# Patient Record
Sex: FEMALE | Race: WHITE | NOT HISPANIC OR LATINO | Employment: OTHER | URBAN - METROPOLITAN AREA
[De-identification: names, ages, dates, MRNs, and addresses within clinical notes are randomized per-mention and may not be internally consistent; named-entity substitution may affect disease eponyms.]

---

## 2017-02-02 ENCOUNTER — DOCUMENTATION ONLY (OUTPATIENT)
Dept: FAMILY MEDICINE | Facility: CLINIC | Age: 77
End: 2017-02-02

## 2017-02-02 NOTE — PROGRESS NOTES
Pre-Visit Chart Review  For Appointment Scheduled on 2/3/2017.     Health Maintenance Due   Topic Date Due    Foot Exam  10/16/1950    Eye Exam  [birth year]16/1950    TETANUS VACCINE  10/16/1956    Zoster Vaccine  10/16/1998    Hemoglobin A1c  12/07/2012    Lipid Panel  06/07/2013    Pneumococcal (65+) (2 of 2 - PCV13) 03/10/2015    DEXA SCAN  06/07/2015    Influenza Vaccine  08/01/2016                          '

## 2017-02-03 ENCOUNTER — LAB VISIT (OUTPATIENT)
Dept: LAB | Facility: HOSPITAL | Age: 77
End: 2017-02-03
Attending: FAMILY MEDICINE
Payer: MEDICARE

## 2017-02-03 ENCOUNTER — OFFICE VISIT (OUTPATIENT)
Dept: FAMILY MEDICINE | Facility: CLINIC | Age: 77
End: 2017-02-03
Payer: MEDICARE

## 2017-02-03 VITALS
SYSTOLIC BLOOD PRESSURE: 166 MMHG | BODY MASS INDEX: 23.29 KG/M2 | DIASTOLIC BLOOD PRESSURE: 86 MMHG | HEART RATE: 66 BPM | HEIGHT: 64 IN | TEMPERATURE: 98 F | RESPIRATION RATE: 18 BRPM | WEIGHT: 136.44 LBS

## 2017-02-03 DIAGNOSIS — E03.9 HYPOTHYROIDISM, UNSPECIFIED TYPE: ICD-10-CM

## 2017-02-03 DIAGNOSIS — I10 HTN (HYPERTENSION), BENIGN: ICD-10-CM

## 2017-02-03 DIAGNOSIS — C64.9 RENAL CELL CARCINOMA, UNSPECIFIED LATERALITY: ICD-10-CM

## 2017-02-03 DIAGNOSIS — E11.8 TYPE 2 DIABETES MELLITUS WITH COMPLICATION, WITH LONG-TERM CURRENT USE OF INSULIN: ICD-10-CM

## 2017-02-03 DIAGNOSIS — Z79.4 TYPE 2 DIABETES MELLITUS WITH COMPLICATION, WITH LONG-TERM CURRENT USE OF INSULIN: ICD-10-CM

## 2017-02-03 DIAGNOSIS — K21.9 GASTROESOPHAGEAL REFLUX DISEASE, ESOPHAGITIS PRESENCE NOT SPECIFIED: ICD-10-CM

## 2017-02-03 DIAGNOSIS — F02.80 ALZHEIMER'S DEMENTIA WITHOUT BEHAVIORAL DISTURBANCE, UNSPECIFIED TIMING OF DEMENTIA ONSET: ICD-10-CM

## 2017-02-03 DIAGNOSIS — F02.80 ALZHEIMER'S DEMENTIA WITHOUT BEHAVIORAL DISTURBANCE, UNSPECIFIED TIMING OF DEMENTIA ONSET: Primary | ICD-10-CM

## 2017-02-03 DIAGNOSIS — E78.5 HYPERLIPIDEMIA, UNSPECIFIED HYPERLIPIDEMIA TYPE: ICD-10-CM

## 2017-02-03 DIAGNOSIS — F33.0 MILD EPISODE OF RECURRENT MAJOR DEPRESSIVE DISORDER: ICD-10-CM

## 2017-02-03 DIAGNOSIS — Z23 IMMUNIZATION DUE: ICD-10-CM

## 2017-02-03 DIAGNOSIS — G30.9 ALZHEIMER'S DEMENTIA WITHOUT BEHAVIORAL DISTURBANCE, UNSPECIFIED TIMING OF DEMENTIA ONSET: Primary | ICD-10-CM

## 2017-02-03 DIAGNOSIS — G30.9 ALZHEIMER'S DEMENTIA WITHOUT BEHAVIORAL DISTURBANCE, UNSPECIFIED TIMING OF DEMENTIA ONSET: ICD-10-CM

## 2017-02-03 PROBLEM — F32.9 MAJOR DEPRESSIVE DISORDER: Status: ACTIVE | Noted: 2017-02-03

## 2017-02-03 LAB
25(OH)D3+25(OH)D2 SERPL-MCNC: 11 NG/ML
ALBUMIN SERPL BCP-MCNC: 3.5 G/DL
ALP SERPL-CCNC: 108 U/L
ALT SERPL W/O P-5'-P-CCNC: 12 U/L
ANION GAP SERPL CALC-SCNC: 10 MMOL/L
AST SERPL-CCNC: 15 U/L
BASOPHILS # BLD AUTO: 0.02 K/UL
BASOPHILS NFR BLD: 0.2 %
BILIRUB SERPL-MCNC: 0.5 MG/DL
BUN SERPL-MCNC: 14 MG/DL
CALCIUM SERPL-MCNC: 9.8 MG/DL
CHLORIDE SERPL-SCNC: 102 MMOL/L
CHOLEST/HDLC SERPL: 4 {RATIO}
CO2 SERPL-SCNC: 26 MMOL/L
CREAT SERPL-MCNC: 1 MG/DL
DIFFERENTIAL METHOD: ABNORMAL
EOSINOPHIL # BLD AUTO: 0.2 K/UL
EOSINOPHIL NFR BLD: 2.5 %
ERYTHROCYTE [DISTWIDTH] IN BLOOD BY AUTOMATED COUNT: 12.5 %
EST. GFR  (AFRICAN AMERICAN): >60 ML/MIN/1.73 M^2
EST. GFR  (NON AFRICAN AMERICAN): 54.1 ML/MIN/1.73 M^2
GLUCOSE SERPL-MCNC: 249 MG/DL
HCT VFR BLD AUTO: 41.5 %
HDL/CHOLESTEROL RATIO: 25.2 %
HDLC SERPL-MCNC: 202 MG/DL
HDLC SERPL-MCNC: 51 MG/DL
HGB BLD-MCNC: 14.1 G/DL
LDLC SERPL CALC-MCNC: 120.8 MG/DL
LYMPHOCYTES # BLD AUTO: 2.1 K/UL
LYMPHOCYTES NFR BLD: 24.7 %
MCH RBC QN AUTO: 32.9 PG
MCHC RBC AUTO-ENTMCNC: 34 %
MCV RBC AUTO: 97 FL
MONOCYTES # BLD AUTO: 0.4 K/UL
MONOCYTES NFR BLD: 5.2 %
NEUTROPHILS # BLD AUTO: 5.6 K/UL
NEUTROPHILS NFR BLD: 67.3 %
NONHDLC SERPL-MCNC: 151 MG/DL
PLATELET # BLD AUTO: 217 K/UL
PMV BLD AUTO: 10.5 FL
POTASSIUM SERPL-SCNC: 3.4 MMOL/L
PROT SERPL-MCNC: 7.6 G/DL
RBC # BLD AUTO: 4.29 M/UL
SODIUM SERPL-SCNC: 138 MMOL/L
T4 FREE SERPL-MCNC: 1.43 NG/DL
TRIGL SERPL-MCNC: 151 MG/DL
TSH SERPL DL<=0.005 MIU/L-ACNC: 4.72 UIU/ML
WBC # BLD AUTO: 8.29 K/UL

## 2017-02-03 PROCEDURE — 82306 VITAMIN D 25 HYDROXY: CPT

## 2017-02-03 PROCEDURE — 99999 PR PBB SHADOW E&M-EST. PATIENT-LVL III: CPT | Mod: PBBFAC,,, | Performed by: FAMILY MEDICINE

## 2017-02-03 PROCEDURE — 1160F RVW MEDS BY RX/DR IN RCRD: CPT | Mod: S$GLB,,, | Performed by: FAMILY MEDICINE

## 2017-02-03 PROCEDURE — 3077F SYST BP >= 140 MM HG: CPT | Mod: S$GLB,,, | Performed by: FAMILY MEDICINE

## 2017-02-03 PROCEDURE — 83036 HEMOGLOBIN GLYCOSYLATED A1C: CPT

## 2017-02-03 PROCEDURE — 84439 ASSAY OF FREE THYROXINE: CPT

## 2017-02-03 PROCEDURE — 90670 PCV13 VACCINE IM: CPT | Mod: S$GLB,,, | Performed by: FAMILY MEDICINE

## 2017-02-03 PROCEDURE — 84443 ASSAY THYROID STIM HORMONE: CPT

## 2017-02-03 PROCEDURE — 1159F MED LIST DOCD IN RCRD: CPT | Mod: S$GLB,,, | Performed by: FAMILY MEDICINE

## 2017-02-03 PROCEDURE — 90471 IMMUNIZATION ADMIN: CPT | Mod: S$GLB,,, | Performed by: FAMILY MEDICINE

## 2017-02-03 PROCEDURE — 80053 COMPREHEN METABOLIC PANEL: CPT

## 2017-02-03 PROCEDURE — 90715 TDAP VACCINE 7 YRS/> IM: CPT | Mod: S$GLB,,, | Performed by: FAMILY MEDICINE

## 2017-02-03 PROCEDURE — 36415 COLL VENOUS BLD VENIPUNCTURE: CPT | Mod: PO

## 2017-02-03 PROCEDURE — 3079F DIAST BP 80-89 MM HG: CPT | Mod: S$GLB,,, | Performed by: FAMILY MEDICINE

## 2017-02-03 PROCEDURE — 1157F ADVNC CARE PLAN IN RCRD: CPT | Mod: S$GLB,,, | Performed by: FAMILY MEDICINE

## 2017-02-03 PROCEDURE — 99499 UNLISTED E&M SERVICE: CPT | Mod: S$GLB,,, | Performed by: FAMILY MEDICINE

## 2017-02-03 PROCEDURE — G0008 ADMIN INFLUENZA VIRUS VAC: HCPCS | Mod: S$GLB,,, | Performed by: INTERNAL MEDICINE

## 2017-02-03 PROCEDURE — 1126F AMNT PAIN NOTED NONE PRSNT: CPT | Mod: S$GLB,,, | Performed by: FAMILY MEDICINE

## 2017-02-03 PROCEDURE — 99204 OFFICE O/P NEW MOD 45 MIN: CPT | Mod: 25,S$GLB,, | Performed by: FAMILY MEDICINE

## 2017-02-03 PROCEDURE — 90662 IIV NO PRSV INCREASED AG IM: CPT | Mod: S$GLB,,, | Performed by: INTERNAL MEDICINE

## 2017-02-03 PROCEDURE — 80061 LIPID PANEL: CPT

## 2017-02-03 PROCEDURE — 85025 COMPLETE CBC W/AUTO DIFF WBC: CPT

## 2017-02-03 PROCEDURE — G0009 ADMIN PNEUMOCOCCAL VACCINE: HCPCS | Mod: 59,S$GLB,, | Performed by: FAMILY MEDICINE

## 2017-02-03 PROCEDURE — 86592 SYPHILIS TEST NON-TREP QUAL: CPT

## 2017-02-03 RX ORDER — ENALAPRIL MALEATE 5 MG/1
5 TABLET ORAL DAILY
Qty: 90 TABLET | Refills: 3 | Status: SHIPPED | OUTPATIENT
Start: 2017-02-03 | End: 2018-02-01 | Stop reason: SDUPTHER

## 2017-02-03 RX ORDER — INSULIN GLARGINE 100 [IU]/ML
15 INJECTION, SOLUTION SUBCUTANEOUS NIGHTLY
Qty: 4.5 ML | Refills: 11 | Status: ON HOLD | OUTPATIENT
Start: 2017-02-03 | End: 2017-03-28 | Stop reason: HOSPADM

## 2017-02-03 RX ORDER — DONEPEZIL HYDROCHLORIDE 10 MG/1
10 TABLET, FILM COATED ORAL DAILY
COMMUNITY
End: 2017-02-03 | Stop reason: SDUPTHER

## 2017-02-03 RX ORDER — VENLAFAXINE 37.5 MG/1
37.5 TABLET ORAL 2 TIMES DAILY
COMMUNITY
End: 2017-05-15 | Stop reason: SDUPTHER

## 2017-02-03 RX ORDER — DONEPEZIL HYDROCHLORIDE 10 MG/1
10 TABLET, FILM COATED ORAL DAILY
Qty: 90 TABLET | Refills: 3 | Status: SHIPPED | OUTPATIENT
Start: 2017-02-03 | End: 2018-02-01 | Stop reason: SDUPTHER

## 2017-02-03 NOTE — PROGRESS NOTES
"[facility]  Progress Note    Subjective:       Patient ID: Nina Portillo is a 76 y.o. female.    Chief Complaint: Establish Care    HPI76 y.o.female with current medical history of hypertension, hypothyroidism, Alzheimer's, GERD, hyperlipidemia, type 2 diabetes, history of renal cell carcinoma, and depression is here today to establish care.  Patient has been doing well on current medications.  Patient is complaining of trouble eating.  Patient has trouble due to weak teeth.  Patient is currently being evaluated by dentist for possible dentures.  Patient has lost 10 pounds over the past few months.  Otherwise patient has been doing well.  Patient does not have any further complaints at today's visit.  Review of Systems   Constitutional: Negative for chills and fever.   HENT: Negative for congestion, ear pain, postnasal drip, rhinorrhea, sneezing and sore throat.    Eyes: Negative for pain and visual disturbance.   Respiratory: Negative for cough, shortness of breath and wheezing.    Cardiovascular: Negative for chest pain, palpitations and leg swelling.   Gastrointestinal: Negative for abdominal pain, constipation, diarrhea, nausea and vomiting.   Endocrine: Negative.    Genitourinary: Negative for dysuria, frequency and urgency.   Musculoskeletal: Negative for arthralgias and myalgias.   Skin: Negative for rash.   Allergic/Immunologic: Negative.    Neurological: Negative for syncope and headaches.   Hematological: Negative.    Psychiatric/Behavioral: Negative.        Objective:      Vitals:    02/03/17 0855 02/03/17 0914   BP: (!) 184/88 (!) 166/86   BP Location: Right arm    Patient Position: Sitting    BP Method: Automatic    Pulse: 66    Resp: 18    Temp: 97.8 °F (36.6 °C)    TempSrc: Oral    Weight: 61.9 kg (136 lb 7.4 oz)    Height: 5' 4" (1.626 m)      Body mass index is 23.42 kg/(m^2).  Physical Exam   Constitutional: She is oriented to person, place, and time. She appears well-developed and " well-nourished.   HENT:   Head: Normocephalic and atraumatic.   Eyes: Conjunctivae and EOM are normal. Pupils are equal, round, and reactive to light.   Neck: Normal range of motion. Neck supple. No JVD present.   Cardiovascular: Normal rate, regular rhythm, normal heart sounds and intact distal pulses.  Exam reveals no gallop and no friction rub.    No murmur heard.  Pulmonary/Chest: Effort normal. No respiratory distress. She has no wheezes.   Abdominal: Soft. Bowel sounds are normal. There is no tenderness. There is no rebound and no guarding.   Musculoskeletal: Normal range of motion.   Neurological: She is alert and oriented to person, place, and time. No cranial nerve deficit.   Skin: Skin is warm and dry.   Psychiatric: She has a normal mood and affect. Her behavior is normal. Judgment and thought content normal.   Nursing note and vitals reviewed.      Assessment:       1. Alzheimer's dementia without behavioral disturbance, unspecified timing of dementia onset    2. HTN (hypertension), benign    3. Hypothyroidism, unspecified type    4. Type 2 diabetes mellitus with complication, with long-term current use of insulin    5. Gastroesophageal reflux disease, esophagitis presence not specified    6. Hyperlipidemia, unspecified hyperlipidemia type    7. Mild episode of recurrent major depressive disorder    8. Renal cell carcinoma, unspecified laterality    9. Immunization due        Plan:       Alzheimer's dementia without behavioral disturbance, unspecified timing of dementia onset  -     donepezil (ARICEPT) 10 MG tablet; Take 1 tablet (10 mg total) by mouth once daily.  Dispense: 90 tablet; Refill: 3  -     RPR; Future; Expected date: 2/3/17  -     Cancel: HIV-1 and HIV-2 antibodies; Future; Expected date: 2/3/17    HTN (hypertension), benign  -     enalapril (VASOTEC) 5 MG tablet; Take 1 tablet (5 mg total) by mouth once daily.  Dispense: 90 tablet; Refill: 3  - Continue to monitor   - BP goal < 150/90      Hypothyroidism, unspecified type  -     TSH; Future; Expected date: 2/3/17  -     T4, free; Future; Expected date: 2/3/17    Type 2 diabetes mellitus with complication, with long-term current use of insulin  -     Ambulatory referral to Ophthalmology  -     insulin glargine (LANTUS) 100 unit/mL injection; Inject 15 Units into the skin every evening.  Dispense: 4.5 mL; Refill: 11  -     Ambulatory referral to Ophthalmology  -     Comprehensive metabolic panel; Future; Expected date: 2/3/17  -     Hemoglobin A1c; Future; Expected date: 2/3/17  -     Microalbumin/creatinine urine ratio; Future; Expected date: 2/3/17    Gastroesophageal reflux disease, esophagitis presence not specified  -     CBC auto differential; Future; Expected date: 2/3/17    Hyperlipidemia, unspecified hyperlipidemia type  -     Lipid panel; Future; Expected date: 2/3/17    Mild episode of recurrent major depressive disorder        - Well controlled continue current medications    Renal cell carcinoma, unspecified laterality  -     Vitamin D; Future; Expected date: 2/3/17  - Continue to monitor     Immunization due  -     Pneumococcal Conjugate Vaccine (13 Valent) (IM)  -     Tdap Vaccine (Adult)    Patient readiness: acceptance and barriers:none    During the course of the visit the patient was educated and counseled about the following:     Diabetes:  Discussed general issues about diabetes pathophysiology and management.  Educational material distributed.  Addressed ADA diet.  Suggested low cholesterol diet.  Encouraged aerobic exercise.  Discussed foot care.  Reminded to get yearly retinal exam.  Discussed ways to avoid symptomatic hypoglycemia.  Hypertension:   Dietary sodium restriction.  Regular aerobic exercise.  Check blood pressures daily and record.    Goals: Diabetes: Maintain Hemoglobin A1C below 7 and Hypertension: Reduce Blood Pressure    Did patient meet goals/outcomes: Yes    The following self management tools provided:  blood pressure log  blood glucose log    Patient Instructions (the written plan) was given to the patient/family.     Time spent with patient: 30 minutes    Return in about 6 months (around 8/3/2017).  Mariano Peacock MD  [facility]  2/3/2017 10:43 AM

## 2017-02-03 NOTE — PROGRESS NOTES
Prevnar given as ordered; RD;bandaid applied; patient tolerates well. TDaP given as ordered.LD;  Bandaid applied. Patient tolerates well

## 2017-02-04 LAB
ESTIMATED AVG GLUCOSE: 143 MG/DL
HBA1C MFR BLD HPLC: 6.6 %
RPR SER QL: NORMAL

## 2017-02-15 ENCOUNTER — TELEPHONE (OUTPATIENT)
Dept: FAMILY MEDICINE | Facility: CLINIC | Age: 77
End: 2017-02-15

## 2017-02-15 NOTE — TELEPHONE ENCOUNTER
----- Message from Mariano Peacock MD sent at 2/15/2017 10:57 AM CST -----  Please call and inform patient that her hemoglobin A1c has increased to 6.6.  Patient will need better diet and exercise.  The patient also has elevated TSH, low potassium, vitamin D deficiency, and elevated cholesterol.  Please have patient make appointment as soon as possible to discuss findings and formulate appropriate treatment plan.

## 2017-03-06 RX ORDER — LEVOTHYROXINE SODIUM 75 UG/1
75 TABLET ORAL DAILY
Qty: 90 TABLET | Refills: 0 | Status: SHIPPED | OUTPATIENT
Start: 2017-03-06 | End: 2017-06-01 | Stop reason: SDUPTHER

## 2017-03-15 ENCOUNTER — PATIENT MESSAGE (OUTPATIENT)
Dept: FAMILY MEDICINE | Facility: CLINIC | Age: 77
End: 2017-03-15

## 2017-03-16 ENCOUNTER — TELEPHONE (OUTPATIENT)
Dept: FAMILY MEDICINE | Facility: CLINIC | Age: 77
End: 2017-03-16

## 2017-03-16 NOTE — TELEPHONE ENCOUNTER
Patient's daughter, Carroll ([employer] employee) is inquiring about Hospice for patient. Does she need appointment or can she talk with you privately. Please advise

## 2017-03-23 ENCOUNTER — HOSPITAL ENCOUNTER (INPATIENT)
Facility: HOSPITAL | Age: 77
LOS: 6 days | Discharge: NURSING, REHAB OR LONG-TERM CARE | DRG: 871 | End: 2017-03-29
Attending: EMERGENCY MEDICINE | Admitting: EMERGENCY MEDICINE
Payer: MEDICARE

## 2017-03-23 ENCOUNTER — DOCUMENTATION ONLY (OUTPATIENT)
Dept: FAMILY MEDICINE | Facility: CLINIC | Age: 77
End: 2017-03-23

## 2017-03-23 DIAGNOSIS — G93.41 METABOLIC ENCEPHALOPATHY: ICD-10-CM

## 2017-03-23 LAB
ALBUMIN SERPL BCP-MCNC: 3.1 G/DL
ALP SERPL-CCNC: 103 U/L
ALT SERPL W/O P-5'-P-CCNC: 13 U/L
ANION GAP SERPL CALC-SCNC: 11 MMOL/L
AST SERPL-CCNC: 18 U/L
BACTERIA #/AREA URNS HPF: ABNORMAL /HPF
BASOPHILS # BLD AUTO: 0 K/UL
BASOPHILS NFR BLD: 0.1 %
BILIRUB SERPL-MCNC: 0.9 MG/DL
BILIRUB UR QL STRIP: NEGATIVE
BUN SERPL-MCNC: 9 MG/DL
CALCIUM SERPL-MCNC: 10.5 MG/DL
CHLORIDE SERPL-SCNC: 98 MMOL/L
CLARITY UR: ABNORMAL
CO2 SERPL-SCNC: 21 MMOL/L
COLOR UR: YELLOW
CREAT SERPL-MCNC: 0.9 MG/DL
DIFFERENTIAL METHOD: ABNORMAL
EOSINOPHIL # BLD AUTO: 0 K/UL
EOSINOPHIL NFR BLD: 0.3 %
ERYTHROCYTE [DISTWIDTH] IN BLOOD BY AUTOMATED COUNT: 12.3 %
EST. GFR  (AFRICAN AMERICAN): >60 ML/MIN/1.73 M^2
EST. GFR  (NON AFRICAN AMERICAN): >60 ML/MIN/1.73 M^2
GLUCOSE SERPL-MCNC: 279 MG/DL
GLUCOSE UR QL STRIP: ABNORMAL
HCT VFR BLD AUTO: 40.8 %
HGB BLD-MCNC: 13.8 G/DL
HGB UR QL STRIP: ABNORMAL
HYALINE CASTS #/AREA URNS LPF: 0 /LPF
KETONES UR QL STRIP: NEGATIVE
LEUKOCYTE ESTERASE UR QL STRIP: ABNORMAL
LYMPHOCYTES # BLD AUTO: 0.7 K/UL
LYMPHOCYTES NFR BLD: 5.9 %
MCH RBC QN AUTO: 31.5 PG
MCHC RBC AUTO-ENTMCNC: 33.9 %
MCV RBC AUTO: 93 FL
MICROSCOPIC COMMENT: ABNORMAL
MONOCYTES # BLD AUTO: 1 K/UL
MONOCYTES NFR BLD: 8.6 %
NEUTROPHILS # BLD AUTO: 10.3 K/UL
NEUTROPHILS NFR BLD: 85.1 %
NITRITE UR QL STRIP: POSITIVE
PH UR STRIP: 6 [PH] (ref 5–8)
PLATELET # BLD AUTO: 152 K/UL
PMV BLD AUTO: 7.9 FL
POTASSIUM SERPL-SCNC: 4.2 MMOL/L
PROT SERPL-MCNC: 7.5 G/DL
PROT UR QL STRIP: ABNORMAL
RBC # BLD AUTO: 4.38 M/UL
RBC #/AREA URNS HPF: 8 /HPF (ref 0–4)
SODIUM SERPL-SCNC: 130 MMOL/L
SP GR UR STRIP: >=1.03 (ref 1–1.03)
SQUAMOUS #/AREA URNS HPF: 3 /HPF
TROPONIN I SERPL DL<=0.01 NG/ML-MCNC: 0.01 NG/ML
TSH SERPL DL<=0.005 MIU/L-ACNC: 0.99 UIU/ML
URN SPEC COLLECT METH UR: ABNORMAL
UROBILINOGEN UR STRIP-ACNC: 1 EU/DL
WBC # BLD AUTO: 12.1 K/UL
WBC #/AREA URNS HPF: >100 /HPF (ref 0–5)
YEAST URNS QL MICRO: ABNORMAL

## 2017-03-23 PROCEDURE — 96365 THER/PROPH/DIAG IV INF INIT: CPT

## 2017-03-23 PROCEDURE — 63600175 PHARM REV CODE 636 W HCPCS: Performed by: EMERGENCY MEDICINE

## 2017-03-23 PROCEDURE — 87077 CULTURE AEROBIC IDENTIFY: CPT

## 2017-03-23 PROCEDURE — 81000 URINALYSIS NONAUTO W/SCOPE: CPT

## 2017-03-23 PROCEDURE — 12000002 HC ACUTE/MED SURGE SEMI-PRIVATE ROOM

## 2017-03-23 PROCEDURE — 99285 EMERGENCY DEPT VISIT HI MDM: CPT | Mod: 25

## 2017-03-23 PROCEDURE — 87186 SC STD MICRODIL/AGAR DIL: CPT

## 2017-03-23 PROCEDURE — 85025 COMPLETE CBC W/AUTO DIFF WBC: CPT

## 2017-03-23 PROCEDURE — 36415 COLL VENOUS BLD VENIPUNCTURE: CPT

## 2017-03-23 PROCEDURE — 25000003 PHARM REV CODE 250: Performed by: EMERGENCY MEDICINE

## 2017-03-23 PROCEDURE — 84484 ASSAY OF TROPONIN QUANT: CPT

## 2017-03-23 PROCEDURE — 96361 HYDRATE IV INFUSION ADD-ON: CPT

## 2017-03-23 PROCEDURE — 80053 COMPREHEN METABOLIC PANEL: CPT

## 2017-03-23 PROCEDURE — 51702 INSERT TEMP BLADDER CATH: CPT

## 2017-03-23 PROCEDURE — 84443 ASSAY THYROID STIM HORMONE: CPT

## 2017-03-23 PROCEDURE — 87086 URINE CULTURE/COLONY COUNT: CPT

## 2017-03-23 PROCEDURE — 87088 URINE BACTERIA CULTURE: CPT

## 2017-03-23 RX ADMIN — SODIUM CHLORIDE 1000 ML: 0.9 INJECTION, SOLUTION INTRAVENOUS at 09:03

## 2017-03-23 RX ADMIN — CEFTRIAXONE 1 G: 1 INJECTION, SOLUTION INTRAVENOUS at 10:03

## 2017-03-23 NOTE — PROGRESS NOTES
Pre-Visit Chart Review  For Appointment Scheduled on 3/24/17.    Health Maintenance Due   Topic Date Due    Eye Exam  10[birth year]16/1950    Zoster Vaccine  10/16/1998    DEXA SCAN  06/07/2015

## 2017-03-23 NOTE — IP AVS SNAPSHOT
[address] [place] [address]  Phone: [phone]           Patient Discharge Instructions   Our goal is to set you up for success. This packet includes information on your condition, medications, and your home care.  It will help you care for yourself to prevent having to return to the hospital.     Please ask your nurse if you have any questions.      There are many details to remember when preparing to leave the hospital. Here is what you will need to do:    1. Take your medicine. If you are prescribed medications, review your Medication List on the following pages. You may have new medications to  at the pharmacy and others that you'll need to stop taking. Review the instructions for how and when to take your medications. Talk with your doctor or nurses if you are unsure of what to do.     2. Go to your follow-up appointments. Specific follow-up information is listed in the following pages. Your may be contacted by a nurse or clinical provider about future appointments. Be sure we have all of the phone numbers to reach you. Please contact your provider's office if you are unable to make an appointment.     3. Watch for warning signs. Your doctor or nurse will give you detailed warning signs to watch for and when to call for assistance. These instructions may also include educational information about your condition. If you experience any of warning signs to your health, call your doctor.               [facility]  Unless otherwise directed by your provider, please contact [facility], our nurse care line that is available for 24/7 assistance.     [phone] (toll-free)    Registered nurses in the [facility] provide clinical advisement, health education, appointment booking, and other advisory services.                    ** Verify the list of medication(s) below is accurate and up to date. Carry this with you in case of emergency. If  your medications have changed, please notify your healthcare provider.             Medication List      START taking these medications        Additional Info    Begin Date AM Noon PM Bedtime    ciprofloxacin HCl 500 MG tablet   Commonly known as:  CIPRO   Refills:  0   Dose:  500 mg   Indications:  Urinary Tract Infection    Last time this was given:  500 mg on 3/29/2017  8:46 AM   Instructions:  Take 1 tablet (500 mg total) by mouth every 12 (twelve) hours.                                 CONTINUE taking these medications        Additional Info    Begin Date AM Noon PM Bedtime    amlodipine 5 MG tablet   Commonly known as:  NORVASC   Quantity:  90 tablet   Refills:  3   Dose:  5 mg    Last time this was given:  5 mg on 3/29/2017  8:46 AM   Instructions:  Take 1 tablet (5 mg total) by mouth once daily.                               aspirin 325 MG tablet   Refills:  0   Dose:  325 mg    Last time this was given:  325 mg on 3/29/2017  8:46 AM   Instructions:  Take 325 mg by mouth once daily.                               donepezil 10 MG tablet   Commonly known as:  ARICEPT   Quantity:  90 tablet   Refills:  3   Dose:  10 mg    Last time this was given:  10 mg on 3/29/2017  8:46 AM   Instructions:  Take 1 tablet (10 mg total) by mouth once daily.                               enalapril 5 MG tablet   Commonly known as:  VASOTEC   Quantity:  90 tablet   Refills:  3   Dose:  5 mg    Last time this was given:  5 mg on 3/29/2017  8:46 AM   Instructions:  Take 1 tablet (5 mg total) by mouth once daily.                               levothyroxine 75 MCG tablet   Commonly known as:  SYNTHROID   Quantity:  90 tablet   Refills:  0   Dose:  75 mcg    Last time this was given:  75 mcg on 3/29/2017  5:10 AM   Instructions:  Take 1 tablet (75 mcg total) by mouth once daily.                               MULTI VITAMIN ORAL   Refills:  0   Dose:  1 tablet    Instructions:  Take 1 tablet by mouth once daily.                             "   pantoprazole 40 MG tablet   Commonly known as:  PROTONIX   Quantity:  90 tablet   Refills:  3   Dose:  40 mg    Last time this was given:  40 mg on 3/29/2017  8:46 AM   Instructions:  Take 1 tablet (40 mg total) by mouth once daily.                               venlafaxine 37.5 MG Tab   Commonly known as:  EFFEXOR   Refills:  0   Dose:  37.5 mg    Last time this was given:  37.5 mg on 3/29/2017  8:46 AM   Instructions:  Take 37.5 mg by mouth 2 (two) times daily.                                    STOP taking these medications     BD INSULIN PEN NEEDLE UF SHORT 31 gauge x 5/16" Ndle   Generic drug:  pen needle, diabetic       blood sugar diagnostic Strp       blood-glucose meter kit       insulin glargine 100 unit/mL injection   Commonly known as:  LANTUS            Where to Get Your Medications      Information about where to get these medications is not yet available     ! Ask your nurse or doctor about these medications     ciprofloxacin HCl 500 MG tablet                  Please bring to all follow up appointments:    1. A copy of your discharge instructions.  2. All medicines you are currently taking in their original bottles.  3. Identification and insurance card.    Please arrive 15 minutes ahead of scheduled appointment time.    Please call 24 hours in advance if you must reschedule your appointment and/or time.        Your Scheduled Appointments     Apr 07, 2017 10:00 AM CDT   Established Patient Visit with MD [facility] - [facility] ([place])    [address] [place] [address]   [phone]            Aug 04, 2017 10:40 AM CDT   Established Patient Visit with MD [facility] - [facility] ([place])    [place] [address]   [phone]              Follow-up Information     Follow up with Lydia Singh.    Specialties:  SNF Agency, Allergy, Allergy    Why:  SNF          Discharge Instructions     " "  ********************************************************  Discharge to [facility] for SNF  PT and OT 5 times a week.  Diet:  1800 calorie diabetic  Activity:  Ambulate with assistance  Meds:  See list.  Stop date for ciprofloxacin is 4/1.  *********************************************************      Primary Diagnosis     Your primary diagnosis was:  Metabolic Brain Disorder      Admission Information     Date & Time Provider Department CSN    3/23/2017  8:52 PM Miguel Portillo MD [facility] [id number]      Care Providers     Provider Role Specialty Primary office phone    Miguel Portillo MD Attending Provider Hospitalist [phone]      Important Medicare Message          Most Recent Value    Important Message from Medicare Regarding Discharge Appeal Rights  Explained to patient/caregiver, Signed/date by patient/caregiver yes 03/28/2017 0910      Your Vitals Were     BP Pulse Temp Resp Height Weight    159/74 (BP Location: Left arm, Patient Position: Lying, BP Method: Automatic) 72 98.2 °F (36.8 °C) (Oral) 18 5' 5" (1.651 m) 61.7 kg (136 lb)    SpO2 BMI             96% 22.63 kg/m2         Recent Lab Values        6/7/2012 2/3/2017 3/24/2017                     8:38 AM 10:20 AM  7:00 AM         A1C 6.1 6.6 (H) 6.9 (H)         Comment for A1C at 10:20 AM on 2/3/2017:  According to ADA guidelines, hemoglobin A1C <7.0% represents  optimal control in non-pregnant diabetic patients.  Different  metrics may apply to specific populations.   Standards of Medical Care in Diabetes - 2016.  For the purpose of screening for the presence of diabetes:  <5.7%     Consistent with the absence of diabetes  5.7-6.4%  Consistent with increasing risk for diabetes   (prediabetes)  >or=6.5%  Consistent with diabetes  Currently no consensus exists for use of hemoglobin A1C  for diagnosis of diabetes for children.      Comment for A1C at  7:00 AM on 3/24/2017:  According to ADA guidelines, hemoglobin " A1C <7.0% represents  optimal control in non-pregnant diabetic patients.  Different  metrics may apply to specific populations.   Standards of Medical Care in Diabetes - 2016.  For the purpose of screening for the presence of diabetes:  <5.7%     Consistent with the absence of diabetes  5.7-6.4%  Consistent with increasing risk for diabetes   (prediabetes)  >or=6.5%  Consistent with diabetes  Currently no consensus exists for use of hemoglobin A1C  for diagnosis of diabetes for children.        Allergies as of 3/29/2017        Reactions    Phenergan [Promethazine] Other (See Comments)    hallucinations    Prilosec [Omeprazole Magnesium] Rash      Advance Directives     An advance directive is a document which, in the event you are no longer able to make decisions for yourself, tells your healthcare team what kind of treatment you do or do not want to receive, or who you would like to make those decisions for you.  If you do not currently have an advance directive, [facility] encourages you to create one.  For more information call:  [phone]), [phone] ([phone]),  or log on to [email or web address].        Language Assistance Services     ATTENTION: Language assistance services are available, free of charge. Please call [phone].      ATENCIÓN: Si habla español, tiene a lindsey disposición servicios gratuitos de asistencia lingüística. Llame al [phone].     [facility] Ý: N?u b?n nói Ti?ng Vi?t, có các d?ch v? h? tr? ngôn ng? mi?n phí dành cho b?n. G?i s? [phone].        Diabetes Discharge Instructions                                    [facility] complies with applicable Federal civil rights laws and does not discriminate on the basis of race, color, national origin, age, disability, or sex.

## 2017-03-24 PROBLEM — Z85.528 HISTORY OF RENAL CELL CARCINOMA: Status: ACTIVE | Noted: 2017-02-03

## 2017-03-24 PROBLEM — E87.1 HYPONATREMIA: Status: ACTIVE | Noted: 2017-03-24

## 2017-03-24 PROBLEM — N30.00 ACUTE CYSTITIS WITHOUT HEMATURIA: Status: ACTIVE | Noted: 2017-03-24

## 2017-03-24 LAB
CRP SERPL-MCNC: 111.7 MG/L
ESTIMATED AVG GLUCOSE: 151 MG/DL
HBA1C MFR BLD HPLC: 6.9 %
POCT GLUCOSE: 186 MG/DL (ref 70–110)
POCT GLUCOSE: 208 MG/DL (ref 70–110)
POCT GLUCOSE: 229 MG/DL (ref 70–110)
POCT GLUCOSE: 258 MG/DL (ref 70–110)
SODIUM UR-SCNC: 35 MMOL/L

## 2017-03-24 PROCEDURE — 84300 ASSAY OF URINE SODIUM: CPT

## 2017-03-24 PROCEDURE — 12000002 HC ACUTE/MED SURGE SEMI-PRIVATE ROOM

## 2017-03-24 PROCEDURE — 86140 C-REACTIVE PROTEIN: CPT

## 2017-03-24 PROCEDURE — 36415 COLL VENOUS BLD VENIPUNCTURE: CPT

## 2017-03-24 PROCEDURE — 25000003 PHARM REV CODE 250: Performed by: NURSE PRACTITIONER

## 2017-03-24 PROCEDURE — 83036 HEMOGLOBIN GLYCOSYLATED A1C: CPT

## 2017-03-24 PROCEDURE — 63600175 PHARM REV CODE 636 W HCPCS: Performed by: NURSE PRACTITIONER

## 2017-03-24 RX ORDER — DONEPEZIL HYDROCHLORIDE 5 MG/1
10 TABLET, FILM COATED ORAL DAILY
Status: DISCONTINUED | OUTPATIENT
Start: 2017-03-24 | End: 2017-03-29 | Stop reason: HOSPADM

## 2017-03-24 RX ORDER — ENOXAPARIN SODIUM 100 MG/ML
40 INJECTION SUBCUTANEOUS EVERY 24 HOURS
Status: DISCONTINUED | OUTPATIENT
Start: 2017-03-24 | End: 2017-03-29 | Stop reason: HOSPADM

## 2017-03-24 RX ORDER — ENALAPRIL MALEATE 5 MG/1
5 TABLET ORAL DAILY
Status: DISCONTINUED | OUTPATIENT
Start: 2017-03-24 | End: 2017-03-29 | Stop reason: HOSPADM

## 2017-03-24 RX ORDER — AMLODIPINE BESYLATE 5 MG/1
5 TABLET ORAL DAILY
Status: DISCONTINUED | OUTPATIENT
Start: 2017-03-24 | End: 2017-03-29 | Stop reason: HOSPADM

## 2017-03-24 RX ORDER — PANTOPRAZOLE SODIUM 40 MG/1
40 TABLET, DELAYED RELEASE ORAL DAILY
Status: DISCONTINUED | OUTPATIENT
Start: 2017-03-24 | End: 2017-03-29 | Stop reason: HOSPADM

## 2017-03-24 RX ORDER — IBUPROFEN 200 MG
24 TABLET ORAL
Status: DISCONTINUED | OUTPATIENT
Start: 2017-03-24 | End: 2017-03-29 | Stop reason: HOSPADM

## 2017-03-24 RX ORDER — VENLAFAXINE 37.5 MG/1
37.5 TABLET ORAL 2 TIMES DAILY
Status: DISCONTINUED | OUTPATIENT
Start: 2017-03-24 | End: 2017-03-29 | Stop reason: HOSPADM

## 2017-03-24 RX ORDER — AMOXICILLIN 250 MG
1 CAPSULE ORAL 2 TIMES DAILY
Status: DISCONTINUED | OUTPATIENT
Start: 2017-03-24 | End: 2017-03-29 | Stop reason: HOSPADM

## 2017-03-24 RX ORDER — IBUPROFEN 200 MG
16 TABLET ORAL
Status: DISCONTINUED | OUTPATIENT
Start: 2017-03-24 | End: 2017-03-29 | Stop reason: HOSPADM

## 2017-03-24 RX ORDER — GLUCAGON 1 MG
1 KIT INJECTION
Status: DISCONTINUED | OUTPATIENT
Start: 2017-03-24 | End: 2017-03-29 | Stop reason: HOSPADM

## 2017-03-24 RX ORDER — INSULIN ASPART 100 [IU]/ML
0-5 INJECTION, SOLUTION INTRAVENOUS; SUBCUTANEOUS
Status: DISCONTINUED | OUTPATIENT
Start: 2017-03-24 | End: 2017-03-29 | Stop reason: HOSPADM

## 2017-03-24 RX ORDER — ASPIRIN 325 MG
325 TABLET ORAL DAILY
Status: DISCONTINUED | OUTPATIENT
Start: 2017-03-24 | End: 2017-03-29 | Stop reason: HOSPADM

## 2017-03-24 RX ORDER — ONDANSETRON 2 MG/ML
4 INJECTION INTRAMUSCULAR; INTRAVENOUS EVERY 4 HOURS PRN
Status: DISCONTINUED | OUTPATIENT
Start: 2017-03-24 | End: 2017-03-29 | Stop reason: HOSPADM

## 2017-03-24 RX ORDER — ACETAMINOPHEN 325 MG/1
650 TABLET ORAL EVERY 6 HOURS PRN
Status: DISCONTINUED | OUTPATIENT
Start: 2017-03-24 | End: 2017-03-29 | Stop reason: HOSPADM

## 2017-03-24 RX ORDER — SODIUM CHLORIDE 9 MG/ML
INJECTION, SOLUTION INTRAVENOUS CONTINUOUS
Status: DISCONTINUED | OUTPATIENT
Start: 2017-03-24 | End: 2017-03-29 | Stop reason: HOSPADM

## 2017-03-24 RX ADMIN — STANDARDIZED SENNA CONCENTRATE AND DOCUSATE SODIUM 1 TABLET: 8.6; 5 TABLET, FILM COATED ORAL at 10:03

## 2017-03-24 RX ADMIN — LEVOTHYROXINE SODIUM 75 MCG: 25 TABLET ORAL at 06:03

## 2017-03-24 RX ADMIN — PANTOPRAZOLE SODIUM 40 MG: 40 TABLET, DELAYED RELEASE ORAL at 10:03

## 2017-03-24 RX ADMIN — STANDARDIZED SENNA CONCENTRATE AND DOCUSATE SODIUM 1 TABLET: 8.6; 5 TABLET, FILM COATED ORAL at 09:03

## 2017-03-24 RX ADMIN — CEFTRIAXONE 1 G: 1 INJECTION, SOLUTION INTRAVENOUS at 09:03

## 2017-03-24 RX ADMIN — INSULIN ASPART 2 UNITS: 100 INJECTION, SOLUTION INTRAVENOUS; SUBCUTANEOUS at 02:03

## 2017-03-24 RX ADMIN — INSULIN ASPART 2 UNITS: 100 INJECTION, SOLUTION INTRAVENOUS; SUBCUTANEOUS at 06:03

## 2017-03-24 RX ADMIN — ASPIRIN 325 MG ORAL TABLET 325 MG: 325 PILL ORAL at 10:03

## 2017-03-24 RX ADMIN — VENLAFAXINE 37.5 MG: 37.5 TABLET ORAL at 10:03

## 2017-03-24 RX ADMIN — ENALAPRIL MALEATE 5 MG: 5 TABLET ORAL at 10:03

## 2017-03-24 RX ADMIN — VENLAFAXINE 37.5 MG: 37.5 TABLET ORAL at 09:03

## 2017-03-24 RX ADMIN — DONEPEZIL HYDROCHLORIDE 10 MG: 5 TABLET, FILM COATED ORAL at 10:03

## 2017-03-24 RX ADMIN — INSULIN ASPART 1 UNITS: 100 INJECTION, SOLUTION INTRAVENOUS; SUBCUTANEOUS at 09:03

## 2017-03-24 RX ADMIN — ENOXAPARIN SODIUM 40 MG: 100 INJECTION SUBCUTANEOUS at 05:03

## 2017-03-24 RX ADMIN — SODIUM CHLORIDE: 0.9 INJECTION, SOLUTION INTRAVENOUS at 07:03

## 2017-03-24 RX ADMIN — AMLODIPINE BESYLATE 5 MG: 5 TABLET ORAL at 10:03

## 2017-03-24 NOTE — ED NOTES
"Patient identifiers for Nina Portillo checked and correct.  LOC:  Patient is awake, alert, and aware of environment with an appropriate affect. Pt disoriented to time and situation and speaking appropriately.  APPEARANCE:  Patient resting comfortably and in no acute distress. Patient is clean and well groomed, patient's clothing is properly fastened.  SKIN:  The skin is warm and dry. Patient has normal skin turgor and moist mucus membranes. Skin is intact; no bruising or breakdown noted.  MUSCULOSKELETAL:  Patient is moving all extremities well, no obvious deformities noted. Pulses intact. Pedal edema noted candido lower legs.   RESPIRATORY:  Airway is open and patent. Respirations are spontaneous and non-labored with normal effort and rate.  CARDIAC:  Patient has a normal rate and rhythm. Capillary refill < 3 seconds.  ABDOMEN:  No distention noted.  Soft and non-tender upon palpation.  NEUROLOGICAL:  PERRL. Facial expression is symmetrical. Hand grasps are equal bilaterally. Normal sensation in all extremities when touched with finger.  Allergies reported:   Review of patient's allergies indicates:   Allergen Reactions    Phenergan [promethazine] Other (See Comments)     hallucinations    Prilosec [omeprazole magnesium] Rash     OTHER NOTES:  Family states has been having declined mental and physical status x48 hrs, family having to feed and change pt. Unable to get up and walk to bathroom. Pt states she just "doesn't feel well".  "

## 2017-03-24 NOTE — ASSESSMENT & PLAN NOTE
Chronic  Continue Aricept  Fall precautions  Nursing aware of diagnosis  Daughter sleeping at BS

## 2017-03-24 NOTE — PLAN OF CARE
PCP is Dr Peacock.  Verified insurance as [employer].  Pharmacy is [facility] [address].       03/24/17 1130   Discharge Assessment   Assessment Type Discharge Planning Assessment   Confirmed/corrected address and phone number on facesheet? Yes   Assessment information obtained from? Patient;Other  (daughter)   Type of Healthcare Directive Received Durable power of  for health care  (daughter Soledad Kahn)   Prior to hospitilization cognitive status: Alert/Oriented   Prior to hospitalization functional status: Independent;Assistive Equipment   Current cognitive status: Alert/Oriented   Current Functional Status: Independent;Assistive Equipment   Arrived From home or self-care   Lives With child(dianna), adult  (daughter)   Able to Return to Prior Arrangements yes   Is patient able to care for self after discharge? Yes   How many people do you have in your home that can help with your care after discharge? 1   Patient's perception of discharge disposition home or selfcare   Readmission Within The Last 30 Days no previous admission in last 30 days   Patient currently being followed by outpatient case management? No   Patient currently receives home health services? No   Does the patient currently use HME? Yes   Patient currently receives private duty nursing? No   Patient currently receives any other outside agency services? No   Equipment Currently Used at Home glucometer;cane, straight;rollator   Do you have any problems affording any of your prescribed medications? No   Is the patient taking medications as prescribed? yes   Do you have any financial concerns preventing you from receiving the healthcare you need? No   Does the patient have transportation to healthcare appointments? Yes   Transportation Available family or friend will provide   On Dialysis? No   Does the patient receive services at the Coumadin Clinic? No   Discharge Plan A Home   Patient/Family In Agreement With Plan yes

## 2017-03-24 NOTE — PLAN OF CARE
Problem: Patient Care Overview  Goal: Plan of Care Review  Outcome: Ongoing (interventions implemented as appropriate)  Alertness increasing, conversing with daughter and staff. Oriented x1, Iv medication continued

## 2017-03-24 NOTE — SUBJECTIVE & OBJECTIVE
"Past Medical History:   Diagnosis Date    Alzheimer disease 2012    Cancer     renal cell. right    Dementia     Diabetes mellitus     GERD (gastroesophageal reflux disease)     Hyperlipidemia     Hypothyroid     Memory loss     Movement disorder        Past Surgical History:   Procedure Laterality Date    HYSTERECTOMY      JOINT REPLACEMENT      right    KNEE ARTHROSCOPY W/ DEBRIDEMENT      left    PARTIAL NEPHRECTOMY      right.     TONSILLECTOMY         Review of patient's allergies indicates:   Allergen Reactions    Phenergan [promethazine] Other (See Comments)     hallucinations    Prilosec [omeprazole magnesium] Rash       No current facility-administered medications on file prior to encounter.      Current Outpatient Prescriptions on File Prior to Encounter   Medication Sig    amlodipine (NORVASC) 5 MG tablet Take 1 tablet (5 mg total) by mouth once daily.    BD INSULIN PEN NEEDLE UF SHORT 31 X 5/16 " Ndle USE AS DIRECTED    blood sugar diagnostic Strp True results test strips and lancets  use twice a day to test blood sugar   DX:250.00    blood-glucose meter kit Please dispence true test results meter    donepezil (ARICEPT) 10 MG tablet Take 1 tablet (10 mg total) by mouth once daily.    enalapril (VASOTEC) 5 MG tablet Take 1 tablet (5 mg total) by mouth once daily.    insulin glargine (LANTUS) 100 unit/mL injection Inject 15 Units into the skin every evening.    MULTIVIT-MINERALS/FERROUS FUM (MULTI VITAMIN ORAL) Take 1 tablet by mouth once daily.    pantoprazole (PROTONIX) 40 MG tablet Take 1 tablet (40 mg total) by mouth once daily.    aspirin 325 MG tablet Take 325 mg by mouth once daily.    levothyroxine (SYNTHROID) 75 MCG tablet Take 1 tablet (75 mcg total) by mouth once daily.    venlafaxine (EFFEXOR) 37.5 MG Tab Take 37.5 mg by mouth 2 (two) times daily.     Family History     Problem Relation (Age of Onset)    Cancer Mother (90)        Social History Main Topics    " Smoking status: Never Smoker    Smokeless tobacco: Never Used    Alcohol use No    Drug use: No    Sexual activity: No     Review of Systems   Unable to perform ROS: Dementia     Objective:     Vital Signs (Most Recent):  Temp: 98.3 °F (36.8 °C) (03/24/17 0747)  Pulse: 60 (03/24/17 0747)  Resp: 18 (03/24/17 0747)  BP: (!) 154/84 (03/24/17 0747)  SpO2: 96 % (03/24/17 0747) Vital Signs (24h Range):  Temp:  [97.9 °F (36.6 °C)-98.5 °F (36.9 °C)] 98.3 °F (36.8 °C)  Pulse:  [] 60  Resp:  [16-24] 18  SpO2:  [90 %-99 %] 96 %  BP: (136-154)/(69-91) 154/84     Weight: 61.7 kg (136 lb)  Body mass index is 22.63 kg/(m^2).    Physical Exam   Constitutional: She appears well-developed and well-nourished. No distress.   HENT:   Head: Normocephalic and atraumatic.   Mouth/Throat: Oropharynx is clear and moist.   Eyes: Conjunctivae are normal. Pupils are equal, round, and reactive to light. Right eye exhibits no discharge. Left eye exhibits no discharge. No scleral icterus.   Neck: No JVD present. No thyromegaly present.   Cardiovascular: Normal rate and regular rhythm.  Exam reveals no gallop and no friction rub.    No murmur heard.  Pulmonary/Chest: Effort normal and breath sounds normal. No respiratory distress. She has no wheezes. She has no rales. She exhibits no tenderness.   Abdominal: Soft. Bowel sounds are normal. She exhibits no distension. There is no tenderness. There is no rebound and no guarding.   Musculoskeletal: Normal range of motion. She exhibits no edema or tenderness.   Lymphadenopathy:     She has no cervical adenopathy.   Neurological: She is alert. No cranial nerve deficit.   Pleasantly confused   Skin: Skin is warm and dry. She is not diaphoretic.   Psychiatric: She has a normal mood and affect. Her behavior is normal. Judgment and thought content normal.   Vitals reviewed.       Significant Labs:   CBC:   Recent Labs  Lab 03/23/17  2145   WBC 12.10   HGB 13.8   HCT 40.8        CMP:   Recent  Labs  Lab 03/23/17  2145   *   K 4.2   CL 98   CO2 21*   *   BUN 9   CREATININE 0.9   CALCIUM 10.5   PROT 7.5   ALBUMIN 3.1*   BILITOT 0.9   ALKPHOS 103   AST 18   ALT 13   ANIONGAP 11   EGFRNONAA >60

## 2017-03-24 NOTE — H&P
"[facility] Hospital Medicine  History & Physical    Patient Name: Nina Portillo  MRN: [id number]  Admission Date: 3/23/2017  Attending Physician: Sinan Miranda MD   Primary Care Provider: Mariano Peacock MD         Patient information was obtained from relative(s), past medical records and ER records.     Subjective:     Principal Problem:Metabolic encephalopathy    Chief Complaint:   Chief Complaint   Patient presents with    Fatigue      since yesterday with incontinence and inability to feed self; heavier breathing; pt has hx of Dementia        HPI: Pt is a 77 yo female admitted to the services of hospital medicine via the ER for metabolic encephalopathy secondary to UTI. Unable to obtain history and ROS due to dementia. According to the pt's daughter, Ms. Portillo has not "been herself" for the past week, not wanting to eat, confused and has been incontinent of urine. Other past medical history as below.    Past Medical History:   Diagnosis Date    Alzheimer disease 2012    Cancer     renal cell. right    Dementia     Diabetes mellitus     GERD (gastroesophageal reflux disease)     Hyperlipidemia     Hypothyroid     Memory loss     Movement disorder        Past Surgical History:   Procedure Laterality Date    HYSTERECTOMY      JOINT REPLACEMENT      right    KNEE ARTHROSCOPY W/ DEBRIDEMENT      left    PARTIAL NEPHRECTOMY      right.     TONSILLECTOMY         Review of patient's allergies indicates:   Allergen Reactions    Phenergan [promethazine] Other (See Comments)     hallucinations    Prilosec [omeprazole magnesium] Rash       No current facility-administered medications on file prior to encounter.      Current Outpatient Prescriptions on File Prior to Encounter   Medication Sig    amlodipine (NORVASC) 5 MG tablet Take 1 tablet (5 mg total) by mouth once daily.    BD INSULIN PEN NEEDLE UF SHORT 31 X 5/16 " Ndle USE AS DIRECTED    blood sugar diagnostic Strp True " results test strips and lancets  use twice a day to test blood sugar   DX:250.00    blood-glucose meter kit Please dispence true test results meter    donepezil (ARICEPT) 10 MG tablet Take 1 tablet (10 mg total) by mouth once daily.    enalapril (VASOTEC) 5 MG tablet Take 1 tablet (5 mg total) by mouth once daily.    insulin glargine (LANTUS) 100 unit/mL injection Inject 15 Units into the skin every evening.    MULTIVIT-MINERALS/FERROUS FUM (MULTI VITAMIN ORAL) Take 1 tablet by mouth once daily.    pantoprazole (PROTONIX) 40 MG tablet Take 1 tablet (40 mg total) by mouth once daily.    aspirin 325 MG tablet Take 325 mg by mouth once daily.    levothyroxine (SYNTHROID) 75 MCG tablet Take 1 tablet (75 mcg total) by mouth once daily.    venlafaxine (EFFEXOR) 37.5 MG Tab Take 37.5 mg by mouth 2 (two) times daily.     Family History     Problem Relation (Age of Onset)    Cancer Mother (90)        Social History Main Topics    Smoking status: Never Smoker    Smokeless tobacco: Never Used    Alcohol use No    Drug use: No    Sexual activity: No     Review of Systems   Unable to perform ROS: Dementia     Objective:     Vital Signs (Most Recent):  Temp: 98.3 °F (36.8 °C) (03/24/17 0747)  Pulse: 60 (03/24/17 0747)  Resp: 18 (03/24/17 0747)  BP: (!) 154/84 (03/24/17 0747)  SpO2: 96 % (03/24/17 0747) Vital Signs (24h Range):  Temp:  [97.9 °F (36.6 °C)-98.5 °F (36.9 °C)] 98.3 °F (36.8 °C)  Pulse:  [] 60  Resp:  [16-24] 18  SpO2:  [90 %-99 %] 96 %  BP: (136-154)/(69-91) 154/84     Weight: 61.7 kg (136 lb)  Body mass index is 22.63 kg/(m^2).    Physical Exam   Constitutional: She appears well-developed and well-nourished. No distress.   HENT:   Head: Normocephalic and atraumatic.   Mouth/Throat: Oropharynx is clear and moist.   Eyes: Conjunctivae are normal. Pupils are equal, round, and reactive to light. Right eye exhibits no discharge. Left eye exhibits no discharge. No scleral icterus.   Neck: No JVD  present. No thyromegaly present.   Cardiovascular: Normal rate and regular rhythm.  Exam reveals no gallop and no friction rub.    No murmur heard.  Pulmonary/Chest: Effort normal and breath sounds normal. No respiratory distress. She has no wheezes. She has no rales. She exhibits no tenderness.   Abdominal: Soft. Bowel sounds are normal. She exhibits no distension. There is no tenderness. There is no rebound and no guarding.   Musculoskeletal: Normal range of motion. She exhibits no edema or tenderness.   Lymphadenopathy:     She has no cervical adenopathy.   Neurological: She is alert. No cranial nerve deficit.   Pleasantly confused   Skin: Skin is warm and dry. She is not diaphoretic.   Psychiatric: She has a normal mood and affect. Her behavior is normal. Judgment and thought content normal.   Vitals reviewed.       Significant Labs:   CBC:   Recent Labs  Lab 03/23/17  2145   WBC 12.10   HGB 13.8   HCT 40.8        CMP:   Recent Labs  Lab 03/23/17  2145   *   K 4.2   CL 98   CO2 21*   *   BUN 9   CREATININE 0.9   CALCIUM 10.5   PROT 7.5   ALBUMIN 3.1*   BILITOT 0.9   ALKPHOS 103   AST 18   ALT 13   ANIONGAP 11   EGFRNONAA >60           Assessment/Plan:     Acute cystitis without hematuria  Empiric coverage with a beta lactam  Check CRP  Urine culture pending      * Metabolic encephalopathy  Acute  Secondary to UTI  Fall precautions  See above      HTN (hypertension), benign  Chronic with borderline control  Continue home medications  Monitor and treat accordingly        Hypothyroid  chronic, stable, TSH shows euthyroid, continue Synthroid.      Long-term insulin use in type 2 diabetes  Chronic  Controlled   Last A1C 6%  Hold basal insulin due to recent anorexia  accuchecks with ISS  Advance diet to ADA as tolerated      Alzheimer's disease  Chronic  Continue Aricept  Fall precautions  Nursing aware of diagnosis  Daughter sleeping at BS      Hyponatremia  Acute  Mild at 130, will trend  Gentle  hydration with NS  Check urine sodium       VTE Risk Mitigation         Ordered     enoxaparin injection 40 mg  Daily     Route:  Subcutaneous        03/24/17 0558     Medium Risk of VTE  Once      03/24/17 0558        Lola Kilgore NP  Department of Hospital Medicine   [facility]

## 2017-03-24 NOTE — ED PROVIDER NOTES
Encounter Date: 3/23/2017    SCRIBE #1 NOTE: Verona ARGUETA, carmen scribing for, and in the presence of, Dr. Otero.       History     Chief Complaint   Patient presents with    Fatigue      since yesterday with incontinence and inability to feed self; heavier breathing; pt has hx of Dementia     Review of patient's allergies indicates:   Allergen Reactions    Phenergan [promethazine] Other (See Comments)     hallucinations    Prilosec [omeprazole magnesium] Rash     HPI Comments: 3/23/2017  9:06 PM     Chief Complaint: Fatigue    The patient is a 76 y.o. female with PMHx of DM, HLD, hypothyroid, GERD, dementia and alzheimer disease who presents with gradual onset of progressively worsening fatigue for the last 48 hours. Associated with urine incontinence. Typically able to feed herself but x38 hours she has had Decreased po intake.  No recent falls. Pt is on lantus every morning for DM. Patient's hx is limited secondary to dementia and is provided by her daughter. Shx of joint replacement, knee arthroscopy with debridement, hysterectomy, partial nephrectomy and tonsillectomy.    The history is provided by a relative. History limited by: dementia.     Past Medical History:   Diagnosis Date    Alzheimer disease 2012    Cancer     renal cell. right    Dementia     Diabetes mellitus     GERD (gastroesophageal reflux disease)     Hyperlipidemia     Hypothyroid     Memory loss     Movement disorder      Past Surgical History:   Procedure Laterality Date    HYSTERECTOMY      JOINT REPLACEMENT      right    KNEE ARTHROSCOPY W/ DEBRIDEMENT      left    PARTIAL NEPHRECTOMY      right.     TONSILLECTOMY       Family History   Problem Relation Age of Onset    Cancer Mother 90     breast     Social History   Substance Use Topics    Smoking status: Never Smoker    Smokeless tobacco: Never Used    Alcohol use No     Review of Systems   Unable to perform ROS: Dementia       Physical Exam   Initial Vitals   BP  Pulse Resp Temp SpO2   03/23/17 1929 03/23/17 1929 03/23/17 1929 03/23/17 1929 03/23/17 1929   138/73 103 24 98.5 °F (36.9 °C) 97 %     Physical Exam    Nursing note and vitals reviewed.  Constitutional: She appears well-developed and well-nourished. No distress.   No distress   HENT:   Head: Normocephalic and atraumatic.   Mouth/Throat: Oropharynx is clear and moist. Mucous membranes are dry.   Eyes: EOM are normal. Pupils are equal, round, and reactive to light.   Neck: Normal range of motion.   Cardiovascular: Normal rate, regular rhythm, normal heart sounds and intact distal pulses. Exam reveals no gallop and no friction rub.    No murmur heard.  Pulmonary/Chest: Breath sounds normal. She has no wheezes. She has no rhonchi. She has no rales.   Abdominal: Soft. She exhibits no distension. There is no tenderness.   Musculoskeletal: Normal range of motion. She exhibits no edema.   Neurological: She is alert. She has normal strength.   Oriented to self and person only. Follows commands.   Skin: Skin is dry.   Right total knee replacement scar.   Psychiatric: She has a normal mood and affect.         ED Course   Procedures  Labs Reviewed - No data to display  EKG Readings: (Independently Interpreted)   Initial Reading: No STEMI. Rhythm: Normal Sinus Rhythm. Heart Rate: 86. Ectopy: No Ectopy. Conduction: Normal. ST Segments: Normal ST Segments. T Waves: Normal. Clinical Impression: Normal Sinus Rhythm       X-Rays:   Independently Interpreted Readings:   Chest X-Ray: No acute disease seen, no consolidation, atelectasis or PTX.       Medical Decision Making:   History:   I obtained history from: someone other than patient.  Old Medical Records: I decided to obtain old medical records.  Clinical Tests:   Lab Tests: Ordered and Reviewed  Radiological Study: Ordered and Reviewed  Medical Tests: Ordered and Reviewed            Scribe Attestation:   Scribe #1: I performed the above scribed service and the documentation  accurately describes the services I performed. I attest to the accuracy of the note.    Attending Attestation:           Physician Attestation for Scribe:  Physician Attestation Statement for Scribe #1: I, Dr. Otero, reviewed documentation, as scribed by Verona Mendoza in my presence, and it is both accurate and complete.                 ED Course   Comment By Time   IMPRESSION:  No acute intracranial abnormality.  Thank you for allowing us to participate in the care of your patient.  Dictated and Authenticated by: Nemesio Rachel MD  03/23/2017 10:29 PM Central Time ([place] & [place]) Leonel Otero MD 03/23 3254   Dolese to admit Leonel Otero MD 03/23 3373   76-year-old female with dementia presents for decreased by mouth intake.  Urinalysis demonstrates a UTI.  Urine culture has been obtained and IV antibiotics were given.  Patient will be admitted by hospital medicine.  No sign of sepsis.  No distress at this time. Leonel Otero MD 03/24 0005     Clinical Impression:   The encounter diagnosis was Metabolic encephalopathy.          Leonel Otero MD  03/24/17 0117

## 2017-03-24 NOTE — ASSESSMENT & PLAN NOTE
Chronic  Controlled   Last A1C 6%  Hold basal insulin due to recent anorexia  accuchecks with ISS  Advance diet to ADA as tolerated

## 2017-03-24 NOTE — ED NOTES
Report called to Day SANCHEZ. Attempted to call daughterCarroll to update on plan of care. No answer, will try again.

## 2017-03-25 PROBLEM — E87.1 HYPONATREMIA: Status: RESOLVED | Noted: 2017-03-24 | Resolved: 2017-03-25

## 2017-03-25 LAB
ALBUMIN SERPL BCP-MCNC: 2.5 G/DL
ANION GAP SERPL CALC-SCNC: 6 MMOL/L
BASOPHILS # BLD AUTO: 0 K/UL
BASOPHILS NFR BLD: 0.1 %
BUN SERPL-MCNC: 6 MG/DL
CALCIUM SERPL-MCNC: 8.9 MG/DL
CHLORIDE SERPL-SCNC: 105 MMOL/L
CO2 SERPL-SCNC: 24 MMOL/L
CREAT SERPL-MCNC: 0.7 MG/DL
DIFFERENTIAL METHOD: ABNORMAL
EOSINOPHIL # BLD AUTO: 0.1 K/UL
EOSINOPHIL NFR BLD: 1.8 %
ERYTHROCYTE [DISTWIDTH] IN BLOOD BY AUTOMATED COUNT: 12.5 %
EST. GFR  (AFRICAN AMERICAN): >60 ML/MIN/1.73 M^2
EST. GFR  (NON AFRICAN AMERICAN): >60 ML/MIN/1.73 M^2
GLUCOSE SERPL-MCNC: 142 MG/DL
HCT VFR BLD AUTO: 33.8 %
HGB BLD-MCNC: 11.5 G/DL
LYMPHOCYTES # BLD AUTO: 1.3 K/UL
LYMPHOCYTES NFR BLD: 19.5 %
MCH RBC QN AUTO: 31.4 PG
MCHC RBC AUTO-ENTMCNC: 34 %
MCV RBC AUTO: 92 FL
MONOCYTES # BLD AUTO: 0.9 K/UL
MONOCYTES NFR BLD: 13.7 %
NEUTROPHILS # BLD AUTO: 4.2 K/UL
NEUTROPHILS NFR BLD: 64.9 %
PHOSPHATE SERPL-MCNC: 1.9 MG/DL
PLATELET # BLD AUTO: 132 K/UL
PMV BLD AUTO: 8 FL
POCT GLUCOSE: 142 MG/DL (ref 70–110)
POCT GLUCOSE: 172 MG/DL (ref 70–110)
POCT GLUCOSE: 193 MG/DL (ref 70–110)
POCT GLUCOSE: 222 MG/DL (ref 70–110)
POTASSIUM SERPL-SCNC: 3.4 MMOL/L
RBC # BLD AUTO: 3.66 M/UL
SODIUM SERPL-SCNC: 135 MMOL/L
WBC # BLD AUTO: 6.5 K/UL

## 2017-03-25 PROCEDURE — 25000003 PHARM REV CODE 250: Performed by: NURSE PRACTITIONER

## 2017-03-25 PROCEDURE — 63600175 PHARM REV CODE 636 W HCPCS: Performed by: NURSE PRACTITIONER

## 2017-03-25 PROCEDURE — 80069 RENAL FUNCTION PANEL: CPT

## 2017-03-25 PROCEDURE — 85025 COMPLETE CBC W/AUTO DIFF WBC: CPT

## 2017-03-25 PROCEDURE — 12000002 HC ACUTE/MED SURGE SEMI-PRIVATE ROOM

## 2017-03-25 PROCEDURE — 36415 COLL VENOUS BLD VENIPUNCTURE: CPT

## 2017-03-25 RX ADMIN — CEFTRIAXONE 1 G: 1 INJECTION, SOLUTION INTRAVENOUS at 09:03

## 2017-03-25 RX ADMIN — PANTOPRAZOLE SODIUM 40 MG: 40 TABLET, DELAYED RELEASE ORAL at 08:03

## 2017-03-25 RX ADMIN — ENALAPRIL MALEATE 5 MG: 5 TABLET ORAL at 08:03

## 2017-03-25 RX ADMIN — STANDARDIZED SENNA CONCENTRATE AND DOCUSATE SODIUM 1 TABLET: 8.6; 5 TABLET, FILM COATED ORAL at 09:03

## 2017-03-25 RX ADMIN — LEVOTHYROXINE SODIUM 75 MCG: 25 TABLET ORAL at 05:03

## 2017-03-25 RX ADMIN — VENLAFAXINE 37.5 MG: 37.5 TABLET ORAL at 09:03

## 2017-03-25 RX ADMIN — AMLODIPINE BESYLATE 5 MG: 5 TABLET ORAL at 08:03

## 2017-03-25 RX ADMIN — SODIUM CHLORIDE: 0.9 INJECTION, SOLUTION INTRAVENOUS at 03:03

## 2017-03-25 RX ADMIN — INSULIN ASPART 2 UNITS: 100 INJECTION, SOLUTION INTRAVENOUS; SUBCUTANEOUS at 04:03

## 2017-03-25 RX ADMIN — STANDARDIZED SENNA CONCENTRATE AND DOCUSATE SODIUM 1 TABLET: 8.6; 5 TABLET, FILM COATED ORAL at 08:03

## 2017-03-25 RX ADMIN — ASPIRIN 325 MG ORAL TABLET 325 MG: 325 PILL ORAL at 08:03

## 2017-03-25 RX ADMIN — ENOXAPARIN SODIUM 40 MG: 100 INJECTION SUBCUTANEOUS at 04:03

## 2017-03-25 RX ADMIN — DONEPEZIL HYDROCHLORIDE 10 MG: 5 TABLET, FILM COATED ORAL at 08:03

## 2017-03-25 RX ADMIN — VENLAFAXINE 37.5 MG: 37.5 TABLET ORAL at 08:03

## 2017-03-25 NOTE — PLAN OF CARE
Problem: Patient Care Overview  Goal: Plan of Care Review  Outcome: Ongoing (interventions implemented as appropriate)  Plan of care and fall risk reviewed.  Pt oriented to self.  Blood glucose monitored and treated as ordered.  Cathter and incontinence care provided.  Blood glucose monitored and treated. Weight shift assistance provided.  Bed in lowest position, wheels locked, side rails up x 4, alarm set. Will continue to monitor.

## 2017-03-25 NOTE — NURSING
Problem: Patient Care Overview  Goal: Plan of Care Review  Outcome: Ongoing (interventions implemented as appropriate)  Plan of care reviewed with patient, verbalized understanding  Denies any pain or SOB  Bed wheels locked, call light in reach, bed alarms on, side rails up x2  Blood sugar monitored   Will continue to monitor

## 2017-03-25 NOTE — ASSESSMENT & PLAN NOTE
Chronic-Controlled   Last A1C 6%  Hold basal insulin due to recent anorexia  accuchecks with ISS  Advance diet to ADA as tolerated

## 2017-03-25 NOTE — PROGRESS NOTES
"[facility] Medicine  Progress Note    Patient Name: Nina Portillo  MRN: [id number]  Patient Class: IP- Inpatient   Admission Date: 3/23/2017  Length of Stay: 2 days  Attending Physician: Brinda Arnold MD  Primary Care Provider: Mariano Peacock MD        Subjective:     Principal Problem:Metabolic encephalopathy    HPI:  Pt is a 75 yo female admitted to the services of hospital medicine via the ER for metabolic encephalopathy secondary to UTI. Unable to obtain history and ROS due to dementia. According to the pt's daughter, Ms. Portillo has not "been herself" for the past week, not wanting to eat, confused and has been incontinent of urine. Other past medical history as below.    Hospital Course:  Acute cystitis without hematuria  Empiric coverage with a beta lactam-rocephin  Urine culture pending        * Metabolic encephalopathy  Acute  Secondary to UTI  Fall precautions  See above        HTN (hypertension), benign  Chronic with borderline control  Continue home medications  Monitor and treat accordingly           Hypothyroid  chronic, stable, TSH shows euthyroid, continue Synthroid.        Long-term insulin use in type 2 diabetes  Chronic  Controlled   Last A1C 6%  Hold basal insulin due to recent anorexia  accuchecks with ISS  Advance diet to ADA as tolerated        Alzheimer's disease  Chronic  Continue Aricept  Fall precautions          Hyponatremia  Acute-resolved with Gentle hydration with NS  Dispo-placement      Interval History: feeling much better today   Aware of person/place     Review of Systems   Respiratory: Negative.    Cardiovascular: Negative.    Gastrointestinal: Negative.      Objective:     Vital Signs (Most Recent):  Temp: 97.1 °F (36.2 °C) (03/25/17 1500)  Pulse: 68 (03/25/17 1500)  Resp: 18 (03/25/17 1500)  BP: (!) 151/72 (03/25/17 1500)  SpO2: 95 % (03/25/17 1500) Vital Signs (24h Range):  Temp:  [97.1 °F (36.2 °C)-98.4 °F (36.9 °C)] 97.1 °F (36.2 °C)  Pulse:  " [65-79] 68  Resp:  [17-18] 18  SpO2:  [95 %-96 %] 95 %  BP: (115-153)/(68-80) 151/72     Weight: 61.7 kg (136 lb)  Body mass index is 22.63 kg/(m^2).    Intake/Output Summary (Last 24 hours) at 03/25/17 1841  Last data filed at 03/25/17 1727   Gross per 24 hour   Intake          5096.25 ml   Output             2550 ml   Net          2546.25 ml      Physical Exam   Constitutional: She appears well-developed and well-nourished. No distress.   HENT:   Head: Normocephalic and atraumatic.   Mouth/Throat: Oropharynx is clear and moist.   Eyes: Conjunctivae are normal. Pupils are equal, round, and reactive to light. Right eye exhibits no discharge. Left eye exhibits no discharge. No scleral icterus.   Neck: No JVD present. No thyromegaly present.   Cardiovascular: Normal rate and regular rhythm.  Exam reveals no gallop and no friction rub.    No murmur heard.  Pulmonary/Chest: Effort normal and breath sounds normal. No respiratory distress. She has no wheezes. She has no rales. She exhibits no tenderness.   Abdominal: Soft. Bowel sounds are normal. She exhibits no distension. There is no tenderness. There is no rebound and no guarding.   Musculoskeletal: Normal range of motion. She exhibits no edema or tenderness.   Lymphadenopathy:     She has no cervical adenopathy.   Neurological: She is alert. No cranial nerve deficit.   Pleasantly confused   Skin: Skin is warm and dry. She is not diaphoretic.   Psychiatric: She has a normal mood and affect. Her behavior is normal. Judgment and thought content normal.   Vitals reviewed.      Significant Labs: All pertinent labs within the past 24 hours have been reviewed.    Significant Imaging: I have reviewed and interpreted all pertinent imaging results/findings within the past 24 hours.    Assessment/Plan:      * Metabolic encephalopathy  Acute--Secondary to UTI  Fall precautions  See above      HTN (hypertension), benign  Chronic with borderline control  Continue home  medications  Monitor and treat accordingly        Hypothyroid  chronic, stable, TSH shows euthyroid, continue Synthroid.      Long-term insulin use in type 2 diabetes  Chronic-Controlled   Last A1C 6%  Hold basal insulin due to recent anorexia  accuchecks with ISS  Advance diet to ADA as tolerated      Alzheimer's disease  Chronic  Continue Aricept  Fall precautions  Nursing aware of diagnosis-for snf vs placement      Acute cystitis without hematuria  Empiric coverage with a beta lactam  Urine culture pending-e coli prelim      VTE Risk Mitigation         Ordered     enoxaparin injection 40 mg  Daily     Route:  Subcutaneous        03/24/17 0558     Medium Risk of VTE  Once      03/24/17 0558      dispo snf-consult pt/ot SW /tb panel  Discussed with daughter Soledad thomas progress and plan  Time spent seeing patient( greater than 1/2 spent in direct contact) : 38 min    Brinda Arnold MD  Department of Hospital Medicine   [facility]

## 2017-03-25 NOTE — SUBJECTIVE & OBJECTIVE
Interval History: feeling much better today   Aware of person/place     Review of Systems   Respiratory: Negative.    Cardiovascular: Negative.    Gastrointestinal: Negative.      Objective:     Vital Signs (Most Recent):  Temp: 97.1 °F (36.2 °C) (03/25/17 1500)  Pulse: 68 (03/25/17 1500)  Resp: 18 (03/25/17 1500)  BP: (!) 151/72 (03/25/17 1500)  SpO2: 95 % (03/25/17 1500) Vital Signs (24h Range):  Temp:  [97.1 °F (36.2 °C)-98.4 °F (36.9 °C)] 97.1 °F (36.2 °C)  Pulse:  [65-79] 68  Resp:  [17-18] 18  SpO2:  [95 %-96 %] 95 %  BP: (115-153)/(68-80) 151/72     Weight: 61.7 kg (136 lb)  Body mass index is 22.63 kg/(m^2).    Intake/Output Summary (Last 24 hours) at 03/25/17 1841  Last data filed at 03/25/17 1727   Gross per 24 hour   Intake          5096.25 ml   Output             2550 ml   Net          2546.25 ml      Physical Exam   Constitutional: She appears well-developed and well-nourished. No distress.   HENT:   Head: Normocephalic and atraumatic.   Mouth/Throat: Oropharynx is clear and moist.   Eyes: Conjunctivae are normal. Pupils are equal, round, and reactive to light. Right eye exhibits no discharge. Left eye exhibits no discharge. No scleral icterus.   Neck: No JVD present. No thyromegaly present.   Cardiovascular: Normal rate and regular rhythm.  Exam reveals no gallop and no friction rub.    No murmur heard.  Pulmonary/Chest: Effort normal and breath sounds normal. No respiratory distress. She has no wheezes. She has no rales. She exhibits no tenderness.   Abdominal: Soft. Bowel sounds are normal. She exhibits no distension. There is no tenderness. There is no rebound and no guarding.   Musculoskeletal: Normal range of motion. She exhibits no edema or tenderness.   Lymphadenopathy:     She has no cervical adenopathy.   Neurological: She is alert. No cranial nerve deficit.   Pleasantly confused   Skin: Skin is warm and dry. She is not diaphoretic.   Psychiatric: She has a normal mood and affect. Her behavior  is normal. Judgment and thought content normal.   Vitals reviewed.      Significant Labs: All pertinent labs within the past 24 hours have been reviewed.    Significant Imaging: I have reviewed and interpreted all pertinent imaging results/findings within the past 24 hours.

## 2017-03-26 LAB
BACTERIA UR CULT: NORMAL
POCT GLUCOSE: 177 MG/DL (ref 70–110)
POCT GLUCOSE: 189 MG/DL (ref 70–110)
POCT GLUCOSE: 193 MG/DL (ref 70–110)

## 2017-03-26 PROCEDURE — 63600175 PHARM REV CODE 636 W HCPCS: Performed by: NURSE PRACTITIONER

## 2017-03-26 PROCEDURE — 12000002 HC ACUTE/MED SURGE SEMI-PRIVATE ROOM

## 2017-03-26 PROCEDURE — 86580 TB INTRADERMAL TEST: CPT | Performed by: HOSPITALIST

## 2017-03-26 PROCEDURE — 63600175 PHARM REV CODE 636 W HCPCS: Performed by: HOSPITALIST

## 2017-03-26 PROCEDURE — 25000003 PHARM REV CODE 250: Performed by: NURSE PRACTITIONER

## 2017-03-26 RX ORDER — RAMELTEON 8 MG/1
8 TABLET ORAL NIGHTLY
Status: DISCONTINUED | OUTPATIENT
Start: 2017-03-26 | End: 2017-03-29 | Stop reason: HOSPADM

## 2017-03-26 RX ADMIN — VENLAFAXINE 37.5 MG: 37.5 TABLET ORAL at 08:03

## 2017-03-26 RX ADMIN — ENALAPRIL MALEATE 5 MG: 5 TABLET ORAL at 08:03

## 2017-03-26 RX ADMIN — ENOXAPARIN SODIUM 40 MG: 100 INJECTION SUBCUTANEOUS at 04:03

## 2017-03-26 RX ADMIN — RAMELTEON 8 MG: 8 TABLET, FILM COATED ORAL at 09:03

## 2017-03-26 RX ADMIN — DONEPEZIL HYDROCHLORIDE 10 MG: 5 TABLET, FILM COATED ORAL at 08:03

## 2017-03-26 RX ADMIN — TUBERCULIN PURIFIED PROTEIN DERIVATIVE 5 UNITS: 5 INJECTION, SOLUTION INTRADERMAL at 04:03

## 2017-03-26 RX ADMIN — PANTOPRAZOLE SODIUM 40 MG: 40 TABLET, DELAYED RELEASE ORAL at 08:03

## 2017-03-26 RX ADMIN — VENLAFAXINE 37.5 MG: 37.5 TABLET ORAL at 09:03

## 2017-03-26 RX ADMIN — LEVOTHYROXINE SODIUM 75 MCG: 25 TABLET ORAL at 06:03

## 2017-03-26 RX ADMIN — AMLODIPINE BESYLATE 5 MG: 5 TABLET ORAL at 08:03

## 2017-03-26 RX ADMIN — ASPIRIN 325 MG ORAL TABLET 325 MG: 325 PILL ORAL at 08:03

## 2017-03-26 RX ADMIN — STANDARDIZED SENNA CONCENTRATE AND DOCUSATE SODIUM 1 TABLET: 8.6; 5 TABLET, FILM COATED ORAL at 09:03

## 2017-03-26 RX ADMIN — CEFTRIAXONE 1 G: 1 INJECTION, SOLUTION INTRAVENOUS at 09:03

## 2017-03-26 NOTE — PLAN OF CARE
Problem: Patient Care Overview  Goal: Plan of Care Review  Outcome: Ongoing (interventions implemented as appropriate)  Pt AAO  Incontinent to bowel and bladder  Denies pain and discomfort at present time  Pt daughter active in care  Pt tolerates PO and IV therapy with no adverse reactions  Bed locked in lowest position side rails up x3  Call light and bedside table in reach  Will continue to monitor

## 2017-03-27 PROBLEM — G93.41 METABOLIC ENCEPHALOPATHY: Status: RESOLVED | Noted: 2017-03-23 | Resolved: 2017-03-27

## 2017-03-27 LAB
POCT GLUCOSE: 108 MG/DL (ref 70–110)
POCT GLUCOSE: 141 MG/DL (ref 70–110)
POCT GLUCOSE: 209 MG/DL (ref 70–110)
POCT GLUCOSE: 210 MG/DL (ref 70–110)

## 2017-03-27 PROCEDURE — 63600175 PHARM REV CODE 636 W HCPCS: Performed by: NURSE PRACTITIONER

## 2017-03-27 PROCEDURE — 97165 OT EVAL LOW COMPLEX 30 MIN: CPT

## 2017-03-27 PROCEDURE — 25000003 PHARM REV CODE 250: Performed by: HOSPITALIST

## 2017-03-27 PROCEDURE — 97161 PT EVAL LOW COMPLEX 20 MIN: CPT

## 2017-03-27 PROCEDURE — 12000002 HC ACUTE/MED SURGE SEMI-PRIVATE ROOM

## 2017-03-27 PROCEDURE — 25000003 PHARM REV CODE 250: Performed by: NURSE PRACTITIONER

## 2017-03-27 RX ORDER — CIPROFLOXACIN 500 MG/1
500 TABLET ORAL EVERY 12 HOURS
Status: DISCONTINUED | OUTPATIENT
Start: 2017-03-27 | End: 2017-03-29 | Stop reason: HOSPADM

## 2017-03-27 RX ADMIN — STANDARDIZED SENNA CONCENTRATE AND DOCUSATE SODIUM 1 TABLET: 8.6; 5 TABLET, FILM COATED ORAL at 09:03

## 2017-03-27 RX ADMIN — ENALAPRIL MALEATE 5 MG: 5 TABLET ORAL at 09:03

## 2017-03-27 RX ADMIN — VENLAFAXINE 37.5 MG: 37.5 TABLET ORAL at 09:03

## 2017-03-27 RX ADMIN — INSULIN ASPART 2 UNITS: 100 INJECTION, SOLUTION INTRAVENOUS; SUBCUTANEOUS at 12:03

## 2017-03-27 RX ADMIN — DONEPEZIL HYDROCHLORIDE 10 MG: 5 TABLET, FILM COATED ORAL at 09:03

## 2017-03-27 RX ADMIN — SODIUM CHLORIDE: 0.9 INJECTION, SOLUTION INTRAVENOUS at 09:03

## 2017-03-27 RX ADMIN — ENOXAPARIN SODIUM 40 MG: 100 INJECTION SUBCUTANEOUS at 05:03

## 2017-03-27 RX ADMIN — AMLODIPINE BESYLATE 5 MG: 5 TABLET ORAL at 09:03

## 2017-03-27 RX ADMIN — INSULIN ASPART 2 UNITS: 100 INJECTION, SOLUTION INTRAVENOUS; SUBCUTANEOUS at 05:03

## 2017-03-27 RX ADMIN — ASPIRIN 325 MG ORAL TABLET 325 MG: 325 PILL ORAL at 09:03

## 2017-03-27 RX ADMIN — RAMELTEON 8 MG: 8 TABLET, FILM COATED ORAL at 09:03

## 2017-03-27 RX ADMIN — LEVOTHYROXINE SODIUM 75 MCG: 25 TABLET ORAL at 05:03

## 2017-03-27 RX ADMIN — PANTOPRAZOLE SODIUM 40 MG: 40 TABLET, DELAYED RELEASE ORAL at 09:03

## 2017-03-27 RX ADMIN — CIPROFLOXACIN HYDROCHLORIDE 500 MG: 500 TABLET, FILM COATED ORAL at 09:03

## 2017-03-27 NOTE — SUBJECTIVE & OBJECTIVE
Interval History:  No major changes    Review of Systems   Respiratory: Negative.    Cardiovascular: Negative.    Gastrointestinal: Negative.      Objective:     Vital Signs (Most Recent):  Temp: 97.4 °F (36.3 °C) (03/27/17 1500)  Pulse: 66 (03/27/17 1500)  Resp: 18 (03/27/17 1500)  BP: (!) 149/79 (03/27/17 1500)  SpO2: 96 % (03/27/17 1500) Vital Signs (24h Range):  Temp:  [97.4 °F (36.3 °C)-98.5 °F (36.9 °C)] 97.4 °F (36.3 °C)  Pulse:  [61-82] 66  Resp:  [18] 18  SpO2:  [95 %-97 %] 96 %  BP: (140-187)/(64-79) 149/79     Weight: 61.7 kg (136 lb)  Body mass index is 22.63 kg/(m^2).  No intake or output data in the 24 hours ending 03/27/17 1846   Physical Exam   Constitutional: She appears well-developed and well-nourished. No distress.   HENT:   Head: Normocephalic and atraumatic.   Mouth/Throat: Oropharynx is clear and moist.   Eyes: Conjunctivae are normal. Pupils are equal, round, and reactive to light. Right eye exhibits no discharge. Left eye exhibits no discharge. No scleral icterus.   Neck: No JVD present. No thyromegaly present.   Cardiovascular: Normal rate and regular rhythm.  Exam reveals no gallop and no friction rub.    No murmur heard.  Pulmonary/Chest: Effort normal and breath sounds normal. No respiratory distress. She has no wheezes. She has no rales. She exhibits no tenderness.   Abdominal: Soft. Bowel sounds are normal. She exhibits no distension. There is no tenderness. There is no rebound and no guarding.   Musculoskeletal: Normal range of motion. She exhibits no edema or tenderness.   Lymphadenopathy:     She has no cervical adenopathy.   Neurological: She is alert. No cranial nerve deficit.   Pleasantly confused   Skin: Skin is warm and dry. She is not diaphoretic.   Psychiatric: She has a normal mood and affect. Her behavior is normal. Judgment and thought content normal.   Vitals reviewed.      Significant Labs: None    Significant Imaging: none\

## 2017-03-27 NOTE — PT/OT/SLP EVAL
Physical Therapy  Evaluation    Nina Portillo   MRN: [id number]   Admitting Diagnosis: Metabolic encephalopathy    PT Received On: 17  PT Start Time: 0950     PT Stop Time: 1006    PT Total Time (min): 16 min       Billable Minutes:  Evaluation 16    Diagnosis: Metabolic encephalopathy      Past Medical History:   Diagnosis Date    Alzheimer disease 2012    Cancer     renal cell. right    Dementia     Diabetes mellitus     GERD (gastroesophageal reflux disease)     Hyperlipidemia     Hypothyroid     Memory loss     Movement disorder       Past Surgical History:   Procedure Laterality Date    HYSTERECTOMY      JOINT REPLACEMENT      right    KNEE ARTHROSCOPY W/ DEBRIDEMENT      left    PARTIAL NEPHRECTOMY      right.     TONSILLECTOMY         Referring physician: Clayton  Date referred to PT: 2017    General Precautions: Standard, fall  Orthopedic Precautions: N/A   Braces:              Patient History:  Lives With: child(dianna), adult  Equipment Currently Used at Home: cane, straight  DME owned (not currently used): rollator    Previous Level of Function:  Ambulation Skills: needs device  Transfer Skills: needs device    Subjective:  Communicated with nurseSoraya prior to session.    Chief Complaint: none currently  Patient goals: none stated    Pain Ratin/10               Pain Rating Post-Intervention: 0/10    Objective:   Patient found with: telemetry, peripheral IV     Cognitive Exam:  Oriented to: Person and Place    Follows Commands/attention: Follows one-step commands  Communication: clear/fluent  Safety awareness/insight to disability: intact    Physical Exam:  Postural examination/scapula alignment: Rounded shoulder and Head forward    Skin integrity: Visible skin intact  Edema: None noted       Lower Extremity Range of Motion:  Right Lower Extremity: WFL  Left Lower Extremity: WFL    Lower Extremity Strength:  Right Lower Extremity: ankle and hip 4/5, knee 3+/5  Left Lower  Extremity: ankle and hip 4/5, knee 3+/5       Gross motor coordination: WFL    Functional Mobility:  Bed Mobility:  Supine to Sit: Minimum Assistance  Sit to Supine: Stand by Assistance    Transfers:  Sit <> Stand Assistance: Minimum Assistance  Sit <> Stand Assistive Device: Straight Cane    Gait:   Gait Distance: 16'  Assistance 1: Minimum assistance  Gait Assistive Device: Single point cane  Gait Deviation(s): decreased silvia, increased time in double stance, decreased velocity of limb motion, decreased step length, decreased weight-shifting ability        Balance:   Static Sit: FAIR+: Able to take MINIMAL challenges from all directions  Dynamic Sit: FAIR+: Maintains balance through MINIMAL excursions of active trunk motion  Static Stand: FAIR: Maintains without assist but unable to take challenges  Dynamic stand: FAIR: Needs CONTACT GUARD during gait        Patient left right sidelying with all lines intact and call button in reach.    Assessment:   Nina Portillo is a 76 y.o. female with a medical diagnosis of Metabolic encephalopathy and presents with impairments as listed and should benefit from PT to increase and improve mobility.    Rehab identified problem list/impairments: Rehab identified problem list/impairments: weakness, impaired endurance, impaired self care skills, impaired functional mobilty, gait instability, impaired balance, decreased lower extremity function    Rehab potential is good.    Activity tolerance: Fair    Discharge recommendations: Discharge Facility/Level Of Care Needs: home health PT       Equipment recommendations: Equipment Needed After Discharge: none     GOALS:   Physical Therapy Goals        Problem: Physical Therapy Goal    Goal Priority Disciplines Outcome Goal Variances Interventions   Physical Therapy Goal     PT/OT, PT      Description:  Goals to be met by: 2017     Patient will increase functional independence with mobility by performin. Supine to sit  with [place]  2. Sit to supine with [place]  3. Sit to stand transfer with Supervision  4. Gait  x 50 feet with Supervision using Single-point Cane .                 PLAN:    Patient to be seen 6 x/week to address the above listed problems via gait training, therapeutic activities, therapeutic exercises  Plan of Care expires: 04/03/17  Plan of Care reviewed with: patient          Cintia Joiner, PT  03/27/2017

## 2017-03-27 NOTE — PHYSICIAN QUERY
PT Name: Nina Portillo  MR #: [id number]    Physician Query Form - Cause and Effect Relationship Clarification      CDS/: Maday Blanco RN               Contact information:  [phone]    This form is a permanent document in the medical record.     Query Date: March 27, 2017    By submitting this query, we are merely seeking further clarification of documentation. Please utilize your independent clinical judgment when addressing the question(s) below.    The Medical record contains the following:  Supporting Clinical Findings   Location in record   Demented patient with UTI with 2/4 SIRS with source, resolving sepsis presently.                                                                                                  Acute cystitis without hematuria  Empiric coverage with a beta lactam                                                                                     2/24 MD note    ESCHERICHIA COLI   >100,000 cfu/ml                                                                                                                                                                         Ceftriaxone IVPB                       3/23 Urine culture        3/27 MAR      2 Questions:      Question #1  Provider, please clarify if there is any correlation between  Sepsis and ecoli.           Are the conditions:     [  x] Due to or associated with each other     [  ] Unrelated to each other     [  ] Other (Please Specify): _________________________     [  ] Clinically Undetermined                                                                                                                                                                                      Question #2  Provider, please clarify if there is any correlation between UTI  and ecoli.           Are the conditions:     [ x ] Due to or associated with each other     [  ] Unrelated to each other     [  ] Other (Please Specify):  _________________________     [  ] Clinically Undetermined

## 2017-03-27 NOTE — PLAN OF CARE
Problem: Occupational Therapy Goal  Goal: Occupational Therapy Goal  Goals to be met by: 4-3-17     Patient will increase functional independence with ADLs by performing:    UE Dressing with Contact Guard Assistance.  LE Dressing with Minimal Assistance.  Grooming while standing at sink with Contact Guard Assistance.  Stand pivot transfers with Contact Guard Assistance.  Outcome: Ongoing (interventions implemented as appropriate)  OT Evaluation completed and poc established.  ORTIZ Urban

## 2017-03-27 NOTE — PROGRESS NOTES
Left messages on daughter Soledad's voice mail at [phone] and [phone] requesting she call me to discuss pt's discharge disposition.      10:45 a.m. - received phone call from Soledad who stated she would plan to be at the hospital around noon to meet with me.  Plan for her to call me when she is on her way.

## 2017-03-27 NOTE — PLAN OF CARE
Problem: Patient Care Overview  Goal: Plan of Care Review  Outcome: Ongoing (interventions implemented as appropriate)  POC reviewed with pt, understanding verbalized. Pt sat up in chair for a few hours today. Slept between care. Blood glucose monitored, insulin given per order. Pt denies pain and SOB. Bed in lowest position, wheels locked, call light within reach. Will continue to monitor.

## 2017-03-27 NOTE — PLAN OF CARE
Problem: Patient Care Overview  Goal: Plan of Care Review  Outcome: Ongoing (interventions implemented as appropriate)  Pt AAO, disoriented to time  Incontinent  Denies pain and discomfort at present time  Tolerates PO medication and IV therapy well with no adverse reactions  Cane at bedside  Daughter active in Pt care  Polyuria   IV fluids decreased  After being up all night last night and throughout the day with no sleep, Pt is now resting quietly in bed  Chest rising and falling, respirations even and unlabored no visible signs of distress  Bed locked in lowest position side rails up x3 bed alarm set  Call light and bedside table in reach  Will continue to monitor

## 2017-03-27 NOTE — PROGRESS NOTES
"[facility] Medicine  Progress Note    Patient Name: Nina Portillo  MRN: [id number]  Patient Class: IP- Inpatient   Admission Date: 3/23/2017  Length of Stay: 4 days  Attending Physician: Miguel Portillo MD  Primary Care Provider: Mariano Peacock MD        Subjective:     Principal Problem:Metabolic encephalopathy    HPI:  Pt is a 75 yo female admitted to the services of hospital medicine via the ER for metabolic encephalopathy secondary to UTI. Unable to obtain history and ROS due to dementia. According to the pt's daughter, Ms. Portillo has not "been herself" for the past week, not wanting to eat, confused and has been incontinent of urine. Other past medical history as below.    Hospital Course:  Acute cystitis without hematuria  Empiric coverage with a beta lactam-rocephin  Urine culture pending        * Metabolic encephalopathy  Acute  Secondary to UTI  Fall precautions  See above        HTN (hypertension), benign  Chronic with borderline control  Continue home medications  Monitor and treat accordingly           Hypothyroid  chronic, stable, TSH shows euthyroid, continue Synthroid.        Long-term insulin use in type 2 diabetes  Chronic  Controlled   Last A1C 6%  Hold basal insulin due to recent anorexia  accuchecks with ISS  Advance diet to ADA as tolerated        Alzheimer's disease  Chronic  Continue Aricept  Fall precautions          Hyponatremia  Acute-resolved with Gentle hydration with NS  Dispo-placement      Interval History:  No major changes    Review of Systems   Respiratory: Negative.    Cardiovascular: Negative.    Gastrointestinal: Negative.      Objective:     Vital Signs (Most Recent):  Temp: 97.4 °F (36.3 °C) (03/27/17 1500)  Pulse: 66 (03/27/17 1500)  Resp: 18 (03/27/17 1500)  BP: (!) 149/79 (03/27/17 1500)  SpO2: 96 % (03/27/17 1500) Vital Signs (24h Range):  Temp:  [97.4 °F (36.3 °C)-98.5 °F (36.9 °C)] 97.4 °F (36.3 °C)  Pulse:  [61-82] 66  Resp:  [18] " 18  SpO2:  [95 %-97 %] 96 %  BP: (140-187)/(64-79) 149/79     Weight: 61.7 kg (136 lb)  Body mass index is 22.63 kg/(m^2).  No intake or output data in the 24 hours ending 03/27/17 1846   Physical Exam   Constitutional: She appears well-developed and well-nourished. No distress.   HENT:   Head: Normocephalic and atraumatic.   Mouth/Throat: Oropharynx is clear and moist.   Eyes: Conjunctivae are normal. Pupils are equal, round, and reactive to light. Right eye exhibits no discharge. Left eye exhibits no discharge. No scleral icterus.   Neck: No JVD present. No thyromegaly present.   Cardiovascular: Normal rate and regular rhythm.  Exam reveals no gallop and no friction rub.    No murmur heard.  Pulmonary/Chest: Effort normal and breath sounds normal. No respiratory distress. She has no wheezes. She has no rales. She exhibits no tenderness.   Abdominal: Soft. Bowel sounds are normal. She exhibits no distension. There is no tenderness. There is no rebound and no guarding.   Musculoskeletal: Normal range of motion. She exhibits no edema or tenderness.   Lymphadenopathy:     She has no cervical adenopathy.   Neurological: She is alert. No cranial nerve deficit.   Pleasantly confused   Skin: Skin is warm and dry. She is not diaphoretic.   Psychiatric: She has a normal mood and affect. Her behavior is normal. Judgment and thought content normal.   Vitals reviewed.      Significant Labs: None    Significant Imaging: none\    Assessment/Plan:      HTN (hypertension), benign  Chronic with borderline control  Continue home medications  Monitor and treat accordingly        Hypothyroid  chronic, stable, TSH shows euthyroid, continue Synthroid.      Long-term insulin use in type 2 diabetes  Chronic-Controlled   Last A1C 6%  Hold basal insulin due to recent anorexia  accuchecks with ISS  Advance diet to ADA as tolerated      Alzheimer's disease  Chronic  Continue Aricept  Fall precautions  Nursing aware of diagnosis      Acute  cystitis without hematuria  Urine culture shows pan-susceptible Ecoli.  DC Rocephin and start ciprofloxacin.       * Metabolic encephalopathy, resolved as of 3/27/2017  Acute--Secondary to UTI  Fall precautions  See above      VTE Risk Mitigation         Ordered     enoxaparin injection 40 mg  Daily     Route:  Subcutaneous        03/24/17 0558     Medium Risk of VTE  Once      03/24/17 0558        Dispo:  Faxing her chart to a few SNF's here in [place].  Will see what they think.    Miguel Portillo MD  Department of Hospital Medicine   [facility]

## 2017-03-27 NOTE — PT/OT/SLP EVAL
Occupational Therapy  Evaluation    Nina Portillo   MRN: [id number]   Admitting Diagnosis: Metabolic encephalopathy    OT Date of Treatment: 17   OT Start Time: 1158  OT Stop Time: 1211  OT Total Time (min): 13 min    Billable Minutes:  Evaluation 13    Diagnosis: Metabolic encephalopathy       Past Medical History:   Diagnosis Date    Alzheimer disease 2012    Cancer     renal cell. right    Dementia     Diabetes mellitus     GERD (gastroesophageal reflux disease)     Hyperlipidemia     Hypothyroid     Memory loss     Movement disorder       Past Surgical History:   Procedure Laterality Date    HYSTERECTOMY      JOINT REPLACEMENT      right    KNEE ARTHROSCOPY W/ DEBRIDEMENT      left    PARTIAL NEPHRECTOMY      right.     TONSILLECTOMY         Referring physician: Bandar  Date referred to OT: 3-25-17    General Precautions: Standard, fall  Orthopedic Precautions: N/A  Braces:      Do you have any cultural, spiritual, Congregational conflicts, given your current situation?: none     Patient History:  Living Environment  Lives With: child(dianna), adult  Living Arrangements: house  Transportation Available: family or friend will provide  Equipment Currently Used at Home: cane, straight, shower chair    Prior level of function:   Bed Mobility/Transfers: needs device and assist  Grooming: independent  Bathing: needs device and assist  Upper Body Dressing: needs assist  Lower Body Dressing: needs assist  Toileting: unable to perform  Home Management Skills: unable to perform  Homemaking Responsibilities: No  Driving License: No  Mode of Transportation: Family, Friends     Dominant hand: right    Subjective:  Communicated with nurse prior to session.    Chief Complaint: none  Patient/Family stated goals: skilled    Pain Ratin/10              Pain Rating Post-Intervention: 0/10    Objective:  Patient found with: peripheral IV, telemetry    Cognitive Exam:  Oriented to: Person  Follows Commands/attention:  Easily distracted and Follows one-step commands  Communication: very low volume  Memory:  Impaired STM  Safety awareness/insight to disability: impaired  Coping skills/emotional control: Appropriate to situation    Visual/perceptual:  Intact    Physical Exam:  Postural examination/scapula alignment: No postural abnormalities identified  Skin integrity: Visible skin intact  Edema: None noted     Sensation:   Intact    Upper Extremity Range of Motion:  Right Upper Extremity: WFL  Left Upper Extremity: WFL    Upper Extremity Strength:  Right Upper Extremity: WFL  Left Upper Extremity: WFL   Strength: WFL      Gross motor coordination: WFL    Functional Mobility:  Bed Mobility:  Rolling/Turning to Left: Supervision  Rolling/Turning Right: Supervision  Scooting/Bridging: Supervision  Supine to Sit: Stand by Assistance    Transfers:  Sit <> Stand Assistance: Contact Guard Assistance  Sit <> Stand Assistive Device: Straight Cane  Bed <> Chair Technique: Stand Pivot  Bed <> Chair Transfer Assistance: Contact Guard Assistance  Bed <> Chair Assistive Device: Straight Cane    Functional Ambulation: 10 steps with SC and CG    Activities of Daily Living:  Feeding Level of Assistance: Stand by assistance    UE Dressing Level of Assistance: Activity did not occur    LE Dressing Level of Assistance: Activity did not occur    Grooming Position: bedside chair  Grooming Level of Assistance: Stand by assistance  Balance:   Static Sit: GOOD: Takes MODERATE challenges from all directions  Dynamic Sit: GOOD: Maintains balance through MODERATE excursions of active trunk movement  Static Stand: SBA  Dynamic stand: CG  AM-PAC 6 CLICK ADL  How much help from another person does this patient currently need?  1 = Unable, Total/Dependent Assistance  2 = A lot, Maximum/Moderate Assistance  3 = A little, Minimum/Contact Guard/Supervision  4 = None, Modified [place]/Independent    Putting on and taking off regular lower body clothing? :  "2  Bathing (including washing, rinsing, drying)?: 2  Toileting, which includes using toilet, bedpan, or urinal? : 1  Putting on and taking off regular upper body clothing?: 2  Taking care of personal grooming such as brushing teeth?: 3  Eating meals?: 3  Total Score: 13    AM-PAC Raw Score CMS "G-Code Modifier Level of Impairment Assistance   6 % Total / Unable   7 - 9 CM 80 - 100% Maximal Assist   10 - 14 CL 60 - 80% Moderate Assist   15 - 19 CK 40 - 60% Moderate Assist   20 - 22 CJ 20 - 40% Minimal Assist   23 CI 1-20% SBA / CGA   24 CH 0% Independent/ Mod I       Patient left up in chair with all lines intact, call button in reach and nurse notified    Assessment:  Nina Portillo is a 76 y.o. female with a medical diagnosis of Metabolic encephalopathy.  Rehab identified problem list/impairments: Rehab identified problem list/impairments: decreased safety awareness, impaired cognition, impaired functional mobilty, impaired self care skills, gait instability, impaired balance, decreased coordination    Rehab potential is good.    Activity tolerance: Good    Discharge recommendations: Discharge Facility/Level Of Care Needs: nursing facility, skilled     Barriers to discharge: Barriers to Discharge: Decreased caregiver support    Equipment recommendations: none     GOALS:   Occupational Therapy Goals        Problem: Occupational Therapy Goal    Goal Priority Disciplines Outcome Interventions   Occupational Therapy Goal     OT, PT/OT Ongoing (interventions implemented as appropriate)    Description:  Goals to be met by: 4-3-17     Patient will increase functional independence with ADLs by performing:    UE Dressing with Contact Guard Assistance.  LE Dressing with Minimal Assistance.  Grooming while standing at sink with Contact Guard Assistance.  Stand pivot transfers with Contact Guard Assistance.                PLAN:  Patient to be seen 3 x/week to address the above listed problems via self-care/home " management, therapeutic activities, cognitive retraining, therapeutic exercises  Plan of Care expires: 04/27/17  Plan of Care reviewed with: patient         ORTIZ Urban  03/27/2017

## 2017-03-27 NOTE — PROGRESS NOTES
Met with pt's daughter Soledad, pt's POA, to discuss SNF placement.  Provided her with a list of nursing homes in [place].  She stated that she did not want [facility].  Her first choice is [facility], second choice [facility] and third choice [facility].      12:20 p.m. - Spoke to Emily at [facility], [phone] and Nenita at [facility], [phone] regarding the SNF referral.  Left message for Suma at [facility] requesting she call me and added [facility] as part of the treatment team in [facility].  Faxed through [facility], pt's 3 day rehab packet, radiology, MD progress note.  Will send PT/OT when they are available.

## 2017-03-27 NOTE — PLAN OF CARE
Problem: Physical Therapy Goal  Goal: Physical Therapy Goal  Goals to be met by: 2017     Patient will increase functional independence with mobility by performin. Supine to sit with [place]  2. Sit to supine with [place]  3. Sit to stand transfer with Supervision  4. Gait x 50 feet with Supervision using Single-point Cane .    PT syed completed and POC initiated

## 2017-03-28 LAB
ANION GAP SERPL CALC-SCNC: 9 MMOL/L
BUN SERPL-MCNC: 6 MG/DL
CALCIUM SERPL-MCNC: 8.8 MG/DL
CHLORIDE SERPL-SCNC: 106 MMOL/L
CO2 SERPL-SCNC: 21 MMOL/L
CREAT SERPL-MCNC: 0.8 MG/DL
EST. GFR  (AFRICAN AMERICAN): >60 ML/MIN/1.73 M^2
EST. GFR  (NON AFRICAN AMERICAN): >60 ML/MIN/1.73 M^2
GLUCOSE SERPL-MCNC: 196 MG/DL
POCT GLUCOSE: 152 MG/DL (ref 70–110)
POCT GLUCOSE: 286 MG/DL (ref 70–110)
POTASSIUM SERPL-SCNC: 3.3 MMOL/L
SODIUM SERPL-SCNC: 136 MMOL/L

## 2017-03-28 PROCEDURE — 25000003 PHARM REV CODE 250: Performed by: NURSE PRACTITIONER

## 2017-03-28 PROCEDURE — 80048 BASIC METABOLIC PNL TOTAL CA: CPT

## 2017-03-28 PROCEDURE — 12000002 HC ACUTE/MED SURGE SEMI-PRIVATE ROOM

## 2017-03-28 PROCEDURE — 36415 COLL VENOUS BLD VENIPUNCTURE: CPT

## 2017-03-28 PROCEDURE — 63600175 PHARM REV CODE 636 W HCPCS: Performed by: NURSE PRACTITIONER

## 2017-03-28 PROCEDURE — 25000003 PHARM REV CODE 250: Performed by: HOSPITALIST

## 2017-03-28 RX ORDER — CIPROFLOXACIN 500 MG/1
500 TABLET ORAL EVERY 12 HOURS
Start: 2017-03-28 | End: 2017-04-01

## 2017-03-28 RX ADMIN — CIPROFLOXACIN HYDROCHLORIDE 500 MG: 500 TABLET, FILM COATED ORAL at 08:03

## 2017-03-28 RX ADMIN — AMLODIPINE BESYLATE 5 MG: 5 TABLET ORAL at 08:03

## 2017-03-28 RX ADMIN — RAMELTEON 8 MG: 8 TABLET, FILM COATED ORAL at 08:03

## 2017-03-28 RX ADMIN — PANTOPRAZOLE SODIUM 40 MG: 40 TABLET, DELAYED RELEASE ORAL at 08:03

## 2017-03-28 RX ADMIN — VENLAFAXINE 37.5 MG: 37.5 TABLET ORAL at 08:03

## 2017-03-28 RX ADMIN — STANDARDIZED SENNA CONCENTRATE AND DOCUSATE SODIUM 1 TABLET: 8.6; 5 TABLET, FILM COATED ORAL at 08:03

## 2017-03-28 RX ADMIN — DONEPEZIL HYDROCHLORIDE 10 MG: 5 TABLET, FILM COATED ORAL at 08:03

## 2017-03-28 RX ADMIN — ENALAPRIL MALEATE 5 MG: 5 TABLET ORAL at 08:03

## 2017-03-28 RX ADMIN — INSULIN ASPART 3 UNITS: 100 INJECTION, SOLUTION INTRAVENOUS; SUBCUTANEOUS at 11:03

## 2017-03-28 RX ADMIN — ASPIRIN 325 MG ORAL TABLET 325 MG: 325 PILL ORAL at 08:03

## 2017-03-28 RX ADMIN — SODIUM CHLORIDE: 0.9 INJECTION, SOLUTION INTRAVENOUS at 05:03

## 2017-03-28 RX ADMIN — ENOXAPARIN SODIUM 40 MG: 100 INJECTION SUBCUTANEOUS at 05:03

## 2017-03-28 RX ADMIN — INSULIN ASPART 1 UNITS: 100 INJECTION, SOLUTION INTRAVENOUS; SUBCUTANEOUS at 09:03

## 2017-03-28 RX ADMIN — LEVOTHYROXINE SODIUM 75 MCG: 25 TABLET ORAL at 05:03

## 2017-03-28 NOTE — SUBJECTIVE & OBJECTIVE
Interval History:  No changes.  Waiting on acceptance at [facility].    Review of Systems   Respiratory: Negative.    Cardiovascular: Negative.    Gastrointestinal: Negative.      Objective:     Vital Signs (Most Recent):  Temp: 98.1 °F (36.7 °C) (03/28/17 1603)  Pulse: 61 (03/28/17 1603)  Resp: 18 (03/28/17 1603)  BP: (!) 120/56 (03/28/17 1603)  SpO2: 95 % (03/28/17 1603) Vital Signs (24h Range):  Temp:  [97.8 °F (36.6 °C)-98.2 °F (36.8 °C)] 98.1 °F (36.7 °C)  Pulse:  [57-65] 61  Resp:  [16-18] 18  SpO2:  [95 %-98 %] 95 %  BP: (120-160)/(56-74) 120/56     Weight: 61.7 kg (136 lb)  Body mass index is 22.63 kg/(m^2).    Intake/Output Summary (Last 24 hours) at 03/28/17 1800  Last data filed at 03/28/17 1751   Gross per 24 hour   Intake          5223.75 ml   Output                0 ml   Net          5223.75 ml      Physical Exam   Constitutional: She appears well-developed and well-nourished. No distress.   HENT:   Head: Normocephalic and atraumatic.   Mouth/Throat: Oropharynx is clear and moist.   Eyes: Conjunctivae are normal. Pupils are equal, round, and reactive to light. Right eye exhibits no discharge. Left eye exhibits no discharge. No scleral icterus.   Neck: No JVD present. No thyromegaly present.   Cardiovascular: Normal rate and regular rhythm.  Exam reveals no gallop and no friction rub.    No murmur heard.  Pulmonary/Chest: Effort normal and breath sounds normal. No respiratory distress. She has no wheezes. She has no rales. She exhibits no tenderness.   Abdominal: Soft. Bowel sounds are normal. She exhibits no distension. There is no tenderness. There is no rebound and no guarding.   Musculoskeletal: Normal range of motion. She exhibits no edema or tenderness.   Lymphadenopathy:     She has no cervical adenopathy.   Neurological: She is alert. No cranial nerve deficit.   Pleasantly confused   Skin: Skin is warm and dry. She is not diaphoretic.   Psychiatric: She has a normal mood and affect. Her behavior is  normal. Judgment and thought content normal.   Vitals reviewed.      Significant Labs: All pertinent labs within the past 24 hours have been reviewed.    Significant Imaging: none

## 2017-03-28 NOTE — PLAN OF CARE
Problem: Patient Care Overview  Goal: Plan of Care Review  Outcome: Ongoing (interventions implemented as appropriate)  No acute distress noted Remains on cardiac monitoring Safety maintained

## 2017-03-28 NOTE — NURSING
Patient resting comfortably, bed in lowest position, wheels locked, call light within reach, daughter at bedside. Notified daughter of patient's pending [facility] once ok'd [facility]; V/U. Will cont to monitor.

## 2017-03-28 NOTE — PROGRESS NOTES
Informed Gena at [facility] that patient has been accepted by [facility] and that the family has chosen that facility.  Gena will call me when a decision on SNF has been made.

## 2017-03-28 NOTE — ASSESSMENT & PLAN NOTE
Chronic-Controlled   Last A1C 6%  Hold basal insulin due to recent anorexia  accuchecks with ISS  On ADA diet

## 2017-03-28 NOTE — PROGRESS NOTES
1509  Informed Gena at [facility] that today's PT note is in epic.    1605  Updated Dr Portillo that insurance is still pending on SNF; order given to d/c discharge order.

## 2017-03-28 NOTE — NURSING
Notified patient's daughter that patient is not d/c'ing this date d/t insurance still pending; daughter called to verify with [facility] d/t states she was told earlier that insurance was settled. Daughter then confirmed Insurance is still pending. States she has to work tomorrow so D/C might not be able to happen until tomorrow afternoon; states to call her if patient refusing treatment.

## 2017-03-28 NOTE — PROGRESS NOTES
"[facility] Hospital Medicine  Progress Note    Patient Name: Nina Portillo  MRN: [id number]  Patient Class: IP- Inpatient   Admission Date: 3/23/2017  Length of Stay: 5 days  Attending Physician: Miguel Portillo MD  Primary Care Provider: Mariano Peacock MD        Subjective:     Principal Problem:Metabolic encephalopathy    HPI:  Pt is a 77 yo female admitted to the services of hospital medicine via the ER for metabolic encephalopathy secondary to UTI. Unable to obtain history and ROS due to dementia. According to the pt's daughter, Ms. Portillo has not "been herself" for the past week, not wanting to eat, confused and has been incontinent of urine. Other past medical history as below.    Hospital Course:      Interval History:  No changes.  Waiting on acceptance at [facility].    Review of Systems   Respiratory: Negative.    Cardiovascular: Negative.    Gastrointestinal: Negative.      Objective:     Vital Signs (Most Recent):  Temp: 98.1 °F (36.7 °C) (03/28/17 1603)  Pulse: 61 (03/28/17 1603)  Resp: 18 (03/28/17 1603)  BP: (!) 120/56 (03/28/17 1603)  SpO2: 95 % (03/28/17 1603) Vital Signs (24h Range):  Temp:  [97.8 °F (36.6 °C)-98.2 °F (36.8 °C)] 98.1 °F (36.7 °C)  Pulse:  [57-65] 61  Resp:  [16-18] 18  SpO2:  [95 %-98 %] 95 %  BP: (120-160)/(56-74) 120/56     Weight: 61.7 kg (136 lb)  Body mass index is 22.63 kg/(m^2).    Intake/Output Summary (Last 24 hours) at 03/28/17 1800  Last data filed at 03/28/17 1751   Gross per 24 hour   Intake          5223.75 ml   Output                0 ml   Net          5223.75 ml      Physical Exam   Constitutional: She appears well-developed and well-nourished. No distress.   HENT:   Head: Normocephalic and atraumatic.   Mouth/Throat: Oropharynx is clear and moist.   Eyes: Conjunctivae are normal. Pupils are equal, round, and reactive to light. Right eye exhibits no discharge. Left eye exhibits no discharge. No scleral icterus.   Neck: No JVD present. No thyromegaly " present.   Cardiovascular: Normal rate and regular rhythm.  Exam reveals no gallop and no friction rub.    No murmur heard.  Pulmonary/Chest: Effort normal and breath sounds normal. No respiratory distress. She has no wheezes. She has no rales. She exhibits no tenderness.   Abdominal: Soft. Bowel sounds are normal. She exhibits no distension. There is no tenderness. There is no rebound and no guarding.   Musculoskeletal: Normal range of motion. She exhibits no edema or tenderness.   Lymphadenopathy:     She has no cervical adenopathy.   Neurological: She is alert. No cranial nerve deficit.   Pleasantly confused   Skin: Skin is warm and dry. She is not diaphoretic.   Psychiatric: She has a normal mood and affect. Her behavior is normal. Judgment and thought content normal.   Vitals reviewed.      Significant Labs: All pertinent labs within the past 24 hours have been reviewed.    Significant Imaging: none    Assessment/Plan:      HTN (hypertension), benign  Chronic with borderline control  Continue home medications  Monitor and treat accordingly        Hypothyroid  chronic, stable, TSH shows euthyroid, continue Synthroid.      Long-term insulin use in type 2 diabetes  Chronic-Controlled   Last A1C 6%  Hold basal insulin due to recent anorexia  accuchecks with ISS  On ADA diet      Alzheimer's disease  Chronic  Continue Aricept  Fall precautions  Nursing aware of diagnosis      Acute cystitis without hematuria  Urine culture shows pan-susceptible Ecoli.  DC Rocephin and start ciprofloxacin.       VTE Risk Mitigation         Ordered     enoxaparin injection 40 mg  Daily     Route:  Subcutaneous        03/24/17 0558     Medium Risk of VTE  Once      03/24/17 0585          Miguel Portillo MD  Department of Hospital Medicine   [facility]

## 2017-03-28 NOTE — PT/OT/SLP PROGRESS
Physical Therapy      Nina Portillo  MRN: [id number]    Patient not seen today secondary to Patient unwilling to participate, Patient fatigue. Pt would barely open eyes for PTA when asking to participate with therapy. PTA noted pt's lunch was untouched. Pt reported that she did not even feel like eating. Nurse Ulloa notified/aware. Will follow-up 3-.    Porsche Vega, PTA

## 2017-03-28 NOTE — PROGRESS NOTES
Called locet in to [facility].  Faxed PASRR to [facility].    Received 142

## 2017-03-28 NOTE — PROGRESS NOTES
0950  Pt's daughter, Soledad, came to the  office and stated that she has chosen [facility] for [facility].    0953  Updated Suma at [facility].

## 2017-03-29 VITALS
BODY MASS INDEX: 22.66 KG/M2 | SYSTOLIC BLOOD PRESSURE: 159 MMHG | WEIGHT: 136 LBS | DIASTOLIC BLOOD PRESSURE: 74 MMHG | OXYGEN SATURATION: 96 % | RESPIRATION RATE: 18 BRPM | HEIGHT: 65 IN | TEMPERATURE: 98 F | HEART RATE: 72 BPM

## 2017-03-29 LAB
POCT GLUCOSE: 228 MG/DL (ref 70–110)
POCT GLUCOSE: 233 MG/DL (ref 70–110)
POCT GLUCOSE: 249 MG/DL (ref 70–110)
TB INDURATION 48 - 72 HR READ: 0 MM

## 2017-03-29 PROCEDURE — 25000003 PHARM REV CODE 250: Performed by: NURSE PRACTITIONER

## 2017-03-29 PROCEDURE — 25000003 PHARM REV CODE 250: Performed by: HOSPITALIST

## 2017-03-29 PROCEDURE — 97116 GAIT TRAINING THERAPY: CPT

## 2017-03-29 RX ADMIN — ENALAPRIL MALEATE 5 MG: 5 TABLET ORAL at 08:03

## 2017-03-29 RX ADMIN — INSULIN ASPART 2 UNITS: 100 INJECTION, SOLUTION INTRAVENOUS; SUBCUTANEOUS at 11:03

## 2017-03-29 RX ADMIN — VENLAFAXINE 37.5 MG: 37.5 TABLET ORAL at 08:03

## 2017-03-29 RX ADMIN — CIPROFLOXACIN HYDROCHLORIDE 500 MG: 500 TABLET, FILM COATED ORAL at 08:03

## 2017-03-29 RX ADMIN — LEVOTHYROXINE SODIUM 75 MCG: 25 TABLET ORAL at 05:03

## 2017-03-29 RX ADMIN — STANDARDIZED SENNA CONCENTRATE AND DOCUSATE SODIUM 1 TABLET: 8.6; 5 TABLET, FILM COATED ORAL at 08:03

## 2017-03-29 RX ADMIN — PANTOPRAZOLE SODIUM 40 MG: 40 TABLET, DELAYED RELEASE ORAL at 08:03

## 2017-03-29 RX ADMIN — ASPIRIN 325 MG ORAL TABLET 325 MG: 325 PILL ORAL at 08:03

## 2017-03-29 RX ADMIN — INSULIN ASPART 2 UNITS: 100 INJECTION, SOLUTION INTRAVENOUS; SUBCUTANEOUS at 05:03

## 2017-03-29 RX ADMIN — AMLODIPINE BESYLATE 5 MG: 5 TABLET ORAL at 08:03

## 2017-03-29 RX ADMIN — SODIUM CHLORIDE: 0.9 INJECTION, SOLUTION INTRAVENOUS at 02:03

## 2017-03-29 RX ADMIN — DONEPEZIL HYDROCHLORIDE 10 MG: 5 TABLET, FILM COATED ORAL at 08:03

## 2017-03-29 NOTE — DISCHARGE SUMMARY
"[facility] Medicine  Discharge Summary      Patient Name: Nina Portillo  MRN: [id number]  Admission Date: 3/23/2017  Hospital Length of Stay: 6 days  Discharge Date and Time:  03/29/2017 5:33 PM  Attending Physician: Miguel Rangel MD   Discharging Provider: Miguel Rangel MD  Primary Care Provider: Mariano Peacock MD        HPI: Pt is a 75 yo female admitted to the services of hospital medicine via the ER for metabolic encephalopathy secondary to UTI. Unable to obtain history and ROS due to dementia. According to the pt's daughter, Ms. Portillo has not "been herself" for the past week, not wanting to eat, confused and has been incontinent of urine.    * No surgery found *      Indwelling Lines/Drains at time of discharge:   Lines/Drains/Airways          No matching active lines, drains, or airways        Hospital Course:   Pt diagnosed with acute cystitis and metabolic encephalopathy.  Was placed on ceftriaxone.  Her condition improved and mental status returned to baseline.  Urine culture was Ecoli, no resistance to any antibiotic tested.  Changed to ciprofloxacin.  Underwent daily PT and OT.  She was discharged to SNF at [facility].    PE:  Constitutional: She appears well-developed and well-nourished. No distress.    Neck: No JVD present. No thyromegaly present.   Cardiovascular: Normal rate and regular rhythm. Exam reveals no gallop and no friction rub.   No murmur heard.  Pulmonary/Chest: Effort normal and breath sounds normal. No respiratory distress. She has no wheezes. She has no rales. She exhibits no tenderness.   Abdominal: Soft. Bowel sounds are normal. She exhibits no distension. There is no tenderness. There is no rebound and no guarding.     Consults:   Consults         Status Ordering Provider     Inpatient consult to Social Work/Case Management  Once     Provider:  (Not yet assigned)    Completed MIGUEL RANGEL          Significant Diagnostic Studies: " Labs:   BMP:   Recent Labs  Lab 03/28/17  0514   *      K 3.3*      CO2 21*   BUN 6*   CREATININE 0.8   CALCIUM 8.8    and CBC:  Lab Results   Component Value Date    WBC 6.50 03/25/2017    HGB 11.5 (L) 03/25/2017    HCT 33.8 (L) 03/25/2017    MCV 92 03/25/2017     (L) 03/25/2017         Pending Diagnostic Studies:     None        Final Active Diagnoses:    Diagnosis Date Noted POA    Acute cystitis without hematuria [N30.00] 03/24/2017 Yes    Alzheimer's disease [G30.9] 03/24/2014 Yes    HTN (hypertension), benign [I10] 05/31/2012 Yes    Hypothyroid [E03.9] 05/31/2012 Yes    Long-term insulin use in type 2 diabetes [E11.9, Z79.4] 05/31/2012 Not Applicable      Problems Resolved During this Admission:    Diagnosis Date Noted Date Resolved POA    PRINCIPAL PROBLEM:  Metabolic encephalopathy [G93.41] 03/23/2017 03/27/2017 Yes    Hyponatremia [E87.1] 03/24/2017 03/25/2017 Yes      Discharged Condition: stable    Disposition: Skilled Nursing Facility    Follow Up:  Follow-up Information     Follow up with Lydia Singh.    Specialties:  SNF Agency, Allergy, Allergy    Why:  SNF        Patient Instructions:   Diet:  1800 calorie diabetic  Activity:  Ambulate with assistance    Medications:  Reconciled Home Medications:   Current Discharge Medication List      START taking these medications    Details   ciprofloxacin HCl (CIPRO) 500 MG tablet Take 1 tablet (500 mg total) by mouth every 12 (twelve) hours. Stop date: 4/1/2017.         CONTINUE these medications which have NOT CHANGED    Details   amlodipine (NORVASC) 5 MG tablet Take 1 tablet (5 mg total) by mouth once daily.  Qty: 90 tablet, Refills: 3    Associated Diagnoses: HTN (hypertension)      donepezil (ARICEPT) 10 MG tablet Take 1 tablet (10 mg total) by mouth once daily.  Qty: 90 tablet, Refills: 3    Associated Diagnoses: Alzheimer's dementia without behavioral disturbance, unspecified timing of dementia onset     "  enalapril (VASOTEC) 5 MG tablet Take 1 tablet (5 mg total) by mouth once daily.  Qty: 90 tablet, Refills: 3    Associated Diagnoses: HTN (hypertension), benign      MULTIVIT-MINERALS/FERROUS FUM (MULTI VITAMIN ORAL) Take 1 tablet by mouth once daily.      pantoprazole (PROTONIX) 40 MG tablet Take 1 tablet (40 mg total) by mouth once daily.  Qty: 90 tablet, Refills: 3    Associated Diagnoses: GERD (gastroesophageal reflux disease)      aspirin 325 MG tablet Take 325 mg by mouth once daily.      levothyroxine (SYNTHROID) 75 MCG tablet Take 1 tablet (75 mcg total) by mouth once daily.  Qty: 90 tablet, Refills: 0      venlafaxine (EFFEXOR) 37.5 MG Tab Take 37.5 mg by mouth 2 (two) times daily.         STOP taking these medications       BD INSULIN PEN NEEDLE UF SHORT 31 X 5/16 " Ndle Comments:   Reason for Stopping:         blood sugar diagnostic Strp Comments:   Reason for Stopping:         blood-glucose meter kit Comments:   Reason for Stopping:         insulin glargine (LANTUS) 100 unit/mL injection Comments:   Reason for Stopping:             Time spent on the discharge of patient: 28 minutes    Miguel Portillo MD  Department of Hospital Medicine  [facility]    "

## 2017-03-29 NOTE — PLAN OF CARE
Problem: Physical Therapy Goal  Goal: Physical Therapy Goal  Goals to be met by: 2017     Patient will increase functional independence with mobility by performin. Supine to sit with Adel  2. Sit to supine with Adel  3. Sit to stand transfer with Supervision  4. Gait x 50 feet with Supervision using Single-point Cane .    Outcome: Ongoing (interventions implemented as appropriate)  Ambulated 100' with SPC and Min A. Requires verbal cues for safety and technique.

## 2017-03-29 NOTE — DISCHARGE INSTRUCTIONS
********************************************************  Discharge to [facility] for SNF  PT and OT 5 times a week.  Diet:  1800 calorie diabetic  Activity:  Ambulate with assistance  Meds:  See list.  Stop date for ciprofloxacin is 4/1.  *********************************************************

## 2017-03-29 NOTE — PROGRESS NOTES
1205  Received SNF auth from [facility] [id number]    1208  Faxed 142, PASRR, PPD and AVS to [facility].    1226  Updated patient and daughter.

## 2017-03-29 NOTE — PROGRESS NOTES
2300  Updated pt's daughter Soledad that [facility] has not yet approved SNF for patient.  Informed daughter that patient refused PT yesterday due to fatigue therefore we are waiting to see how patient performs with PT today; verbalized understanding and stated that she wants to be called if patient refused PT.    6424  Asked Cayla PT to try and see patient early this morning and document early if possible; verbalized understanding.  Informed Cayla that pt's daughter wants to be called if patient refused therapy again; verbalized understanding.

## 2017-03-29 NOTE — NURSING
Patient bring discharged to [facility] today. Patient's daughter at bedside awaiting transportation. Report called to Radha.

## 2017-03-29 NOTE — PT/OT/SLP PROGRESS
Physical Therapy  Treatment    Nina Portillo   MRN: [id number]   Admitting Diagnosis: Metabolic encephalopathy    PT Received On: 17  PT Start Time: 09     PT Stop Time: 910    PT Total Time (min): 10 min       Billable Minutes:  Gait [id number]    Treatment Type: Treatment  PT/PTA: PTA     PTA Visit Number: 1       General Precautions: Standard, fall  Orthopedic Precautions: N/A   Braces:           Subjective:  Communicated with nurse Yanet prior to session.  Agreed to mobilize.    Pain Ratin/10                   Objective:   Patient found with: telemetry, peripheral IV    Functional Mobility:  Bed Mobility:   Rolling/Turning to Left: Minimum assistance  Rolling/Turning Right: Minimum assistance  Supine to Sit: Minimum Assistance  Sit to Supine: Contact Guard Assistance    Transfers:  Sit <> Stand Assistance: Minimum Assistance  Sit <> Stand Assistive Device: Straight Cane    Gait:   Gait Distance: 100'  Assistance 1: Minimum assistance  Gait Assistive Device: Single point cane  Gait Pattern: 3-point gait  Gait Deviation(s): decreased silvia, decreased velocity of limb motion, decreased step length, decreased weight-shifting ability    Stairs:      Balance:   Static Sit: FAIR+: Able to take MINIMAL challenges from all directions  Dynamic Sit: FAIR: Cannot move trunk without losing balance  Static Stand: FAIR: Maintains without assist but unable to take challenges  Dynamic stand: FAIR: Needs CONTACT GUARD during gait     Therapeutic Activities and Exercises:  Transferred to EOB with Min A.  Ambulated 100' with SPC and Min A with verbal cues for safety and technique as patient grabbing for furniture in room to achieve balance initially during gait.     AM-PAC 6 CLICK MOBILITY  How much help from another person does this patient currently need?   1 = Unable, Total/Dependent Assistance  2 = A lot, Maximum/Moderate Assistance  3 = A little, Minimum/Contact Guard/Supervision  4 = None, Modified  Hardy/Independent         AM-PAC Raw Score CMS G-Code Modifier Level of Impairment Assistance   6 % Total / Unable   7 - 9 CM 80 - 100% Maximal Assist   10 - 14 CL 60 - 80% Moderate Assist   15 - 19 CK 40 - 60% Moderate Assist   20 - 22 CJ 20 - 40% Minimal Assist   23 CI 1-20% SBA / CGA   24 CH 0% Independent/ Mod I     Patient left supine with all lines intact, call button in reach and nurse Yanet notified.    Assessment:  Nina Portillo is a 76 y.o. female with a medical diagnosis of Metabolic encephalopathy and presents with weakness, decreased balance.    Rehab identified problem list/impairments: Rehab identified problem list/impairments: weakness, impaired endurance, gait instability, impaired balance, impaired functional mobilty, decreased coordination, decreased safety awareness    Rehab potential is good.    Activity tolerance: Fair    Discharge recommendations: Discharge Facility/Level Of Care Needs: nursing facility, skilled     Barriers to discharge: Barriers to Discharge: Decreased caregiver support    Equipment recommendations: Equipment Needed After Discharge: none     GOALS:   Physical Therapy Goals        Problem: Physical Therapy Goal    Goal Priority Disciplines Outcome Goal Variances Interventions   Physical Therapy Goal     PT/OT, PT Ongoing (interventions implemented as appropriate)     Description:  Goals to be met by: 2017     Patient will increase functional independence with mobility by performin. Supine to sit with Hardy  2. Sit to supine with Hardy  3. Sit to stand transfer with Supervision  4. Gait  x 50 feet with Supervision using Single-point Cane .                 PLAN:    Patient to be seen 6 x/week  to address the above listed problems via gait training, therapeutic activities, therapeutic exercises  Plan of Care expires: 17  Plan of Care reviewed with: patient         Edith Davi, VALERIY  2017

## 2017-03-29 NOTE — PLAN OF CARE
Problem: Patient Care Overview  Goal: Plan of Care Review  Outcome: Ongoing (interventions implemented as appropriate)  Safety maintained Remains on cardiac monitoring No acute distress noted

## 2017-03-29 NOTE — PROGRESS NOTES
1448  Per Suma at [facility] report can be called to Radha on Station 2 ([phone]) and they will  patient.    1453  Updated pt's nurse Shantell.

## 2017-03-30 ENCOUNTER — PATIENT OUTREACH (OUTPATIENT)
Dept: ADMINISTRATIVE | Facility: CLINIC | Age: 77
End: 2017-03-30
Payer: MEDICARE

## 2017-03-30 ENCOUNTER — DOCUMENTATION ONLY (OUTPATIENT)
Dept: FAMILY MEDICINE | Facility: CLINIC | Age: 77
End: 2017-03-30

## 2017-03-30 NOTE — PT/OT/SLP DISCHARGE
Physical Therapy Discharge Summary    Nina Portillo  MRN: [id number]   Metabolic encephalopathy   Patient Discharged from acute Physical Therapy on 2017.  Please refer to prior PT noted date on 2017 for functional status.     Assessment:   Patient appropriate for care in another setting.  GOALS:   Physical Therapy Goals        Problem: Physical Therapy Goal    Goal Priority Disciplines Outcome Goal Variances Interventions   Physical Therapy Goal     PT/OT, PT Ongoing (interventions implemented as appropriate)     Description:  Goals to be met by: 2017     Patient will increase functional independence with mobility by performin. Supine to sit with Bulloch  2. Sit to supine with Bulloch  3. Sit to stand transfer with Supervision  4. Gait  x 50 feet with Supervision using Single-point Cane .               Reasons for Discontinuation of Therapy Services  Transfer to alternate level of care.      Plan:  Patient Discharged to: Skilled Nursing Facility.

## 2017-03-30 NOTE — PROGRESS NOTES
Pre-Visit Chart Review  For Appointment Scheduled on 4/7/17.    Health Maintenance Due   Topic Date Due    Eye Exam  10/16/1950    Zoster Vaccine  10/16/1998    DEXA SCAN  06/07/2015

## 2017-03-30 NOTE — PLAN OF CARE
03/30/17 1533   Final Note   Assessment Type Final Discharge Note   Discharge Disposition SNF  ([facility])   Discharge planning education complete? Yes

## 2017-04-11 ENCOUNTER — TELEPHONE (OUTPATIENT)
Dept: FAMILY MEDICINE | Facility: CLINIC | Age: 77
End: 2017-04-11

## 2017-04-11 NOTE — TELEPHONE ENCOUNTER
Message from patients daughter.  On behalf of my mother she is being discharged from [facility] on Friday 4/14/17.  [employer] instructed me to contact you to have orders place for [facility] home health.

## 2017-04-12 DIAGNOSIS — F03.90 DEMENTIA WITHOUT BEHAVIORAL DISTURBANCE, UNSPECIFIED DEMENTIA TYPE: Primary | ICD-10-CM

## 2017-04-17 ENCOUNTER — PATIENT OUTREACH (OUTPATIENT)
Dept: ADMINISTRATIVE | Facility: CLINIC | Age: 77
End: 2017-04-17
Payer: MEDICARE

## 2017-04-17 RX ORDER — INSULIN GLARGINE 100 [IU]/ML
15 INJECTION, SOLUTION SUBCUTANEOUS DAILY
COMMUNITY
End: 2018-02-01 | Stop reason: SDUPTHER

## 2017-04-17 NOTE — PROGRESS NOTES
C3 nurse attempted to reach Nina TORRES Lindsey's caregiver at home number listed.  Soledad Kahn's daughter states caregiver is at work and will not be home until after 5pm.  Voicemail with [facility] number left on Soledad's cell.

## 2017-04-17 NOTE — PROGRESS NOTES
C3 nurse attempted to contact patient. No answer. The following message was left for the patient to return the call:  Good afternoon, I am a nurse calling on behalf of [facility] from the Care Coordination Center.  This is a Transitional Care Call for Nina Portillo. When you have a moment please contact us at [phone] or [phone] Monday through Friday, between the hours of 8 am to 4 pm. We look forward to speaking with you. On behalf of [facility] have a nice day.    The patient does not have a scheduled HOSFU appointment within 7 days post acute discharge date 4/14/17. Message sent to Physician staff to assist with HOSFU appointment scheduling.

## 2017-05-15 ENCOUNTER — PATIENT MESSAGE (OUTPATIENT)
Dept: FAMILY MEDICINE | Facility: CLINIC | Age: 77
End: 2017-05-15

## 2017-05-15 RX ORDER — VENLAFAXINE 37.5 MG/1
37.5 TABLET ORAL 2 TIMES DAILY
Qty: 60 TABLET | Refills: 1 | Status: SHIPPED | OUTPATIENT
Start: 2017-05-15 | End: 2017-08-27 | Stop reason: SDUPTHER

## 2017-06-01 RX ORDER — LEVOTHYROXINE SODIUM 75 UG/1
75 TABLET ORAL DAILY
Qty: 90 TABLET | Refills: 3 | Status: SHIPPED | OUTPATIENT
Start: 2017-06-01 | End: 2018-02-01 | Stop reason: SDUPTHER

## 2017-06-06 ENCOUNTER — OFFICE VISIT (OUTPATIENT)
Dept: FAMILY MEDICINE | Facility: CLINIC | Age: 77
End: 2017-06-06
Payer: MEDICARE

## 2017-06-06 ENCOUNTER — DOCUMENTATION ONLY (OUTPATIENT)
Dept: FAMILY MEDICINE | Facility: CLINIC | Age: 77
End: 2017-06-06

## 2017-06-06 ENCOUNTER — LAB VISIT (OUTPATIENT)
Dept: LAB | Facility: HOSPITAL | Age: 77
End: 2017-06-06
Attending: FAMILY MEDICINE
Payer: MEDICARE

## 2017-06-06 VITALS
DIASTOLIC BLOOD PRESSURE: 76 MMHG | SYSTOLIC BLOOD PRESSURE: 150 MMHG | WEIGHT: 132.5 LBS | HEIGHT: 65 IN | TEMPERATURE: 98 F | BODY MASS INDEX: 22.08 KG/M2

## 2017-06-06 DIAGNOSIS — R11.2 NAUSEA AND VOMITING, INTRACTABILITY OF VOMITING NOT SPECIFIED, UNSPECIFIED VOMITING TYPE: Primary | ICD-10-CM

## 2017-06-06 DIAGNOSIS — E11.8 TYPE 2 DIABETES MELLITUS WITH COMPLICATION, WITH LONG-TERM CURRENT USE OF INSULIN: ICD-10-CM

## 2017-06-06 DIAGNOSIS — F02.80 ALZHEIMER'S DEMENTIA WITHOUT BEHAVIORAL DISTURBANCE, UNSPECIFIED TIMING OF DEMENTIA ONSET: ICD-10-CM

## 2017-06-06 DIAGNOSIS — N39.0 URINARY TRACT INFECTION WITHOUT HEMATURIA, SITE UNSPECIFIED: ICD-10-CM

## 2017-06-06 DIAGNOSIS — G30.9 ALZHEIMER'S DEMENTIA WITHOUT BEHAVIORAL DISTURBANCE, UNSPECIFIED TIMING OF DEMENTIA ONSET: ICD-10-CM

## 2017-06-06 DIAGNOSIS — I10 HTN (HYPERTENSION), BENIGN: ICD-10-CM

## 2017-06-06 DIAGNOSIS — Z79.4 TYPE 2 DIABETES MELLITUS WITH COMPLICATION, WITH LONG-TERM CURRENT USE OF INSULIN: ICD-10-CM

## 2017-06-06 LAB
ANION GAP SERPL CALC-SCNC: 11 MMOL/L
BASOPHILS # BLD AUTO: 0.02 K/UL
BASOPHILS NFR BLD: 0.2 %
BILIRUB SERPL-MCNC: NEGATIVE MG/DL
BLOOD URINE, POC: ABNORMAL
BUN SERPL-MCNC: 12 MG/DL
CALCIUM SERPL-MCNC: 10.2 MG/DL
CHLORIDE SERPL-SCNC: 99 MMOL/L
CO2 SERPL-SCNC: 24 MMOL/L
COLOR, POC UA: ABNORMAL
CREAT SERPL-MCNC: 0.9 MG/DL
DIFFERENTIAL METHOD: ABNORMAL
EOSINOPHIL # BLD AUTO: 0.1 K/UL
EOSINOPHIL NFR BLD: 1.2 %
ERYTHROCYTE [DISTWIDTH] IN BLOOD BY AUTOMATED COUNT: 12.8 %
EST. GFR  (AFRICAN AMERICAN): >60 ML/MIN/1.73 M^2
EST. GFR  (NON AFRICAN AMERICAN): >60 ML/MIN/1.73 M^2
GLUCOSE SERPL-MCNC: 183 MG/DL
GLUCOSE SERPL-MCNC: 225 MG/DL (ref 70–110)
GLUCOSE UR QL STRIP: 50
HCT VFR BLD AUTO: 42.2 %
HGB BLD-MCNC: 13.9 G/DL
KETONES UR QL STRIP: NEGATIVE
LEUKOCYTE ESTERASE URINE, POC: POSITIVE
LYMPHOCYTES # BLD AUTO: 1.4 K/UL
LYMPHOCYTES NFR BLD: 12.7 %
MCH RBC QN AUTO: 32.1 PG
MCHC RBC AUTO-ENTMCNC: 32.9 %
MCV RBC AUTO: 98 FL
MONOCYTES # BLD AUTO: 0.9 K/UL
MONOCYTES NFR BLD: 8 %
NEUTROPHILS # BLD AUTO: 8.3 K/UL
NEUTROPHILS NFR BLD: 77.6 %
NITRITE, POC UA: NEGATIVE
PH, POC UA: 5
PLATELET # BLD AUTO: 276 K/UL
PMV BLD AUTO: 10.5 FL
POTASSIUM SERPL-SCNC: 4 MMOL/L
PROTEIN, POC: ABNORMAL
RBC # BLD AUTO: 4.33 M/UL
SODIUM SERPL-SCNC: 134 MMOL/L
SPECIFIC GRAVITY, POC UA: 1.02
UROBILINOGEN, POC UA: NORMAL
WBC # BLD AUTO: 10.64 K/UL

## 2017-06-06 PROCEDURE — 1159F MED LIST DOCD IN RCRD: CPT | Mod: S$GLB,,, | Performed by: PHYSICIAN ASSISTANT

## 2017-06-06 PROCEDURE — 82948 REAGENT STRIP/BLOOD GLUCOSE: CPT | Mod: S$GLB,,, | Performed by: PHYSICIAN ASSISTANT

## 2017-06-06 PROCEDURE — 81001 URINALYSIS AUTO W/SCOPE: CPT | Mod: S$GLB,,, | Performed by: PHYSICIAN ASSISTANT

## 2017-06-06 PROCEDURE — 36415 COLL VENOUS BLD VENIPUNCTURE: CPT | Mod: PO

## 2017-06-06 PROCEDURE — 1126F AMNT PAIN NOTED NONE PRSNT: CPT | Mod: S$GLB,,, | Performed by: PHYSICIAN ASSISTANT

## 2017-06-06 PROCEDURE — 99214 OFFICE O/P EST MOD 30 MIN: CPT | Mod: 25,S$GLB,, | Performed by: PHYSICIAN ASSISTANT

## 2017-06-06 PROCEDURE — 80048 BASIC METABOLIC PNL TOTAL CA: CPT

## 2017-06-06 PROCEDURE — 99999 PR PBB SHADOW E&M-EST. PATIENT-LVL IV: CPT | Mod: PBBFAC,,, | Performed by: PHYSICIAN ASSISTANT

## 2017-06-06 PROCEDURE — 85025 COMPLETE CBC W/AUTO DIFF WBC: CPT

## 2017-06-06 PROCEDURE — 99499 UNLISTED E&M SERVICE: CPT | Mod: S$GLB,,, | Performed by: PHYSICIAN ASSISTANT

## 2017-06-06 RX ORDER — ONDANSETRON 8 MG/1
8 TABLET, ORALLY DISINTEGRATING ORAL EVERY 6 HOURS PRN
Qty: 20 TABLET | Refills: 0 | Status: SHIPPED | OUTPATIENT
Start: 2017-06-06 | End: 2017-12-04

## 2017-06-06 RX ORDER — DOXYCYCLINE 100 MG/1
100 CAPSULE ORAL EVERY 12 HOURS
Qty: 20 CAPSULE | Refills: 0 | Status: SHIPPED | OUTPATIENT
Start: 2017-06-06 | End: 2017-12-04

## 2017-06-06 RX ORDER — ONDANSETRON 8 MG/1
8 TABLET, ORALLY DISINTEGRATING ORAL ONCE
Qty: 1 TABLET | Refills: 0 | Status: SHIPPED | OUTPATIENT
Start: 2017-06-06 | End: 2017-06-06 | Stop reason: SDUPTHER

## 2017-06-06 NOTE — PROGRESS NOTES
Pre-Visit Chart Review  For Appointment Scheduled on 06/06/2017    Health Maintenance Due   Topic Date Due    Eye Exam  10/16/1950    Zoster Vaccine  10/16/1998    DEXA SCAN  06/07/2015

## 2017-06-06 NOTE — PATIENT INSTRUCTIONS
Controlling High Blood Pressure  High blood pressure (hypertension) is often called the silent killer. This is because many people who have it dont know it. High blood pressure is defined as 140/90 mm Hg or higher. Know your blood pressure and remember to check it regularly. Doing so can save your life. Here are some things you can do to help control your blood pressure.    Choose heart-healthy foods  · Select low-salt, low-fat foods. Limit sodium intake to 2,400 mg per day or the amount suggested by your healthcare provider.  · Limit canned, dried, cured, packaged, and fast foods. These can contain a lot of salt.  · Eat 8 to 10 servings of fruits and vegetables every day.  · Choose lean meats, fish, or chicken.  · Eat whole-grain pasta, brown rice, and beans.  · Eat 2 to 3 servings of low-fat or fat-free dairy products.  · Ask your doctor about the DASH eating plan. This plan helps reduce blood pressure.  · When you go to a restaurant, ask that your meal be prepared with no added salt.  Maintain a healthy weight  · Ask your healthcare provider how many calories to eat a day. Then stick to that number.  · Ask your healthcare provider what weight range is healthiest for you. If you are overweight, a weight loss of only 3% to 5% of your body weight can help lower blood pressure. Generally, a good weight loss goal is to lose 10% of your body weight in a year.  · Limit snacks and sweets.  · Get regular exercise.  Get up and get active  · Choose activities you enjoy. Find ones you can do with friends or family. This includes bicycling, dancing, walking, and jogging.  · Park farther away from building entrances.  · Use stairs instead of the elevator.  · When you can, walk or bike instead of driving.  · [place] leaves, garden, or do household repairs.  · Be active at a moderate to vigorous level of physical activity for at least 40 minutes for a minimum of 3 to 4 days a week.   Manage stress  · Make time to relax and enjoy  life. Find time to laugh.  · Communicate your concerns with your loved ones and your healthcare provider.  · Visit with family and friends, and keep up with hobbies.  Limit alcohol and quit smoking  · Men should have no more than 2 drinks per day.  · Women should have no more than 1 drink per day.  · Talk with your healthcare provider about quitting smoking. Smoking significantly increases your risk for heart disease and stroke. Ask your healthcare provider about community smoking cessation programs and other options.  Medicines  If lifestyle changes arent enough, your healthcare provider may prescribe high blood pressure medicine. Take all medicines as prescribed. If you have any questions about your medicines, ask your healthcare provider before stopping or changing them.   Date Last Reviewed: 4/27/2016  © 8440-1733 [employer]. [address] [place] [address], [place] [address]. All rights reserved. This information is not intended as a substitute for professional medical care. Always follow your healthcare professional's instructions.

## 2017-06-06 NOTE — PROGRESS NOTES
Subjective:       Patient ID: Nina Portillo is a 76 y.o. female.    Chief Complaint: Nausea and Emesis    Patient with dementia, recurrent UTI, and diabetes mellitus type 2 presents with nausea since yesterday evening.  She is accompanied by her daughter who is an MA here in the clinic.  Patient had one episode of vomiting after she tried to eat dinner.  She continues to have nausea, poor appetite,  and malaise today.  She denies diarrhea and had normal BM yesterday.  She has not had a fever.  She denies dysuria, frequency, abdominal pain.  She is incontinent of urine and wears depends.  She has tried cranberry juice and tablets with out relief of symptoms.        Review of Systems   Constitutional: Positive for activity change, appetite change and fatigue. Negative for chills, diaphoresis, fever and unexpected weight change.   Respiratory: Negative for chest tightness, shortness of breath and wheezing.    Cardiovascular: Negative for chest pain, palpitations and leg swelling.   Gastrointestinal: Positive for nausea and vomiting. Negative for abdominal pain, constipation and diarrhea.   Genitourinary: Positive for enuresis. Negative for decreased urine volume, difficulty urinating, dysuria, flank pain, frequency, hematuria, pelvic pain and urgency.   Musculoskeletal: Negative for back pain.   Skin: Negative for rash.   Neurological: Positive for weakness. Negative for dizziness, light-headedness, numbness and headaches.   Psychiatric/Behavioral: Positive for confusion (baseline ). Negative for agitation, behavioral problems, hallucinations and sleep disturbance.       Objective:      Physical Exam   Constitutional: She appears well-developed and well-nourished. She is cooperative. She does not have a sickly appearance. She does not appear ill. No distress.   HENT:   Head: Normocephalic and atraumatic.   Mouth/Throat: Oropharynx is clear and moist and mucous membranes are normal. Mucous membranes are not pale and  not dry.   Eyes: Conjunctivae and EOM are normal. Pupils are equal, round, and reactive to light. No scleral icterus.   Neck: Trachea normal. Carotid bruit is not present.   Cardiovascular: Normal rate, regular rhythm and normal heart sounds.    Pulmonary/Chest: Effort normal and breath sounds normal.   Abdominal: Soft. Normal appearance and bowel sounds are normal. She exhibits no distension. There is no tenderness.   Musculoskeletal:        Right lower leg: She exhibits no edema.        Left lower leg: She exhibits no edema.   Neurological: She is alert.   Skin: Skin is warm and dry. Capillary refill takes less than 2 seconds.   Psychiatric: She has a normal mood and affect. Her speech is normal. Judgment normal. Cognition and memory are normal.   Vitals reviewed.      Assessment:       1. Nausea and vomiting, intractability of vomiting not specified, unspecified vomiting type    2. Urinary tract infection without hematuria, site unspecified    3. Type 2 diabetes mellitus with complication, with long-term current use of insulin    4. Alzheimer's dementia without behavioral disturbance, unspecified timing of dementia onset    5. HTN (hypertension), benign        Plan:       Nina was seen today for nausea and emesis.    Diagnoses and all orders for this visit:    Nausea and vomiting, intractability of vomiting not specified, unspecified vomiting type    -     POCT glucose   ondansetron (ZOFRAN-ODT) 8 MG TbDL; Take 1 tablet (8 mg total) by mouth every 6 (six) hours as needed.  Urinary tract infection without hematuria, site unspecified  -     Urine culture  -     POCT urinalysis, dipstick or tablet reag  -     CBC auto differential; Future  -     Basic metabolic panel; Future        doxycycline (VIBRAMYCIN) 100 MG Cap; Take 1 capsule (100 mg total) by mouth every 12 (twelve) hours.  Increase fluid intake   Type 2 diabetes mellitus with complication, with long-term current use of insulin  -     POCT  glucose  Continue as is   Hold long acting insulin if not eating     Alzheimer's dementia without behavioral disturbance, unspecified timing of dementia onset  At baseline   Monitor for acute changes in mental status   HTN (hypertension), benign  Patient readiness: acceptance and barriers:none    During the course of the visit the patient was educated and counseled about the following:     Diabetes:  Reminded to bring in blood sugar diary at next visit.  Hypertension:   Medication: no change.    Goals: Diabetes: Maintain Hemoglobin A1C below 8 and Hypertension: Reduce Blood Pressure    Did patient meet goals/outcomes: Yes    The following self management tools provided: declined    Patient Instructions (the written plan) was given to the patient/family.     Time spent with patient: 30 minutes            -    -

## 2017-06-07 LAB — BACTERIA UR CULT: NO GROWTH

## 2017-06-09 ENCOUNTER — TELEPHONE (OUTPATIENT)
Dept: FAMILY MEDICINE | Facility: CLINIC | Age: 77
End: 2017-06-09

## 2017-06-09 NOTE — TELEPHONE ENCOUNTER
----- Message from TOMMY Jc sent at 6/8/2017 12:38 PM CDT -----  Urine culture is negative  Complete antibiotic as prescribed

## 2017-07-25 ENCOUNTER — DOCUMENTATION ONLY (OUTPATIENT)
Dept: FAMILY MEDICINE | Facility: CLINIC | Age: 77
End: 2017-07-25

## 2017-07-25 NOTE — PROGRESS NOTES
Pre-Visit Chart Review  For Appointment Scheduled on 8/4/17.    Health Maintenance Due   Topic Date Due    Eye Exam  10/16/1950    Zoster Vaccine  10/16/1998    DEXA SCAN  06/07/2015

## 2017-07-31 DIAGNOSIS — K21.9 GASTROESOPHAGEAL REFLUX DISEASE, ESOPHAGITIS PRESENCE NOT SPECIFIED: ICD-10-CM

## 2017-07-31 NOTE — TELEPHONE ENCOUNTER
Patient requesting medication refill   Lov: 6/6/17 next appointment 8/4/17  Labs: 6/6/17  Refill: 3/10/17

## 2017-08-01 RX ORDER — PANTOPRAZOLE SODIUM 40 MG/1
40 TABLET, DELAYED RELEASE ORAL DAILY
Qty: 90 TABLET | Refills: 3 | Status: SHIPPED | OUTPATIENT
Start: 2017-08-01 | End: 2018-08-04 | Stop reason: SDUPTHER

## 2017-08-01 RX ORDER — AMLODIPINE BESYLATE 5 MG/1
5 TABLET ORAL DAILY
Qty: 90 TABLET | Refills: 3 | Status: SHIPPED | OUTPATIENT
Start: 2017-08-01 | End: 2018-08-04 | Stop reason: SDUPTHER

## 2017-08-28 RX ORDER — VENLAFAXINE 37.5 MG/1
37.5 TABLET ORAL 2 TIMES DAILY
Qty: 60 TABLET | Refills: 1 | Status: SHIPPED | OUTPATIENT
Start: 2017-08-28 | End: 2017-10-04 | Stop reason: SDUPTHER

## 2017-10-05 DIAGNOSIS — E11.9 TYPE 2 DIABETES MELLITUS WITHOUT COMPLICATION: ICD-10-CM

## 2017-10-05 RX ORDER — VENLAFAXINE 37.5 MG/1
37.5 TABLET ORAL 2 TIMES DAILY
Qty: 60 TABLET | Refills: 1 | Status: SHIPPED | OUTPATIENT
Start: 2017-10-05 | End: 2017-12-04

## 2017-12-04 ENCOUNTER — PATIENT MESSAGE (OUTPATIENT)
Dept: FAMILY MEDICINE | Facility: CLINIC | Age: 77
End: 2017-12-04

## 2018-01-31 ENCOUNTER — DOCUMENTATION ONLY (OUTPATIENT)
Dept: FAMILY MEDICINE | Facility: CLINIC | Age: 78
End: 2018-01-31

## 2018-01-31 NOTE — PROGRESS NOTES
Health Maintenance Due   Topic Date Due    Eye Exam  10/16/1950    Zoster Vaccine  10/16/1998    DEXA SCAN  06/07/2015    Influenza Vaccine  08/01/2017    Hemoglobin A1c  09/24/2017    Foot Exam  02/03/2018

## 2018-02-01 ENCOUNTER — OFFICE VISIT (OUTPATIENT)
Dept: FAMILY MEDICINE | Facility: CLINIC | Age: 78
End: 2018-02-01
Payer: MEDICARE

## 2018-02-01 ENCOUNTER — CLINICAL SUPPORT (OUTPATIENT)
Dept: FAMILY MEDICINE | Facility: CLINIC | Age: 78
End: 2018-02-01
Payer: MEDICARE

## 2018-02-01 VITALS
HEIGHT: 65 IN | WEIGHT: 122.56 LBS | DIASTOLIC BLOOD PRESSURE: 92 MMHG | BODY MASS INDEX: 20.42 KG/M2 | TEMPERATURE: 99 F | SYSTOLIC BLOOD PRESSURE: 132 MMHG | HEART RATE: 108 BPM | OXYGEN SATURATION: 98 %

## 2018-02-01 DIAGNOSIS — E11.59 HYPERTENSION ASSOCIATED WITH DIABETES: Primary | ICD-10-CM

## 2018-02-01 DIAGNOSIS — E03.9 ACQUIRED HYPOTHYROIDISM: ICD-10-CM

## 2018-02-01 DIAGNOSIS — F33.0 MAJOR DEPRESSIVE DISORDER, RECURRENT EPISODE, MILD: ICD-10-CM

## 2018-02-01 DIAGNOSIS — I10 HTN (HYPERTENSION), BENIGN: ICD-10-CM

## 2018-02-01 DIAGNOSIS — F02.80 ALZHEIMER'S DEMENTIA WITHOUT BEHAVIORAL DISTURBANCE, UNSPECIFIED TIMING OF DEMENTIA ONSET: ICD-10-CM

## 2018-02-01 DIAGNOSIS — Z23 NEED FOR VACCINATION: Primary | ICD-10-CM

## 2018-02-01 DIAGNOSIS — Z23 FLU VACCINE NEED: ICD-10-CM

## 2018-02-01 DIAGNOSIS — I15.2 HYPERTENSION ASSOCIATED WITH DIABETES: Primary | ICD-10-CM

## 2018-02-01 DIAGNOSIS — E11.8 CONTROLLED TYPE 2 DIABETES MELLITUS WITH COMPLICATION, WITH LONG-TERM CURRENT USE OF INSULIN: ICD-10-CM

## 2018-02-01 DIAGNOSIS — Z79.4 CONTROLLED TYPE 2 DIABETES MELLITUS WITH COMPLICATION, WITH LONG-TERM CURRENT USE OF INSULIN: ICD-10-CM

## 2018-02-01 DIAGNOSIS — G30.9 ALZHEIMER'S DEMENTIA WITHOUT BEHAVIORAL DISTURBANCE, UNSPECIFIED TIMING OF DEMENTIA ONSET: ICD-10-CM

## 2018-02-01 DIAGNOSIS — F51.01 PRIMARY INSOMNIA: ICD-10-CM

## 2018-02-01 LAB
ALBUMIN SERPL BCP-MCNC: 3.3 G/DL
ALP SERPL-CCNC: 132 U/L
ALT SERPL W/O P-5'-P-CCNC: 10 U/L
ANION GAP SERPL CALC-SCNC: 11 MMOL/L
AST SERPL-CCNC: 18 U/L
BASOPHILS # BLD AUTO: 0.04 K/UL
BASOPHILS NFR BLD: 0.5 %
BILIRUB SERPL-MCNC: 0.9 MG/DL
BUN SERPL-MCNC: 13 MG/DL
CALCIUM SERPL-MCNC: 10.1 MG/DL
CHLORIDE SERPL-SCNC: 100 MMOL/L
CO2 SERPL-SCNC: 27 MMOL/L
CREAT SERPL-MCNC: 1 MG/DL
CREAT UR-MCNC: 225 MG/DL
DIFFERENTIAL METHOD: ABNORMAL
EOSINOPHIL # BLD AUTO: 0.2 K/UL
EOSINOPHIL NFR BLD: 1.9 %
ERYTHROCYTE [DISTWIDTH] IN BLOOD BY AUTOMATED COUNT: 12.7 %
EST. GFR  (AFRICAN AMERICAN): >60 ML/MIN/1.73 M^2
EST. GFR  (NON AFRICAN AMERICAN): 53.7 ML/MIN/1.73 M^2
ESTIMATED AVG GLUCOSE: 120 MG/DL
GLUCOSE SERPL-MCNC: 222 MG/DL
HBA1C MFR BLD HPLC: 5.8 %
HCT VFR BLD AUTO: 43.4 %
HGB BLD-MCNC: 14.4 G/DL
IMM GRANULOCYTES # BLD AUTO: 0.02 K/UL
IMM GRANULOCYTES NFR BLD AUTO: 0.2 %
LYMPHOCYTES # BLD AUTO: 2.1 K/UL
LYMPHOCYTES NFR BLD: 25.5 %
MCH RBC QN AUTO: 32.4 PG
MCHC RBC AUTO-ENTMCNC: 33.2 G/DL
MCV RBC AUTO: 98 FL
MICROALBUMIN UR DL<=1MG/L-MCNC: 27 UG/ML
MICROALBUMIN/CREATININE RATIO: 12 UG/MG
MONOCYTES # BLD AUTO: 0.6 K/UL
MONOCYTES NFR BLD: 7.2 %
NEUTROPHILS # BLD AUTO: 5.4 K/UL
NEUTROPHILS NFR BLD: 64.7 %
PLATELET # BLD AUTO: 295 K/UL
PMV BLD AUTO: 9.8 FL
POTASSIUM SERPL-SCNC: 3.5 MMOL/L
PROT SERPL-MCNC: 7.5 G/DL
RBC # BLD AUTO: 4.45 M/UL
SODIUM SERPL-SCNC: 138 MMOL/L
T4 FREE SERPL-MCNC: 1.36 NG/DL
TSH SERPL DL<=0.005 MIU/L-ACNC: 11.24 UIU/ML
WBC # BLD AUTO: 8.34 K/UL

## 2018-02-01 PROCEDURE — 1159F MED LIST DOCD IN RCRD: CPT | Mod: S$GLB,,, | Performed by: NURSE PRACTITIONER

## 2018-02-01 PROCEDURE — 3008F BODY MASS INDEX DOCD: CPT | Mod: S$GLB,,, | Performed by: NURSE PRACTITIONER

## 2018-02-01 PROCEDURE — 83036 HEMOGLOBIN GLYCOSYLATED A1C: CPT

## 2018-02-01 PROCEDURE — G0008 ADMIN INFLUENZA VIRUS VAC: HCPCS | Mod: S$GLB,,, | Performed by: INTERNAL MEDICINE

## 2018-02-01 PROCEDURE — 99214 OFFICE O/P EST MOD 30 MIN: CPT | Mod: 25,S$GLB,, | Performed by: NURSE PRACTITIONER

## 2018-02-01 PROCEDURE — 90662 IIV NO PRSV INCREASED AG IM: CPT | Mod: S$GLB,,, | Performed by: INTERNAL MEDICINE

## 2018-02-01 PROCEDURE — 80053 COMPREHEN METABOLIC PANEL: CPT

## 2018-02-01 PROCEDURE — 84439 ASSAY OF FREE THYROXINE: CPT

## 2018-02-01 PROCEDURE — 1126F AMNT PAIN NOTED NONE PRSNT: CPT | Mod: S$GLB,,, | Performed by: NURSE PRACTITIONER

## 2018-02-01 PROCEDURE — 82043 UR ALBUMIN QUANTITATIVE: CPT

## 2018-02-01 PROCEDURE — 84443 ASSAY THYROID STIM HORMONE: CPT

## 2018-02-01 PROCEDURE — 85025 COMPLETE CBC W/AUTO DIFF WBC: CPT

## 2018-02-01 RX ORDER — ENALAPRIL MALEATE 5 MG/1
5 TABLET ORAL DAILY
Qty: 90 TABLET | Refills: 3 | Status: SHIPPED | OUTPATIENT
Start: 2018-02-01 | End: 2018-06-01 | Stop reason: SDUPTHER

## 2018-02-01 RX ORDER — INSULIN GLARGINE 100 [IU]/ML
15 INJECTION, SOLUTION SUBCUTANEOUS DAILY
Qty: 30 ML | Refills: 3 | Status: SHIPPED | OUTPATIENT
Start: 2018-02-01 | End: 2018-03-28

## 2018-02-01 RX ORDER — DONEPEZIL HYDROCHLORIDE 10 MG/1
10 TABLET, FILM COATED ORAL DAILY
Qty: 90 TABLET | Refills: 3 | Status: SHIPPED | OUTPATIENT
Start: 2018-02-01 | End: 2019-02-27

## 2018-02-01 RX ORDER — LEVOTHYROXINE SODIUM 75 UG/1
75 TABLET ORAL DAILY
Qty: 90 TABLET | Refills: 3 | Status: SHIPPED | OUTPATIENT
Start: 2018-02-01 | End: 2018-03-28

## 2018-02-01 RX ORDER — MIRTAZAPINE 7.5 MG/1
7.5 TABLET, FILM COATED ORAL NIGHTLY
Qty: 30 TABLET | Refills: 1 | Status: SHIPPED | OUTPATIENT
Start: 2018-02-01 | End: 2018-03-28

## 2018-02-01 NOTE — PROGRESS NOTES
Allergies and two pt identifiers verified.  High dose Flu vaccine administered IM per order and MAR.  Tolerated injection well.  Instructed patient to wait 15 minutes in lobby to monitor for adverse reactions and copy of VIS given.  Patient voiced understanding.

## 2018-02-01 NOTE — PROGRESS NOTES
Allergies and two pt identifiers verified.  High dose Flu vaccine administered IM per MD order and MAR.  Tolerated injection well.  Instructed patient to wait 15 minutes in lobby to monitor for adverse reactions and copy of VIS given.  Patient voiced understanding.

## 2018-02-01 NOTE — PROGRESS NOTES
"Subjective:       Patient ID: Nina Portillo is a 77 y.o. female.    Chief Complaint: Establish Care  Weight loss, 10 pounds in past year without trying to lose weight. States she is eating and drinking well. Denies any nausea, vomiting or diarrhea.     Tachycardia is new, denies any palpitations, denies chest pain and dyspnea. Has chronic hypertension which is worse today.     Has chronic hypothyroidism, taking levothyroxine in the morning with everything else.     Taking protonix daily, denies heartburn or reflux.     Appears depressed, facial expression is sad, denies having fun doing things she has enjoyed previously. Not on any antidepressant at this time.  HPI  Review of Systems   Constitutional: Positive for unexpected weight change. Negative for fatigue and fever.   Respiratory: Negative for cough and shortness of breath.    Cardiovascular: Negative for chest pain and palpitations.   Gastrointestinal: Negative for diarrhea, nausea and vomiting.   Endocrine: Positive for cold intolerance. Negative for polydipsia and polyphagia.   Neurological: Negative for dizziness and weakness.       Objective:      Physical Exam   Constitutional: She is oriented to person, place, and time. She appears well-developed and well-nourished. No distress.   HENT:   Head: Normocephalic and atraumatic.   Eyes: Conjunctivae are normal. Right eye exhibits no discharge. Left eye exhibits no discharge. No scleral icterus.   Cardiovascular: Normal rate, regular rhythm and normal heart sounds.  Exam reveals no gallop and no friction rub.    No murmur heard.  Pulmonary/Chest: Effort normal and breath sounds normal. No respiratory distress. She has no wheezes. She has no rales.   Musculoskeletal: She exhibits no edema.   Neurological: She is alert and oriented to person, place, and time.   When asked what the day is, she replies "2013"   Skin: Skin is warm and dry. She is not diaphoretic.   Psychiatric: She has a normal mood and affect. " Her behavior is normal.   Nursing note and vitals reviewed.      Assessment:       1. Hypertension associated with diabetes    2. Acquired hypothyroidism    3. Controlled type 2 diabetes mellitus with complication, with long-term current use of insulin    4. Alzheimer's dementia without behavioral disturbance, unspecified timing of dementia onset    5. HTN (hypertension), benign    6. Primary insomnia    7. Flu vaccine need    8. Major depressive disorder, recurrent episode, mild        Plan:       Hypertension associated with diabetes  -     Comprehensive metabolic panel; Future; Expected date: 02/01/2018  -     CBC auto differential; Future; Expected date: 02/01/2018    Acquired hypothyroidism  -     TSH; Future; Expected date: 02/01/2018  -     levothyroxine (SYNTHROID) 75 MCG tablet; Take 1 tablet (75 mcg total) by mouth once daily.  Dispense: 90 tablet; Refill: 3    Controlled type 2 diabetes mellitus with complication, with long-term current use of insulin  -     Hemoglobin A1c; Future; Expected date: 02/01/2018  -     Microalbumin/creatinine urine ratio; Future; Expected date: 02/01/2018  -     insulin glargine (LANTUS) 100 unit/mL injection; Inject 15 Units into the skin once daily.  Dispense: 30 mL; Refill: 3    Alzheimer's dementia without behavioral disturbance, unspecified timing of dementia onset  -     donepezil (ARICEPT) 10 MG tablet; Take 1 tablet (10 mg total) by mouth once daily.  Dispense: 90 tablet; Refill: 3    HTN (hypertension), benign  -     enalapril (VASOTEC) 5 MG tablet; Take 1 tablet (5 mg total) by mouth once daily.  Dispense: 90 tablet; Refill: 3    Primary insomnia  -     mirtazapine (REMERON) 7.5 MG Tab; Take 1 tablet (7.5 mg total) by mouth every evening.  Dispense: 30 tablet; Refill: 1    Flu vaccine need  -     Influenza - High Dose (65+) (PF) (IM)    Major depressive disorder, recurrent episode, mild      4 weeks, if b/p remains elevated, will increase medication at that time.  Discussed changing insulin if patient's kidney function is good to metformin, daughter in agreement. Per daughter, appetite is poor and pt, does not sleep well at night. Will try remeron which should help with depression, appetite and insomnia, if weight loss continues, will do weight loss work up.

## 2018-02-02 ENCOUNTER — TELEPHONE (OUTPATIENT)
Dept: FAMILY MEDICINE | Facility: CLINIC | Age: 78
End: 2018-02-02

## 2018-03-28 ENCOUNTER — OFFICE VISIT (OUTPATIENT)
Dept: FAMILY MEDICINE | Facility: CLINIC | Age: 78
End: 2018-03-28
Payer: MEDICARE

## 2018-03-28 ENCOUNTER — DOCUMENTATION ONLY (OUTPATIENT)
Dept: FAMILY MEDICINE | Facility: CLINIC | Age: 78
End: 2018-03-28

## 2018-03-28 VITALS
WEIGHT: 121.94 LBS | SYSTOLIC BLOOD PRESSURE: 139 MMHG | DIASTOLIC BLOOD PRESSURE: 72 MMHG | HEART RATE: 78 BPM | HEIGHT: 65 IN | TEMPERATURE: 98 F | BODY MASS INDEX: 20.32 KG/M2 | OXYGEN SATURATION: 98 %

## 2018-03-28 DIAGNOSIS — E03.9 ACQUIRED HYPOTHYROIDISM: ICD-10-CM

## 2018-03-28 DIAGNOSIS — E11.9 DIABETIC EYE EXAM: ICD-10-CM

## 2018-03-28 DIAGNOSIS — Z01.00 DIABETIC EYE EXAM: ICD-10-CM

## 2018-03-28 DIAGNOSIS — Z85.528 HISTORY OF RENAL CELL CARCINOMA: ICD-10-CM

## 2018-03-28 DIAGNOSIS — E11.9 CONTROLLED TYPE 2 DIABETES MELLITUS WITHOUT COMPLICATION, WITH LONG-TERM CURRENT USE OF INSULIN: Primary | ICD-10-CM

## 2018-03-28 DIAGNOSIS — Z79.4 CONTROLLED TYPE 2 DIABETES MELLITUS WITHOUT COMPLICATION, WITH LONG-TERM CURRENT USE OF INSULIN: Primary | ICD-10-CM

## 2018-03-28 DIAGNOSIS — R63.4 WEIGHT LOSS: ICD-10-CM

## 2018-03-28 DIAGNOSIS — F51.01 PRIMARY INSOMNIA: ICD-10-CM

## 2018-03-28 PROCEDURE — 99214 OFFICE O/P EST MOD 30 MIN: CPT | Mod: S$GLB,,, | Performed by: NURSE PRACTITIONER

## 2018-03-28 RX ORDER — MIRTAZAPINE 15 MG/1
15 TABLET, FILM COATED ORAL NIGHTLY
Qty: 30 TABLET | Refills: 11 | Status: SHIPPED | OUTPATIENT
Start: 2018-03-28 | End: 2018-08-17 | Stop reason: SDUPTHER

## 2018-03-28 RX ORDER — METFORMIN HYDROCHLORIDE 500 MG/1
500 TABLET, EXTENDED RELEASE ORAL 2 TIMES DAILY WITH MEALS
Qty: 60 TABLET | Refills: 3 | Status: SHIPPED | OUTPATIENT
Start: 2018-03-28 | End: 2018-09-11 | Stop reason: SDUPTHER

## 2018-03-28 RX ORDER — LEVOTHYROXINE SODIUM 100 UG/1
100 TABLET ORAL DAILY
Qty: 30 TABLET | Refills: 5 | Status: SHIPPED | OUTPATIENT
Start: 2018-03-28 | End: 2018-06-28 | Stop reason: SDUPTHER

## 2018-03-28 NOTE — PROGRESS NOTES
Health Maintenance Due   Topic Date Due    Eye Exam  10/16/1950    Zoster Vaccine  10/16/1998    DEXA SCAN  06/07/2015    Foot Exam  02/03/2018    Lipid Panel  02/03/2018

## 2018-03-28 NOTE — PATIENT INSTRUCTIONS
Stop insulin, check fasting blood sugar for 1 week. Start with 1 metformin tab after supper. IF no problems, increase to 2 metformin tabs after a week. Increase remeron to 15 mg. Tab at bedtime. Increase levothyroxine to 100 mg. A day.

## 2018-03-28 NOTE — PROGRESS NOTES
Subjective:       Patient ID: Nina Portillo is a 77 y.o. female.    Chief Complaint: Follow-up  Has type 2 diabetes, was never tried on any orals, put on insulin and other than dose change, has never been changed to anything else. Does not eat regularly and is at risk of severe hypoglycemia which her daughter is concerned about.     Continues to lose weight, much less than she was, but is eating better since starting remeron. Denies any problems with remeron, still not sleeping through the night every night.      Hypothyroidism is chronic. She is taking her levothyroxine with her other medications in the evening as her meds are prepared for her and it is not practical for her to take it any other way. She c/o dry skin, hair loss. Denies fatigue and constipation.       HPI  Review of Systems   Constitutional: Positive for unexpected weight change. Negative for fatigue and fever.   Respiratory: Positive for shortness of breath. Negative for cough.    Cardiovascular: Positive for leg swelling. Negative for chest pain.   Gastrointestinal: Negative for diarrhea, nausea and vomiting.   Genitourinary: Negative for flank pain.   Musculoskeletal: Positive for arthralgias. Negative for back pain.   Neurological: Positive for dizziness and headaches.   Psychiatric/Behavioral: Positive for sleep disturbance.       Objective:       CMP  Sodium   Date Value Ref Range Status   02/01/2018 138 136 - 145 mmol/L Final     Potassium   Date Value Ref Range Status   02/01/2018 3.5 3.5 - 5.1 mmol/L Final     Chloride   Date Value Ref Range Status   02/01/2018 100 95 - 110 mmol/L Final     CO2   Date Value Ref Range Status   02/01/2018 27 23 - 29 mmol/L Final     Glucose   Date Value Ref Range Status   02/01/2018 222 (H) 70 - 110 mg/dL Final     BUN, Bld   Date Value Ref Range Status   02/01/2018 13 8 - 23 mg/dL Final     Creatinine   Date Value Ref Range Status   02/01/2018 1.0 0.5 - 1.4 mg/dL Final     Calcium   Date Value Ref Range  Status   02/01/2018 10.1 8.7 - 10.5 mg/dL Final     Total Protein   Date Value Ref Range Status   02/01/2018 7.5 6.0 - 8.4 g/dL Final     Albumin   Date Value Ref Range Status   02/01/2018 3.3 (L) 3.5 - 5.2 g/dL Final     Total Bilirubin   Date Value Ref Range Status   02/01/2018 0.9 0.1 - 1.0 mg/dL Final     Comment:     For infants and newborns, interpretation of results should be based  on gestational age, weight and in agreement with clinical  observations.  Premature Infant recommended reference ranges:  Up to 24 hours.............<8.0 mg/dL  Up to 48 hours............<12.0 mg/dL  3-5 days..................<15.0 mg/dL  6-29 days.................<15.0 mg/dL       Alkaline Phosphatase   Date Value Ref Range Status   02/01/2018 132 55 - 135 U/L Final     AST   Date Value Ref Range Status   02/01/2018 18 10 - 40 U/L Final     ALT   Date Value Ref Range Status   02/01/2018 10 10 - 44 U/L Final     Anion Gap   Date Value Ref Range Status   02/01/2018 11 8 - 16 mmol/L Final     eGFR if    Date Value Ref Range Status   02/01/2018 >60.0 >60 mL/min/1.73 m^2 Final     eGFR if non    Date Value Ref Range Status   02/01/2018 53.7 (A) >60 mL/min/1.73 m^2 Final     Comment:     Calculation used to obtain the estimated glomerular filtration  rate (eGFR) is the CKD-EPI equation.        Lab Results   Component Value Date    WBC 8.34 02/01/2018    HGB 14.4 02/01/2018    HCT 43.4 02/01/2018    MCV 98 02/01/2018     02/01/2018     Lab Results   Component Value Date    HGBA1C 5.8 (H) 02/01/2018       Physical Exam   Constitutional: She is oriented to person, place, and time. She appears well-developed and well-nourished. No distress.   HENT:   Head: Normocephalic and atraumatic.   Eyes: Conjunctivae are normal. Right eye exhibits no discharge. Left eye exhibits no discharge. No scleral icterus.   Cardiovascular: Normal rate, regular rhythm and normal heart sounds.  Exam reveals no gallop and no  friction rub.    No murmur heard.  Pulses:       Dorsalis pedis pulses are 1+ on the right side, and 1+ on the left side.        Posterior tibial pulses are 1+ on the right side, and 1+ on the left side.   Pulmonary/Chest: Effort normal and breath sounds normal. No respiratory distress. She has no wheezes. She has no rales.   Musculoskeletal:        Right foot: There is normal range of motion and no deformity.        Left foot: There is normal range of motion and no deformity.   Feet:   Right Foot:   Protective Sensation: 10 sites tested. 9 sites sensed.   Skin Integrity: Positive for callus and dry skin. Negative for ulcer, blister, skin breakdown, erythema or warmth.   Left Foot:   Protective Sensation: 10 sites tested. 7 sites sensed.   Skin Integrity: Positive for callus. Negative for ulcer, blister, skin breakdown, erythema, warmth or dry skin.   Neurological: She is alert and oriented to person, place, and time.   Skin: Skin is warm and dry. She is not diaphoretic.   Psychiatric: She has a normal mood and affect. Her behavior is normal.   Nursing note and vitals reviewed.      Assessment:       1. Controlled type 2 diabetes mellitus without complication, with long-term current use of insulin    2. Primary insomnia    3. Acquired hypothyroidism    4. History of renal cell carcinoma    5. Weight loss    6. Diabetic eye exam        Plan:       Controlled type 2 diabetes mellitus without complication, with long-term current use of insulin  -     metFORMIN (GLUCOPHAGE-XR) 500 MG 24 hr tablet; Take 1 tablet (500 mg total) by mouth 2 (two) times daily with meals.  Dispense: 60 tablet; Refill: 3  -     Comprehensive metabolic panel; Future; Expected date: 06/28/2018  -     Hemoglobin A1c; Future; Expected date: 06/28/2018  -     Lipid panel; Future; Expected date: 06/28/2018    Primary insomnia  -     mirtazapine (REMERON) 15 MG tablet; Take 1 tablet (15 mg total) by mouth every evening.  Dispense: 30 tablet; Refill:  11    Acquired hypothyroidism  -     levothyroxine (SYNTHROID) 100 MCG tablet; Take 1 tablet (100 mcg total) by mouth once daily.  Dispense: 30 tablet; Refill: 5  -     TSH; Future; Expected date: 06/28/2018  -     Lipid panel; Future; Expected date: 06/28/2018    History of renal cell carcinoma  -     US Abdomen Complete; Future; Expected date: 03/28/2018    Weight loss  -     US Abdomen Complete; Future; Expected date: 03/28/2018    Diabetic eye exam  -     Ambulatory Referral to Ophthalmology         Stop insulin, check fasting blood sugar for 1 week. Start with 1 metformin tab after supper. IF no problems, increase to 2 metformin tabs after a week. Increase remeron to 15 mg. Tab at bedtime. Increase levothyroxine to 100 mg. A day.

## 2018-06-01 DIAGNOSIS — I10 HTN (HYPERTENSION), BENIGN: ICD-10-CM

## 2018-06-01 RX ORDER — ENALAPRIL MALEATE 5 MG/1
TABLET ORAL
Qty: 90 TABLET | Refills: 0 | Status: SHIPPED | OUTPATIENT
Start: 2018-06-01 | End: 2018-09-11 | Stop reason: SDUPTHER

## 2018-06-22 ENCOUNTER — CLINICAL SUPPORT (OUTPATIENT)
Dept: FAMILY MEDICINE | Facility: CLINIC | Age: 78
End: 2018-06-22
Payer: MEDICARE

## 2018-06-22 ENCOUNTER — APPOINTMENT (OUTPATIENT)
Dept: LAB | Facility: HOSPITAL | Age: 78
End: 2018-06-22
Attending: NURSE PRACTITIONER
Payer: MEDICARE

## 2018-06-22 DIAGNOSIS — E11.9 CONTROLLED TYPE 2 DIABETES MELLITUS WITHOUT COMPLICATION, WITH LONG-TERM CURRENT USE OF INSULIN: ICD-10-CM

## 2018-06-22 DIAGNOSIS — E03.9 ACQUIRED HYPOTHYROIDISM: ICD-10-CM

## 2018-06-22 DIAGNOSIS — Z79.4 CONTROLLED TYPE 2 DIABETES MELLITUS WITHOUT COMPLICATION, WITH LONG-TERM CURRENT USE OF INSULIN: ICD-10-CM

## 2018-06-22 LAB
ALBUMIN SERPL BCP-MCNC: 3.4 G/DL
ALP SERPL-CCNC: 90 U/L
ALT SERPL W/O P-5'-P-CCNC: 11 U/L
ANION GAP SERPL CALC-SCNC: 10 MMOL/L
AST SERPL-CCNC: 15 U/L
BILIRUB SERPL-MCNC: 0.4 MG/DL
BUN SERPL-MCNC: 11 MG/DL
CALCIUM SERPL-MCNC: 10.3 MG/DL
CHLORIDE SERPL-SCNC: 104 MMOL/L
CHOLEST SERPL-MCNC: 180 MG/DL
CHOLEST/HDLC SERPL: 3.6 {RATIO}
CO2 SERPL-SCNC: 24 MMOL/L
CREAT SERPL-MCNC: 0.8 MG/DL
EST. GFR  (AFRICAN AMERICAN): >60 ML/MIN/1.73 M^2
EST. GFR  (NON AFRICAN AMERICAN): >60 ML/MIN/1.73 M^2
ESTIMATED AVG GLUCOSE: 111 MG/DL
GLUCOSE SERPL-MCNC: 115 MG/DL
HBA1C MFR BLD HPLC: 5.5 %
HDLC SERPL-MCNC: 50 MG/DL
HDLC SERPL: 27.8 %
LDLC SERPL CALC-MCNC: 111.2 MG/DL
NONHDLC SERPL-MCNC: 130 MG/DL
POTASSIUM SERPL-SCNC: 3.9 MMOL/L
PROT SERPL-MCNC: 6.9 G/DL
SODIUM SERPL-SCNC: 138 MMOL/L
T4 FREE SERPL-MCNC: 1.4 NG/DL
TRIGL SERPL-MCNC: 94 MG/DL
TSH SERPL DL<=0.005 MIU/L-ACNC: 0.27 UIU/ML

## 2018-06-22 PROCEDURE — 83036 HEMOGLOBIN GLYCOSYLATED A1C: CPT

## 2018-06-22 PROCEDURE — 80053 COMPREHEN METABOLIC PANEL: CPT

## 2018-06-22 PROCEDURE — 80061 LIPID PANEL: CPT

## 2018-06-22 PROCEDURE — 84443 ASSAY THYROID STIM HORMONE: CPT

## 2018-06-22 PROCEDURE — 84439 ASSAY OF FREE THYROXINE: CPT

## 2018-06-26 DIAGNOSIS — E11.8 TYPE 2 DIABETES MELLITUS WITH COMPLICATION, WITHOUT LONG-TERM CURRENT USE OF INSULIN: ICD-10-CM

## 2018-06-26 DIAGNOSIS — Z78.0 ASYMPTOMATIC MENOPAUSE: Primary | ICD-10-CM

## 2018-06-28 ENCOUNTER — DOCUMENTATION ONLY (OUTPATIENT)
Dept: FAMILY MEDICINE | Facility: CLINIC | Age: 78
End: 2018-06-28

## 2018-06-28 ENCOUNTER — OFFICE VISIT (OUTPATIENT)
Dept: FAMILY MEDICINE | Facility: CLINIC | Age: 78
End: 2018-06-28
Payer: MEDICARE

## 2018-06-28 VITALS
OXYGEN SATURATION: 98 % | DIASTOLIC BLOOD PRESSURE: 82 MMHG | HEART RATE: 82 BPM | SYSTOLIC BLOOD PRESSURE: 128 MMHG | HEIGHT: 65 IN | WEIGHT: 119.06 LBS | BODY MASS INDEX: 19.83 KG/M2 | TEMPERATURE: 98 F

## 2018-06-28 DIAGNOSIS — E03.9 ACQUIRED HYPOTHYROIDISM: ICD-10-CM

## 2018-06-28 DIAGNOSIS — I73.9 PAD (PERIPHERAL ARTERY DISEASE): ICD-10-CM

## 2018-06-28 DIAGNOSIS — E11.8 CONTROLLED TYPE 2 DIABETES MELLITUS WITH COMPLICATION, WITHOUT LONG-TERM CURRENT USE OF INSULIN: Primary | ICD-10-CM

## 2018-06-28 PROCEDURE — 99213 OFFICE O/P EST LOW 20 MIN: CPT | Mod: S$GLB,,, | Performed by: NURSE PRACTITIONER

## 2018-06-28 RX ORDER — LEVOTHYROXINE SODIUM 88 UG/1
88 TABLET ORAL
Qty: 90 TABLET | Refills: 3 | Status: SHIPPED | OUTPATIENT
Start: 2018-06-28 | End: 2018-12-07 | Stop reason: SDUPTHER

## 2018-06-28 NOTE — PROGRESS NOTES
Subjective:       Patient ID: Nina Portillo is a 77 y.o. female.    Chief Complaint: Diabetes    Diabetes   She presents for her follow-up diabetic visit. She has type 2 diabetes mellitus. Her disease course has been improving. Pertinent negatives for hypoglycemia include no confusion or dizziness. Associated symptoms include foot paresthesias and weight loss. Pertinent negatives for diabetes include no blurred vision, no chest pain, no polyphagia, no polyuria and no weakness. Pertinent negatives for hypoglycemia complications include no hospitalization and no nocturnal hypoglycemia. Symptoms are improving. Diabetic complications include peripheral neuropathy. Pertinent negatives for diabetic complications include no CVA or heart disease. Risk factors for coronary artery disease include diabetes mellitus, sedentary lifestyle, post-menopausal, hypertension, dyslipidemia and family history. Current diabetic treatment includes oral agent (monotherapy). She is compliant with treatment all of the time. Her weight is decreasing steadily. When asked about meal planning, she reported none. She has not had a previous visit with a dietitian. She never participates in exercise. An ACE inhibitor/angiotensin II receptor blocker is being taken. She does not see a podiatrist.Eye exam is not current.     Has hypothyroidism, over treated currently. She is eating well, but still losing weight. Denies any chest pain or palpitations.     Has h/o renal cell cancer, has order for abdominal ultrasound, has not gotten it done yet.     Has chronic periferal vascular disease. Cannot walk long distances, needs a cane/walker for all ambulation. Has edema if legs are down for any length of time.   Review of Systems   Unable to perform ROS: Dementia (ROS provided by family)   Constitutional: Positive for appetite change, unexpected weight change and weight loss. Negative for activity change.   Eyes: Negative for blurred vision.    Respiratory: Negative for cough and shortness of breath.    Cardiovascular: Negative for chest pain and leg swelling.   Gastrointestinal: Negative for abdominal pain and nausea.   Endocrine: Negative for polyphagia and polyuria.   Genitourinary: Negative for difficulty urinating and dysuria.   Musculoskeletal: Negative for arthralgias and myalgias.   Neurological: Negative for dizziness and weakness.   Psychiatric/Behavioral: Negative for confusion.       Objective:      Physical Exam   Constitutional: She is oriented to person, place, and time. She appears well-developed and well-nourished. She is cooperative. She has a sickly appearance. She appears ill. No distress.   HENT:   Head: Normocephalic and atraumatic.   Eyes: Conjunctivae are normal. Right eye exhibits no discharge. Left eye exhibits no discharge. No scleral icterus.   Cardiovascular: Normal rate, regular rhythm and normal heart sounds.  Exam reveals no gallop and no friction rub.    No murmur heard.  Pulmonary/Chest: Effort normal and breath sounds normal. No respiratory distress. She has no wheezes. She has no rales.   Musculoskeletal: She exhibits edema.   +1/+4 edema BLE     Neurological: She is alert and oriented to person, place, and time.   Skin: Skin is warm and dry. She is not diaphoretic.   Psychiatric: She has a normal mood and affect. She is withdrawn.   Nursing note and vitals reviewed.      Assessment:       1. Controlled type 2 diabetes mellitus with complication, without long-term current use of insulin    2. Acquired hypothyroidism    3. PAD (peripheral artery disease)        Plan:       Controlled type 2 diabetes mellitus with complication, without long-term current use of insulin    Acquired hypothyroidism  -     levothyroxine (SYNTHROID) 88 MCG tablet; Take 1 tablet (88 mcg total) by mouth before breakfast.  Dispense: 90 tablet; Refill: 3  -     TSH; Future; Expected date: 08/28/2018    PAD (peripheral artery disease)       Patient  to continue eating to gain weight and to start taking synthroid at 88mcg. Return to clinic in 2 months with labs prior. Also get abdominal ultrasound. Continue metformin once a day. Keep legs elevated as much as possible.  Blood pressure is slightly above goal, but fine for this elderly, frail lady. No change in b/p meds.

## 2018-06-28 NOTE — PATIENT INSTRUCTIONS
Patient to continue eating to gain weight and to start taking synthroid at 88mcg. Return to clinic in 2 months with labs prior.

## 2018-06-28 NOTE — PROGRESS NOTES
Health Maintenance Due   Topic Date Due    Eye Exam  10/16/1950    Zoster Vaccine  10/16/1998    DEXA SCAN  06/07/2015

## 2018-08-04 DIAGNOSIS — K21.9 GASTROESOPHAGEAL REFLUX DISEASE, ESOPHAGITIS PRESENCE NOT SPECIFIED: ICD-10-CM

## 2018-08-04 RX ORDER — PANTOPRAZOLE SODIUM 40 MG/1
40 TABLET, DELAYED RELEASE ORAL DAILY
Qty: 90 TABLET | Refills: 2 | Status: SHIPPED | OUTPATIENT
Start: 2018-08-04 | End: 2018-12-07 | Stop reason: SDUPTHER

## 2018-08-04 RX ORDER — AMLODIPINE BESYLATE 5 MG/1
5 TABLET ORAL DAILY
Qty: 90 TABLET | Refills: 2 | Status: SHIPPED | OUTPATIENT
Start: 2018-08-04 | End: 2018-12-07 | Stop reason: SDUPTHER

## 2018-08-17 DIAGNOSIS — F51.01 PRIMARY INSOMNIA: ICD-10-CM

## 2018-08-17 RX ORDER — MIRTAZAPINE 15 MG/1
15 TABLET, FILM COATED ORAL NIGHTLY
Qty: 30 TABLET | Refills: 11 | Status: SHIPPED | OUTPATIENT
Start: 2018-08-17 | End: 2018-12-07 | Stop reason: SDUPTHER

## 2018-09-11 DIAGNOSIS — Z79.4 CONTROLLED TYPE 2 DIABETES MELLITUS WITHOUT COMPLICATION, WITH LONG-TERM CURRENT USE OF INSULIN: ICD-10-CM

## 2018-09-11 DIAGNOSIS — I10 HTN (HYPERTENSION), BENIGN: ICD-10-CM

## 2018-09-11 DIAGNOSIS — E11.9 CONTROLLED TYPE 2 DIABETES MELLITUS WITHOUT COMPLICATION, WITH LONG-TERM CURRENT USE OF INSULIN: ICD-10-CM

## 2018-09-11 RX ORDER — METFORMIN HYDROCHLORIDE 500 MG/1
500 TABLET, EXTENDED RELEASE ORAL 2 TIMES DAILY WITH MEALS
Qty: 60 TABLET | Refills: 3 | Status: SHIPPED | OUTPATIENT
Start: 2018-09-11 | End: 2018-12-07 | Stop reason: SDUPTHER

## 2018-09-11 RX ORDER — ENALAPRIL MALEATE 5 MG/1
5 TABLET ORAL DAILY
Qty: 90 TABLET | Refills: 0 | Status: SHIPPED | OUTPATIENT
Start: 2018-09-11 | End: 2018-12-07 | Stop reason: SDUPTHER

## 2018-12-07 DIAGNOSIS — K21.9 GASTROESOPHAGEAL REFLUX DISEASE, ESOPHAGITIS PRESENCE NOT SPECIFIED: ICD-10-CM

## 2018-12-07 DIAGNOSIS — E03.9 ACQUIRED HYPOTHYROIDISM: ICD-10-CM

## 2018-12-07 DIAGNOSIS — E11.9 CONTROLLED TYPE 2 DIABETES MELLITUS WITHOUT COMPLICATION, WITH LONG-TERM CURRENT USE OF INSULIN: ICD-10-CM

## 2018-12-07 DIAGNOSIS — F51.01 PRIMARY INSOMNIA: ICD-10-CM

## 2018-12-07 DIAGNOSIS — Z79.4 CONTROLLED TYPE 2 DIABETES MELLITUS WITHOUT COMPLICATION, WITH LONG-TERM CURRENT USE OF INSULIN: ICD-10-CM

## 2018-12-07 DIAGNOSIS — I10 HTN (HYPERTENSION), BENIGN: ICD-10-CM

## 2018-12-07 RX ORDER — METFORMIN HYDROCHLORIDE 500 MG/1
500 TABLET, EXTENDED RELEASE ORAL 2 TIMES DAILY WITH MEALS
Qty: 60 TABLET | Refills: 3 | Status: ON HOLD | OUTPATIENT
Start: 2018-12-07 | End: 2019-02-19 | Stop reason: HOSPADM

## 2018-12-07 RX ORDER — MIRTAZAPINE 15 MG/1
15 TABLET, FILM COATED ORAL NIGHTLY
Qty: 30 TABLET | Refills: 11 | Status: ON HOLD | OUTPATIENT
Start: 2018-12-07 | End: 2019-02-18 | Stop reason: SDUPTHER

## 2018-12-07 RX ORDER — ENALAPRIL MALEATE 5 MG/1
5 TABLET ORAL DAILY
Qty: 90 TABLET | Refills: 0 | Status: SHIPPED | OUTPATIENT
Start: 2018-12-07 | End: 2019-02-27

## 2018-12-07 RX ORDER — AMLODIPINE BESYLATE 5 MG/1
5 TABLET ORAL DAILY
Qty: 90 TABLET | Refills: 2 | Status: ON HOLD | OUTPATIENT
Start: 2018-12-07 | End: 2019-02-18 | Stop reason: SDUPTHER

## 2018-12-07 RX ORDER — LEVOTHYROXINE SODIUM 88 UG/1
88 TABLET ORAL
Qty: 90 TABLET | Refills: 0 | Status: SHIPPED | OUTPATIENT
Start: 2018-12-07 | End: 2019-01-10 | Stop reason: SDUPTHER

## 2018-12-07 RX ORDER — PANTOPRAZOLE SODIUM 40 MG/1
40 TABLET, DELAYED RELEASE ORAL DAILY
Qty: 90 TABLET | Refills: 2 | Status: SHIPPED | OUTPATIENT
Start: 2018-12-07 | End: 2019-02-27 | Stop reason: SDUPTHER

## 2019-01-05 ENCOUNTER — HOSPITAL ENCOUNTER (EMERGENCY)
Facility: HOSPITAL | Age: 79
Discharge: HOME | End: 2019-01-05
Attending: EMERGENCY MEDICINE
Payer: MEDICARE

## 2019-01-05 VITALS
RESPIRATION RATE: 18 BRPM | BODY MASS INDEX: 19.8 KG/M2 | DIASTOLIC BLOOD PRESSURE: 107 MMHG | WEIGHT: 119 LBS | OXYGEN SATURATION: 97 % | SYSTOLIC BLOOD PRESSURE: 165 MMHG | HEART RATE: 102 BPM | TEMPERATURE: 98 F

## 2019-01-05 DIAGNOSIS — S60.222A CONTUSION OF LEFT HAND, INITIAL ENCOUNTER: ICD-10-CM
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
## 2019-01-05 PROCEDURE — 25000003 PHARM REV CODE 250: Performed by: NURSE PRACTITIONER

## 2019-01-05 PROCEDURE — 99284 EMERGENCY DEPT VISIT MOD MDM: CPT | Mod: 25

## 2019-01-05 RX ORDER — ACETAMINOPHEN 325 MG/1
650 TABLET ORAL
Status: COMPLETED | OUTPATIENT
Start: 2019-01-05 | End: 2019-01-05

## 2019-01-05 RX ADMIN — ACETAMINOPHEN 650 MG: 325 TABLET, FILM COATED ORAL at 03:01

## 2019-01-05 NOTE — ED PROVIDER NOTES
Encounter Date: 1/5/2019    SCRIBE #1 NOTE: I, Stone Witt, am scribing for, and in the presence of, NAS Bravo.       History     Chief Complaint   Patient presents with    Fall     S/p trip and fall. C/o pain to left hand. Family states she appears to have hip pain as well.        Time seen by provider: 3:00 PM on 01/05/2019    Nina Portillo is a 78 y.o. female with a hx of DM, Alzheimer's, HTN, hypothyroid, and GERD who presents to the ED with c/o left hand pain secondary to falling. Per the patients daughter, she found her laying on the floor approximately 1 hour PTA. The pt states that she is not able to move too good since she has fallen. Associated symptoms include left hip pain and tremors. She denies having any new back pain, knee pain, CP, blurred vision, LOC, or neck pain. She also explains that she has some lower back pain, but that is not different from her baseline. The patient denies any other symptoms at this time.Past surgical hx includes a partial nephrectomy, knee replacement, and a hysterectomy. Pt has drug allergies to Phenergan and Prilosec.       The history is provided by the patient and a relative.     Review of patient's allergies indicates:   Allergen Reactions    Phenergan [promethazine] Other (See Comments)     hallucinations    Prilosec [omeprazole magnesium] Hives and Rash     Past Medical History:   Diagnosis Date    Alzheimer disease 2012    Cancer     renal cell. right    Dementia     Diabetes mellitus     GERD (gastroesophageal reflux disease)     Hyperlipidemia     Hypothyroid     Memory loss     Movement disorder      Past Surgical History:   Procedure Laterality Date    HYSTERECTOMY      JOINT REPLACEMENT      right    KNEE ARTHROSCOPY W/ DEBRIDEMENT      left    PARTIAL NEPHRECTOMY      right.     TONSILLECTOMY       Family History   Problem Relation Age of Onset    Cancer Mother 90        breast     Social History     Tobacco Use    Smoking  status: Never Smoker    Smokeless tobacco: Never Used   Substance Use Topics    Alcohol use: No    Drug use: No     Review of Systems   Constitutional: Positive for chills. Negative for fever.   HENT: Negative for congestion and ear pain.    Eyes: Negative for redness.   Respiratory: Negative for shortness of breath and stridor.    Cardiovascular: Negative for chest pain and leg swelling.   Gastrointestinal: Negative for diarrhea, nausea and vomiting.   Genitourinary: Negative for difficulty urinating, dysuria, flank pain, frequency, hematuria and urgency.   Musculoskeletal: Positive for back pain. Negative for arthralgias, gait problem, joint swelling, myalgias and neck pain.        Positive for left hand pain.   Skin: Negative for rash and wound.   Neurological: Positive for tremors. Negative for dizziness, syncope, facial asymmetry, weakness, light-headedness, numbness and headaches.   Hematological: Negative for adenopathy.   Psychiatric/Behavioral: Negative.        Physical Exam     Initial Vitals   BP Pulse Resp Temp SpO2   01/05/19 1444 01/05/19 1444 01/05/19 1444 01/05/19 1443 01/05/19 1444   (!) 165/107 102 18 97.7 °F (36.5 °C) 97 %      MAP       --                Physical Exam    Nursing note and vitals reviewed.  Constitutional: She appears well-developed and well-nourished. She is active.   HENT:   Head: Normocephalic and atraumatic.   Right Ear: External ear normal.   Left Ear: External ear normal.   Nose: Nose normal.   Mouth/Throat: Oropharynx is clear and moist. No oropharyngeal exudate.   Eyes: Conjunctivae, EOM and lids are normal. Pupils are equal, round, and reactive to light. Right eye exhibits no chemosis and no discharge. Left eye exhibits no chemosis and no discharge. Right conjunctiva is not injected. Left conjunctiva is not injected.   Neck: Trachea normal and normal range of motion. Neck supple. No stridor present. No tracheal deviation present. No neck rigidity.   Cardiovascular:  Normal rate, regular rhythm, normal heart sounds, intact distal pulses and normal pulses. Exam reveals no distant heart sounds and no friction rub.    No murmur heard.  Pulses:       Radial pulses are 2+ on the right side, and 2+ on the left side.        Dorsalis pedis pulses are 2+ on the right side, and 2+ on the left side.   Pulmonary/Chest: Breath sounds normal. No stridor. She has no wheezes. She has no rhonchi. She has no rales.   Abdominal: Soft. Normal appearance and bowel sounds are normal. She exhibits no distension. There is no tenderness. There is no rigidity, no rebound, no guarding and no CVA tenderness.   Musculoskeletal: Normal range of motion.        Left knee: Tenderness found.   Left knee tenderness with no bruising. Midline sacral tenderness.    Lymphadenopathy:        Head (right side): No submental, no submandibular, no preauricular and no posterior auricular adenopathy present.        Head (left side): No submental, no submandibular, no preauricular and no posterior auricular adenopathy present.     She has no cervical adenopathy.   Neurological: She is alert and oriented to person, place, and time. She has normal strength. No cranial nerve deficit or sensory deficit. Gait normal. GCS eye subscore is 4. GCS verbal subscore is 5. GCS motor subscore is 6.   Skin: Skin is warm, dry and intact. Capillary refill takes less than 2 seconds. No rash noted.   Psychiatric: She has a normal mood and affect. Her speech is normal and behavior is normal. Thought content normal.         ED Course   Procedures  Labs Reviewed - No data to display       Imaging Results          X-Ray Hip 2 or 3 views Left (Final result)  Result time 01/05/19 15:50:39    Final result by Hugo Lieberman MD (01/05/19 15:50:39)                 Narrative:    EXAMINATION:  XR HIP 2 VIEW LEFT    CLINICAL HISTORY:  fall;    TECHNIQUE:  AP view of the pelvis and frog leg lateral view of the left hip were  performed.    COMPARISON:  None    FINDINGS:  No acute fracture.  No malalignment.  Partially imaged degenerative change lower lumbar spine.  Minimal degenerative change of both hip joints.  Moderate degree of stool in the colon and rectum as can be seen with constipation.  Osteopenia.      Electronically signed by: Hugo Lieberman  Date:    01/05/2019  Time:    15:50                             X-Ray Hand 3 view Left (Final result)  Result time 01/05/19 15:52:09    Final result by Hugo Lieberman MD (01/05/19 15:52:09)                 Narrative:    EXAMINATION:  XR HAND COMPLETE 3 VIEW LEFT    CLINICAL HISTORY:  fall;.    TECHNIQUE:  PA, lateral, and oblique views of the left hand were performed.    COMPARISON:  None    FINDINGS:  No displaced fracture.  Osteopenia.  No malalignment.  Moderate degenerative change 1st CMC joint.  Scattered mild IP joint degenerative changes.      Electronically signed by: Hugo Lieberman  Date:    01/05/2019  Time:    15:52                             X-Ray Knee 3 View Left (Final result)  Result time 01/05/19 15:53:05    Final result by Hugo Lieberman MD (01/05/19 15:53:05)                 Narrative:    EXAMINATION:  XR KNEE 3 VIEW LEFT    CLINICAL HISTORY:  Unspecified fall, initial encounter    TECHNIQUE:  AP, lateral, and Merchant views of the left knee were performed.    COMPARISON:  None    FINDINGS:  No displaced fracture.  Osteopenia.  No malalignment.  Tricompartmental degenerative changes moderate in the medial compartment and at least mild in the remaining compartments.  No joint effusion.      Electronically signed by: Hugo Lieberman  Date:    01/05/2019  Time:    15:53                             X-Ray Sacrum And Coccyx (Final result)  Result time 01/05/19 15:54:53    Final result by Hugo Lieberman MD (01/05/19 15:54:53)                 Narrative:    EXAMINATION:  XR SACRUM AND COCCYX    CLINICAL HISTORY:  Unspecified fall, initial  encounter    TECHNIQUE:  Two or three views of the sacrum and coccyx were performed.    COMPARISON:  None    FINDINGS:  Osteopenia.  No displaced fracture.  No malalignment in the sacrum or coccyx.  Partially imaged degenerative change lower lumbar spine.  Mild degenerative change of the hip joints.  Moderate degree of stool in the colon and rectum.      Electronically signed by: Hugo Lieberman  Date:    01/05/2019  Time:    15:54                             X-Ray Lumbar Spine Ap And Lateral (Final result)  Result time 01/05/19 15:56:27    Final result by Hugo Lieberman MD (01/05/19 15:56:27)                 Impression:      No traumatic malalignment or displaced fracture.  Degenerative changes as described.      Electronically signed by: Hugo Lieberman  Date:    01/05/2019  Time:    15:56             Narrative:    EXAMINATION:  XR LUMBAR SPINE AP AND LATERAL    CLINICAL HISTORY:  Low back pain, minor trauma;Unspecified fall, initial encounter    TECHNIQUE:  AP, lateral and spot images were performed of the lumbar spine.    COMPARISON:  None    FINDINGS:  Levocurvature lumbar spine with straightening of normal lordosis.  No spondylolisthesis.  No displaced fracture with preserved vertebral body heights.  Degenerative disc disease moderate at L3-4 and mild at L4-5.  Facet degenerative change lowest 3 lumbar levels.  Surgical clip projects right upper abdominal quadrant.  Moderate degree of stool in the colon and rectum which could indicate constipation.                                 Medical Decision Making:   History:   Old Medical Records: I decided to obtain old medical records.  Differential Diagnosis:   Fracture  Dislocation  Sprain  Contusion  Strain  Spasm    Clinical Tests:   Radiological Study: Reviewed and Ordered       APC / Resident Notes:   79 yo chronically ill female presents after fall at home. Xrays negative for acute findings. Pt given tylenol with relief. Pt able to ambulate prior to  discharge. I do not feel further evaluation or admission is needed at this time.  I discussed fall precautions with daughter and patient and recommend the use of a walker instead of cane.  Pt to f/u with PCP. I discussed with Dr Malloy who agrees with POC. Pt  voices understanding and is agreeable to the plan.  She is given specific return precautions.        Scribe Attestation:   Scribe #1: I performed the above scribed service and the documentation accurately describes the services I performed. I attest to the accuracy of the note.    I, HUGO Bravo, personally performed the services described in this documentation. All medical record entries made by the scribe were at my direction and in my presence.  I have reviewed the chart and agree that the record reflects my personal performance and is accurate and complete. HUGO Bravo.  9:44 PM 01/05/2019           Clinical Impression:   The primary encounter diagnosis was Fall. A diagnosis of Contusion of left hand, initial encounter was also pertinent to this visit.                             Milly Ríos NP  01/05/19 5012

## 2019-01-05 NOTE — ED NOTES
Daughter Given written and verbal DC instructions questions answered per MD aware to follow up with PCP encouraged to return if needed.

## 2019-01-05 NOTE — ED NOTES
Pt daughter states that 1 hr PTA she found mother laying on ground after fall, now c/o left hand and left knee pain with lower back discomfort difficult to assess because pt states she hurts all over. Daughter states mother normally ambulates with a cane but has not been doing so after the fall. Calm even non labored respirations family remains at bedside aware to notify nurse of needs or concerns. ROM sensation and pulses intact

## 2019-01-09 ENCOUNTER — OFFICE VISIT (OUTPATIENT)
Dept: FAMILY MEDICINE | Facility: CLINIC | Age: 79
End: 2019-01-09
Payer: MEDICARE

## 2019-01-09 ENCOUNTER — APPOINTMENT (OUTPATIENT)
Dept: LAB | Facility: HOSPITAL | Age: 79
End: 2019-01-09
Attending: NURSE PRACTITIONER
Payer: MEDICARE

## 2019-01-09 VITALS
WEIGHT: 122.56 LBS | DIASTOLIC BLOOD PRESSURE: 64 MMHG | HEART RATE: 90 BPM | OXYGEN SATURATION: 96 % | TEMPERATURE: 98 F | BODY MASS INDEX: 20.42 KG/M2 | HEIGHT: 65 IN | SYSTOLIC BLOOD PRESSURE: 124 MMHG
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
## 2019-01-09 DIAGNOSIS — E11.49 DIABETIC NEUROPATHY WITH NEUROLOGIC COMPLICATION: ICD-10-CM

## 2019-01-09 DIAGNOSIS — E11.59 HYPERTENSION ASSOCIATED WITH DIABETES: ICD-10-CM

## 2019-01-09 DIAGNOSIS — M79.642 LEFT HAND PAIN: ICD-10-CM

## 2019-01-09 DIAGNOSIS — F33.0 MAJOR DEPRESSIVE DISORDER, RECURRENT EPISODE, MILD: ICD-10-CM

## 2019-01-09 DIAGNOSIS — E11.40 DIABETIC NEUROPATHY WITH NEUROLOGIC COMPLICATION: ICD-10-CM

## 2019-01-09 DIAGNOSIS — I73.9 PAD (PERIPHERAL ARTERY DISEASE): ICD-10-CM

## 2019-01-09 DIAGNOSIS — M25.552 LEFT HIP PAIN: Primary | ICD-10-CM

## 2019-01-09 DIAGNOSIS — E11.8 TYPE 2 DIABETES MELLITUS WITH COMPLICATION, WITHOUT LONG-TERM CURRENT USE OF INSULIN: ICD-10-CM

## 2019-01-09 DIAGNOSIS — I15.2 HYPERTENSION ASSOCIATED WITH DIABETES: ICD-10-CM

## 2019-01-09 DIAGNOSIS — Z23 NEEDS FLU SHOT: ICD-10-CM

## 2019-01-09 LAB
ALBUMIN SERPL BCP-MCNC: 3.7 G/DL
ALP SERPL-CCNC: 107 U/L
ALT SERPL W/O P-5'-P-CCNC: 10 U/L
ANION GAP SERPL CALC-SCNC: 9 MMOL/L
AST SERPL-CCNC: 15 U/L
BASOPHILS # BLD AUTO: 0 K/UL
BASOPHILS NFR BLD: 0.5 %
BILIRUB SERPL-MCNC: 0.6 MG/DL
BUN SERPL-MCNC: 17 MG/DL
CALCIUM SERPL-MCNC: 10.7 MG/DL
CHLORIDE SERPL-SCNC: 103 MMOL/L
CHOLEST SERPL-MCNC: 207 MG/DL
CHOLEST/HDLC SERPL: 3.8 {RATIO}
CO2 SERPL-SCNC: 27 MMOL/L
CREAT SERPL-MCNC: 1 MG/DL
DIFFERENTIAL METHOD: ABNORMAL
EOSINOPHIL # BLD AUTO: 0.3 K/UL
EOSINOPHIL NFR BLD: 4 %
ERYTHROCYTE [DISTWIDTH] IN BLOOD BY AUTOMATED COUNT: 12.7 %
EST. GFR  (AFRICAN AMERICAN): >60 ML/MIN/1.73 M^2
EST. GFR  (NON AFRICAN AMERICAN): 53 ML/MIN/1.73 M^2
GLUCOSE SERPL-MCNC: 195 MG/DL
HCT VFR BLD AUTO: 44 %
HDLC SERPL-MCNC: 55 MG/DL
HDLC SERPL: 26.6 %
HGB BLD-MCNC: 14.7 G/DL
LDLC SERPL CALC-MCNC: 121 MG/DL
LYMPHOCYTES # BLD AUTO: 1.8 K/UL
LYMPHOCYTES NFR BLD: 23.2 %
MCH RBC QN AUTO: 32.1 PG
MCHC RBC AUTO-ENTMCNC: 33.5 G/DL
MCV RBC AUTO: 96 FL
MONOCYTES # BLD AUTO: 0.6 K/UL
MONOCYTES NFR BLD: 7 %
NEUTROPHILS # BLD AUTO: 5.2 K/UL
NEUTROPHILS NFR BLD: 65.3 %
NONHDLC SERPL-MCNC: 152 MG/DL
PLATELET # BLD AUTO: 279 K/UL
PMV BLD AUTO: 8.4 FL
POTASSIUM SERPL-SCNC: 4.1 MMOL/L
PROT SERPL-MCNC: 8 G/DL
RBC # BLD AUTO: 4.59 M/UL
SODIUM SERPL-SCNC: 139 MMOL/L
T4 FREE SERPL-MCNC: 1.3 NG/DL
TRIGL SERPL-MCNC: 155 MG/DL
TSH SERPL DL<=0.005 MIU/L-ACNC: 7.19 UIU/ML
WBC # BLD AUTO: 7.9 K/UL

## 2019-01-09 PROCEDURE — 99214 PR OFFICE/OUTPT VISIT, EST, LEVL IV, 30-39 MIN: ICD-10-PCS | Mod: 25,S$GLB,, | Performed by: NURSE PRACTITIONER

## 2019-01-09 PROCEDURE — 90662 FLU VACCINE - HIGH DOSE (65+) PRESERVATIVE FREE IM: ICD-10-PCS | Mod: S$GLB,,, | Performed by: NURSE PRACTITIONER

## 2019-01-09 PROCEDURE — 83036 HEMOGLOBIN GLYCOSYLATED A1C: CPT

## 2019-01-09 PROCEDURE — 99499 UNLISTED E&M SERVICE: CPT | Mod: S$GLB,,, | Performed by: NURSE PRACTITIONER

## 2019-01-09 PROCEDURE — 99214 OFFICE O/P EST MOD 30 MIN: CPT | Mod: 25,S$GLB,, | Performed by: NURSE PRACTITIONER

## 2019-01-09 PROCEDURE — 85025 COMPLETE CBC W/AUTO DIFF WBC: CPT

## 2019-01-09 PROCEDURE — 84439 ASSAY OF FREE THYROXINE: CPT

## 2019-01-09 PROCEDURE — 80053 COMPREHEN METABOLIC PANEL: CPT

## 2019-01-09 PROCEDURE — G0008 ADMIN INFLUENZA VIRUS VAC: HCPCS | Mod: S$GLB,,, | Performed by: NURSE PRACTITIONER

## 2019-01-09 PROCEDURE — 90662 IIV NO PRSV INCREASED AG IM: CPT | Mod: S$GLB,,, | Performed by: NURSE PRACTITIONER

## 2019-01-09 PROCEDURE — 99499 RISK ADDL DX/OHS AUDIT: ICD-10-PCS | Mod: S$GLB,,, | Performed by: NURSE PRACTITIONER

## 2019-01-09 PROCEDURE — 84443 ASSAY THYROID STIM HORMONE: CPT

## 2019-01-09 PROCEDURE — G0008 FLU VACCINE - HIGH DOSE (65+) PRESERVATIVE FREE IM: ICD-10-PCS | Mod: S$GLB,,, | Performed by: NURSE PRACTITIONER

## 2019-01-09 PROCEDURE — 80061 LIPID PANEL: CPT

## 2019-01-09 NOTE — PROGRESS NOTES
"Subjective:       Patient ID: Nina Portillo is a 78 y.o. female.    Chief Complaint: Left hip pain and L hand pain 2nd, 3rd, and 4th digits.  Fell 4 days ago at home. She is unsure how she fell, she just recalls, " one minute I was walking and the next, my feet were over my head." She was taken to the ER, had multiple x rays, nothing was found to be fractured and she was sent home. Her left hand is painful when she tries to move it and she is unable to close the hand at all. Her left hip is so painful that she cannot walk without using a cane or walker (and help).     Hypertension is chronic and well controlled. She denies chest pain, dyspnea and dizziness. She has no peripheral edema. She denies dry cough and takes amlodipine and enalapril daily.     She has chronic diabetes with neuropathy which has been well controlled. She does not take any medications and does not check blood sugar regularly, eats well.     PAD is chronic, she is able to walk short distances with walker or cane, had chronic knee pain as well but denies severe leg pain with walking, but sits down after short walk.     Depression is chronic, she takes remeron nightly but feels she doesn't sleep well (although her daughter states she sleeps fine most nights). She continues on remeron nightly.     HPI  Review of Systems   Constitutional: Negative for fever and unexpected weight change.   Cardiovascular: Negative for chest pain.   Musculoskeletal: Positive for arthralgias, gait problem and joint swelling.   Skin: Positive for color change. Negative for wound.   Neurological: Negative for dizziness.       Objective:     X-Ray Hip 2 or 3 views Left   Order: [id number]   Status:  Final result   Visible to patient:  Yes (Patient Portal) Next appt:  None   Details     Reading Physician Reading Date Result Priority   Hugo Lieberman MD 1/5/2019       Narrative     EXAMINATION:  XR HIP 2 VIEW LEFT    CLINICAL HISTORY:  fall;    TECHNIQUE:  AP view " of the pelvis and frog leg lateral view of the left hip were performed.    COMPARISON:  None    FINDINGS:  No acute fracture.  No malalignment.  Partially imaged degenerative change lower lumbar spine.  Minimal degenerative change of both hip joints.  Moderate degree of stool in the colon and rectum as can be seen with constipation.  Osteopenia.      Electronically signed by: Hugo Lieberman  Date: 01/05/2019  Time: 15:50             Last Resulted: 01/05/19 15:50 Order Details View Encounter Lab and Collection Details Routing Result            X-Ray Hand 3 view Left   Order: [id number]   Status:  Final result   Visible to patient:  Yes (Patient Portal) Next appt:  None   Details     Reading Physician Reading Date Result Priority   Hugo Lieberman MD 1/5/2019       Narrative     EXAMINATION:  XR HAND COMPLETE 3 VIEW LEFT    CLINICAL HISTORY:  fall;.    TECHNIQUE:  PA, lateral, and oblique views of the left hand were performed.    COMPARISON:  None    FINDINGS:  No displaced fracture.  Osteopenia.  No malalignment.  Moderate degenerative change 1st CMC joint.  Scattered mild IP joint degenerative changes.      Electronically signed by: Hugo Lieberman  Date: 01/05/2019  Time: 15:52                         Physical Exam   Constitutional: She is oriented to person, place, and time. She appears well-developed and well-nourished. No distress.   HENT:   Head: Normocephalic and atraumatic.   Eyes: Conjunctivae are normal. Right eye exhibits no discharge. Left eye exhibits no discharge. No scleral icterus.   Cardiovascular: Normal rate, regular rhythm and normal heart sounds. Exam reveals no gallop and no friction rub.   No murmur heard.  Pulmonary/Chest: Effort normal and breath sounds normal. No stridor. No respiratory distress. She has no wheezes. She has no rales.   Musculoskeletal: She exhibits edema and tenderness. She exhibits no deformity.   Swelling and minimal ROM 3rd and 4th fingers left hand. Left hip tender  to touch over greater trochanter, no swelling or bruising noted.    Neurological: She is alert and oriented to person, place, and time.   Skin: Skin is warm and dry. No rash noted. She is not diaphoretic. No pallor.   Bruising 3rd and 4th fingers left hand.    Psychiatric: She has a normal mood and affect. Her behavior is normal.   Nursing note and vitals reviewed.      Assessment:       1. Left hip pain    2. Left hand pain    3. Fall, subsequent encounter    4. Hypertension associated with diabetes    5. Major depressive disorder, recurrent episode, mild    6. PAD (peripheral artery disease)    7. Diabetic neuropathy with neurologic complication    8. Type 2 diabetes mellitus with complication, without long-term current use of insulin    9. Needs flu shot        Plan:       Left hip pain  -     Ambulatory Referral to Orthopedics    Left hand pain  -     Ambulatory Referral to Orthopedics    Fall, subsequent encounter    Hypertension associated with diabetes  -     Comprehensive metabolic panel; Future; Expected date: 01/09/2019  -     Lipid panel; Future; Expected date: 01/09/2019  -     TSH; Future; Expected date: 01/09/2019  -     CBC auto differential; Future; Expected date: 01/09/2019    Major depressive disorder, recurrent episode, mild    PAD (peripheral artery disease)    Diabetic neuropathy with neurologic complication    Type 2 diabetes mellitus with complication, without long-term current use of insulin  -     Hemoglobin A1c; Future; Expected date: 01/09/2019  -     Microalbumin/creatinine urine ratio; Future; Expected date: 01/09/2019    Needs flu shot  -     Influenza - High Dose (65+) (PF) (IM)         3rd and 4th fingers splinted. Continue to use cane or walker. Referred to orthopedist. High dose flu vaccine given by this APRN in right deltoid using aseptic technique. Patient tolerated flu vaccine well.   1 month with labs prior

## 2019-01-10 DIAGNOSIS — E03.9 ACQUIRED HYPOTHYROIDISM: ICD-10-CM

## 2019-01-10 LAB
ESTIMATED AVG GLUCOSE: 126 MG/DL
HBA1C MFR BLD HPLC: 6 %

## 2019-01-10 RX ORDER — LEVOTHYROXINE SODIUM 100 UG/1
TABLET ORAL
Qty: 45 TABLET | Refills: 0 | Status: SHIPPED | OUTPATIENT
Start: 2019-01-10 | End: 2019-04-02 | Stop reason: SDUPTHER

## 2019-01-10 RX ORDER — LEVOTHYROXINE SODIUM 88 UG/1
TABLET ORAL
Qty: 45 TABLET | Refills: 0 | Status: SHIPPED | OUTPATIENT
Start: 2019-01-10 | End: 2019-03-01 | Stop reason: SDUPTHER

## 2019-01-15 ENCOUNTER — OFFICE VISIT (OUTPATIENT)
Dept: ORTHOPEDICS | Facility: CLINIC | Age: 79
End: 2019-01-15
Payer: MEDICARE

## 2019-01-15 VITALS
HEART RATE: 81 BPM | WEIGHT: 122 LBS | BODY MASS INDEX: 20.33 KG/M2 | SYSTOLIC BLOOD PRESSURE: 179 MMHG | HEIGHT: 65 IN | DIASTOLIC BLOOD PRESSURE: 80 MMHG

## 2019-01-15 DIAGNOSIS — S60.00XA CONTUSION OF FINGER OF LEFT HAND, UNSPECIFIED FINGER, INITIAL ENCOUNTER: ICD-10-CM

## 2019-01-15 DIAGNOSIS — S60.222A CONTUSION OF LEFT HAND, INITIAL ENCOUNTER: ICD-10-CM

## 2019-01-15 DIAGNOSIS — S70.02XA CONTUSION OF LEFT HIP, INITIAL ENCOUNTER: Primary | ICD-10-CM

## 2019-01-15 DIAGNOSIS — M25.552 ACUTE HIP PAIN, LEFT: Primary | ICD-10-CM

## 2019-01-15 PROCEDURE — 1100F PTFALLS ASSESS-DOCD GE2>/YR: CPT | Mod: CPTII,S$GLB,, | Performed by: ORTHOPAEDIC SURGERY

## 2019-01-15 PROCEDURE — 3077F PR MOST RECENT SYSTOLIC BLOOD PRESSURE >= 140 MM HG: ICD-10-PCS | Mod: CPTII,S$GLB,, | Performed by: ORTHOPAEDIC SURGERY

## 2019-01-15 PROCEDURE — 99203 PR OFFICE/OUTPT VISIT, NEW, LEVL III, 30-44 MIN: ICD-10-PCS | Mod: S$GLB,,, | Performed by: ORTHOPAEDIC SURGERY

## 2019-01-15 PROCEDURE — 99999 PR PBB SHADOW E&M-EST. PATIENT-LVL III: CPT | Mod: PBBFAC,,, | Performed by: ORTHOPAEDIC SURGERY

## 2019-01-15 PROCEDURE — 3288F FALL RISK ASSESSMENT DOCD: CPT | Mod: CPTII,S$GLB,, | Performed by: ORTHOPAEDIC SURGERY

## 2019-01-15 PROCEDURE — 3079F DIAST BP 80-89 MM HG: CPT | Mod: CPTII,S$GLB,, | Performed by: ORTHOPAEDIC SURGERY

## 2019-01-15 PROCEDURE — 3077F SYST BP >= 140 MM HG: CPT | Mod: CPTII,S$GLB,, | Performed by: ORTHOPAEDIC SURGERY

## 2019-01-15 PROCEDURE — 3288F PR FALLS RISK ASSESSMENT DOCUMENTED: ICD-10-PCS | Mod: CPTII,S$GLB,, | Performed by: ORTHOPAEDIC SURGERY

## 2019-01-15 PROCEDURE — 99999 PR PBB SHADOW E&M-EST. PATIENT-LVL III: ICD-10-PCS | Mod: PBBFAC,,, | Performed by: ORTHOPAEDIC SURGERY

## 2019-01-15 PROCEDURE — 1100F PR PT FALLS ASSESS DOC 2+ FALLS/FALL W/INJURY/YR: ICD-10-PCS | Mod: CPTII,S$GLB,, | Performed by: ORTHOPAEDIC SURGERY

## 2019-01-15 PROCEDURE — 3079F PR MOST RECENT DIASTOLIC BLOOD PRESSURE 80-89 MM HG: ICD-10-PCS | Mod: CPTII,S$GLB,, | Performed by: ORTHOPAEDIC SURGERY

## 2019-01-15 PROCEDURE — 99203 OFFICE O/P NEW LOW 30 MIN: CPT | Mod: S$GLB,,, | Performed by: ORTHOPAEDIC SURGERY

## 2019-01-15 NOTE — LETTER
January 17, 2019      Elizabeth Sandoval, APRN  [id number] [place] [id number]  Phone: [phone]          Patient: Nina Portillo   MR Number: [id number]   YOB: 1940   Date of Visit: 1/15/2019       Dear Elizabeth Sandoval:    Thank you for referring Nina Portillo to me for evaluation. Attached you will find relevant portions of my assessment and plan of care.    If you have questions, please do not hesitate to call me. I look forward to following Nina Portillo along with you.    Sincerely,    Brannon Cordova MD    Enclosure  CC:  No Recipients    If you would like to receive this communication electronically, please contact [email or web address] or [phone] to request more information on [employer] access.    For providers and/or their staff who would like to refer a patient to [facility], please contact us through our one-stop-shop provider referral line, [facility] , at [phone].    If you feel you have received this communication in error or would no longer like to receive these types of communications, please e-mail [email or web address]

## 2019-01-17 NOTE — PROGRESS NOTES
Past Medical History:   Diagnosis Date    Alzheimer disease 2012    Cancer     renal cell. right    Dementia     Diabetes mellitus     GERD (gastroesophageal reflux disease)     Hyperlipidemia     Hypothyroid     Memory loss     Movement disorder        Past Surgical History:   Procedure Laterality Date    HYSTERECTOMY      JOINT REPLACEMENT      right    KNEE ARTHROSCOPY W/ DEBRIDEMENT      left    PARTIAL NEPHRECTOMY      right.     TONSILLECTOMY         Current Outpatient Medications   Medication Sig    amLODIPine (NORVASC) 5 MG tablet Take 1 tablet (5 mg total) by mouth once daily.    aspirin 325 MG tablet Take 325 mg by mouth once daily.    CRANBERRY FRUIT EXTRACT (CRANBERRY EXTRACT ORAL) Take 1 capsule by mouth once daily at 6am.    donepezil (ARICEPT) 10 MG tablet Take 1 tablet (10 mg total) by mouth once daily.    enalapril (VASOTEC) 5 MG tablet Take 1 tablet (5 mg total) by mouth once daily.    levothyroxine (SYNTHROID) 100 MCG tablet 1 tab po qod odd days with 88 mcg. Po on even days.    levothyroxine (SYNTHROID) 88 MCG tablet 1 tab po qod even days with 100 mcg. po Tab odd days    metFORMIN (GLUCOPHAGE-XR) 500 MG 24 hr tablet Take 1 tablet (500 mg total) by mouth 2 (two) times daily with meals.    mirtazapine (REMERON) 15 MG tablet Take 1 tablet (15 mg total) by mouth every evening.    MULTIVIT-MINERALS/FERROUS FUM (MULTI VITAMIN ORAL) Take 1 tablet by mouth once daily.    pantoprazole (PROTONIX) 40 MG tablet Take 1 tablet (40 mg total) by mouth once daily.     No current facility-administered medications for this visit.        Review of patient's allergies indicates:   Allergen Reactions    Phenergan [promethazine] Other (See Comments)     hallucinations    Prilosec [omeprazole magnesium] Hives and Rash       Family History   Problem Relation Age of Onset    Cancer Mother 90        breast       Social History     Socioeconomic History    Marital status:      Spouse  "name: Not on file    Number of children: Not on file    Years of education: Not on file    Highest education level: Not on file   Social Needs    Financial resource strain: Not on file    Food insecurity - worry: Not on file    Food insecurity - inability: Not on file    Transportation needs - medical: Not on file    Transportation needs - non-medical: Not on file   Occupational History    Not on file   Tobacco Use    Smoking status: Never Smoker    Smokeless tobacco: Never Used   Substance and Sexual Activity    Alcohol use: No    Drug use: No    Sexual activity: No   Other Topics Concern    Not on file   Social History Narrative    Not on file       Chief Complaint:   Chief Complaint   Patient presents with    Left Hip - Pain, Injury    Left Hand - Pain, Injury       Consulting Physician: Elizabeth Sandoval APRN    History of present illness:    This is a 78 y.o. female who complains of left hip and hand pain following a fall 1-5-19. Pain 0/10. Getting better.    Review of Systems:    Constitution: Denies chills, fever, and sweats.  HENT: Denies headaches or blurry vision.  Cardiovascular: Denies chest pain or irregular heart beat.  Respiratory: Denies cough or shortness of breath.  Gastrointestinal: Denies abdominal pain, nausea, or vomiting.  Musculoskeletal:  Denies muscle cramps.  Neurological: Denies dizziness or focal weakness.  Psychiatric/Behavioral: Normal mental status.  Hematologic/Lymphatic: Denies bleeding problem or easy bruising/bleeding.  Skin: Denies rash or suspicious lesions.    Examination:    Vital Signs:    Vitals:    01/15/19 1552   BP: (!) 179/80   Pulse: 81   Weight: 55.3 kg (122 lb)   Height: 5' 5" (1.651 m)   PainSc: 0-No pain   PainLoc: Hand       Body mass index is 20.3 kg/m².    This a well-developed, well nourished patient in no acute distress.    Alert and oriented x 3 and cooperative to examination.       Physical Exam: Left Hip " Exam    Gait:   Normal    Skin  Rash:   None  Scars:   None    Inspection  Erythema:  None  Bruising:  None  Swelling:  None  Masses:  None  Lymphadenopathy: None    Range of Motion  Flexion:  150°  Extension:  0°  External Rotation: 50°  Internal Rotation: 15°  Abduction:  50°    No pain with hip range of motion.    Straight Leg Raise: Negative  Log Roll:  Negative    Tenderness  Groin:   Negative  Greater Trochanter: Negative    Strength:  5/5    Stability:  Normal    Sensation:  Intact    Vascular  Pulses:  Palpable distally      Left Hand Exam    Skin  Scars:   None  Rash:   None    Inspection  Erythema:  None  Bruising:  None  Swelling:  None  Masses:  None  Lymphadenopathy: None    Coordination:  Normal  Instability:  None    Range of Motion  Finger ROM:  Near full    Strength:  Near normal   Tenderness:  None    Pulse:   2+ radial  Capillary Refill: Normal    Sensation:  Intact          Imaging: X-rays ordered and images interpreted today personally by me of left hip appears normal and left hand shows no fracture but CMC DJD.        Assessment: Acute hip pain, left    Contusion of left hand, initial encounter        Plan:  Contusion of hip and hand. Will setup for home PT. Back in 6 weeks if not better.      DISCLAIMER: This note may have been dictated using voice recognition software and may contain grammatical errors.     NOTE: Consult report sent to referring provider via [employer] EMR.

## 2019-02-15 ENCOUNTER — HOSPITAL ENCOUNTER (INPATIENT)
Facility: HOSPITAL | Age: 79
LOS: 4 days | Discharge: HOME HEALTH | DRG: 872 | End: 2019-02-19
Attending: EMERGENCY MEDICINE | Admitting: INTERNAL MEDICINE
Payer: MEDICARE

## 2019-02-15 DIAGNOSIS — A41.9 SEPSIS, DUE TO UNSPECIFIED ORGANISM: Primary | ICD-10-CM

## 2019-02-15 DIAGNOSIS — A41.9 SEVERE SEPSIS: ICD-10-CM

## 2019-02-15 DIAGNOSIS — R65.20 SEVERE SEPSIS: ICD-10-CM

## 2019-02-15 LAB
ALBUMIN SERPL BCP-MCNC: 3.3 G/DL
ALP SERPL-CCNC: 96 U/L
ALT SERPL W/O P-5'-P-CCNC: 10 U/L
ANION GAP SERPL CALC-SCNC: 11 MMOL/L
AST SERPL-CCNC: 12 U/L
BACTERIA #/AREA URNS HPF: ABNORMAL /HPF
BASOPHILS # BLD AUTO: 0 K/UL
BASOPHILS NFR BLD: 0.1 %
BILIRUB SERPL-MCNC: 1.4 MG/DL
BILIRUB UR QL STRIP: NEGATIVE
BUN SERPL-MCNC: 19 MG/DL
CALCIUM SERPL-MCNC: 10.5 MG/DL
CHLORIDE SERPL-SCNC: 99 MMOL/L
CLARITY UR: ABNORMAL
CO2 SERPL-SCNC: 22 MMOL/L
COLOR UR: YELLOW
CREAT SERPL-MCNC: 1.1 MG/DL
DIFFERENTIAL METHOD: ABNORMAL
EOSINOPHIL # BLD AUTO: 0 K/UL
EOSINOPHIL NFR BLD: 0 %
ERYTHROCYTE [DISTWIDTH] IN BLOOD BY AUTOMATED COUNT: 12.6 %
EST. GFR  (AFRICAN AMERICAN): 55 ML/MIN/1.73 M^2
EST. GFR  (NON AFRICAN AMERICAN): 48 ML/MIN/1.73 M^2
GLUCOSE SERPL-MCNC: 180 MG/DL
GLUCOSE UR QL STRIP: NEGATIVE
HCT VFR BLD AUTO: 43.1 %
HGB BLD-MCNC: 14.3 G/DL
HGB UR QL STRIP: ABNORMAL
HYALINE CASTS #/AREA URNS LPF: 0 /LPF
INR PPP: 1
KETONES UR QL STRIP: NEGATIVE
LACTATE SERPL-SCNC: 2.3 MMOL/L
LEUKOCYTE ESTERASE UR QL STRIP: ABNORMAL
LYMPHOCYTES # BLD AUTO: 0.5 K/UL
LYMPHOCYTES NFR BLD: 2.5 %
MCH RBC QN AUTO: 31.5 PG
MCHC RBC AUTO-ENTMCNC: 33.3 G/DL
MCV RBC AUTO: 95 FL
MICROSCOPIC COMMENT: ABNORMAL
MONOCYTES # BLD AUTO: 1.3 K/UL
MONOCYTES NFR BLD: 7.1 %
NEUTROPHILS # BLD AUTO: 16.4 K/UL
NEUTROPHILS NFR BLD: 90.3 %
NITRITE UR QL STRIP: POSITIVE
PH UR STRIP: 6 [PH] (ref 5–8)
PLATELET # BLD AUTO: 202 K/UL
PMV BLD AUTO: 7.7 FL
POTASSIUM SERPL-SCNC: 4.6 MMOL/L
PROT SERPL-MCNC: 7.4 G/DL
PROT UR QL STRIP: ABNORMAL
PROTHROMBIN TIME: 10.7 SEC
RBC # BLD AUTO: 4.55 M/UL
RBC #/AREA URNS HPF: 5 /HPF (ref 0–4)
SODIUM SERPL-SCNC: 132 MMOL/L
SP GR UR STRIP: 1.02 (ref 1–1.03)
TROPONIN I SERPL DL<=0.01 NG/ML-MCNC: 0.01 NG/ML
TSH SERPL DL<=0.005 MIU/L-ACNC: 0.9 UIU/ML
URN SPEC COLLECT METH UR: ABNORMAL
UROBILINOGEN UR STRIP-ACNC: 1 EU/DL
WBC # BLD AUTO: 18.1 K/UL
WBC #/AREA URNS HPF: >100 /HPF (ref 0–5)

## 2019-02-15 PROCEDURE — 84443 ASSAY THYROID STIM HORMONE: CPT

## 2019-02-15 PROCEDURE — 93005 ELECTROCARDIOGRAM TRACING: CPT

## 2019-02-15 PROCEDURE — 87186 SC STD MICRODIL/AGAR DIL: CPT

## 2019-02-15 PROCEDURE — 36415 COLL VENOUS BLD VENIPUNCTURE: CPT

## 2019-02-15 PROCEDURE — 63600175 PHARM REV CODE 636 W HCPCS: Performed by: NURSE PRACTITIONER

## 2019-02-15 PROCEDURE — 84484 ASSAY OF TROPONIN QUANT: CPT

## 2019-02-15 PROCEDURE — 87040 BLOOD CULTURE FOR BACTERIA: CPT | Mod: 59

## 2019-02-15 PROCEDURE — 87077 CULTURE AEROBIC IDENTIFY: CPT | Mod: 59

## 2019-02-15 PROCEDURE — 83605 ASSAY OF LACTIC ACID: CPT

## 2019-02-15 PROCEDURE — 87088 URINE BACTERIA CULTURE: CPT

## 2019-02-15 PROCEDURE — 80053 COMPREHEN METABOLIC PANEL: CPT

## 2019-02-15 PROCEDURE — 63600175 PHARM REV CODE 636 W HCPCS: Performed by: EMERGENCY MEDICINE

## 2019-02-15 PROCEDURE — G0378 HOSPITAL OBSERVATION PER HR: HCPCS

## 2019-02-15 PROCEDURE — 96365 THER/PROPH/DIAG IV INF INIT: CPT

## 2019-02-15 PROCEDURE — 25000003 PHARM REV CODE 250: Performed by: EMERGENCY MEDICINE

## 2019-02-15 PROCEDURE — 99285 EMERGENCY DEPT VISIT HI MDM: CPT | Mod: 25

## 2019-02-15 PROCEDURE — 85025 COMPLETE CBC W/AUTO DIFF WBC: CPT

## 2019-02-15 PROCEDURE — 12000002 HC ACUTE/MED SURGE SEMI-PRIVATE ROOM

## 2019-02-15 PROCEDURE — 25000003 PHARM REV CODE 250: Performed by: NURSE PRACTITIONER

## 2019-02-15 PROCEDURE — 85610 PROTHROMBIN TIME: CPT

## 2019-02-15 PROCEDURE — 87086 URINE CULTURE/COLONY COUNT: CPT

## 2019-02-15 PROCEDURE — 81000 URINALYSIS NONAUTO W/SCOPE: CPT

## 2019-02-15 RX ORDER — ONDANSETRON 2 MG/ML
4 INJECTION INTRAMUSCULAR; INTRAVENOUS EVERY 4 HOURS PRN
Status: DISCONTINUED | OUTPATIENT
Start: 2019-02-15 | End: 2019-02-19 | Stop reason: HOSPADM

## 2019-02-15 RX ORDER — INSULIN ASPART 100 [IU]/ML
0-5 INJECTION, SOLUTION INTRAVENOUS; SUBCUTANEOUS
Status: DISCONTINUED | OUTPATIENT
Start: 2019-02-15 | End: 2019-02-19 | Stop reason: HOSPADM

## 2019-02-15 RX ORDER — IBUPROFEN 200 MG
16 TABLET ORAL
Status: DISCONTINUED | OUTPATIENT
Start: 2019-02-15 | End: 2019-02-19 | Stop reason: HOSPADM

## 2019-02-15 RX ORDER — SODIUM CHLORIDE 9 MG/ML
INJECTION, SOLUTION INTRAVENOUS CONTINUOUS
Status: DISCONTINUED | OUTPATIENT
Start: 2019-02-15 | End: 2019-02-17

## 2019-02-15 RX ORDER — POTASSIUM CHLORIDE 20 MEQ/15ML
60 SOLUTION ORAL
Status: DISCONTINUED | OUTPATIENT
Start: 2019-02-15 | End: 2019-02-19 | Stop reason: HOSPADM

## 2019-02-15 RX ORDER — IBUPROFEN 200 MG
24 TABLET ORAL
Status: DISCONTINUED | OUTPATIENT
Start: 2019-02-15 | End: 2019-02-19 | Stop reason: HOSPADM

## 2019-02-15 RX ORDER — RAMELTEON 8 MG/1
8 TABLET ORAL NIGHTLY PRN
Status: DISCONTINUED | OUTPATIENT
Start: 2019-02-15 | End: 2019-02-19 | Stop reason: HOSPADM

## 2019-02-15 RX ORDER — ENOXAPARIN SODIUM 100 MG/ML
40 INJECTION SUBCUTANEOUS EVERY 24 HOURS
Status: DISCONTINUED | OUTPATIENT
Start: 2019-02-15 | End: 2019-02-17

## 2019-02-15 RX ORDER — POTASSIUM CHLORIDE 20 MEQ/15ML
40 SOLUTION ORAL
Status: DISCONTINUED | OUTPATIENT
Start: 2019-02-15 | End: 2019-02-19 | Stop reason: HOSPADM

## 2019-02-15 RX ORDER — GLUCAGON 1 MG
1 KIT INJECTION
Status: DISCONTINUED | OUTPATIENT
Start: 2019-02-15 | End: 2019-02-19 | Stop reason: HOSPADM

## 2019-02-15 RX ORDER — ACETAMINOPHEN 325 MG/1
650 TABLET ORAL EVERY 4 HOURS PRN
Status: DISCONTINUED | OUTPATIENT
Start: 2019-02-15 | End: 2019-02-19 | Stop reason: HOSPADM

## 2019-02-15 RX ADMIN — ENOXAPARIN SODIUM 40 MG: 100 INJECTION SUBCUTANEOUS at 11:02

## 2019-02-15 RX ADMIN — SODIUM CHLORIDE: 0.9 INJECTION, SOLUTION INTRAVENOUS at 11:02

## 2019-02-15 RX ADMIN — CEFTRIAXONE 2 G: 2 INJECTION, SOLUTION INTRAVENOUS at 08:02

## 2019-02-15 RX ADMIN — SODIUM CHLORIDE 1659 ML: 0.9 INJECTION, SOLUTION INTRAVENOUS at 08:02

## 2019-02-16 PROBLEM — R65.20 SEVERE SEPSIS: Status: ACTIVE | Noted: 2019-02-15

## 2019-02-16 PROBLEM — E11.40 CONTROLLED TYPE 2 DIABETES MELLITUS WITH DIABETIC NEUROPATHY, WITHOUT LONG-TERM CURRENT USE OF INSULIN: Status: ACTIVE | Noted: 2019-02-16

## 2019-02-16 LAB
ALBUMIN SERPL BCP-MCNC: 2.4 G/DL
ALP SERPL-CCNC: 79 U/L
ALT SERPL W/O P-5'-P-CCNC: 9 U/L
ANION GAP SERPL CALC-SCNC: 7 MMOL/L
AST SERPL-CCNC: 13 U/L
BASOPHILS # BLD AUTO: 0 K/UL
BASOPHILS NFR BLD: 0 %
BILIRUB SERPL-MCNC: 0.7 MG/DL
BUN SERPL-MCNC: 18 MG/DL
CALCIUM SERPL-MCNC: 9.2 MG/DL
CHLORIDE SERPL-SCNC: 108 MMOL/L
CO2 SERPL-SCNC: 20 MMOL/L
CREAT SERPL-MCNC: 0.9 MG/DL
DIFFERENTIAL METHOD: ABNORMAL
EOSINOPHIL # BLD AUTO: 0 K/UL
EOSINOPHIL NFR BLD: 0 %
ERYTHROCYTE [DISTWIDTH] IN BLOOD BY AUTOMATED COUNT: 12.8 %
EST. GFR  (AFRICAN AMERICAN): >60 ML/MIN/1.73 M^2
EST. GFR  (NON AFRICAN AMERICAN): >60 ML/MIN/1.73 M^2
ESTIMATED AVG GLUCOSE: 117 MG/DL
GLUCOSE SERPL-MCNC: 172 MG/DL
HBA1C MFR BLD HPLC: 5.7 %
HCT VFR BLD AUTO: 33.2 %
HGB BLD-MCNC: 11.2 G/DL
LACTATE SERPL-SCNC: 2.4 MMOL/L
LYMPHOCYTES # BLD AUTO: 0.6 K/UL
LYMPHOCYTES NFR BLD: 3.5 %
MAGNESIUM SERPL-MCNC: 1.6 MG/DL
MCH RBC QN AUTO: 31.8 PG
MCHC RBC AUTO-ENTMCNC: 33.7 G/DL
MCV RBC AUTO: 94 FL
MONOCYTES # BLD AUTO: 1.4 K/UL
MONOCYTES NFR BLD: 7.9 %
NEUTROPHILS # BLD AUTO: 15.4 K/UL
NEUTROPHILS NFR BLD: 88.6 %
PHOSPHATE SERPL-MCNC: 1.9 MG/DL
PLATELET # BLD AUTO: 158 K/UL
PMV BLD AUTO: 7.9 FL
POCT GLUCOSE: 159 MG/DL (ref 70–110)
POCT GLUCOSE: 161 MG/DL (ref 70–110)
POCT GLUCOSE: 187 MG/DL (ref 70–110)
POCT GLUCOSE: 193 MG/DL (ref 70–110)
POTASSIUM SERPL-SCNC: 3.6 MMOL/L
PROT SERPL-MCNC: 5.6 G/DL
RBC # BLD AUTO: 3.52 M/UL
SODIUM SERPL-SCNC: 135 MMOL/L
WBC # BLD AUTO: 17.4 K/UL

## 2019-02-16 PROCEDURE — 36415 COLL VENOUS BLD VENIPUNCTURE: CPT

## 2019-02-16 PROCEDURE — 12000002 HC ACUTE/MED SURGE SEMI-PRIVATE ROOM

## 2019-02-16 PROCEDURE — 83735 ASSAY OF MAGNESIUM: CPT

## 2019-02-16 PROCEDURE — 85025 COMPLETE CBC W/AUTO DIFF WBC: CPT

## 2019-02-16 PROCEDURE — 80053 COMPREHEN METABOLIC PANEL: CPT

## 2019-02-16 PROCEDURE — 84100 ASSAY OF PHOSPHORUS: CPT

## 2019-02-16 PROCEDURE — 94761 N-INVAS EAR/PLS OXIMETRY MLT: CPT

## 2019-02-16 PROCEDURE — 63600175 PHARM REV CODE 636 W HCPCS: Performed by: NURSE PRACTITIONER

## 2019-02-16 PROCEDURE — 83605 ASSAY OF LACTIC ACID: CPT

## 2019-02-16 PROCEDURE — 83036 HEMOGLOBIN GLYCOSYLATED A1C: CPT

## 2019-02-16 PROCEDURE — 25000003 PHARM REV CODE 250: Performed by: NURSE PRACTITIONER

## 2019-02-16 PROCEDURE — 25000003 PHARM REV CODE 250: Performed by: INTERNAL MEDICINE

## 2019-02-16 PROCEDURE — 97161 PT EVAL LOW COMPLEX 20 MIN: CPT

## 2019-02-16 PROCEDURE — 99900035 HC TECH TIME PER 15 MIN (STAT)

## 2019-02-16 RX ORDER — AMLODIPINE BESYLATE 5 MG/1
5 TABLET ORAL DAILY
Status: DISCONTINUED | OUTPATIENT
Start: 2019-02-17 | End: 2019-02-19

## 2019-02-16 RX ORDER — DONEPEZIL HYDROCHLORIDE 5 MG/1
10 TABLET, FILM COATED ORAL DAILY
Status: DISCONTINUED | OUTPATIENT
Start: 2019-02-17 | End: 2019-02-19 | Stop reason: HOSPADM

## 2019-02-16 RX ORDER — PANTOPRAZOLE SODIUM 40 MG/1
40 TABLET, DELAYED RELEASE ORAL DAILY
Status: DISCONTINUED | OUTPATIENT
Start: 2019-02-17 | End: 2019-02-19 | Stop reason: HOSPADM

## 2019-02-16 RX ORDER — ASPIRIN 325 MG
325 TABLET ORAL DAILY
Status: DISCONTINUED | OUTPATIENT
Start: 2019-02-17 | End: 2019-02-19 | Stop reason: HOSPADM

## 2019-02-16 RX ORDER — LEVOTHYROXINE SODIUM 88 UG/1
88 TABLET ORAL
Status: DISCONTINUED | OUTPATIENT
Start: 2019-02-17 | End: 2019-02-19 | Stop reason: HOSPADM

## 2019-02-16 RX ORDER — LANOLIN ALCOHOL/MO/W.PET/CERES
800 CREAM (GRAM) TOPICAL ONCE
Status: COMPLETED | OUTPATIENT
Start: 2019-02-16 | End: 2019-02-16

## 2019-02-16 RX ORDER — ENALAPRIL MALEATE 5 MG/1
5 TABLET ORAL DAILY
Status: DISCONTINUED | OUTPATIENT
Start: 2019-02-17 | End: 2019-02-19 | Stop reason: HOSPADM

## 2019-02-16 RX ORDER — ALBUTEROL SULFATE 2.5 MG/.5ML
2.5 SOLUTION RESPIRATORY (INHALATION) EVERY 4 HOURS PRN
Status: DISCONTINUED | OUTPATIENT
Start: 2019-02-16 | End: 2019-02-19 | Stop reason: HOSPADM

## 2019-02-16 RX ORDER — MIRTAZAPINE 15 MG/1
15 TABLET, FILM COATED ORAL NIGHTLY
Status: DISCONTINUED | OUTPATIENT
Start: 2019-02-16 | End: 2019-02-19 | Stop reason: HOSPADM

## 2019-02-16 RX ADMIN — MIRTAZAPINE 15 MG: 15 TABLET, FILM COATED ORAL at 08:02

## 2019-02-16 RX ADMIN — ENOXAPARIN SODIUM 40 MG: 100 INJECTION SUBCUTANEOUS at 05:02

## 2019-02-16 RX ADMIN — RAMELTEON 8 MG: 8 TABLET, FILM COATED ORAL at 08:02

## 2019-02-16 RX ADMIN — MAGNESIUM OXIDE TAB 400 MG (241.3 MG ELEMENTAL MG) 800 MG: 400 (241.3 MG) TAB at 08:02

## 2019-02-16 RX ADMIN — CEFTRIAXONE 1 G: 1 INJECTION, SOLUTION INTRAVENOUS at 08:02

## 2019-02-16 NOTE — HPI
Ms. Portillo is an 77yo F with a PMH of DM2, Renal cell carinoma with partial nephrectomy, HTN, Hypothyroid, and Dementia. She presents to the ED with c/o Fatigue. Her daughter reported that it started today. Associated symptoms include SOB, dry cough, and decreased appetite. While in the ED, she was found to have severe sepsis due to a UTI. She was given a full sepsis bolus and Iv Rocephin. Blood and urine cultures were collected. She currently denies pain or discomforts.

## 2019-02-16 NOTE — ED NOTES
Patient identifiers for Nina Portillo checked and correct.  LOC:  Patient is awake, alert, and aware of environment with an appropriate affect. Patient is oriented x 3 and speaking appropriately.  APPEARANCE:  Patient resting comfortably and in no acute distress. Patient is clean and well groomed, patient's clothing is properly fastened.  SKIN:  The skin is warm and dry. Patient has normal skin turgor and moist mucus membranes. Skin is intact; no bruising or breakdown noted.  MUSCULOSKELETAL:  Patient is moving all extremities well, no obvious deformities noted. Pulses intact.   RESPIRATORY:  Airway is open and patent. Respirations are spontaneous and non-labored with normal effort and rate.  CARDIAC:  Patient has a normal rate and rhythm. No peripheral edema noted. Capillary refill < 3 seconds.  ABDOMEN:  No distention noted.  Soft and non-tender upon palpation.  NEUROLOGICAL:  PERRL. Facial expression is symmetrical. Hand grasps are equal bilaterally. Normal sensation in all extremities when touched with finger.  Allergies reported:    Review of patient's allergies indicates:   Allergen Reactions    Phenergan [promethazine] Other (See Comments)     hallucinations    Prilosec [omeprazole magnesium] Hives and Rash     OTHER NOTES:  CO fatigue for the last few months, states that she has not been eating as normal.

## 2019-02-16 NOTE — PLAN OF CARE
02/16/19 1115   Discharge Assessment   Assessment Type Discharge Planning Assessment   Confirmed/corrected address and phone number on facesheet? Yes   Assessment information obtained from? Other  (pt's daughter, Soledad Kahn on the phone )   Expected Length of Stay (days) 3   Communicated expected length of stay with patient/caregiver yes   Prior to hospitilization cognitive status: Alert/Oriented   Prior to hospitalization functional status: Assistive Equipment;Needs Assistance   Lives With child(dianna), adult   Able to Return to Prior Arrangements other (see comments)  (unable to determine at this time)   Is patient able to care for self after discharge? Unable to determine at this time (comments)   Patient's perception of discharge disposition admitted as an inpatient   Readmission Within the Last 30 Days no previous admission in last 30 days   Patient currently being followed by outpatient case management? No   Patient currently receives any other outside agency services? Yes   Name and contact number of agency or person providing outside services [facility]    Is it the patient/care giver preference to resume care with the current outside agency? Yes   Equipment Currently Used at Home cane, straight;walker, rolling;glucometer   Do you have any problems affording any of your prescribed medications? No  (pharm: [facility] on [facility])   Is the patient taking medications as prescribed? yes   Does the patient have transportation home? Yes   Transportation Anticipated family or friend will provide   Does the patient receive services at the [facility]? No   Discharge Plan A Home Health   DME Needed Upon Discharge  none   Patient/Family in Agreement with Plan yes     Patient appeared to be sleeping twice when I went to the room, I called the pt's daughter to complete assessment.   The pt was active with [facility]; completed pt choice/disclosure with verbal consent from pt's  daughter, Soledad.....LAURA Jimenez CM

## 2019-02-16 NOTE — PT/OT/SLP EVAL
"Physical Therapy Evaluation    Patient Name:  Nina Portillo   MRN:  [id number]    Recommendations:     Discharge Recommendations:  other (see comments)(home vs HHPT)   Discharge Equipment Recommendations: none   Barriers to discharge: Unable to determine at this time due to pt cognitive status; no family at bedside    Assessment:     Nina Portillo is a 78 y.o. female admitted with a medical diagnosis of Severe sepsis.  She presents with the following impairments/functional limitations:  weakness, impaired endurance, impaired functional mobilty, impaired balance, gait instability, decreased safety awareness Patient found sleeping, difficult to awaken. Mildly confused and minimally conversive with one word replies to questions. Able to perform supine to sit transfer with mod A and ambulate with min A/CGA to [facility] using RW. Patient may benefit from continued skilled PT to address noted impairments and improve functional mobility.    Rehab Prognosis: Good; patient would benefit from acute skilled PT services to address these deficits and reach maximum level of function.    Recent Surgery: * No surgery found *      Plan:     During this hospitalization, patient to be seen daily to address the identified rehab impairments via gait training, therapeutic activities, therapeutic exercises, therapeutic groups, neuromuscular re-education, wheelchair management/training and progress toward the following goals:    · Plan of Care Expires:  03/02/19    Subjective     Chief Complaint: Reports she is sleepy  Patient/Family Comments/goals: "I'm cold."   Pain/Comfort:  · Pain Rating 1: 0/10    Patients cultural, spiritual, Zoroastrian conflicts given the current situation: no    Living Environment:  Lives with daughter. Limited information due to pt cognitive status and no family at bedside.  Prior to admission, patients level of function was ambulatory with RW. No information available on level of assistance at this time.  " Equipment used at home: walker, rolling, cane, straight.  DME owned (not currently used): none.  Upon discharge, patient will have assistance from daughter.    Objective:     Communicated with primary nurse Soraya prior to session.  Patient found supine with peripheral IV, telemetry  upon PT entry to room.    General Precautions: Standard, fall   Orthopedic Precautions:N/A   Braces: N/A     Exams:  · Cognitive Exam:  Patient is oriented to Person and Place  · RLE ROM: WFL  · RLE Strength: WFL  · LLE ROM: WFL  · LLE Strength: WFL    Functional Mobility:  · Bed Mobility:     · Supine to Sit: moderate assistance  · Transfers:     · Sit to Stand:  minimum assistance and moderate assistance with rolling walker  · Gait: 15 ft at min A/CGA using RW      Therapeutic Activities and Exercises:   Ambulated with RW and IV pole in tow to [facility], primary nurse setting up chair alarm system.     AM-PAC 6 CLICK MOBILITY  Total Score:      Patient left up in chair with all lines intact, call button in reach, chair alarm on and primary nurse notified.    GOALS:   Multidisciplinary Problems     Physical Therapy Goals        Problem: Physical Therapy Goal    Goal Priority Disciplines Outcome Goal Variances Interventions   Physical Therapy Goal     PT, PT/OT Ongoing (interventions implemented as appropriate)     Description:  Goals to be met by: 2019     Patient will increase functional independence with mobility by performin. Supine to sit with Modified Adams  2. Sit to stand transfer with Modified Adams  2. Gait  x 150 feet with Modified Adams using Rolling Walker.                       History:     Past Medical History:   Diagnosis Date    Alzheimer disease     Cancer     renal cell. right    Dementia     Diabetes mellitus     GERD (gastroesophageal reflux disease)     Hyperlipidemia     Hypothyroid     Memory loss     Movement disorder        Past Surgical History:   Procedure Laterality  Date    HYSTERECTOMY      JOINT REPLACEMENT      right    KNEE ARTHROSCOPY W/ DEBRIDEMENT      left    PARTIAL NEPHRECTOMY      right.     TONSILLECTOMY         Time Tracking:     PT Received On: 02/16/19  PT Start Time: 1130     PT Stop Time: 1150  PT Total Time (min): 20 min     Billable Minutes: Evaluation 20      Kasandra Recinos, PT  02/16/2019

## 2019-02-16 NOTE — PLAN OF CARE
Problem: Physical Therapy Goal  Goal: Physical Therapy Goal  Outcome: Ongoing (interventions implemented as appropriate)  PT evaluation completed. Pt able to ambulate 15 ft to BSC at min A/CGA using RW.

## 2019-02-16 NOTE — ASSESSMENT & PLAN NOTE
Hold metformin due to lactic acidosis  Monitor blood sugars QAC&HS  SSI prn  Diabetic diet  Lab Results   Component Value Date    HGBA1C 6.0 (H) 01/09/2019

## 2019-02-16 NOTE — PLAN OF CARE
Problem: Adult Inpatient Plan of Care  Goal: Plan of Care Review  Outcome: Ongoing (interventions implemented as appropriate)  02 saturation 94% on room air.  PRN tx not required at this time.

## 2019-02-16 NOTE — PLAN OF CARE
Problem: Adult Inpatient Plan of Care  Goal: Plan of Care Review  Outcome: Ongoing (interventions implemented as appropriate)  Patient alert and disoriented at times. resting in bed. NAD. Denies pain or SOB. VSS. Normal Sr. Brief for incont. Bed alarm active.  Plan of care reviewed with patient. Verbalizes understanding.Call light in reach. Pt free from fall or injury. Will monitor.

## 2019-02-16 NOTE — ASSESSMENT & PLAN NOTE
Chronic. Continue home levothyroxine dose  Lab Results   Component Value Date    TSH 0.897 02/15/2019

## 2019-02-16 NOTE — PLAN OF CARE
Problem: Adult Inpatient Plan of Care  Goal: Plan of Care Review  Outcome: Ongoing (interventions implemented as appropriate)  Pt confused- reoriented PRN. Up in chair most of shift. Incontinence care. SR on tele. Safety maintained. Will monitor

## 2019-02-16 NOTE — H&P
[facility] Hospital Medicine  History & Physical    Patient Name: Nina Portillo  MRN: [id number]  Admission Date: 2/15/2019  Attending Physician: Brinda Arnold MD   Primary Care Provider: Rush Rosa MD         Patient information was obtained from patient and ER records.     Subjective:     Principal Problem:Severe sepsis    Chief Complaint:   Chief Complaint   Patient presents with    Fatigue     Refuses to eat since yesterday and generalized weakness.  Endorses SOB this afternoon        HPI: Ms. Portillo is an 79yo F with a PMH of DM2, Renal cell carinoma with partial nephrectomy, HTN, Hypothyroid, and Dementia. She presents to the ED with c/o Fatigue. Her daughter reported that it started today. Associated symptoms include SOB, dry cough, and decreased appetite. While in the ED, she was found to have severe sepsis due to a UTI. She was given a full sepsis bolus and Iv Rocephin. Blood and urine cultures were collected. She currently denies pain or discomforts.           Past Medical History:   Diagnosis Date    Alzheimer disease 2012    Cancer     renal cell. right    Dementia     Diabetes mellitus     GERD (gastroesophageal reflux disease)     Hyperlipidemia     Hypothyroid     Memory loss     Movement disorder        Past Surgical History:   Procedure Laterality Date    HYSTERECTOMY      JOINT REPLACEMENT      right    KNEE ARTHROSCOPY W/ DEBRIDEMENT      left    PARTIAL NEPHRECTOMY      right.     TONSILLECTOMY         Review of patient's allergies indicates:   Allergen Reactions    Phenergan [promethazine] Other (See Comments)     hallucinations    Prilosec [omeprazole magnesium] Hives and Rash       No current facility-administered medications on file prior to encounter.      Current Outpatient Medications on File Prior to Encounter   Medication Sig    amLODIPine (NORVASC) 5 MG tablet Take 1 tablet (5 mg total) by mouth once daily.    aspirin 325 MG tablet Take 325  mg by mouth once daily.    CRANBERRY FRUIT EXTRACT (CRANBERRY EXTRACT ORAL) Take 1 capsule by mouth once daily at 6am.    donepezil (ARICEPT) 10 MG tablet Take 1 tablet (10 mg total) by mouth once daily.    enalapril (VASOTEC) 5 MG tablet Take 1 tablet (5 mg total) by mouth once daily.    levothyroxine (SYNTHROID) 100 MCG tablet 1 tab po qod odd days with 88 mcg. Po on even days.    levothyroxine (SYNTHROID) 88 MCG tablet 1 tab po qod even days with 100 mcg. po Tab odd days    metFORMIN (GLUCOPHAGE-XR) 500 MG 24 hr tablet Take 1 tablet (500 mg total) by mouth 2 (two) times daily with meals.    mirtazapine (REMERON) 15 MG tablet Take 1 tablet (15 mg total) by mouth every evening.    MULTIVIT-MINERALS/FERROUS FUM (MULTI VITAMIN ORAL) Take 1 tablet by mouth once daily.    pantoprazole (PROTONIX) 40 MG tablet Take 1 tablet (40 mg total) by mouth once daily.     Family History     Problem Relation (Age of Onset)    Mother:   Father:      Cancer Mother (90)        Tobacco Use    Smoking status: Never Smoker    Smokeless tobacco: Never Used   Substance and Sexual Activity    Alcohol use: No    Drug use: No    Sexual activity: No     Review of Systems   Constitutional: Positive for appetite change and fatigue.   HENT: Negative for congestion.    Eyes: Negative for visual disturbance.   Respiratory: Positive for cough and shortness of breath.    Cardiovascular: Negative for chest pain and palpitations.   Gastrointestinal: Negative for abdominal pain, diarrhea, nausea and vomiting.   Genitourinary: Negative for dysuria, flank pain and hematuria.   Musculoskeletal: Negative for arthralgias.   Skin: Negative for wound.   Neurological: Negative for dizziness and headaches.     Objective:     Vital Signs (Most Recent):  Temp: 99.6 °F (37.6 °C) (19)  Pulse: (!) 115 (19)  Resp: 14 (19)  BP: 138/66 (19)  SpO2: (!) 93 % (19) Vital Signs (24h  Range):  Temp:  [98.3 °F (36.8 °C)-99.6 °F (37.6 °C)] 99.6 °F (37.6 °C)  Pulse:  [] 115  Resp:  [14-18] 14  SpO2:  [92 %-99 %] 93 %  BP: (137-162)/(65-92) 138/66     Weight: 59 kg (130 lb)  Body mass index is 21.63 kg/m².    Physical Exam   Constitutional: She is oriented to person, place, and time. No distress.   HENT:   Head: Normocephalic.   Eyes: Pupils are equal, round, and reactive to light.   Neck: Normal range of motion. Neck supple. No JVD present. No tracheal deviation present.   Cardiovascular: Regular rhythm, normal heart sounds and intact distal pulses. Tachycardia present.   No murmur heard.   on telemetry   Pulmonary/Chest: Effort normal and breath sounds normal. No respiratory distress.   Abdominal: Soft. Bowel sounds are normal. She exhibits no distension. There is no tenderness.   Neurological: She is alert and oriented to person, place, and time.   Intermittent confusion   Skin: Skin is warm and dry. Capillary refill takes less than 2 seconds.         CRANIAL NERVES     CN III, IV, VI   Pupils are equal, round, and reactive to light.       Significant Labs:   CBC:   Recent Labs   Lab 02/15/19  1945   WBC 18.10*   HGB 14.3   HCT 43.1        CMP:   Recent Labs   Lab 02/15/19  1945   *   K 4.6   CL 99   CO2 22*   *   BUN 19   CREATININE 1.1   CALCIUM 10.5   PROT 7.4   ALBUMIN 3.3*   BILITOT 1.4*   ALKPHOS 96   AST 12   ALT 10   ANIONGAP 11   EGFRNONAA 48*     Coagulation:   Recent Labs   Lab 02/15/19  1945   INR 1.0     Lactic Acid:   Recent Labs   Lab 02/15/19  2038 02/16/19  0003   LACTATE 2.3* 2.4*     Troponin:   Recent Labs   Lab 02/15/19  1945   TROPONINI 0.011     TSH:   Recent Labs   Lab 02/15/19  1945   TSH 0.897     Urine Studies:   Recent Labs   Lab 02/15/19  1950   COLORU Yellow   APPEARANCEUA Cloudy*   PHUR 6.0   SPECGRAV 1.025   PROTEINUA 1+*   GLUCUA Negative   KETONESU Negative   BILIRUBINUA Negative   OCCULTUA 2+*   NITRITE Positive*   UROBILINOGEN 1.0    LEUKOCYTESUR 2+*   RBCUA 5*   WBCUA >100*   BACTERIA Many*   HYALINECASTS 0     Microbiology Results (last 7 days)     Procedure Component Value Units Date/Time    Blood culture [[id number]] Collected:  02/15/19 2038    Order Status:  Sent Specimen:  Blood from Peripheral, Left  Hand Updated:  02/16/19 0006    Blood culture [[id number]] Collected:  02/15/19 2038    Order Status:  Sent Specimen:  Blood from Peripheral, Right  Hand Updated:  02/16/19 0006    Urine culture [[id number]] Collected:  02/15/19 1950    Order Status:  No result Specimen:  Urine Updated:  02/15/19 2019            Significant Imaging:     CXR: Reviewed film--looks ok.    CT Abd/pelvis w/o Contrast: Reviewed radiologist's report-- 1) Duplicated urinary tract involves left kidney. There appears to be some distention involving the lower pole moiety along with left-sided perinephric stranding. These are nonspecific findings and there is no evidence of urinary tract stone. 2) Urinary bladder wall thickening as described above may be related to non distention versus cystitis. 3) Right lower lobe pulmonary nodule. 4) Cholelithiasis    Assessment/Plan:     * Severe sepsis    Due to UTI  IV Rocephin  Lactate 2.3--> given full sepsis bolus in ED  Trend Lactate  Urine and blood cultures pending  Monitor on telemetry  Monitor labs and VS       Controlled type 2 diabetes mellitus with diabetic neuropathy, without long-term current use of insulin    Hold metformin due to lactic acidosis  Monitor blood sugars QAC&HS  SSI prn  Diabetic diet  Lab Results   Component Value Date    HGBA1C 6.0 (H) 01/09/2019          Alzheimer's disease    Stable. Continue Aricept  Delirium precautions        Hypothyroid    Chronic. Continue home levothyroxine dose  Lab Results   Component Value Date    TSH 0.897 02/15/2019            Hypertension associated with diabetes    Chronic/Controlled. Continue chronic medications, adjusting as needed.         VTE Risk Mitigation (From  admission, onward)        Ordered     enoxaparin injection 40 mg  Daily      02/15/19 2214     IP VTE HIGH RISK PATIENT  Once      02/15/19 2214             Geneva Melgoza NP  Department of Hospital Medicine   [facility]

## 2019-02-16 NOTE — ASSESSMENT & PLAN NOTE
Due to UTI  IV Rocephin  Lactate 2.3--> given full sepsis bolus in ED  Trend Lactate  Urine and blood cultures pending  Monitor on telemetry  Monitor labs and VS

## 2019-02-16 NOTE — ED PROVIDER NOTES
"Encounter Date: 2/15/2019    SCRIBE #1 NOTE: I, Nya Collins, am scribing for, and in the presence of,  .       History     Chief Complaint   Patient presents with    Fatigue     Refuses to eat since yesterday and generalized weakness.  Endorses SOB this afternoon       Time seen by provider: 7:20 PM on 02/15/2019    Nina Portillo is a 78 y.o. female with HLD, GERD, hx of Renal cell carcinoma s/p partial nephrectomy, hypothyroid, HTN, who presents to the ED with generalized weakness onset 1 day. Patient's daughter explains that she has been generally weak onset today, explaining that she typically walks around but had to be assisted today. Her daughter states the patient has complained of shortness of breath and notes she had a dry cough today. Patient denies any shortness of breath, chest pain, vomiting, diarrhea, urinary symptoms, congestion, headache, changes in vision, changes in speech, numbness, or fever. She endorses abdominal tenderness and a decrease in appetite because "she is not hungry". Patient has had a previous knee replacement and hysterectomy.  Almost all of the history is obtained from the daughter.  Patient reports I feel fine.      The history is provided by the patient and a relative. No  was used.     Review of patient's allergies indicates:   Allergen Reactions    Phenergan [promethazine] Other (See Comments)     hallucinations    Prilosec [omeprazole magnesium] Hives and Rash     Past Medical History:   Diagnosis Date    Alzheimer disease 2012    Cancer     renal cell. right    Dementia     Diabetes mellitus     GERD (gastroesophageal reflux disease)     Hyperlipidemia     Hypothyroid     Memory loss     Movement disorder      Past Surgical History:   Procedure Laterality Date    HYSTERECTOMY      JOINT REPLACEMENT      right    KNEE ARTHROSCOPY W/ DEBRIDEMENT      left    PARTIAL NEPHRECTOMY      right.     TONSILLECTOMY       Family " History   Problem Relation Age of Onset    Cancer Mother 90        breast     Social History     Tobacco Use    Smoking status: Never Smoker    Smokeless tobacco: Never Used   Substance Use Topics    Alcohol use: No    Drug use: No     Review of Systems   Constitutional: Positive for appetite change (decrease). Negative for fever.   HENT: Negative for congestion, drooling, rhinorrhea and sore throat.    Eyes: Negative for photophobia and visual disturbance.   Respiratory: Positive for cough and shortness of breath.    Cardiovascular: Negative for chest pain.   Gastrointestinal: Positive for abdominal pain. Negative for blood in stool, diarrhea, nausea and vomiting.   Genitourinary: Negative for dysuria, flank pain and hematuria.   Musculoskeletal: Negative for back pain and neck pain.   Skin: Negative for rash.   Neurological: Negative for syncope, speech difficulty, weakness, numbness and headaches.   Psychiatric/Behavioral: Negative for confusion.   All other systems reviewed and are negative.      Physical Exam     Initial Vitals [02/15/19 1845]   BP Pulse Resp Temp SpO2   137/70 (!) 113 18 98.9 °F (37.2 °C) 96 %      MAP       --         Physical Exam    Nursing note and vitals reviewed.  Constitutional: She appears well-developed and well-nourished. She is not diaphoretic.  Non-toxic appearance. She does not have a sickly appearance. She does not appear ill. No distress.   HENT:   Head: Normocephalic and atraumatic.   Eyes: EOM are normal.   Neck: Normal range of motion. Neck supple.   Cardiovascular: Normal rate, regular rhythm, normal heart sounds and intact distal pulses. Exam reveals no gallop and no friction rub.    No murmur heard.  Pulmonary/Chest: Breath sounds normal. No respiratory distress. She has no wheezes. She has no rhonchi. She has no rales.   Abdominal: Soft. She exhibits no distension. There is tenderness in the right lower quadrant. There is no rebound and no guarding.    Musculoskeletal: Normal range of motion.   Neurological: She is alert.   Not oriented to year.    Skin: Skin is warm and dry.   Psychiatric: She has a normal mood and affect. Her behavior is normal. Judgment and thought content normal.         ED Course   Critical Care  Date/Time: 2/16/2019 2:27 AM  Performed by: Leonel Otero MD  Authorized by: Brinda Arnold MD   Direct patient critical care time: 9 minutes  Additional history critical care time: 11 minutes  Ordering / reviewing critical care time: 12 minutes  Documentation critical care time: 7 minutes  Consulting other physicians critical care time: 6 minutes  Consult with family critical care time: 3 minutes  Total critical care time (exclusive of procedural time) : 48 minutes  Critical care was necessary to treat or prevent imminent or life-threatening deterioration of the following conditions: sepsis.  Critical care was time spent personally by me on the following activities: review of old charts, re-evaluation of patient's condition, pulse oximetry, ordering and review of radiographic studies, ordering and review of laboratory studies, ordering and performing treatments and interventions, obtaining history from patient or surrogate and examination of patient.        Labs Reviewed   CBC W/ AUTO DIFFERENTIAL - Abnormal; Notable for the following components:       Result Value    WBC 18.10 (*)     MCH 31.5 (*)     MPV 7.7 (*)     Gran # (ANC) 16.4 (*)     Lymph # 0.5 (*)     Mono # 1.3 (*)     Gran% 90.3 (*)     Lymph% 2.5 (*)     All other components within normal limits   COMPREHENSIVE METABOLIC PANEL   URINALYSIS, REFLEX TO URINE CULTURE   PROTIME-INR   TSH   TROPONIN I        ECG Results          EKG 12-lead (Preliminary result)  Result time 02/15/19 19:51:39    ED Interpretation by Leonel Otero MD (02/15/19 19:51:39)    Sinus rhythm incomplete right bundle no ST segment elevation or depression or T-wave inversion                             Imaging Results          CT Abdomen Pelvis  Without Contrast (In process)                X-Ray Chest AP Portable (In process)                  Medical Decision Making:   History:   Old Medical Records: I decided to obtain old medical records.  Clinical Tests:   Lab Tests: Ordered and Reviewed  Radiological Study: Ordered and Reviewed  Medical Tests: Ordered and Reviewed            Scribe Attestation:   Scribe #1: I performed the above scribed service and the documentation accurately describes the services I performed. I attest to the accuracy of the note.    I, Dr. Otero, personally performed the services described in this documentation. All medical record entries made by the scribe were at my direction and in my presence.  I have reviewed the chart and agree that the record reflects my personal performance and is accurate and complete.2:28 AM 02/16/2019            ED Course as of Feb 16 0207   Fri Feb 15, 2019   1918 SpO2: 96 % [EF]   1918 Resp: 18 [EF]   1918 Pulse: (!) 113 [EF]   1918 Temp src: Oral [EF]   1918 Temp: 98.9 °F (37.2 °C) [EF]   1918 BP: 137/70 [EF]   2023 Bacteria, UA: (!) Many [EF]   2023 WBC, UA: (!) >100 [EF]   2023 RBC, UA: (!) 5 [EF]   2023 WBC: (!) 18.10 [EF]   2024 Sepsis orders added  [EF]   2126 Lactate, Daniel: (!) 2.3 [EF]   2133 Franceski to admit  [EF]      ED Course User Index  [EF] Leonel Otero MD     Clinical Impression:   There were no encounter diagnoses.      Disposition:   Disposition: Admitted  Condition: Stable         78-year-old female presents to the ER for decreased appetite and malaise.  Urinalysis consistent with infection.  Patient meets sepsis criteria.  IV fluid bolus antibiotics ordered.  Patient will be admitted to Hospital Medicine.  Not ill appearing in the ER, no distress.               Leonel Otero MD  02/16/19 0228

## 2019-02-16 NOTE — NURSING
Blood Culture, Routine Gram stain reynaldo bottle: Gram negative rods  P     Dr. Varner notified via secure chat. No new orders at this time.

## 2019-02-17 LAB
ANION GAP SERPL CALC-SCNC: 7 MMOL/L
BUN SERPL-MCNC: 14 MG/DL
CALCIUM SERPL-MCNC: 9.2 MG/DL
CHLORIDE SERPL-SCNC: 107 MMOL/L
CO2 SERPL-SCNC: 20 MMOL/L
CREAT SERPL-MCNC: 0.7 MG/DL
ERYTHROCYTE [DISTWIDTH] IN BLOOD BY AUTOMATED COUNT: 12.6 %
EST. GFR  (AFRICAN AMERICAN): >60 ML/MIN/1.73 M^2
EST. GFR  (NON AFRICAN AMERICAN): >60 ML/MIN/1.73 M^2
GLUCOSE SERPL-MCNC: 142 MG/DL
HCT VFR BLD AUTO: 30.5 %
HGB BLD-MCNC: 10.2 G/DL
MAGNESIUM SERPL-MCNC: 2.3 MG/DL
MCH RBC QN AUTO: 31.9 PG
MCHC RBC AUTO-ENTMCNC: 33.6 G/DL
MCV RBC AUTO: 95 FL
PLATELET # BLD AUTO: 119 K/UL
PMV BLD AUTO: 8.1 FL
POCT GLUCOSE: 117 MG/DL (ref 70–110)
POCT GLUCOSE: 132 MG/DL (ref 70–110)
POCT GLUCOSE: 136 MG/DL (ref 70–110)
POCT GLUCOSE: 147 MG/DL (ref 70–110)
POTASSIUM SERPL-SCNC: 3.5 MMOL/L
RBC # BLD AUTO: 3.21 M/UL
SODIUM SERPL-SCNC: 134 MMOL/L
WBC # BLD AUTO: 11 K/UL

## 2019-02-17 PROCEDURE — 80048 BASIC METABOLIC PNL TOTAL CA: CPT

## 2019-02-17 PROCEDURE — 83735 ASSAY OF MAGNESIUM: CPT

## 2019-02-17 PROCEDURE — 36415 COLL VENOUS BLD VENIPUNCTURE: CPT

## 2019-02-17 PROCEDURE — 99900035 HC TECH TIME PER 15 MIN (STAT)

## 2019-02-17 PROCEDURE — 85027 COMPLETE CBC AUTOMATED: CPT

## 2019-02-17 PROCEDURE — 12000002 HC ACUTE/MED SURGE SEMI-PRIVATE ROOM

## 2019-02-17 PROCEDURE — 25000003 PHARM REV CODE 250: Performed by: INTERNAL MEDICINE

## 2019-02-17 PROCEDURE — 25000003 PHARM REV CODE 250: Performed by: NURSE PRACTITIONER

## 2019-02-17 PROCEDURE — 63600175 PHARM REV CODE 636 W HCPCS: Performed by: NURSE PRACTITIONER

## 2019-02-17 PROCEDURE — 94761 N-INVAS EAR/PLS OXIMETRY MLT: CPT

## 2019-02-17 RX ORDER — POTASSIUM CHLORIDE 20 MEQ/1
40 TABLET, EXTENDED RELEASE ORAL ONCE
Status: DISCONTINUED | OUTPATIENT
Start: 2019-02-17 | End: 2019-02-17

## 2019-02-17 RX ORDER — POTASSIUM CHLORIDE 20 MEQ/15ML
40 SOLUTION ORAL ONCE
Status: COMPLETED | OUTPATIENT
Start: 2019-02-17 | End: 2019-02-17

## 2019-02-17 RX ADMIN — ACETAMINOPHEN 650 MG: 325 TABLET, FILM COATED ORAL at 08:02

## 2019-02-17 RX ADMIN — AMLODIPINE BESYLATE 5 MG: 5 TABLET ORAL at 08:02

## 2019-02-17 RX ADMIN — DONEPEZIL HYDROCHLORIDE 10 MG: 5 TABLET, FILM COATED ORAL at 08:02

## 2019-02-17 RX ADMIN — PANTOPRAZOLE SODIUM 40 MG: 40 TABLET, DELAYED RELEASE ORAL at 08:02

## 2019-02-17 RX ADMIN — LEVOTHYROXINE SODIUM 88 MCG: 88 TABLET ORAL at 05:02

## 2019-02-17 RX ADMIN — ACETAMINOPHEN 650 MG: 325 TABLET, FILM COATED ORAL at 03:02

## 2019-02-17 RX ADMIN — MIRTAZAPINE 15 MG: 15 TABLET, FILM COATED ORAL at 08:02

## 2019-02-17 RX ADMIN — POTASSIUM CHLORIDE 40 MEQ: 20 SOLUTION ORAL at 10:02

## 2019-02-17 RX ADMIN — CEFTRIAXONE 1 G: 1 INJECTION, SOLUTION INTRAVENOUS at 08:02

## 2019-02-17 RX ADMIN — ENALAPRIL MALEATE 5 MG: 5 TABLET ORAL at 08:02

## 2019-02-17 RX ADMIN — ASPIRIN 325 MG ORAL TABLET 325 MG: 325 PILL ORAL at 08:02

## 2019-02-17 NOTE — ASSESSMENT & PLAN NOTE
The patient is no longer septic.  She has pyelonephritis secondary to a Gram-negative germán and has responded to IV Rocephin and fluids.  ID and sensitivities are pending

## 2019-02-17 NOTE — ASSESSMENT & PLAN NOTE
Hold metformin due to lactic acidosis  The patient should  not be on diabetes medications on discharge since her hemoglobin A1c is 5.7  Monitor blood sugars QAC&HS  SSI prn  Diabetic diet  Lab Results   Component Value Date    HGBA1C 5.7 (H) 02/16/2019

## 2019-02-17 NOTE — PLAN OF CARE
02/17/19 0745   PRE-TX-O2   O2 Device (Oxygen Therapy) room air   SpO2 95 %   Pulse Oximetry Type Intermittent   $ Pulse Oximetry - Multiple Charge Pulse Oximetry - Multiple

## 2019-02-17 NOTE — NURSING
"Situation Principle Problem:  Severe sepsis      Reason for Calling: Hypertension, elevated temp    Provider Calling:  JASS Cramer NP   Background Vitals:    02/16/19 0745 02/16/19 1157 02/16/19 1947 02/17/19 0312   BP:  (!) 174/87 (!) 168/70 (!) 185/81   BP Location:    Left arm   Patient Position:  Lying     Pulse: 73 79 86 92   Resp: 18 17 18 20   Temp:  98.9 °F (37.2 °C) 98.4 °F (36.9 °C) (!) 100.7 °F (38.2 °C)   TempSrc:  Oral Oral Axillary   SpO2: (!) 94% (!) 92% 98% 96%   Weight:       Height:         Past Medical History:   Diagnosis Date    Alzheimer disease 2012    Cancer     renal cell. right    Dementia     Diabetes mellitus     GERD (gastroesophageal reflux disease)     Hyperlipidemia     Hypothyroid     Memory loss     Movement disorder          POCT Glucose   Date Value Ref Range Status   02/16/2019 193 (H) 70 - 110 mg/dL Final   02/16/2019 159 (H) 70 - 110 mg/dL Final   02/16/2019 161 (H) 70 - 110 mg/dL Final   02/16/2019 187 (H) 70 - 110 mg/dL Final       Intake/Output:    Intake/Output Summary (Last 24 hours) at 2/17/2019 0336  Last data filed at 2/16/2019 1800  Gross per 24 hour   Intake 1385 ml   Output --   Net 1385 ml        Assessment What is happening: BP (!) 185/81 (BP Location: Left arm)   Pulse 92   Temp (!) 100.7 °F (38.2 °C) (Axillary)   Resp 20   Ht 5' 5" (1.651 m)   Wt 59 kg (130 lb)   SpO2 96%   Breastfeeding? No   BMI 21.63 kg/m²     Looks like blood cultures have already been done which are positive for gram neg rods.  She is on rocephin. Her oral BP meds will be starting at 9AM.  Do you want to do anything else for the temp or BP?   Response Provider Response: ***       "

## 2019-02-17 NOTE — PT/OT/SLP PROGRESS
Physical Therapy      Patient Name:  Nina Portillo   MRN:  [id number]    Patient not seen today secondary to Patient unwilling to participate. Will follow-up tomorrow.    Saige Gunn, PTA

## 2019-02-17 NOTE — SUBJECTIVE & OBJECTIVE
Interval History:  The patient is a little lethargic but wakes up with stimulation.  She is not eating much.  She complains of weakness.  No pain    Review of Systems   Unable to perform ROS: Dementia   Constitutional: Positive for appetite change. Negative for fatigue.   HENT: Negative for congestion.    Eyes: Negative for visual disturbance.   Respiratory: Negative for cough and shortness of breath.    Cardiovascular: Negative for chest pain and palpitations.   Gastrointestinal: Negative for abdominal pain, diarrhea, nausea and vomiting.   Genitourinary: Negative for dysuria, flank pain and hematuria.   Musculoskeletal: Negative for arthralgias.   Skin: Negative for wound.   Neurological: Negative for dizziness and headaches.     Objective:     Vital Signs (Most Recent):  Temp: 97.2 °F (36.2 °C) (02/17/19 0755)  Pulse: 64 (02/17/19 0755)  Resp: 18 (02/17/19 0755)  BP: (!) 127/58 (02/17/19 0755)  SpO2: (!) 94 % (02/17/19 0755) Vital Signs (24h Range):  Temp:  [97.2 °F (36.2 °C)-100.7 °F (38.2 °C)] 97.2 °F (36.2 °C)  Pulse:  [64-92] 64  Resp:  [17-20] 18  SpO2:  [92 %-98 %] 94 %  BP: (127-185)/(58-87) 127/58     Weight: 59 kg (130 lb)  Body mass index is 21.63 kg/m².    Intake/Output Summary (Last 24 hours) at 2/17/2019 1029  Last data filed at 2/17/2019 0442  Gross per 24 hour   Intake 1922.5 ml   Output --   Net 1922.5 ml      Physical Exam    Significant Labs:   BMP:   Recent Labs   Lab 02/17/19  0537   *   *   K 3.5      CO2 20*   BUN 14   CREATININE 0.7   CALCIUM 9.2   MG 2.3     CBC:   Recent Labs   Lab 02/15/19  1945 02/16/19  0521 02/17/19  0537   WBC 18.10* 17.40* 11.00   HGB 14.3 11.2* 10.2*   HCT 43.1 33.2* 30.5*    158 119*       Significant Imaging: I have reviewed all pertinent imaging results/findings within the past 24 hours.

## 2019-02-17 NOTE — PLAN OF CARE
Problem: Adult Inpatient Plan of Care  Goal: Plan of Care Review  Outcome: Ongoing (interventions implemented as appropriate)  Pt cognitively improved since yesterday. Pt oriented to person and place. SR on tele. Incontinence care. Safety maintained.

## 2019-02-17 NOTE — PROGRESS NOTES
[facility] Hospital Medicine  Progress Note    Patient Name: Nina Portillo  MRN: [id number]  Patient Class: IP- Inpatient   Admission Date: 2/15/2019  Length of Stay: 2 days  Attending Physician: Richelle Varner MD  Primary Care Provider: Rush Rosa MD        Subjective:     Principal Problem:Severe sepsis    HPI:  Ms. Portillo is an 79yo F with a PMH of DM2, Renal cell carinoma with partial nephrectomy, HTN, Hypothyroid, and Dementia. She presents to the ED with c/o Fatigue. Her daughter reported that it started today. Associated symptoms include SOB, dry cough, and decreased appetite. While in the ED, she was found to have severe sepsis due to a UTI. She was given a full sepsis bolus and Iv Rocephin. Blood and urine cultures were collected. She currently denies pain or discomforts.           Hospital Course:  The patient is an 78-year-old woman admitted with pyelonephritis.  Her urine culture is growing gram-negative rods.  She is responding nicely to IV Rocephin.    Interval History:  The patient is a little lethargic but wakes up with stimulation.  She is not eating much.  She complains of weakness.  No pain    Review of Systems   Unable to perform ROS: Dementia   Constitutional: Positive for appetite change. Negative for fatigue.   HENT: Negative for congestion.    Eyes: Negative for visual disturbance.   Respiratory: Negative for cough and shortness of breath.    Cardiovascular: Negative for chest pain and palpitations.   Gastrointestinal: Negative for abdominal pain, diarrhea, nausea and vomiting.   Genitourinary: Negative for dysuria, flank pain and hematuria.   Musculoskeletal: Negative for arthralgias.   Skin: Negative for wound.   Neurological: Negative for dizziness and headaches.     Objective:     Vital Signs (Most Recent):  Temp: 97.2 °F (36.2 °C) (02/17/19 0755)  Pulse: 64 (02/17/19 0755)  Resp: 18 (02/17/19 0755)  BP: (!) 127/58 (02/17/19 0755)  SpO2: (!) 94 % (02/17/19  0755) Vital Signs (24h Range):  Temp:  [97.2 °F (36.2 °C)-100.7 °F (38.2 °C)] 97.2 °F (36.2 °C)  Pulse:  [64-92] 64  Resp:  [17-20] 18  SpO2:  [92 %-98 %] 94 %  BP: (127-185)/(58-87) 127/58     Weight: 59 kg (130 lb)  Body mass index is 21.63 kg/m².    Intake/Output Summary (Last 24 hours) at 2/17/2019 1029  Last data filed at 2/17/2019 0442  Gross per 24 hour   Intake 1922.5 ml   Output --   Net 1922.5 ml      Physical Exam    Significant Labs:   BMP:   Recent Labs   Lab 02/17/19  0537   *   *   K 3.5      CO2 20*   BUN 14   CREATININE 0.7   CALCIUM 9.2   MG 2.3     CBC:   Recent Labs   Lab 02/15/19  1945 02/16/19  0521 02/17/19  0537   WBC 18.10* 17.40* 11.00   HGB 14.3 11.2* 10.2*   HCT 43.1 33.2* 30.5*    158 119*       Significant Imaging: I have reviewed all pertinent imaging results/findings within the past 24 hours.    Assessment/Plan:      * Severe sepsis    The patient is no longer septic.  She has pyelonephritis secondary to a Gram-negative germán and has responded to IV Rocephin and fluids.  ID and sensitivities are pending       Hypertension associated with diabetes    Chronic/Controlled. Continue chronic medications, adjusting as needed.       Controlled type 2 diabetes mellitus with diabetic neuropathy, without long-term current use of insulin    Hold metformin due to lactic acidosis  The patient should  not be on diabetes medications on discharge since her hemoglobin A1c is 5.7  Monitor blood sugars QAC&HS  SSI prn  Diabetic diet  Lab Results   Component Value Date    HGBA1C 5.7 (H) 02/16/2019          Alzheimer's disease    Stable. Continue Aricept  Delirium precautions        Hypothyroid    Chronic. Continue home levothyroxine dose  Lab Results   Component Value Date    TSH 0.897 02/15/2019              Thrombocytopenia  --her platelet count is trending down.  I will discontinue the Lovenox and order a CBC for tomorrow    Hypokalemia  --replace with oral potassium      VTE  Risk Mitigation (From admission, onward)        Ordered     enoxaparin injection 40 mg  Daily      02/15/19 2214     IP VTE HIGH RISK PATIENT  Once      02/15/19 2214              Richelle Varner MD  Department of Hospital Medicine   [facility]

## 2019-02-18 DIAGNOSIS — F51.01 PRIMARY INSOMNIA: ICD-10-CM

## 2019-02-18 PROBLEM — E44.0 MODERATE MALNUTRITION: Status: ACTIVE | Noted: 2019-02-18

## 2019-02-18 LAB
ANION GAP SERPL CALC-SCNC: 9 MMOL/L
BACTERIA BLD CULT: NORMAL
BACTERIA UR CULT: NORMAL
BUN SERPL-MCNC: 9 MG/DL
CALCIUM SERPL-MCNC: 9.6 MG/DL
CHLORIDE SERPL-SCNC: 104 MMOL/L
CO2 SERPL-SCNC: 22 MMOL/L
CREAT SERPL-MCNC: 0.7 MG/DL
ERYTHROCYTE [DISTWIDTH] IN BLOOD BY AUTOMATED COUNT: 12.7 %
EST. GFR  (AFRICAN AMERICAN): >60 ML/MIN/1.73 M^2
EST. GFR  (NON AFRICAN AMERICAN): >60 ML/MIN/1.73 M^2
GLUCOSE SERPL-MCNC: 142 MG/DL
HCT VFR BLD AUTO: 34.5 %
HGB BLD-MCNC: 11.5 G/DL
MAGNESIUM SERPL-MCNC: 1.7 MG/DL
MCH RBC QN AUTO: 31.8 PG
MCHC RBC AUTO-ENTMCNC: 33.3 G/DL
MCV RBC AUTO: 95 FL
PLATELET # BLD AUTO: 136 K/UL
PMV BLD AUTO: 8.1 FL
POCT GLUCOSE: 137 MG/DL (ref 70–110)
POCT GLUCOSE: 145 MG/DL (ref 70–110)
POCT GLUCOSE: 161 MG/DL (ref 70–110)
POCT GLUCOSE: 323 MG/DL (ref 70–110)
POTASSIUM SERPL-SCNC: 3.7 MMOL/L
RBC # BLD AUTO: 3.62 M/UL
SODIUM SERPL-SCNC: 135 MMOL/L
WBC # BLD AUTO: 9.2 K/UL

## 2019-02-18 PROCEDURE — 83735 ASSAY OF MAGNESIUM: CPT

## 2019-02-18 PROCEDURE — 99900035 HC TECH TIME PER 15 MIN (STAT)

## 2019-02-18 PROCEDURE — 25000003 PHARM REV CODE 250: Performed by: NURSE PRACTITIONER

## 2019-02-18 PROCEDURE — 36415 COLL VENOUS BLD VENIPUNCTURE: CPT

## 2019-02-18 PROCEDURE — 97530 THERAPEUTIC ACTIVITIES: CPT

## 2019-02-18 PROCEDURE — 87040 BLOOD CULTURE FOR BACTERIA: CPT

## 2019-02-18 PROCEDURE — 12000002 HC ACUTE/MED SURGE SEMI-PRIVATE ROOM

## 2019-02-18 PROCEDURE — 63600175 PHARM REV CODE 636 W HCPCS: Performed by: NURSE PRACTITIONER

## 2019-02-18 PROCEDURE — 25000003 PHARM REV CODE 250: Performed by: INTERNAL MEDICINE

## 2019-02-18 PROCEDURE — 97802 MEDICAL NUTRITION INDIV IN: CPT

## 2019-02-18 PROCEDURE — 94761 N-INVAS EAR/PLS OXIMETRY MLT: CPT

## 2019-02-18 PROCEDURE — 80048 BASIC METABOLIC PNL TOTAL CA: CPT

## 2019-02-18 PROCEDURE — 85027 COMPLETE CBC AUTOMATED: CPT

## 2019-02-18 RX ORDER — HYDRALAZINE HYDROCHLORIDE 20 MG/ML
10 INJECTION INTRAMUSCULAR; INTRAVENOUS EVERY 6 HOURS PRN
Status: DISCONTINUED | OUTPATIENT
Start: 2019-02-18 | End: 2019-02-19 | Stop reason: HOSPADM

## 2019-02-18 RX ADMIN — INSULIN ASPART 2 UNITS: 100 INJECTION, SOLUTION INTRAVENOUS; SUBCUTANEOUS at 10:02

## 2019-02-18 RX ADMIN — HYDRALAZINE HYDROCHLORIDE 10 MG: 20 INJECTION INTRAMUSCULAR; INTRAVENOUS at 02:02

## 2019-02-18 RX ADMIN — LEVOTHYROXINE SODIUM 88 MCG: 88 TABLET ORAL at 06:02

## 2019-02-18 RX ADMIN — ACETAMINOPHEN 650 MG: 325 TABLET, FILM COATED ORAL at 03:02

## 2019-02-18 RX ADMIN — MIRTAZAPINE 15 MG: 15 TABLET, FILM COATED ORAL at 08:02

## 2019-02-18 RX ADMIN — ENALAPRIL MALEATE 5 MG: 5 TABLET ORAL at 08:02

## 2019-02-18 RX ADMIN — DONEPEZIL HYDROCHLORIDE 10 MG: 5 TABLET, FILM COATED ORAL at 08:02

## 2019-02-18 RX ADMIN — ASPIRIN 325 MG ORAL TABLET 325 MG: 325 PILL ORAL at 08:02

## 2019-02-18 RX ADMIN — PANTOPRAZOLE SODIUM 40 MG: 40 TABLET, DELAYED RELEASE ORAL at 08:02

## 2019-02-18 RX ADMIN — CEFTRIAXONE 1 G: 1 INJECTION, SOLUTION INTRAVENOUS at 08:02

## 2019-02-18 RX ADMIN — HYDRALAZINE HYDROCHLORIDE 10 MG: 20 INJECTION INTRAMUSCULAR; INTRAVENOUS at 05:02

## 2019-02-18 RX ADMIN — AMLODIPINE BESYLATE 5 MG: 5 TABLET ORAL at 08:02

## 2019-02-18 NOTE — PLAN OF CARE
Problem: Adult Inpatient Plan of Care  Goal: Plan of Care Review  Outcome: Ongoing (interventions implemented as appropriate)  POC reviewed with pt. Pt verbalized understanding. Pt cognitively improving. Afebrile. Tele box 3466. Safety maintained. Pt sat in chair the majority of shift. NAD

## 2019-02-18 NOTE — PLAN OF CARE
Aerosol treatments ordered with albuterol q4prn no treatmnent needed at this time O2 sat 95% room air

## 2019-02-18 NOTE — PROGRESS NOTES
[facility] Hospital Medicine  Progress Note    Patient Name: Nina Portillo  MRN: [id number]  Patient Class: IP- Inpatient   Admission Date: 2/15/2019  Length of Stay: 3 days  Attending Physician: Sadia Quintero MD  Primary Care Provider: Rush Rosa MD        Subjective:     Principal Problem:Severe sepsis    HPI:  Ms. Portillo is an 79yo F with a PMH of DM2, Renal cell carinoma with partial nephrectomy, HTN, Hypothyroid, and Dementia. She presents to the ED with c/o Fatigue. Her daughter reported that it started today. Associated symptoms include SOB, dry cough, and decreased appetite. While in the ED, she was found to have severe sepsis due to a UTI. She was given a full sepsis bolus and Iv Rocephin. Blood and urine cultures were collected. She currently denies pain or discomforts.           Hospital Course:  No notes on file    Interval History:  T-max a 101.7°.  Patient with some elevated blood pressures overnight.  She reports she feels well and  reports urinary frequency but no pain. Plan of care discussed with patient and bedside nurse in detail.    Review of Systems   Constitutional: Positive for appetite change. Negative for fatigue.   HENT: Negative for congestion.    Eyes: Negative for visual disturbance.   Respiratory: Negative for cough and shortness of breath.    Cardiovascular: Negative for chest pain and palpitations.   Gastrointestinal: Negative for abdominal pain, diarrhea, nausea and vomiting.   Genitourinary: Positive for frequency. Negative for dysuria, flank pain and hematuria.   Musculoskeletal: Negative for arthralgias.   Skin: Negative for wound.   Neurological: Negative for dizziness and headaches.     Objective:     Vital Signs (Most Recent):  Temp: 97.4 °F (36.3 °C) (02/18/19 1142)  Pulse: 74 (02/18/19 1142)  Resp: 17 (02/18/19 1142)  BP: 134/65 (02/18/19 1142)  SpO2: 95 % (02/18/19 1142) Vital Signs (24h Range):  Temp:  [97.4 °F (36.3 °C)-101.7 °F (38.7 °C)] 97.4  °F (36.3 °C)  Pulse:  [59-90] 74  Resp:  [16-20] 17  SpO2:  [94 %-98 %] 95 %  BP: (128-188)/(61-86) 134/65     Weight: 59 kg (130 lb)  Body mass index is 21.63 kg/m².    Intake/Output Summary (Last 24 hours) at 2/18/2019 1151  Last data filed at 2/18/2019 0838  Gross per 24 hour   Intake 360 ml   Output --   Net 360 ml      Physical Exam   Constitutional: She is oriented to person, place, and time. No distress.   HENT:   Head: Normocephalic.   Eyes: Pupils are equal, round, and reactive to light.   Neck: Normal range of motion. Neck supple. No JVD present. No tracheal deviation present.   Cardiovascular: Normal rate, regular rhythm, normal heart sounds and intact distal pulses.   No murmur heard.  Pulmonary/Chest: Effort normal and breath sounds normal. No respiratory distress.   Abdominal: Soft. Bowel sounds are normal. She exhibits no distension. There is no tenderness.   Neurological: She is alert and oriented to person, place, and time.   Skin: Skin is warm and dry. Capillary refill takes less than 2 seconds.       Significant Labs: All pertinent labs within the past 24 hours have been reviewed.    Significant Imaging: I have reviewed and interpreted all pertinent imaging results/findings within the past 24 hours.    Assessment/Plan:      * Severe sepsis    The patient is no longer septic.  She has pyelonephritis secondary E Coli UTI and bacteremia and has responded to IV Rocephin and fluids.  Repeat blood cultures today to ensure clearance.       Controlled type 2 diabetes mellitus with diabetic neuropathy, without long-term current use of insulin    Hold metformin due to lactic acidosis  The patient should  not be on diabetes medications on discharge since her hemoglobin A1c is 5.7  Monitor blood sugars QAC&HS  SSI prn  Diabetic diet  Lab Results   Component Value Date    HGBA1C 5.7 (H) 02/16/2019          Alzheimer's disease    Stable. Continue Aricept  Delirium precautions        Hypothyroid    Chronic.  Continue home levothyroxine dose  Lab Results   Component Value Date    TSH 0.897 02/15/2019            Hypertension associated with diabetes    Chronic/Controlled. Continue chronic medications, adjusting as needed.         VTE Risk Mitigation (From admission, onward)        Ordered     IP VTE HIGH RISK PATIENT  Once      02/15/19 7244              Sadia Quintero MD  Department of Hospital Medicine   [facility]

## 2019-02-18 NOTE — PLAN OF CARE
Problem: Adult Inpatient Plan of Care  Goal: Plan of Care Review  Outcome: Ongoing (interventions implemented as appropriate)  Pt alert and oriented x 4  Positioned supine with HOB elevated  VSS, NAD noted   Pt free of falls during shift  Bed locked in lowest position   Call light in reach   Will continue to monitor

## 2019-02-18 NOTE — SUBJECTIVE & OBJECTIVE
Interval History:  T-max a 101.7°.  Patient with some elevated blood pressures overnight.  She reports she feels well and  reports urinary frequency but no pain. Plan of care discussed with patient and bedside nurse in detail.    Review of Systems   Constitutional: Positive for appetite change. Negative for fatigue.   HENT: Negative for congestion.    Eyes: Negative for visual disturbance.   Respiratory: Negative for cough and shortness of breath.    Cardiovascular: Negative for chest pain and palpitations.   Gastrointestinal: Negative for abdominal pain, diarrhea, nausea and vomiting.   Genitourinary: Positive for frequency. Negative for dysuria, flank pain and hematuria.   Musculoskeletal: Negative for arthralgias.   Skin: Negative for wound.   Neurological: Negative for dizziness and headaches.     Objective:     Vital Signs (Most Recent):  Temp: 97.4 °F (36.3 °C) (02/18/19 1142)  Pulse: 74 (02/18/19 1142)  Resp: 17 (02/18/19 1142)  BP: 134/65 (02/18/19 1142)  SpO2: 95 % (02/18/19 1142) Vital Signs (24h Range):  Temp:  [97.4 °F (36.3 °C)-101.7 °F (38.7 °C)] 97.4 °F (36.3 °C)  Pulse:  [59-90] 74  Resp:  [16-20] 17  SpO2:  [94 %-98 %] 95 %  BP: (128-188)/(61-86) 134/65     Weight: 59 kg (130 lb)  Body mass index is 21.63 kg/m².    Intake/Output Summary (Last 24 hours) at 2/18/2019 1151  Last data filed at 2/18/2019 0838  Gross per 24 hour   Intake 360 ml   Output --   Net 360 ml      Physical Exam   Constitutional: She is oriented to person, place, and time. No distress.   HENT:   Head: Normocephalic.   Eyes: Pupils are equal, round, and reactive to light.   Neck: Normal range of motion. Neck supple. No JVD present. No tracheal deviation present.   Cardiovascular: Normal rate, regular rhythm, normal heart sounds and intact distal pulses.   No murmur heard.  Pulmonary/Chest: Effort normal and breath sounds normal. No respiratory distress.   Abdominal: Soft. Bowel sounds are normal. She exhibits no distension. There  is no tenderness.   Neurological: She is alert and oriented to person, place, and time.   Skin: Skin is warm and dry. Capillary refill takes less than 2 seconds.       Significant Labs: All pertinent labs within the past 24 hours have been reviewed.    Significant Imaging: I have reviewed and interpreted all pertinent imaging results/findings within the past 24 hours.

## 2019-02-18 NOTE — PROGRESS NOTES
"[facility]  Adult Nutrition  Progress Note    SUMMARY    Intervention: carbohydrate modified diet and nutrition supplement therapy     Recommendation:  1) rec.  DM diet 3-4 carb servings/ meal no calorie limit   2) Add Boost Glucose control with meals   3) Weigh pt weekly   4) Record PO intakes in flowsheets   5) Continue appetite stimulant    Goals: 1) PO intakes > 50% of meals and supplements at  f/u   Nutrition Goal Status: new  Communication of RD Recs: (POC, sticky note, second sign)    Reason for Assessment    Reason For Assessment: identified at risk by screening criteria  Diagnosis: (severe sepsis)  Relevant Medical History: Dm2, renal CA 2/p partial nephrectomy, HTN, Alzheimers  Interdisciplinary Rounds: attended    General Information Comments: 79 y/o female admitted with sepsis and pylonephritis. Pt states she has no appetite. Poor historian, but stating she usually eats a few small meals a day and drinks strawberry ice cream shakes, claims 2-3 daiily. NFPE done, moderat-mild muscle/fat loss seen. No wt loss in the past year per chart record.    Nutrition Discharge Planning: To be determined- DM diet 3-4 carb servings/meal + Boost glucose control with meals    Nutrition Risk Screen    Nutrition Risk Screen: no indicators present    Nutrition/Diet History    Patient Reported Diet/Restrictions/Preferences: general  Typical Food/Fluid Intake: Pt states she eats,"whatever she wants." Likes shakes made with strawberries and ice cream.  Spiritual, Cultural Beliefs, Jew Practices, Values that Affect Care: no  Food Allergies: NKFA(or intolerances)    Anthropometrics    Temp: 97.4 °F (36.3 °C)  Height Method: Measured  Height: 5' 5"  Height (inches): 65 in  Weight Method: Bed Scale  Weight: 59 kg (130 lb 1.1 oz)  Weight (lb): 130.07 lb  Ideal Body Weight (IBW), Female: 125 lb  % Ideal Body Weight, Female (lb): 104.06 lb  BMI (Calculated): 21.7  BMI Grade: (underweight for age)  Usual Body " Weight (UBW), k kg(1/15/19)  % Usual Body Weight: 107.5  % Weight Change From Usual Weight: 7.27 %       Lab/Procedures/Meds    Pertinent Labs Reviewed: reviewed  BMP  Lab Results   Component Value Date     (L) 2019    K 3.7 2019     2019    CO2 22 (L) 2019    BUN 9 2019    CREATININE 0.7 2019    CALCIUM 9.6 2019    ANIONGAP 9 2019    ESTGFRAFRICA >60 2019    EGFRNONAA >60 2019     POCT Glucose   Date Value Ref Range Status   2019 161 (H) 70 - 110 mg/dL Final   2019 145 (H) 70 - 110 mg/dL Final   2019 136 (H) 70 - 110 mg/dL Final   2019 132 (H) 70 - 110 mg/dL Final   2019 117 (H) 70 - 110 mg/dL Final   2019 147 (H) 70 - 110 mg/dL Final   2019 193 (H) 70 - 110 mg/dL Final   2019 159 (H) 70 - 110 mg/dL Final   2019 161 (H) 70 - 110 mg/dL Final   2019 187 (H) 70 - 110 mg/dL Final     Lab Results   Component Value Date    CALCIUM 9.6 2019    PHOS 1.9 (L) 2019     Lab Results   Component Value Date    HGBA1C 5.7 (H) 2019     Lab Results   Component Value Date    ALBUMIN 2.4 (L) 2019         Pertinent Medications Reviewed: reviewed  Pertinent Medications Comments: mirtazipine, KCl, insulin, zofran    Estimated/Assessed Needs    Weight Used For Calorie Calculations: 59 kg (130 lb 1.1 oz)  Energy Calorie Requirements (kcal): Gary St Jeor ( x 1.5 wt gain) = 1590 kcal  Energy Need Method: Gary-St Jeor  Protein Requirements: 1.2-1.4 g protein/kg ( age/muscle loss/sepsis) = 70-82 g protein  Weight Used For Protein Calculations: 59 kg (130 lb 1.1 oz)  Fluid Requirements (mL): 1500 ml  Estimated Fluid Requirement Method: RDA Method  CHO Requirement: 45-50%      Nutrition Prescription Ordered    Current Diet Order: DM 2000 kcal    Evaluation of Received Nutrient/Fluid Intake    Energy Calories Required: not meeting needs  Protein Required: not meeting needs  Fluid  Required: not meeting needs  Comments: drinking fairly per notes  Tolerance: tolerating  % Intake of Estimated Energy Needs: 0-25%  % Meal Intake: 0-25%    Nutrition Risk    Level of Risk/Frequency of Follow-up: moderate 2 x weekly until appetite improves    Assessment and Plan    Moderate malnutrition    Contributing Nutrition Diagnosis  Moderate chronic illness relate malnutrition    Related to (etiology):   Decreased appetite and decreased ability to perform nutrition related ADLs d/t dementia    Signs and Symptoms (as evidenced by):   1) PO intakes < 75% estimated needs x > 3 months (< 50% x 4-5 days) 2) Moderate-mild fat muscle/wasting as noted below    Of note pt with underweight BMI for age    Interventions/Recommendations (treatment strategy):  1) Nutrition supplements TID 2) high calorie protein nutrition education @ f/u, will speak with family    Nutrition Diagnosis Status:   New       Controlled type 2 diabetes mellitus with diabetic neuropathy, without long-term current use of insulin    Contributing Nutrition Diagnosis  Altered nutrition related lab values    Related to (etiology):   Altered absorption of nutrients    Signs and Symptoms (as evidenced by):   Elevated glucose    Interventions/Recommendations (treatment strategy):  Diabetic diet 3-4 carb servings per meal    Nutrition Diagnosis Status:   New            Monitor and Evaluation    Food and Nutrient Intake: energy intake, food and beverage intake  Food and Nutrient Adminstration: diet order  Anthropometric Measurements: weight  Biochemical Data, Medical Tests and Procedures: electrolyte and renal panel, glucose/endocrine profile  Nutrition-Focused Physical Findings: overall appearance     Malnutrition Assessment     Skin (Micronutrient): (Luis = 16)  Teeth (Micronutrient): (WDL)       Energy Intake (Malnutrition): less than 75% for greater than or equal to 3 months  Subcutaneous Fat (Malnutrition): moderate depletion  Muscle Mass  (Malnutrition): moderate depletion   Orbital Region (Subcutaneous Fat Loss): mild depletion  Upper Arm Region (Subcutaneous Fat Loss): moderate depletion  Thoracic and Lumbar Region: mild depletion   Samaritan Region (Muscle Loss): moderate depletion  Clavicle Bone Region (Muscle Loss): mild depletion  Clavicle and Acromion Bone Region (Muscle Loss): moderate depletion  Scapular Bone Region (Muscle Loss): moderate depletion  Dorsal Hand (Muscle Loss): moderate depletion   Edema (Fluid Accumulation): (dependent)   Subcutaneous Fat Loss (Final Summary): moderate protein-calorie malnutrition  Muscle Loss Evaluation (Final Summary): moderate protein-calorie malnutrition         Nutrition Follow-Up    RD Follow-up?: Yes

## 2019-02-18 NOTE — PLAN OF CARE
Problem: Malnutrition  Goal: Improved Nutritional Intake    Intervention: Promote and Optimize Oral Intake   Intervention: carbohydrate modified diet and nutrition supplement therapy     Recommendation:  1) rec.  DM diet 3-4 carb servings/ meal no calorie limit   2) Add Boost Glucose control with meals   3) Weigh pt weekly   4) Record PO intakes in flowsheets   5) Continue appetite stimulant    Goals: 1) PO intakes > 50% of meals and supplements at  f/u   Nutrition Goal Status: new  Communication of RD Recs: (POC, sticky note, second sign)

## 2019-02-18 NOTE — ASSESSMENT & PLAN NOTE
Contributing Nutrition Diagnosis  Moderate chronic illness relate malnutrition    Related to (etiology):   Decreased appetite and decreased ability to perform nutrition related ADLs d/t dementia    Signs and Symptoms (as evidenced by):   1) PO intakes < 75% estimated needs x > 3 months (< 50% x 4-5 days) 2) Moderate-mild fat muscle/wasting as noted below    Of note pt with underweight BMI for age    Interventions/Recommendations (treatment strategy):  1) Nutrition supplements TID 2) high calorie protein nutrition education @ f/u, will speak with family    Nutrition Diagnosis Status:   New

## 2019-02-18 NOTE — PLAN OF CARE
02/17/19 2037   Patient Assessment/Suction   Level of Consciousness (AVPU) alert   Respiratory Effort Normal;Unlabored   Expansion/Accessory Muscles/Retractions expansion symmetric;no retractions;no use of accessory muscles   All Lung Fields Breath Sounds clear   PRE-TX-O2   O2 Device (Oxygen Therapy) room air   SpO2 98 %   Pulse Oximetry Type Intermittent   Pulse 84   Resp 16   Aerosol Therapy   $ Aerosol Therapy Charges PRN treatment not required   Respiratory Treatment Status (SVN) PRN treatment not required

## 2019-02-18 NOTE — ASSESSMENT & PLAN NOTE
Contributing Nutrition Diagnosis  Altered nutrition related lab values    Related to (etiology):   Altered absorption of nutrients    Signs and Symptoms (as evidenced by):   Elevated glucose    Interventions/Recommendations (treatment strategy):  Diabetic diet 3-4 carb servings per meal    Nutrition Diagnosis Status:   New

## 2019-02-18 NOTE — PT/OT/SLP PROGRESS
"Physical Therapy Treatment    Patient Name:  Nina Portillo   MRN:  [id number]    Recommendations:     Discharge Recommendations:  other (see comments)(home vs HHPT)       Assessment:     Nina Portillo is a 78 y.o. female admitted with a medical diagnosis of Severe sepsis.  She presents with the following impairments/functional limitations:  weakness, impaired endurance, impaired self care skills, impaired functional mobilty, gait instability, impaired balance, decreased safety awareness .    Rehab Prognosis: Fair; patient would benefit from acute skilled PT services to address these deficits and reach maximum level of function.    Recent Surgery: * No surgery found *      Plan:     During this hospitalization, patient to be seen daily to address the identified rehab impairments via gait training, therapeutic activities, therapeutic exercises, therapeutic groups, neuromuscular re-education, wheelchair management/training and progress toward the following goals:    · Plan of Care Expires:  03/02/19    Subjective     Chief Complaint: "I just don't feel good" pt initially reluctant to PT but agreeable with encouragement from nurse and PTA    Patient/Family Comments/goals: wants to just go home  Pain/Comfort:  · Pain Rating 1: 0/10      Objective:     Communicated with nurse Lira prior to session.  Patient found supine peripheral IV, telemetry  upon PT entry to room.     General Precautions: Standard, fall   Orthopedic Precautions:N/A   Braces: N/A     Functional Mobility:  · Bed Mobility:     · Scooting: minimum assistance  · Supine to Sit: minimum assistance    · Transfers:     · Sit to Stand:  minimum assistance with rolling walker  · Bed to Chair: contact guard assistance and minimum assistance with  rolling walker  using  Stand Pivot    · Gait: 6' to chair with CGA using RW      Therapeutic Activities and Exercises:   pt assisted to bedside chair with min A-CGA using RW    Patient left up in chair with all " lines intact, call button in reach, chair alarm on and nurse notified..    GOALS:   Multidisciplinary Problems     Physical Therapy Goals        Problem: Physical Therapy Goal    Goal Priority Disciplines Outcome Goal Variances Interventions   Physical Therapy Goal     PT, PT/OT Ongoing (interventions implemented as appropriate)     Description:  Goals to be met by: 2019     Patient will increase functional independence with mobility by performin. Supine to sit with Modified [place]  2. Sit to stand transfer with Modified [place]  2. Gait  x 150 feet with Modified [place] using Rolling Walker.                       Time Tracking:     PT Received On: 19  PT Start Time: 1012     PT Stop Time: 1027  PT Total Time (min): 15 min     Billable Minutes: Therapeutic Activity 15    Treatment Type: Treatment  PT/PTA: PTA     PTA Visit Number: 1     Porsche Vega PTA  2019

## 2019-02-18 NOTE — ASSESSMENT & PLAN NOTE
The patient is no longer septic.  She has pyelonephritis secondary E Coli UTI and bacteremia and has responded to IV Rocephin and fluids.  Repeat blood cultures today to ensure clearance.

## 2019-02-19 VITALS
BODY MASS INDEX: 21.67 KG/M2 | HEIGHT: 65 IN | DIASTOLIC BLOOD PRESSURE: 76 MMHG | HEART RATE: 77 BPM | RESPIRATION RATE: 20 BRPM | WEIGHT: 130.06 LBS | OXYGEN SATURATION: 97 % | TEMPERATURE: 97 F | SYSTOLIC BLOOD PRESSURE: 166 MMHG

## 2019-02-19 LAB
ANION GAP SERPL CALC-SCNC: 9 MMOL/L
BUN SERPL-MCNC: 10 MG/DL
CALCIUM SERPL-MCNC: 9.5 MG/DL
CHLORIDE SERPL-SCNC: 104 MMOL/L
CO2 SERPL-SCNC: 22 MMOL/L
CREAT SERPL-MCNC: 0.7 MG/DL
ERYTHROCYTE [DISTWIDTH] IN BLOOD BY AUTOMATED COUNT: 12.8 %
EST. GFR  (AFRICAN AMERICAN): >60 ML/MIN/1.73 M^2
EST. GFR  (NON AFRICAN AMERICAN): >60 ML/MIN/1.73 M^2
GLUCOSE SERPL-MCNC: 115 MG/DL
HCT VFR BLD AUTO: 36.3 %
HGB BLD-MCNC: 12 G/DL
MAGNESIUM SERPL-MCNC: 2.1 MG/DL
MCH RBC QN AUTO: 31.4 PG
MCHC RBC AUTO-ENTMCNC: 33.1 G/DL
MCV RBC AUTO: 95 FL
PLATELET # BLD AUTO: 153 K/UL
PMV BLD AUTO: 8.2 FL
POCT GLUCOSE: 167 MG/DL (ref 70–110)
POTASSIUM SERPL-SCNC: 3.6 MMOL/L
RBC # BLD AUTO: 3.82 M/UL
SODIUM SERPL-SCNC: 135 MMOL/L
WBC # BLD AUTO: 7.7 K/UL

## 2019-02-19 PROCEDURE — 36415 COLL VENOUS BLD VENIPUNCTURE: CPT

## 2019-02-19 PROCEDURE — 85027 COMPLETE CBC AUTOMATED: CPT

## 2019-02-19 PROCEDURE — 97530 THERAPEUTIC ACTIVITIES: CPT

## 2019-02-19 PROCEDURE — 83735 ASSAY OF MAGNESIUM: CPT

## 2019-02-19 PROCEDURE — 80048 BASIC METABOLIC PNL TOTAL CA: CPT

## 2019-02-19 PROCEDURE — 94761 N-INVAS EAR/PLS OXIMETRY MLT: CPT

## 2019-02-19 PROCEDURE — 97110 THERAPEUTIC EXERCISES: CPT

## 2019-02-19 PROCEDURE — 25000003 PHARM REV CODE 250: Performed by: INTERNAL MEDICINE

## 2019-02-19 PROCEDURE — 99900035 HC TECH TIME PER 15 MIN (STAT)

## 2019-02-19 RX ORDER — MIRTAZAPINE 15 MG/1
15 TABLET, FILM COATED ORAL NIGHTLY
Qty: 30 TABLET | Refills: 11 | Status: SHIPPED | OUTPATIENT
Start: 2019-02-19 | End: 2020-04-09 | Stop reason: SDUPTHER

## 2019-02-19 RX ORDER — AMOXICILLIN 875 MG/1
875 TABLET, FILM COATED ORAL 2 TIMES DAILY
Qty: 20 TABLET | Refills: 0 | Status: SHIPPED | OUTPATIENT
Start: 2019-02-19 | End: 2019-02-19 | Stop reason: SDUPTHER

## 2019-02-19 RX ORDER — ACETAMINOPHEN 325 MG/1
650 TABLET ORAL EVERY 4 HOURS PRN
Refills: 0 | COMMUNITY
Start: 2019-02-19 | End: 2019-11-19 | Stop reason: CLARIF

## 2019-02-19 RX ORDER — AMLODIPINE BESYLATE 5 MG/1
10 TABLET ORAL DAILY
Qty: 90 TABLET | Refills: 2 | Status: SHIPPED | OUTPATIENT
Start: 2019-02-19 | End: 2019-03-08 | Stop reason: SDUPTHER

## 2019-02-19 RX ORDER — AMLODIPINE BESYLATE 5 MG/1
5 TABLET ORAL DAILY
Qty: 90 TABLET | Refills: 2 | Status: SHIPPED | OUTPATIENT
Start: 2019-02-19 | End: 2019-02-19 | Stop reason: SDUPTHER

## 2019-02-19 RX ORDER — AMLODIPINE BESYLATE 5 MG/1
5 TABLET ORAL ONCE
Status: COMPLETED | OUTPATIENT
Start: 2019-02-19 | End: 2019-02-19

## 2019-02-19 RX ORDER — POTASSIUM CHLORIDE 20 MEQ/15ML
40 SOLUTION ORAL ONCE
Status: COMPLETED | OUTPATIENT
Start: 2019-02-19 | End: 2019-02-19

## 2019-02-19 RX ORDER — POTASSIUM CHLORIDE 20 MEQ/1
40 TABLET, EXTENDED RELEASE ORAL ONCE
Status: DISCONTINUED | OUTPATIENT
Start: 2019-02-19 | End: 2019-02-19

## 2019-02-19 RX ORDER — AMLODIPINE BESYLATE 5 MG/1
10 TABLET ORAL DAILY
Status: DISCONTINUED | OUTPATIENT
Start: 2019-02-19 | End: 2019-02-19 | Stop reason: HOSPADM

## 2019-02-19 RX ORDER — AMOXICILLIN 875 MG/1
875 TABLET, FILM COATED ORAL 2 TIMES DAILY
Qty: 20 TABLET | Refills: 0 | Status: SHIPPED | OUTPATIENT
Start: 2019-02-19 | End: 2019-03-01

## 2019-02-19 RX ADMIN — ASPIRIN 325 MG ORAL TABLET 325 MG: 325 PILL ORAL at 07:02

## 2019-02-19 RX ADMIN — AMLODIPINE BESYLATE 5 MG: 5 TABLET ORAL at 10:02

## 2019-02-19 RX ADMIN — LEVOTHYROXINE SODIUM 88 MCG: 88 TABLET ORAL at 05:02

## 2019-02-19 RX ADMIN — POTASSIUM CHLORIDE 40 MEQ: 20 SOLUTION ORAL at 10:02

## 2019-02-19 RX ADMIN — AMLODIPINE BESYLATE 5 MG: 5 TABLET ORAL at 07:02

## 2019-02-19 RX ADMIN — ENALAPRIL MALEATE 5 MG: 5 TABLET ORAL at 07:02

## 2019-02-19 RX ADMIN — DONEPEZIL HYDROCHLORIDE 10 MG: 5 TABLET, FILM COATED ORAL at 07:02

## 2019-02-19 RX ADMIN — PANTOPRAZOLE SODIUM 40 MG: 40 TABLET, DELAYED RELEASE ORAL at 07:02

## 2019-02-19 NOTE — PLAN OF CARE
02/18/19 1927   Patient Assessment/Suction   Level of Consciousness (AVPU) alert   Respiratory Effort Normal;Unlabored   Expansion/Accessory Muscles/Retractions no use of accessory muscles   PRE-TX-O2   O2 Device (Oxygen Therapy) room air   SpO2 97 %   Pulse 95   Resp 18   Aerosol Therapy   $ Aerosol Therapy Charges PRN treatment not required   Respiratory Treatment Status (SVN) PRN treatment not required   Pt tolerates RA well. No PRN treatment required at this time.

## 2019-02-19 NOTE — PLAN OF CARE
Problem: Physical Therapy Goal  Goal: Physical Therapy Goal  Goals to be met by: 2019     Patient will increase functional independence with mobility by performin. Supine to sit with Modified [place]  2. Sit to stand transfer with Modified [place]  2. Gait  x 150 feet with Modified [place] using Rolling Walker.      Outcome: Ongoing (interventions implemented as appropriate)  PT for bed mobility, transfer training and therex

## 2019-02-19 NOTE — NURSING
Discharge instructions reviewed with pt and daughter, understanding verbalized. PIV dc, catheter intact. Educated pt and daughter on medication changes. Prescriptions given to daughter. All questions answered. Pt transferred off unit via wheelchair by nursing staff. NAD noted.

## 2019-02-19 NOTE — PLAN OF CARE
02/19/19 1224   Final Note   Assessment Type Final Discharge Note   Anticipated Discharge Disposition Home-Health   Hospital Follow Up  Appt(s) scheduled? Yes

## 2019-02-19 NOTE — PT/OT/SLP PROGRESS
Physical Therapy Treatment    Patient Name:  Nina Portillo   MRN:  [id number]    Recommendations:     Discharge Recommendations:  other (see comments)(home vs HHPT)       Assessment:     Nina Portillo is a 78 y.o. female admitted with a medical diagnosis of Severe sepsis.  She presents with the following impairments/functional limitations:  weakness, impaired endurance, impaired self care skills, impaired functional mobilty, gait instability, impaired balance, decreased safety awareness, edema .    Rehab Prognosis: Fair; patient would benefit from acute skilled PT services to address these deficits and reach maximum level of function.    Recent Surgery: * No surgery found *      Plan:     During this hospitalization, patient to be seen daily to address the identified rehab impairments via gait training, therapeutic activities, therapeutic exercises, therapeutic groups, neuromuscular re-education, wheelchair management/training and progress toward the following goals:    · Plan of Care Expires:  03/02/19    Subjective     Chief Complaint: fatigue, did not get much sleep. Wanting to get BTB  Patient/Family Comments/goals: agreeable to PT. Expressing she wants to go home.  Pain/Comfort:  · Pain Rating 1: 0/10      Objective:     Communicated with nurses Soraya and Rehana prior to session.  Patient found seated in chair peripheral IV, telemetry  upon PT entry to room.     General Precautions: Standard, fall   Orthopedic Precautions:N/A   Braces: N/A     Functional Mobility:  · Bed Mobility:     · Sit to Supine: minimum assistance    · Transfers:     · Sit to Stand:  contact guard assistance with rolling walker  · Chair to Bed: contact guard assistance with  rolling walker  using  Stand Pivot     · Gait: 8' from chair to bed with CGA using RW        Therapeutic Activities and Exercises:   seated therex: AP, LAQ, marching, hip abd/add x 10 reps each     pt then assisted BTB with CGA-min A    Patient left supine with all  lines intact, call button in reach, bed alarm on and nurses Soraya and Rehana notified..    GOALS:   Multidisciplinary Problems     Physical Therapy Goals        Problem: Physical Therapy Goal    Goal Priority Disciplines Outcome Goal Variances Interventions   Physical Therapy Goal     PT, PT/OT Ongoing (interventions implemented as appropriate)     Description:  Goals to be met by: 2019     Patient will increase functional independence with mobility by performin. Supine to sit with Modified [place]  2. Sit to stand transfer with Modified [place]  2. Gait  x 150 feet with Modified P[place] using Rolling Walker.                       Time Tracking:     PT Received On: 19  PT Start Time: 0940     PT Stop Time: 1002  PT Total Time (min): 22 min     Billable Minutes: Therapeutic Activity 10 and Therapeutic Exercise 10    Treatment Type: Treatment  PT/PTA: PTA     PTA Visit Number: 2     Porsche Vega, VALERIY  2019

## 2019-02-19 NOTE — PLAN OF CARE
[facility] sent patient information to [facility] for resumption of home health services through [facility].LUIZ Davenport    · 2/19/2019 11:42:39 AM Accepted: thank you  [email or web address]

## 2019-02-19 NOTE — NURSING
Notified pt's grandaughter, Chloe, of pt's discharge today. She stated she will notify her mother, Soledad, and they will be by later today to pick pt up.

## 2019-02-19 NOTE — DISCHARGE SUMMARY
[facility] Hospital Medicine  Discharge Summary      Patient Name: Nina Portillo  MRN: [id number]  Admission Date: 2/15/2019  Hospital Length of Stay: 4 days  Discharge Date and Time:  02/19/2019 12:50 PM  Attending Physician: Li att. providers found   Discharging Provider: Richelle Varner MD  Primary Care Provider: Rush Rosa MD      HPI:   Ms. Portillo is an 79yo F with a PMH of DM2, Renal cell carinoma with partial nephrectomy, HTN, Hypothyroid, and Dementia. She presents to the ED with c/o Fatigue. Her daughter reported that it started today. Associated symptoms include SOB, dry cough, and decreased appetite. While in the ED, she was found to have severe sepsis due to a UTI. She was given a full sepsis bolus and Iv Rocephin. Blood and urine cultures were collected. She currently denies pain or discomforts.           * No surgery found *      Hospital Course:   The patient is an 78-year-old woman who was admitted to the hospital for treatment of pyelonephritis secondary to E coli and associated with bacteremia.    She was treated with IV antibiotics and IV fluids.  The patient was lethargic in confused on admission but her symptoms have resolved and she is back to baseline.    She is afebrile and her white count has normalized.  She will be discharged home on amoxicillin for a total of 2 weeks.    Blood pressure has been elevated in the hospital therefore I increased his Norvasc from 5 to 10 mg.    The patient has a history of diabetes and was taking metformin which I discontinued.  Her hemoglobin A1c is 5.7.  She should only be treated with diet.    She will receive home health services.     Consults:   Consults (From admission, onward)        Status Ordering Provider     IP consult to case management  Once     Provider:  (Not yet assigned)    Completed MAURICIO DILL new Assessment & Plan notes have been filed under this hospital service since the last note was  generated.  Service: Hospital Medicine    Final Active Diagnoses:    Diagnosis Date Noted POA    PRINCIPAL PROBLEM:  Severe sepsis [A41.9, R65.20] 02/15/2019 Yes    Hypertension associated with diabetes [E11.59, I10] 05/31/2012 Yes    Moderate malnutrition [E44.0] 02/18/2019 Yes    Controlled type 2 diabetes mellitus with diabetic neuropathy, without long-term current use of insulin [E11.40] 02/16/2019 Yes    Alzheimer's disease [G30.9, F02.80] 03/24/2014 Yes    Hypothyroid [E03.9] 05/31/2012 Yes      Problems Resolved During this Admission:       Discharged Condition: stable    Disposition: Home-Health Care [facility]    Follow Up:  Follow-up Information     Rush Rosa MD In 1 week.    Specialties:  Family Medicine, Internal Medicine  Contact information:  [address]  [facility] [id number] [phone]                 Patient Instructions:      Referral to Home health   Referral Priority: Routine Referral Type: Home Health   Referral Reason: Specialty Services Required   Requested Specialty: Home Health Services   Number of Visits Requested: 1     Diet diabetic     Activity as tolerated       Significant Diagnostic Studies: Labs:   BMP:   Recent Labs   Lab 02/18/19  0443 02/19/19  0457   * 115*   * 135*   K 3.7 3.6    104   CO2 22* 22*   BUN 9 10   CREATININE 0.7 0.7   CALCIUM 9.6 9.5   MG 1.7 2.1   , CBC   Recent Labs   Lab 02/18/19  0443 02/19/19  0457   WBC 9.20 7.70   HGB 11.5* 12.0   HCT 34.5* 36.3*   * 153    and A1C:   Recent Labs   Lab 01/09/19  1021 02/16/19  0003   HGBA1C 6.0* 5.7*       Pending Diagnostic Studies:     None         Medications:  Reconciled Home Medications:      Medication List      START taking these medications    acetaminophen 325 MG tablet  Commonly known as:  TYLENOL  Take 2 tablets (650 mg total) by mouth every 4 (four) hours as needed.     amoxicillin 875 MG tablet  Commonly known as:  AMOXIL  Take 1 tablet (875 mg total) by mouth 2 (two) times daily.  for 10 days        CHANGE how you take these medications    amLODIPine 5 MG tablet  Commonly known as:  NORVASC  Take 2 tablets (10 mg total) by mouth once daily.  What changed:  how much to take        CONTINUE taking these medications    aspirin 325 MG tablet  Take 325 mg by mouth once daily.     CRANBERRY EXTRACT ORAL  Take 1 capsule by mouth once daily at 6am.     donepezil 10 MG tablet  Commonly known as:  ARICEPT  Take 1 tablet (10 mg total) by mouth once daily.     enalapril 5 MG tablet  Commonly known as:  VASOTEC  Take 1 tablet (5 mg total) by mouth once daily.     * levothyroxine 88 MCG tablet  Commonly known as:  SYNTHROID  1 tab po qod even days with 100 mcg. po Tab odd days     * levothyroxine 100 MCG tablet  Commonly known as:  SYNTHROID  1 tab po qod odd days with 88 mcg. Po on even days.     mirtazapine 15 MG tablet  Commonly known as:  REMERON  Take 1 tablet (15 mg total) by mouth every evening.     MULTI VITAMIN ORAL  Take 1 tablet by mouth once daily.     pantoprazole 40 MG tablet  Commonly known as:  PROTONIX  Take 1 tablet (40 mg total) by mouth once daily.         * This list has 2 medication(s) that are the same as other medications prescribed for you. Read the directions carefully, and ask your doctor or other care provider to review them with you.            STOP taking these medications    metFORMIN 500 MG 24 hr tablet  Commonly known as:  GLUCOPHAGE-XR            Indwelling Lines/Drains at time of discharge:   Lines/Drains/Airways          None          Time spent on the discharge of patient: 30 minutes  Patient was seen and examined on the date of discharge and determined to be suitable for discharge.         Richelle Varner MD  Department of Hospital Medicine  [facility]

## 2019-02-19 NOTE — PLAN OF CARE
Scheduled hospital f/u with PCP office; placed on AVS.       02/19/19 1049   Discharge Assessment   Assessment Type Discharge Planning Reassessment

## 2019-02-19 NOTE — HOSPITAL COURSE
The patient is an 78-year-old woman who was admitted to the hospital for treatment of pyelonephritis secondary to E coli and associated with bacteremia.    She was treated with IV antibiotics and IV fluids.  The patient was lethargic in confused on admission but her symptoms have resolved and she is back to baseline.    She is afebrile and her white count has normalized.  She will be discharged home on amoxicillin for a total of 2 weeks.    Blood pressure has been elevated in the hospital therefore I increased his Norvasc from 5 to 10 mg.    The patient has a history of diabetes and was taking metformin which I discontinued.  Her hemoglobin A1c is 5.7.  She should only be treated with diet.    She will receive home health services.

## 2019-02-20 ENCOUNTER — TELEPHONE (OUTPATIENT)
Dept: MEDSURG UNIT | Facility: HOSPITAL | Age: 79
End: 2019-02-20

## 2019-02-22 DIAGNOSIS — A41.51 SEPSIS DUE TO ESCHERICHIA COLI: Primary | ICD-10-CM

## 2019-02-22 RX ORDER — SULFAMETHOXAZOLE AND TRIMETHOPRIM 800; 160 MG/1; MG/1
1 TABLET ORAL 2 TIMES DAILY
Qty: 20 TABLET | Refills: 0 | Status: SHIPPED | OUTPATIENT
Start: 2019-02-22 | End: 2019-02-27

## 2019-02-23 LAB
BACTERIA BLD CULT: NORMAL
BACTERIA BLD CULT: NORMAL

## 2019-02-26 NOTE — PHYSICIAN QUERY
PT Name: Nina Portillo  MR #: [id number]     PHYSICIAN QUERY -  ACUITY OF CONDITION CLARIFICATION      CDS/: Batsheva Galicia RN                Contact information:[email or web address]  This form is a permanent document in the medical record.     Query Date: February 26, 2019    By submitting this query, we are merely seeking further clarification of documentation to reflect the severity of illness of your patient. Please utilize your independent clinical judgment when addressing the question(s) below.    The Medical record reflects the following:     Indicators   Supporting Clinical Findings Location in Medical Record   x Documentation of condition The patient is an 78-year-old woman who was admitted to the hospital for treatment of pyelonephritis secondary to E coli and associated with bacteremia  She was treated with IV antibiotics and IV fluids.  The patient was lethargic in confused on admission but her symptoms have resolved and she is back to baseline.  She is afebrile and her white count has normalized.  She will be discharged home on amoxicillin for a total of 2 weeks. DC summary 2/19   x Lab Value(s) WBC 18.1>>7.7  Lactate 2.3-2.4  Blood culture  E. coli LAB 2/15-2/19    Radiology Findings     x Treatment                                 Medication Rocephin 2 gm IVPB once ED  Rocephin 1 gm IVPB daily MAR 2/15-2/19    Other       Provider, please specify the acuity/chronicity of __pyelonephritis__:    [  x ] Acute   [   ] Chronic   [   ] Acute and/on chronic   [   ] Ruled Out   [   ]  Clinically Undetermined       Please document in your progress notes daily for the duration of treatment until resolved, and include in your discharge summary.

## 2019-02-27 ENCOUNTER — DOCUMENTATION ONLY (OUTPATIENT)
Dept: FAMILY MEDICINE | Facility: CLINIC | Age: 79
End: 2019-02-27

## 2019-02-27 ENCOUNTER — OFFICE VISIT (OUTPATIENT)
Dept: FAMILY MEDICINE | Facility: CLINIC | Age: 79
End: 2019-02-27
Payer: MEDICARE

## 2019-02-27 VITALS
OXYGEN SATURATION: 99 % | DIASTOLIC BLOOD PRESSURE: 72 MMHG | BODY MASS INDEX: 19.49 KG/M2 | HEART RATE: 94 BPM | TEMPERATURE: 98 F | HEIGHT: 66 IN | WEIGHT: 121.25 LBS | SYSTOLIC BLOOD PRESSURE: 124 MMHG

## 2019-02-27 DIAGNOSIS — A41.9 SEVERE SEPSIS: Primary | ICD-10-CM

## 2019-02-27 DIAGNOSIS — E44.0 MODERATE MALNUTRITION: ICD-10-CM

## 2019-02-27 DIAGNOSIS — E11.9 DIABETIC EYE EXAM: ICD-10-CM

## 2019-02-27 DIAGNOSIS — N39.0 URINARY TRACT INFECTION WITHOUT HEMATURIA, SITE UNSPECIFIED: ICD-10-CM

## 2019-02-27 DIAGNOSIS — K21.9 GASTROESOPHAGEAL REFLUX DISEASE, ESOPHAGITIS PRESENCE NOT SPECIFIED: ICD-10-CM

## 2019-02-27 DIAGNOSIS — R65.20 SEVERE SEPSIS: Primary | ICD-10-CM

## 2019-02-27 DIAGNOSIS — Z01.00 DIABETIC EYE EXAM: ICD-10-CM

## 2019-02-27 DIAGNOSIS — R26.9 GAIT DISTURBANCE: ICD-10-CM

## 2019-02-27 DIAGNOSIS — E11.42 CONTROLLED TYPE 2 DIABETES MELLITUS WITH DIABETIC POLYNEUROPATHY, WITHOUT LONG-TERM CURRENT USE OF INSULIN: ICD-10-CM

## 2019-02-27 DIAGNOSIS — Z78.0 POSTMENOPAUSAL: ICD-10-CM

## 2019-02-27 PROCEDURE — 99495 TRANSJ CARE MGMT MOD F2F 14D: CPT | Mod: S$GLB,,, | Performed by: NURSE PRACTITIONER

## 2019-02-27 PROCEDURE — 99495 TCM SERVICES (MODERATE COMPLEXITY): ICD-10-PCS | Mod: S$GLB,,, | Performed by: NURSE PRACTITIONER

## 2019-02-27 PROCEDURE — 99499 UNLISTED E&M SERVICE: CPT | Mod: S$GLB,,, | Performed by: NURSE PRACTITIONER

## 2019-02-27 PROCEDURE — 99499 RISK ADDL DX/OHS AUDIT: ICD-10-PCS | Mod: S$GLB,,, | Performed by: NURSE PRACTITIONER

## 2019-02-27 RX ORDER — PANTOPRAZOLE SODIUM 40 MG/1
40 TABLET, DELAYED RELEASE ORAL DAILY
Qty: 90 TABLET | Refills: 3 | Status: SHIPPED | OUTPATIENT
Start: 2019-02-27 | End: 2019-03-25 | Stop reason: SDUPTHER

## 2019-02-27 RX ORDER — METFORMIN HYDROCHLORIDE 500 MG/1
500 TABLET, EXTENDED RELEASE ORAL
Qty: 90 TABLET | Refills: 3 | Status: SHIPPED | OUTPATIENT
Start: 2019-02-27 | End: 2019-03-25 | Stop reason: SDUPTHER

## 2019-02-27 NOTE — PROGRESS NOTES
Subjective:       Patient ID: Nina Portillo is a 78 y.o. female.    Chief Complaint: Blood Infection (admitted 2/15/19)  Transitional Care Note    Family and/or Caretaker present at visit?  Yes, daughter, Soledad  Diagnostic tests reviewed/disposition: I have reviewed all completed as well as pending diagnostic tests at the time of discharge.  Disease/illness education: sepsis  Home health/community services discussion/referrals: Patient has home health established at [facility].   Establishment or re-establishment of referral orders for community resources: No other necessary community resources.   Discussion with other health care providers: No discussion with other health care providers necessary.   She is having PT from home health but is very decompensated and cannot walk more than 20 feet without being completely exhausted and feels she has to lay down. Family cannot take her out of the house without a lot of help because she is unable to walk more than a very short distance and even bringing her to the physician's office takes a lot of help from her son in law as she is so weak. She becomes short of breath trying to walk around her home and has poor upper body muscle mass at this time. She continues to be treated for PAD and HTN, has leg edema intermittently (when legs are dangling for more than an hour). Her appetite has not been good, she is mostly eating soups and chips. She continues to take remeron nightly, but has lost weight. Daughter is supplementing her diet with milk shakes.   HPI  Review of Systems   Constitutional: Positive for fatigue. Negative for fever.   Gastrointestinal: Negative for diarrhea, nausea and vomiting.   Genitourinary: Negative for dysuria and hematuria.   Musculoskeletal: Positive for gait problem.   Neurological: Positive for weakness. Negative for dizziness and headaches.       Objective:       CMP  Sodium   Date Value Ref Range Status   02/19/2019 135 (L) 136 - 145  mmol/L Final     Potassium   Date Value Ref Range Status   02/19/2019 3.6 3.5 - 5.1 mmol/L Final     Chloride   Date Value Ref Range Status   02/19/2019 104 95 - 110 mmol/L Final     CO2   Date Value Ref Range Status   02/19/2019 22 (L) 23 - 29 mmol/L Final     Glucose   Date Value Ref Range Status   02/19/2019 115 (H) 70 - 110 mg/dL Final     BUN, Bld   Date Value Ref Range Status   02/19/2019 10 8 - 23 mg/dL Final     Creatinine   Date Value Ref Range Status   02/19/2019 0.7 0.5 - 1.4 mg/dL Final     Calcium   Date Value Ref Range Status   02/19/2019 9.5 8.7 - 10.5 mg/dL Final     Total Protein   Date Value Ref Range Status   02/16/2019 5.6 (L) 6.0 - 8.4 g/dL Final     Albumin   Date Value Ref Range Status   02/16/2019 2.4 (L) 3.5 - 5.2 g/dL Final     Total Bilirubin   Date Value Ref Range Status   02/16/2019 0.7 0.1 - 1.0 mg/dL Final     Comment:     For infants and newborns, interpretation of results should be based  on gestational age, weight and in agreement with clinical  observations.  Premature Infant recommended reference ranges:  Up to 24 hours.............<8.0 mg/dL  Up to 48 hours............<12.0 mg/dL  3-5 days..................<15.0 mg/dL  6-29 days.................<15.0 mg/dL       Alkaline Phosphatase   Date Value Ref Range Status   02/16/2019 79 55 - 135 U/L Final     AST   Date Value Ref Range Status   02/16/2019 13 10 - 40 U/L Final     ALT   Date Value Ref Range Status   02/16/2019 9 (L) 10 - 44 U/L Final     Anion Gap   Date Value Ref Range Status   02/19/2019 9 8 - 16 mmol/L Final     eGFR if    Date Value Ref Range Status   02/19/2019 >60 >60 mL/min/1.73 m^2 Final     eGFR if non    Date Value Ref Range Status   02/19/2019 >60 >60 mL/min/1.73 m^2 Final     Comment:     Calculation used to obtain the estimated glomerular filtration  rate (eGFR) is the CKD-EPI equation.        Lab Results   Component Value Date    WBC 7.70 02/19/2019    HGB 12.0 02/19/2019     HCT 36.3 (L) 02/19/2019    MCV 95 02/19/2019     02/19/2019     Final blood cultures are negative.   Physical Exam   Constitutional: She is oriented to person, place, and time. She appears well-developed and well-nourished. No distress.   HENT:   Head: Normocephalic and atraumatic.   Eyes: Conjunctivae and EOM are normal. Pupils are equal, round, and reactive to light. Right eye exhibits no discharge. Left eye exhibits no discharge. No scleral icterus.   Neck: Normal range of motion. Neck supple.   Cardiovascular: Normal rate, regular rhythm, normal heart sounds and intact distal pulses. Exam reveals no gallop and no friction rub.   No murmur heard.  Pulses:       Dorsalis pedis pulses are 1+ on the right side, and 1+ on the left side.        Posterior tibial pulses are 1+ on the right side, and 1+ on the left side.   Pulmonary/Chest: Effort normal and breath sounds normal. No stridor. No respiratory distress. She has no wheezes. She has no rales. She exhibits no tenderness.   Abdominal: Soft. Bowel sounds are normal. She exhibits no distension and no mass. There is no tenderness. There is no rebound and no guarding.   Musculoskeletal: She exhibits no edema or tenderness.        Right foot: There is normal range of motion and no deformity.        Left foot: There is normal range of motion and no deformity.   No spinal or bilateral CVA tenderness.    Feet:   Right Foot:   Protective Sensation: 10 sites tested. 8 sites sensed.   Skin Integrity: Negative for ulcer, blister, skin breakdown, erythema, warmth, callus or dry skin.   Left Foot:   Protective Sensation: 10 sites tested. 7 sites sensed.   Skin Integrity: Negative for ulcer, blister, skin breakdown, erythema, warmth, callus or dry skin.   Neurological: She is alert and oriented to person, place, and time.   Skin: Skin is warm and dry. Capillary refill takes less than 2 seconds. No rash noted. She is not diaphoretic. No erythema. No pallor.   Psychiatric:  She has a normal mood and affect. Her behavior is normal.   Nursing note and vitals reviewed.      Assessment:       1. Severe sepsis    2. Urinary tract infection without hematuria, site unspecified    3. Gait disturbance    4. Controlled type 2 diabetes mellitus with diabetic polyneuropathy, without long-term current use of insulin    5. Gastroesophageal reflux disease, esophagitis presence not specified    6. Moderate malnutrition    7. Postmenopausal    8. Diabetic eye exam        Plan:       Severe sepsis    Urinary tract infection without hematuria, site unspecified    Gait disturbance  -     WHEELCHAIR FOR HOME USE    Controlled type 2 diabetes mellitus with diabetic polyneuropathy, without long-term current use of insulin  -     metFORMIN (GLUCOPHAGE-XR) 500 MG 24 hr tablet; Take 1 tablet (500 mg total) by mouth after dinner.  Dispense: 90 tablet; Refill: 3    Gastroesophageal reflux disease, esophagitis presence not specified  -     pantoprazole (PROTONIX) 40 MG tablet; Take 1 tablet (40 mg total) by mouth once daily.  Dispense: 90 tablet; Refill: 3    Moderate malnutrition    Postmenopausal  -     DXA Bone Density Spine And Hip; Future; Expected date: 02/27/2019    Diabetic eye exam  -     Ambulatory Referral to Ophthalmology         Sepsis is resolving, continue antibiotics until they are all gone. Will recheck u/a once antibiotics are completed. Continue to give milkshake supplements and will monitor weight and labs.   Urine culture 1 week after she finishes antibiotics.

## 2019-02-27 NOTE — PROGRESS NOTES
Health Maintenance Due   Topic Date Due    Eye Exam  10/16/1950    Zoster Vaccine  10/16/1998    DEXA SCAN  06/07/2015    Foot Exam  03/28/2019

## 2019-02-27 NOTE — PATIENT INSTRUCTIONS
Sepsis is resolving, continue antibiotics until they are all gone. Will recheck u/a once antibiotics are completed. Continue to give milkshake supplements and will monitor weight and labs.   Urine culture 1 week after she finishes antibiotics.

## 2019-03-01 DIAGNOSIS — E03.9 ACQUIRED HYPOTHYROIDISM: ICD-10-CM

## 2019-03-01 RX ORDER — LEVOTHYROXINE SODIUM 88 UG/1
TABLET ORAL
Qty: 45 TABLET | Refills: 3 | Status: SHIPPED | OUTPATIENT
Start: 2019-03-01 | End: 2020-01-13

## 2019-03-08 RX ORDER — AMLODIPINE BESYLATE 5 MG/1
10 TABLET ORAL DAILY
Qty: 180 TABLET | Refills: 2 | Status: SHIPPED | OUTPATIENT
Start: 2019-03-08 | End: 2019-03-11 | Stop reason: SDUPTHER

## 2019-03-08 NOTE — TELEPHONE ENCOUNTER
Please re send Amlodipine to [facility] the rx was printed by mistake last encounter. Thanks

## 2019-03-11 DIAGNOSIS — I10 ESSENTIAL HYPERTENSION: Primary | ICD-10-CM

## 2019-03-11 RX ORDER — AMLODIPINE BESYLATE 5 MG/1
10 TABLET ORAL DAILY
Qty: 180 TABLET | Refills: 2 | Status: SHIPPED | OUTPATIENT
Start: 2019-03-11 | End: 2020-02-12 | Stop reason: CLARIF

## 2019-03-13 ENCOUNTER — TELEPHONE (OUTPATIENT)
Dept: ADMINISTRATIVE | Facility: CLINIC | Age: 79
End: 2019-03-13

## 2019-03-13 NOTE — TELEPHONE ENCOUNTER
Home Health SOC 01/21/2019 - 03/21/2019 with [facility] - Dr. Brannon Cordova. Patient received PT and OT services.

## 2019-03-25 DIAGNOSIS — K21.9 GASTROESOPHAGEAL REFLUX DISEASE, ESOPHAGITIS PRESENCE NOT SPECIFIED: ICD-10-CM

## 2019-03-25 DIAGNOSIS — E11.42 CONTROLLED TYPE 2 DIABETES MELLITUS WITH DIABETIC POLYNEUROPATHY, WITHOUT LONG-TERM CURRENT USE OF INSULIN: ICD-10-CM

## 2019-03-25 RX ORDER — PANTOPRAZOLE SODIUM 40 MG/1
40 TABLET, DELAYED RELEASE ORAL DAILY
Qty: 90 TABLET | Refills: 3 | Status: SHIPPED | OUTPATIENT
Start: 2019-03-25 | End: 2020-04-09 | Stop reason: SDUPTHER

## 2019-03-25 RX ORDER — METFORMIN HYDROCHLORIDE 500 MG/1
500 TABLET, EXTENDED RELEASE ORAL
Qty: 90 TABLET | Refills: 3 | Status: SHIPPED | OUTPATIENT
Start: 2019-03-25 | End: 2019-05-24 | Stop reason: SDUPTHER

## 2019-04-02 DIAGNOSIS — E03.9 ACQUIRED HYPOTHYROIDISM: ICD-10-CM

## 2019-04-02 RX ORDER — LEVOTHYROXINE SODIUM 100 UG/1
TABLET ORAL
Qty: 45 TABLET | Refills: 0 | Status: SHIPPED | OUTPATIENT
Start: 2019-04-02 | End: 2019-07-01 | Stop reason: SDUPTHER

## 2019-04-03 ENCOUNTER — DOCUMENTATION ONLY (OUTPATIENT)
Dept: FAMILY MEDICINE | Facility: CLINIC | Age: 79
End: 2019-04-03

## 2019-04-03 ENCOUNTER — OFFICE VISIT (OUTPATIENT)
Dept: FAMILY MEDICINE | Facility: CLINIC | Age: 79
End: 2019-04-03
Payer: MEDICARE

## 2019-04-03 VITALS
WEIGHT: 124.56 LBS | HEART RATE: 100 BPM | TEMPERATURE: 99 F | HEIGHT: 66 IN | SYSTOLIC BLOOD PRESSURE: 118 MMHG | DIASTOLIC BLOOD PRESSURE: 74 MMHG | OXYGEN SATURATION: 97 % | BODY MASS INDEX: 20.02 KG/M2

## 2019-04-03 DIAGNOSIS — N39.0 URINARY TRACT INFECTION WITHOUT HEMATURIA, SITE UNSPECIFIED: Primary | ICD-10-CM

## 2019-04-03 PROCEDURE — 81002 POCT URINE DIPSTICK WITHOUT MICROSCOPE: ICD-10-PCS | Mod: S$GLB,,, | Performed by: NURSE PRACTITIONER

## 2019-04-03 PROCEDURE — 99213 OFFICE O/P EST LOW 20 MIN: CPT | Mod: 25,S$GLB,, | Performed by: NURSE PRACTITIONER

## 2019-04-03 PROCEDURE — 87086 URINE CULTURE/COLONY COUNT: CPT

## 2019-04-03 PROCEDURE — 81002 URINALYSIS NONAUTO W/O SCOPE: CPT | Mod: S$GLB,,, | Performed by: NURSE PRACTITIONER

## 2019-04-03 PROCEDURE — 99213 PR OFFICE/OUTPT VISIT, EST, LEVL III, 20-29 MIN: ICD-10-PCS | Mod: 25,S$GLB,, | Performed by: NURSE PRACTITIONER

## 2019-04-03 RX ORDER — SULFAMETHOXAZOLE AND TRIMETHOPRIM 800; 160 MG/1; MG/1
1 TABLET ORAL 2 TIMES DAILY
Qty: 20 TABLET | Refills: 0 | Status: SHIPPED | OUTPATIENT
Start: 2019-04-03 | End: 2019-04-13

## 2019-04-03 NOTE — PROGRESS NOTES
Health Maintenance Due   Topic Date Due    Eye Exam  10/16/1950    Zoster Vaccine  10/16/1998    DEXA SCAN  06/07/2015    Urine Microalbumin  02/01/2019

## 2019-04-03 NOTE — PROGRESS NOTES
Subjective:       Patient ID: Nina Portillo is a 78 y.o. female.    Chief Complaint: Urinary Tract Infection    Urinary Tract Infection    This is a recurrent problem. The current episode started in the past 7 days. There has been no fever. Associated symptoms include frequency. Pertinent negatives include no chills, nausea, sweats or vomiting. She has tried nothing for the symptoms. hospitalized for sepsis 6 weeks ago      Review of Systems   Constitutional: Negative for chills and fever.   Gastrointestinal: Negative for abdominal pain, nausea and vomiting.   Genitourinary: Positive for frequency. Negative for dysuria.   Psychiatric/Behavioral: Positive for confusion. Negative for agitation.       Objective:    UA >+ for 2+ leukocytes, trace protein, + glucose, sp. Gravity 1.025 with kai/cloudy color, everything else negative  Physical Exam   Constitutional: She appears well-developed and well-nourished. No distress.   HENT:   Head: Normocephalic and atraumatic.   Eyes: Conjunctivae are normal. Right eye exhibits no discharge. Left eye exhibits no discharge. No scleral icterus.   Cardiovascular: Normal rate, regular rhythm and normal heart sounds. Exam reveals no gallop and no friction rub.   No murmur heard.  Pulmonary/Chest: Effort normal and breath sounds normal. No stridor. No respiratory distress. She has no wheezes. She has no rales. She exhibits no tenderness.   Abdominal: Soft. She exhibits no distension. There is no tenderness.   Musculoskeletal: She exhibits no edema.   Percuss spine and bilateral CVA without tenderness   Neurological: She is alert.   patient states it is 1970 [facility] is president and she does not know what day it is.    Skin: Skin is warm and dry. No rash noted. She is not diaphoretic. No pallor.   Psychiatric: She has a normal mood and affect. Her behavior is normal.   Nursing note and vitals reviewed.      Assessment:       1. Urinary tract infection without hematuria, site  unspecified        Plan:       Urinary tract infection without hematuria, site unspecified  -     POCT urine dipstick without microscope  -     Urine culture  -     sulfamethoxazole-trimethoprim 800-160mg (BACTRIM DS) 800-160 mg Tab; Take 1 tablet by mouth 2 (two) times daily. for 10 days  Dispense: 20 tablet; Refill: 0    drink plenty of fluids with medications, return to clinic in one week if not better.

## 2019-04-04 LAB
BILIRUB SERPL-MCNC: NORMAL MG/DL
BLOOD URINE, POC: NORMAL
COLOR, POC UA: NORMAL
GLUCOSE UR QL STRIP: 50
KETONES UR QL STRIP: NORMAL
LEUKOCYTE ESTERASE URINE, POC: NORMAL
NITRITE, POC UA: NORMAL
PH, POC UA: 5
PROTEIN, POC: NORMAL
SPECIFIC GRAVITY, POC UA: 1.02
UROBILINOGEN, POC UA: NORMAL

## 2019-04-05 LAB
BACTERIA UR CULT: NORMAL
BACTERIA UR CULT: NORMAL

## 2019-05-24 ENCOUNTER — OFFICE VISIT (OUTPATIENT)
Dept: FAMILY MEDICINE | Facility: CLINIC | Age: 79
End: 2019-05-24
Payer: MEDICARE

## 2019-05-24 ENCOUNTER — DOCUMENTATION ONLY (OUTPATIENT)
Dept: FAMILY MEDICINE | Facility: CLINIC | Age: 79
End: 2019-05-24

## 2019-05-24 ENCOUNTER — APPOINTMENT (OUTPATIENT)
Dept: LAB | Facility: HOSPITAL | Age: 79
End: 2019-05-24
Attending: NURSE PRACTITIONER
Payer: MEDICARE

## 2019-05-24 VITALS
HEART RATE: 88 BPM | SYSTOLIC BLOOD PRESSURE: 120 MMHG | RESPIRATION RATE: 16 BRPM | WEIGHT: 129.88 LBS | TEMPERATURE: 98 F | DIASTOLIC BLOOD PRESSURE: 80 MMHG | HEIGHT: 66 IN | BODY MASS INDEX: 20.87 KG/M2 | OXYGEN SATURATION: 98 %

## 2019-05-24 DIAGNOSIS — R60.0 BILATERAL LEG EDEMA: ICD-10-CM

## 2019-05-24 DIAGNOSIS — R41.82 ALTERED MENTAL STATUS, UNSPECIFIED ALTERED MENTAL STATUS TYPE: Primary | ICD-10-CM

## 2019-05-24 DIAGNOSIS — N30.01 ACUTE CYSTITIS WITH HEMATURIA: ICD-10-CM

## 2019-05-24 DIAGNOSIS — E11.42 CONTROLLED TYPE 2 DIABETES MELLITUS WITH DIABETIC POLYNEUROPATHY, WITHOUT LONG-TERM CURRENT USE OF INSULIN: ICD-10-CM

## 2019-05-24 DIAGNOSIS — E11.40 CONTROLLED TYPE 2 DIABETES MELLITUS WITH DIABETIC NEUROPATHY, WITHOUT LONG-TERM CURRENT USE OF INSULIN: ICD-10-CM

## 2019-05-24 LAB
ALBUMIN SERPL BCP-MCNC: 3.5 G/DL (ref 3.5–5.2)
ALP SERPL-CCNC: 106 U/L (ref 55–135)
ALT SERPL W/O P-5'-P-CCNC: 13 U/L (ref 10–44)
ANION GAP SERPL CALC-SCNC: 8 MMOL/L (ref 8–16)
AST SERPL-CCNC: 17 U/L (ref 10–40)
BASOPHILS # BLD AUTO: 0 K/UL (ref 0–0.2)
BASOPHILS NFR BLD: 0.4 % (ref 0–1.9)
BILIRUB SERPL-MCNC: 0.4 MG/DL (ref 0.1–1)
BILIRUB SERPL-MCNC: ABNORMAL MG/DL
BLOOD URINE, POC: 50
BUN SERPL-MCNC: 9 MG/DL (ref 8–23)
CALCIUM SERPL-MCNC: 10.1 MG/DL (ref 8.7–10.5)
CHLORIDE SERPL-SCNC: 103 MMOL/L (ref 95–110)
CO2 SERPL-SCNC: 25 MMOL/L (ref 23–29)
COLOR, POC UA: YELLOW
CREAT SERPL-MCNC: 0.8 MG/DL (ref 0.5–1.4)
DIFFERENTIAL METHOD: ABNORMAL
EOSINOPHIL # BLD AUTO: 0.3 K/UL (ref 0–0.5)
EOSINOPHIL NFR BLD: 3.7 % (ref 0–8)
ERYTHROCYTE [DISTWIDTH] IN BLOOD BY AUTOMATED COUNT: 13.2 % (ref 11.5–14.5)
EST. GFR  (AFRICAN AMERICAN): >60 ML/MIN/1.73 M^2
EST. GFR  (NON AFRICAN AMERICAN): >60 ML/MIN/1.73 M^2
GLUCOSE SERPL-MCNC: 145 MG/DL (ref 70–110)
GLUCOSE UR QL STRIP: 50
HCT VFR BLD AUTO: 39.8 % (ref 37–48.5)
HGB BLD-MCNC: 13.2 G/DL (ref 12–16)
KETONES UR QL STRIP: ABNORMAL
LEUKOCYTE ESTERASE URINE, POC: ABNORMAL
LYMPHOCYTES # BLD AUTO: 1.9 K/UL (ref 1–4.8)
LYMPHOCYTES NFR BLD: 24.5 % (ref 18–48)
MCH RBC QN AUTO: 31.2 PG (ref 27–31)
MCHC RBC AUTO-ENTMCNC: 33.1 G/DL (ref 32–36)
MCV RBC AUTO: 94 FL (ref 82–98)
MONOCYTES # BLD AUTO: 0.7 K/UL (ref 0.3–1)
MONOCYTES NFR BLD: 9.1 % (ref 4–15)
NEUTROPHILS # BLD AUTO: 4.7 K/UL (ref 1.8–7.7)
NEUTROPHILS NFR BLD: 62.3 % (ref 38–73)
NITRITE, POC UA: ABNORMAL
PH, POC UA: 7
PLATELET # BLD AUTO: 338 K/UL (ref 150–350)
PMV BLD AUTO: 7.8 FL (ref 9.2–12.9)
POTASSIUM SERPL-SCNC: 4.3 MMOL/L (ref 3.5–5.1)
PROT SERPL-MCNC: 7.7 G/DL (ref 6–8.4)
PROTEIN, POC: ABNORMAL
RBC # BLD AUTO: 4.22 M/UL (ref 4–5.4)
SODIUM SERPL-SCNC: 136 MMOL/L (ref 136–145)
SPECIFIC GRAVITY, POC UA: 1.01
UROBILINOGEN, POC UA: NORMAL
WBC # BLD AUTO: 7.6 K/UL (ref 3.9–12.7)

## 2019-05-24 PROCEDURE — 80053 COMPREHEN METABOLIC PANEL: CPT

## 2019-05-24 PROCEDURE — 81002 URINALYSIS NONAUTO W/O SCOPE: CPT | Mod: S$GLB,,, | Performed by: NURSE PRACTITIONER

## 2019-05-24 PROCEDURE — 99213 PR OFFICE/OUTPT VISIT, EST, LEVL III, 20-29 MIN: ICD-10-PCS | Mod: 25,S$GLB,, | Performed by: NURSE PRACTITIONER

## 2019-05-24 PROCEDURE — 87086 URINE CULTURE/COLONY COUNT: CPT

## 2019-05-24 PROCEDURE — 81002 POCT URINE DIPSTICK WITHOUT MICROSCOPE: ICD-10-PCS | Mod: S$GLB,,, | Performed by: NURSE PRACTITIONER

## 2019-05-24 PROCEDURE — 82043 UR ALBUMIN QUANTITATIVE: CPT

## 2019-05-24 PROCEDURE — 99213 OFFICE O/P EST LOW 20 MIN: CPT | Mod: 25,S$GLB,, | Performed by: NURSE PRACTITIONER

## 2019-05-24 PROCEDURE — 85025 COMPLETE CBC W/AUTO DIFF WBC: CPT

## 2019-05-24 RX ORDER — METFORMIN HYDROCHLORIDE 500 MG/1
500 TABLET, EXTENDED RELEASE ORAL
Qty: 180 TABLET | Refills: 3 | Status: SHIPPED | OUTPATIENT
Start: 2019-05-24 | End: 2020-01-10 | Stop reason: SDUPTHER

## 2019-05-24 NOTE — PROGRESS NOTES
Health Maintenance Due   Topic Date Due    Eye Exam  10/16/1950    DEXA SCAN  06/07/2015    Urine Microalbumin  02/01/2019

## 2019-05-24 NOTE — PROGRESS NOTES
Subjective:       Patient ID: Nina Portillo is a 78 y.o. female.    Chief Complaint: Altered Mental Status and low appetite  Patient lives with her daughter.  She has been progressively more confused in the past week, she walked out of her bedroom without her pants on yesterday and was unaware of it. She is not eating well, she is primarily eating sweets, taking metformin daily.  She does not check her blood sugar regularly. She was hospitalized earlier this year with sepsis.   HPI  Review of Systems   Constitutional: Positive for activity change. Negative for fatigue and fever.   Respiratory: Negative for shortness of breath.    Cardiovascular: Positive for leg swelling. Negative for chest pain.   Endocrine: Positive for cold intolerance.   Genitourinary: Negative for difficulty urinating, dysuria and hematuria.   Neurological: Negative for dizziness.   Psychiatric/Behavioral: Positive for confusion.       Objective:      Physical Exam   Constitutional: She is oriented to person, place, and time. She appears well-developed and well-nourished. No distress.   HENT:   Head: Normocephalic and atraumatic.   Eyes: Pupils are equal, round, and reactive to light. Conjunctivae and EOM are normal. Right eye exhibits no discharge. Left eye exhibits no discharge. No scleral icterus.   Neck: Normal range of motion. Neck supple.   Cardiovascular: Normal rate, regular rhythm, normal heart sounds and intact distal pulses. Exam reveals no gallop and no friction rub.   No murmur heard.  Pulmonary/Chest: Effort normal and breath sounds normal. No stridor. No respiratory distress. She has no wheezes. She has no rales. She exhibits no tenderness.   Abdominal: Soft. She exhibits no distension and no mass. There is no tenderness. There is no rebound and no guarding.   Hypoactive bowel sounds.    Musculoskeletal: She exhibits edema.   Neurological: She is alert and oriented to person, place, and time. No cranial nerve deficit.   Is  able to state her name and knows where she is, but can't recall the day, month or the correct year she was born.  Hand grasps are equal, smile is symmetric.     Skin: Skin is warm and dry. Capillary refill takes less than 2 seconds. No rash noted. She is not diaphoretic. No erythema. No pallor.   Psychiatric: She has a normal mood and affect. Her behavior is normal.   Nursing note and vitals reviewed.    U/A positive for leukocytes 1+, blood 50 and glucose 50, protein trace  Assessment:       1. Altered mental status, unspecified altered mental status type    2. Bilateral leg edema    3. Acute cystitis with hematuria    4. Controlled type 2 diabetes mellitus with diabetic neuropathy, without long-term current use of insulin    5. Controlled type 2 diabetes mellitus with diabetic polyneuropathy, without long-term current use of insulin        Plan:       Altered mental status, unspecified altered mental status type  -     CBC auto differential; Future; Expected date: 05/24/2019  -     Comprehensive metabolic panel; Future; Expected date: 05/24/2019    Bilateral leg edema  -     CBC auto differential; Future; Expected date: 05/24/2019  -     Comprehensive metabolic panel; Future; Expected date: 05/24/2019    Acute cystitis with hematuria  -     POCT urine dipstick without microscope  -     Urine culture    Controlled type 2 diabetes mellitus with diabetic neuropathy, without long-term current use of insulin  -     Microalbumin/creatinine urine ratio; Future; Expected date: 05/24/2019    Controlled type 2 diabetes mellitus with diabetic polyneuropathy, without long-term current use of insulin  -     metFORMIN (GLUCOPHAGE-XR) 500 MG 24 hr tablet; Take 1 tablet (500 mg total) by mouth after dinner.  Dispense: 180 tablet; Refill: 3         Increase metformin to 1 tab twice a day.

## 2019-05-25 LAB
ALBUMIN/CREAT UR: 17.9 UG/MG (ref 0–30)
CREAT UR-MCNC: 78 MG/DL (ref 15–325)
MICROALBUMIN UR DL<=1MG/L-MCNC: 14 UG/ML

## 2019-05-26 LAB
BACTERIA UR CULT: NORMAL
BACTERIA UR CULT: NORMAL

## 2019-07-01 DIAGNOSIS — E03.9 ACQUIRED HYPOTHYROIDISM: ICD-10-CM

## 2019-07-01 RX ORDER — LEVOTHYROXINE SODIUM 100 UG/1
TABLET ORAL
Qty: 45 TABLET | Refills: 3 | Status: SHIPPED | OUTPATIENT
Start: 2019-07-01 | End: 2020-01-13

## 2019-11-19 ENCOUNTER — HOSPITAL ENCOUNTER (OUTPATIENT)
Facility: HOSPITAL | Age: 79
Discharge: HOME | End: 2019-11-24
Attending: EMERGENCY MEDICINE | Admitting: INTERNAL MEDICINE
Payer: MEDICARE

## 2019-11-19 DIAGNOSIS — I73.9 PAD (PERIPHERAL ARTERY DISEASE): ICD-10-CM

## 2019-11-19 DIAGNOSIS — R53.1 WEAKNESS: ICD-10-CM

## 2019-11-19 DIAGNOSIS — A41.9 SEPSIS: ICD-10-CM

## 2019-11-19 DIAGNOSIS — F33.0 MAJOR DEPRESSIVE DISORDER, RECURRENT EPISODE, MILD: ICD-10-CM

## 2019-11-19 DIAGNOSIS — E11.59 HYPERTENSION ASSOCIATED WITH DIABETES: ICD-10-CM

## 2019-11-19 DIAGNOSIS — G25.0 HEREDITARY ESSENTIAL TREMOR: ICD-10-CM

## 2019-11-19 DIAGNOSIS — A41.9 SEPSIS, DUE TO UNSPECIFIED ORGANISM, UNSPECIFIED WHETHER ACUTE ORGAN DYSFUNCTION PRESENT: ICD-10-CM

## 2019-11-19 DIAGNOSIS — N39.0 URINARY TRACT INFECTION WITHOUT HEMATURIA, SITE UNSPECIFIED: Primary | ICD-10-CM

## 2019-11-19 DIAGNOSIS — N17.9 AKI (ACUTE KIDNEY INJURY): ICD-10-CM

## 2019-11-19 DIAGNOSIS — I15.2 HYPERTENSION ASSOCIATED WITH DIABETES: ICD-10-CM

## 2019-11-19 DIAGNOSIS — R65.20 SEVERE SEPSIS: ICD-10-CM

## 2019-11-19 DIAGNOSIS — Z85.528 HISTORY OF RENAL CELL CARCINOMA: ICD-10-CM

## 2019-11-19 DIAGNOSIS — Z12.39 BREAST CANCER SCREENING: ICD-10-CM

## 2019-11-19 DIAGNOSIS — A41.9 SEVERE SEPSIS: ICD-10-CM

## 2019-11-19 DIAGNOSIS — Z13.820 OSTEOPOROSIS SCREENING: ICD-10-CM

## 2019-11-19 DIAGNOSIS — E44.0 MODERATE MALNUTRITION: ICD-10-CM

## 2019-11-19 DIAGNOSIS — E11.40 CONTROLLED TYPE 2 DIABETES MELLITUS WITH DIABETIC NEUROPATHY, WITHOUT LONG-TERM CURRENT USE OF INSULIN: ICD-10-CM

## 2019-11-19 DIAGNOSIS — E78.5 HYPERLIPIDEMIA, UNSPECIFIED HYPERLIPIDEMIA TYPE: ICD-10-CM

## 2019-11-19 LAB
ALBUMIN SERPL BCP-MCNC: 3.6 G/DL (ref 3.5–5.2)
ALP SERPL-CCNC: 102 U/L (ref 55–135)
ALT SERPL W/O P-5'-P-CCNC: 15 U/L (ref 10–44)
ANION GAP SERPL CALC-SCNC: 10 MMOL/L (ref 8–16)
AST SERPL-CCNC: 19 U/L (ref 10–40)
BACTERIA #/AREA URNS HPF: ABNORMAL /HPF
BASOPHILS # BLD AUTO: 0.03 K/UL (ref 0–0.2)
BASOPHILS NFR BLD: 0.2 % (ref 0–1.9)
BILIRUB SERPL-MCNC: 0.7 MG/DL (ref 0.1–1)
BILIRUB UR QL STRIP: NEGATIVE
BUN SERPL-MCNC: 17 MG/DL (ref 8–23)
CALCIUM SERPL-MCNC: 11.7 MG/DL (ref 8.7–10.5)
CHLORIDE SERPL-SCNC: 99 MMOL/L (ref 95–110)
CLARITY UR: CLEAR
CO2 SERPL-SCNC: 25 MMOL/L (ref 23–29)
COLOR UR: YELLOW
CREAT SERPL-MCNC: 1.1 MG/DL (ref 0.5–1.4)
DIFFERENTIAL METHOD: ABNORMAL
EOSINOPHIL # BLD AUTO: 0 K/UL (ref 0–0.5)
EOSINOPHIL NFR BLD: 0.2 % (ref 0–8)
ERYTHROCYTE [DISTWIDTH] IN BLOOD BY AUTOMATED COUNT: 13 % (ref 11.5–14.5)
EST. GFR  (AFRICAN AMERICAN): 54 ML/MIN/1.73 M^2
EST. GFR  (NON AFRICAN AMERICAN): 47 ML/MIN/1.73 M^2
GLUCOSE SERPL-MCNC: 190 MG/DL (ref 70–110)
GLUCOSE UR QL STRIP: NEGATIVE
HCT VFR BLD AUTO: 42.8 % (ref 37–48.5)
HGB BLD-MCNC: 14.2 G/DL (ref 12–16)
HGB UR QL STRIP: ABNORMAL
HYALINE CASTS #/AREA URNS LPF: 8 /LPF
IMM GRANULOCYTES # BLD AUTO: 0.07 K/UL (ref 0–0.04)
KETONES UR QL STRIP: NEGATIVE
LACTATE SERPL-SCNC: 1.9 MMOL/L (ref 0.5–2.2)
LEUKOCYTE ESTERASE UR QL STRIP: ABNORMAL
LYMPHOCYTES # BLD AUTO: 0.8 K/UL (ref 1–4.8)
LYMPHOCYTES NFR BLD: 4.4 % (ref 18–48)
MCH RBC QN AUTO: 32.5 PG (ref 27–31)
MCHC RBC AUTO-ENTMCNC: 33.2 G/DL (ref 32–36)
MCV RBC AUTO: 98 FL (ref 82–98)
MICROSCOPIC COMMENT: ABNORMAL
MONOCYTES # BLD AUTO: 1 K/UL (ref 0.3–1)
MONOCYTES NFR BLD: 5.6 % (ref 4–15)
NEUTROPHILS # BLD AUTO: 15.9 K/UL (ref 1.8–7.7)
NEUTROPHILS NFR BLD: 89.2 % (ref 38–73)
NITRITE UR QL STRIP: NEGATIVE
NRBC BLD-RTO: 0 /100 WBC
PH UR STRIP: 7 [PH] (ref 5–8)
PLATELET # BLD AUTO: 233 K/UL (ref 150–350)
PMV BLD AUTO: 8.5 FL (ref 9.2–12.9)
POTASSIUM SERPL-SCNC: 4.6 MMOL/L (ref 3.5–5.1)
PROT SERPL-MCNC: 8.1 G/DL (ref 6–8.4)
PROT UR QL STRIP: NEGATIVE
RBC # BLD AUTO: 4.37 M/UL (ref 4–5.4)
RBC #/AREA URNS HPF: 3 /HPF (ref 0–4)
SODIUM SERPL-SCNC: 134 MMOL/L (ref 136–145)
SP GR UR STRIP: 1.02 (ref 1–1.03)
SQUAMOUS #/AREA URNS HPF: 2 /HPF
URN SPEC COLLECT METH UR: ABNORMAL
UROBILINOGEN UR STRIP-ACNC: 1 EU/DL
WBC # BLD AUTO: 17.86 K/UL (ref 3.9–12.7)
WBC #/AREA URNS HPF: 35 /HPF (ref 0–5)

## 2019-11-19 PROCEDURE — 85025 COMPLETE CBC W/AUTO DIFF WBC: CPT

## 2019-11-19 PROCEDURE — 81000 URINALYSIS NONAUTO W/SCOPE: CPT

## 2019-11-19 PROCEDURE — 80053 COMPREHEN METABOLIC PANEL: CPT

## 2019-11-19 PROCEDURE — 63600175 PHARM REV CODE 636 W HCPCS: Performed by: PHYSICIAN ASSISTANT

## 2019-11-19 PROCEDURE — G0378 HOSPITAL OBSERVATION PER HR: HCPCS

## 2019-11-19 PROCEDURE — 93005 ELECTROCARDIOGRAM TRACING: CPT

## 2019-11-19 PROCEDURE — 25000003 PHARM REV CODE 250: Performed by: PHYSICIAN ASSISTANT

## 2019-11-19 PROCEDURE — 87040 BLOOD CULTURE FOR BACTERIA: CPT | Mod: 59

## 2019-11-19 PROCEDURE — 87186 SC STD MICRODIL/AGAR DIL: CPT

## 2019-11-19 PROCEDURE — 96365 THER/PROPH/DIAG IV INF INIT: CPT

## 2019-11-19 PROCEDURE — 83605 ASSAY OF LACTIC ACID: CPT

## 2019-11-19 PROCEDURE — 87077 CULTURE AEROBIC IDENTIFY: CPT

## 2019-11-19 PROCEDURE — 99291 CRITICAL CARE FIRST HOUR: CPT | Mod: 25

## 2019-11-19 PROCEDURE — 87086 URINE CULTURE/COLONY COUNT: CPT

## 2019-11-19 PROCEDURE — 87088 URINE BACTERIA CULTURE: CPT

## 2019-11-19 PROCEDURE — 96361 HYDRATE IV INFUSION ADD-ON: CPT

## 2019-11-19 PROCEDURE — 36415 COLL VENOUS BLD VENIPUNCTURE: CPT

## 2019-11-19 RX ORDER — AMLODIPINE BESYLATE 5 MG/1
10 TABLET ORAL
Status: COMPLETED | OUTPATIENT
Start: 2019-11-19 | End: 2019-11-19

## 2019-11-19 RX ADMIN — CEFTRIAXONE 1 G: 1 INJECTION, SOLUTION INTRAVENOUS at 10:11

## 2019-11-19 RX ADMIN — SODIUM CHLORIDE 1000 ML: 0.9 INJECTION, SOLUTION INTRAVENOUS at 10:11

## 2019-11-19 RX ADMIN — AMLODIPINE BESYLATE 10 MG: 5 TABLET ORAL at 11:11

## 2019-11-20 PROBLEM — A41.9 SEPSIS: Status: ACTIVE | Noted: 2019-11-20

## 2019-11-20 PROBLEM — N17.9 AKI (ACUTE KIDNEY INJURY): Status: ACTIVE | Noted: 2019-11-20

## 2019-11-20 LAB
ANION GAP SERPL CALC-SCNC: 10 MMOL/L (ref 8–16)
BASOPHILS # BLD AUTO: 0.04 K/UL (ref 0–0.2)
BASOPHILS NFR BLD: 0.3 % (ref 0–1.9)
BUN SERPL-MCNC: 15 MG/DL (ref 8–23)
CALCIUM SERPL-MCNC: 10.2 MG/DL (ref 8.7–10.5)
CHLORIDE SERPL-SCNC: 100 MMOL/L (ref 95–110)
CO2 SERPL-SCNC: 23 MMOL/L (ref 23–29)
CREAT SERPL-MCNC: 0.8 MG/DL (ref 0.5–1.4)
DIFFERENTIAL METHOD: ABNORMAL
EOSINOPHIL # BLD AUTO: 0.1 K/UL (ref 0–0.5)
EOSINOPHIL NFR BLD: 0.4 % (ref 0–8)
ERYTHROCYTE [DISTWIDTH] IN BLOOD BY AUTOMATED COUNT: 12.9 % (ref 11.5–14.5)
EST. GFR  (AFRICAN AMERICAN): >60 ML/MIN/1.73 M^2
EST. GFR  (NON AFRICAN AMERICAN): >60 ML/MIN/1.73 M^2
GLUCOSE SERPL-MCNC: 169 MG/DL (ref 70–110)
HCT VFR BLD AUTO: 39.6 % (ref 37–48.5)
HGB BLD-MCNC: 13 G/DL (ref 12–16)
IMM GRANULOCYTES # BLD AUTO: 0.05 K/UL (ref 0–0.04)
LACTATE SERPL-SCNC: 1.1 MMOL/L (ref 0.5–2.2)
LYMPHOCYTES # BLD AUTO: 0.9 K/UL (ref 1–4.8)
LYMPHOCYTES NFR BLD: 6.3 % (ref 18–48)
MAGNESIUM SERPL-MCNC: 1.7 MG/DL (ref 1.6–2.6)
MCH RBC QN AUTO: 31.6 PG (ref 27–31)
MCHC RBC AUTO-ENTMCNC: 32.8 G/DL (ref 32–36)
MCV RBC AUTO: 96 FL (ref 82–98)
MONOCYTES # BLD AUTO: 1.1 K/UL (ref 0.3–1)
MONOCYTES NFR BLD: 7.6 % (ref 4–15)
NEUTROPHILS # BLD AUTO: 11.9 K/UL (ref 1.8–7.7)
NEUTROPHILS NFR BLD: 85 % (ref 38–73)
NRBC BLD-RTO: 0 /100 WBC
PHOSPHATE SERPL-MCNC: 2 MG/DL (ref 2.7–4.5)
PLATELET # BLD AUTO: 207 K/UL (ref 150–350)
PMV BLD AUTO: 8.7 FL (ref 9.2–12.9)
POCT GLUCOSE: 150 MG/DL (ref 70–110)
POCT GLUCOSE: 193 MG/DL (ref 70–110)
POTASSIUM SERPL-SCNC: 3.9 MMOL/L (ref 3.5–5.1)
RBC # BLD AUTO: 4.11 M/UL (ref 4–5.4)
SODIUM SERPL-SCNC: 133 MMOL/L (ref 136–145)
T4 FREE SERPL-MCNC: 0.48 NG/DL (ref 0.71–1.51)
TSH SERPL DL<=0.005 MIU/L-ACNC: 27.68 UIU/ML (ref 0.4–4)
WBC # BLD AUTO: 14.04 K/UL (ref 3.9–12.7)

## 2019-11-20 PROCEDURE — 97802 MEDICAL NUTRITION INDIV IN: CPT | Mod: 59

## 2019-11-20 PROCEDURE — 25000003 PHARM REV CODE 250: Performed by: NURSE PRACTITIONER

## 2019-11-20 PROCEDURE — G8979 MOBILITY GOAL STATUS: HCPCS | Mod: CJ

## 2019-11-20 PROCEDURE — 97165 OT EVAL LOW COMPLEX 30 MIN: CPT

## 2019-11-20 PROCEDURE — 96372 THER/PROPH/DIAG INJ SC/IM: CPT | Mod: 59

## 2019-11-20 PROCEDURE — G8987 SELF CARE CURRENT STATUS: HCPCS | Mod: CK

## 2019-11-20 PROCEDURE — 97116 GAIT TRAINING THERAPY: CPT

## 2019-11-20 PROCEDURE — 97530 THERAPEUTIC ACTIVITIES: CPT

## 2019-11-20 PROCEDURE — G0378 HOSPITAL OBSERVATION PER HR: HCPCS

## 2019-11-20 PROCEDURE — 80048 BASIC METABOLIC PNL TOTAL CA: CPT

## 2019-11-20 PROCEDURE — G8978 MOBILITY CURRENT STATUS: HCPCS | Mod: CK

## 2019-11-20 PROCEDURE — 36415 COLL VENOUS BLD VENIPUNCTURE: CPT

## 2019-11-20 PROCEDURE — 84100 ASSAY OF PHOSPHORUS: CPT

## 2019-11-20 PROCEDURE — 83735 ASSAY OF MAGNESIUM: CPT

## 2019-11-20 PROCEDURE — G8988 SELF CARE GOAL STATUS: HCPCS | Mod: CI

## 2019-11-20 PROCEDURE — 83605 ASSAY OF LACTIC ACID: CPT

## 2019-11-20 PROCEDURE — 96376 TX/PRO/DX INJ SAME DRUG ADON: CPT

## 2019-11-20 PROCEDURE — 84439 ASSAY OF FREE THYROXINE: CPT

## 2019-11-20 PROCEDURE — 63600175 PHARM REV CODE 636 W HCPCS: Performed by: NURSE PRACTITIONER

## 2019-11-20 PROCEDURE — 96361 HYDRATE IV INFUSION ADD-ON: CPT

## 2019-11-20 PROCEDURE — 85025 COMPLETE CBC W/AUTO DIFF WBC: CPT

## 2019-11-20 PROCEDURE — 94761 N-INVAS EAR/PLS OXIMETRY MLT: CPT

## 2019-11-20 PROCEDURE — 97161 PT EVAL LOW COMPLEX 20 MIN: CPT

## 2019-11-20 PROCEDURE — 84443 ASSAY THYROID STIM HORMONE: CPT

## 2019-11-20 RX ORDER — POTASSIUM CHLORIDE 20 MEQ/15ML
40 SOLUTION ORAL
Status: DISCONTINUED | OUTPATIENT
Start: 2019-11-20 | End: 2019-11-24 | Stop reason: HOSPADM

## 2019-11-20 RX ORDER — MIRTAZAPINE 15 MG/1
15 TABLET, FILM COATED ORAL NIGHTLY
Status: DISCONTINUED | OUTPATIENT
Start: 2019-11-20 | End: 2019-11-24 | Stop reason: HOSPADM

## 2019-11-20 RX ORDER — SODIUM,POTASSIUM PHOSPHATES 280-250MG
2 POWDER IN PACKET (EA) ORAL
Status: DISCONTINUED | OUTPATIENT
Start: 2019-11-20 | End: 2019-11-24 | Stop reason: HOSPADM

## 2019-11-20 RX ORDER — PANTOPRAZOLE SODIUM 40 MG/1
40 TABLET, DELAYED RELEASE ORAL DAILY
Status: DISCONTINUED | OUTPATIENT
Start: 2019-11-20 | End: 2019-11-24 | Stop reason: HOSPADM

## 2019-11-20 RX ORDER — SODIUM CHLORIDE 0.9 % (FLUSH) 0.9 %
10 SYRINGE (ML) INJECTION
Status: DISCONTINUED | OUTPATIENT
Start: 2019-11-20 | End: 2019-11-24 | Stop reason: HOSPADM

## 2019-11-20 RX ORDER — LEVOTHYROXINE SODIUM 125 UG/1
125 TABLET ORAL
Status: DISCONTINUED | OUTPATIENT
Start: 2019-11-21 | End: 2019-11-24 | Stop reason: HOSPADM

## 2019-11-20 RX ORDER — IBUPROFEN 200 MG
16 TABLET ORAL
Status: DISCONTINUED | OUTPATIENT
Start: 2019-11-20 | End: 2019-11-24 | Stop reason: HOSPADM

## 2019-11-20 RX ORDER — ONDANSETRON 2 MG/ML
4 INJECTION INTRAMUSCULAR; INTRAVENOUS EVERY 6 HOURS PRN
Status: DISCONTINUED | OUTPATIENT
Start: 2019-11-20 | End: 2019-11-24 | Stop reason: HOSPADM

## 2019-11-20 RX ORDER — INSULIN ASPART 100 [IU]/ML
0-5 INJECTION, SOLUTION INTRAVENOUS; SUBCUTANEOUS
Status: DISCONTINUED | OUTPATIENT
Start: 2019-11-20 | End: 2019-11-24 | Stop reason: HOSPADM

## 2019-11-20 RX ORDER — ACETAMINOPHEN 325 MG/1
650 TABLET ORAL EVERY 4 HOURS PRN
Status: DISCONTINUED | OUTPATIENT
Start: 2019-11-20 | End: 2019-11-24 | Stop reason: HOSPADM

## 2019-11-20 RX ORDER — LANOLIN ALCOHOL/MO/W.PET/CERES
800 CREAM (GRAM) TOPICAL
Status: DISCONTINUED | OUTPATIENT
Start: 2019-11-20 | End: 2019-11-24 | Stop reason: HOSPADM

## 2019-11-20 RX ORDER — ENOXAPARIN SODIUM 100 MG/ML
40 INJECTION SUBCUTANEOUS EVERY 24 HOURS
Status: DISCONTINUED | OUTPATIENT
Start: 2019-11-20 | End: 2019-11-24 | Stop reason: HOSPADM

## 2019-11-20 RX ORDER — ASPIRIN 325 MG
325 TABLET ORAL DAILY
Status: DISCONTINUED | OUTPATIENT
Start: 2019-11-20 | End: 2019-11-24 | Stop reason: HOSPADM

## 2019-11-20 RX ORDER — SODIUM CHLORIDE 9 MG/ML
INJECTION, SOLUTION INTRAVENOUS CONTINUOUS
Status: DISCONTINUED | OUTPATIENT
Start: 2019-11-20 | End: 2019-11-24 | Stop reason: HOSPADM

## 2019-11-20 RX ORDER — IBUPROFEN 200 MG
24 TABLET ORAL
Status: DISCONTINUED | OUTPATIENT
Start: 2019-11-20 | End: 2019-11-24 | Stop reason: HOSPADM

## 2019-11-20 RX ORDER — AMLODIPINE BESYLATE 5 MG/1
10 TABLET ORAL DAILY
Status: DISCONTINUED | OUTPATIENT
Start: 2019-11-20 | End: 2019-11-24 | Stop reason: HOSPADM

## 2019-11-20 RX ORDER — GLUCAGON 1 MG
1 KIT INJECTION
Status: DISCONTINUED | OUTPATIENT
Start: 2019-11-20 | End: 2019-11-24 | Stop reason: HOSPADM

## 2019-11-20 RX ADMIN — MIRTAZAPINE 15 MG: 15 TABLET, FILM COATED ORAL at 09:11

## 2019-11-20 RX ADMIN — ENOXAPARIN SODIUM 40 MG: 100 INJECTION SUBCUTANEOUS at 05:11

## 2019-11-20 RX ADMIN — SODIUM CHLORIDE: 0.9 INJECTION, SOLUTION INTRAVENOUS at 03:11

## 2019-11-20 RX ADMIN — SODIUM CHLORIDE: 0.9 INJECTION, SOLUTION INTRAVENOUS at 09:11

## 2019-11-20 RX ADMIN — AMLODIPINE BESYLATE 10 MG: 5 TABLET ORAL at 09:11

## 2019-11-20 RX ADMIN — THERA TABS 1 TABLET: TAB at 09:11

## 2019-11-20 RX ADMIN — ASPIRIN 325 MG ORAL TABLET 325 MG: 325 PILL ORAL at 09:11

## 2019-11-20 RX ADMIN — CEFTRIAXONE 1 G: 1 INJECTION, SOLUTION INTRAVENOUS at 09:11

## 2019-11-20 RX ADMIN — PANTOPRAZOLE SODIUM 40 MG: 40 TABLET, DELAYED RELEASE ORAL at 09:11

## 2019-11-20 NOTE — ASSESSMENT & PLAN NOTE
Contributing Nutrition Diagnosis  Moderate acute illness related malnutrition    Related to (etiology):   Decreased appetite    Signs and Symptoms (as evidenced by):   1) PO intakes < 50% x 1 week  2) mild-moderate muscle/fat wasting as charted bleow  3) Mild BLE edema    Interventions/Recommendations (treatment strategy):  Above    Nutrition Diagnosis Status:   New

## 2019-11-20 NOTE — PROGRESS NOTES
[facility] Hospital Medicine  Progress Note    Patient Name: Nina Portillo  MRN: [id number]  Patient Class: OP- Observation   Admission Date: 11/19/2019  Length of Stay: 0 days  Attending Physician: Reanna Martinez MD  Primary Care Provider: Rush Rosa MD        Subjective:     Principal Problem:Sepsis        HPI:  Nina Portillo is an 78 y/o female with a past medical history of HTN, alzheimer's disease, DM2, HLD, hypothyroidism and GERD who presented to the ED accompanied by her daughter with c/o increased weakness, decreased appetite and urinary incontinence.  Daughter reported that pt had similar symptoms in the past and was admitted into the hospital due to severe urinary tract infection.  While in the ED, pt was noted to have a UTI and meets sepsis criteria.  IV antibiotics were initiated.  She is placed in observation under the service of hospital medicine for further treatment.    Overview/Hospital Course:  No notes on file    Interval History:  Patient is afebrile.  Elderly frail female sitting in chair.  Reporting generalized weakness and lethargy.  No confusion present today.  Patient has baseline dementia.  No acute focal complaints.    Review of Systems   Unable to perform ROS: Dementia     Objective:     Vital Signs (Most Recent):  Temp: 97.5 °F (36.4 °C) (11/20/19 0721)  Pulse: 75 (11/20/19 0721)  Resp: 16 (11/20/19 0721)  BP: (!) 148/67 (11/20/19 0721)  SpO2: 98 % (11/20/19 0721) Vital Signs (24h Range):  Temp:  [97.5 °F (36.4 °C)-98.4 °F (36.9 °C)] 97.5 °F (36.4 °C)  Pulse:  [] 75  Resp:  [16] 16  SpO2:  [95 %-100 %] 98 %  BP: (138-180)/() 148/67     Weight: 60.2 kg (132 lb 11.5 oz)  Body mass index is 22.09 kg/m².    Intake/Output Summary (Last 24 hours) at 11/20/2019 1003  Last data filed at 11/19/2019 2349  Gross per 24 hour   Intake 2000 ml   Output --   Net 2000 ml      Physical Exam   Constitutional: She appears well-developed.   Thin, frail appearing.    HENT:   Head: Normocephalic and atraumatic.   Eyes: Pupils are equal, round, and reactive to light. Conjunctivae and EOM are normal.   Neck: Normal range of motion. Neck supple.   Cardiovascular: Normal rate, regular rhythm and intact distal pulses.   Pulmonary/Chest: Effort normal and breath sounds normal. No respiratory distress.   Abdominal: Soft. Bowel sounds are normal. She exhibits no distension.   Genitourinary:   Genitourinary Comments: deferred   Musculoskeletal: Normal range of motion. She exhibits edema (mild BLE).   Neurological: She is alert.   Oriented to person only.   Skin: Skin is warm and dry.   Psychiatric: She is slowed. Cognition and memory are impaired. She is attentive.   Vitals reviewed.    Significant Labs:   CBC:   Recent Labs   Lab 11/19/19 2205 11/20/19 0522   WBC 17.86* 14.04*   HGB 14.2 13.0   HCT 42.8 39.6    207     CMP:   Recent Labs   Lab 11/19/19 2205 11/20/19 0522   * 133*   K 4.6 3.9   CL 99 100   CO2 25 23   * 169*   BUN 17 15   CREATININE 1.1 0.8   CALCIUM 11.7* 10.2   PROT 8.1  --    ALBUMIN 3.6  --    BILITOT 0.7  --    ALKPHOS 102  --    AST 19  --    ALT 15  --    ANIONGAP 10 10   EGFRNONAA 47* >60     TSH:   Recent Labs   Lab 11/20/19 0522   TSH 27.685*     Urine Culture: No results for input(s): LABURIN in the last 48 hours.  Urine Studies:   Recent Labs   Lab 11/19/19 2132   COLORU Yellow   APPEARANCEUA Clear   PHUR 7.0   SPECGRAV 1.020   PROTEINUA Negative   GLUCUA Negative   KETONESU Negative   BILIRUBINUA Negative   OCCULTUA Trace*   NITRITE Negative   UROBILINOGEN 1.0   LEUKOCYTESUR 1+*   RBCUA 3   WBCUA 35*   BACTERIA Many*   SQUAMEPITHEL 2   HYALINECASTS 8*       Significant Imaging: None      Assessment/Plan:      * Sepsis  This patient does have evidence of infective focus  My overall impression is sepsis.  Antibiotics given-   Antibiotics (From admission, onward)    Start     Stop Route Frequency Ordered    11/20/19 2200  cefTRIAXone  (ROCEPHIN) 1 g in dextrose 5 % 50 mL IVPB      -- IV Every 24 hours (non-standard times) 11/20/19 0045          Latest lactate reviewed, they are-  Recent Labs   Lab 11/19/19 2205 11/20/19  0522   LACTATE 1.9 1.1       Organ dysfunction indicated by Acute kidney injury and Encephalopathy  Source-    Source control Achieved by- IV abx  Obtain chest x-ray.      KAL (acute kidney injury)  Mild, likely 2/2 IVVD.    Gentle IV hydration  Trend creat.  Baseline appears to be 0.7 and was 1.1 on presentation.  UA-+ UTI    Moderate malnutrition  Thin, frail in appearance.  Decreased po intake.  Consult nutrition.      Controlled type 2 diabetes mellitus with diabetic neuropathy, without long-term current use of insulin  Chronic; appears to be controlled.  Last Hgb A1c was 5.7% 9 mos ago.  Hold chronic oral hypoglycemics for now.  Accuchecks ac/hs with SSI coverage as needed.  Resume chronic meds when clinically appropriate.    Acute cystitis  IV rocephin  Follow culture    Alzheimer's disease  Chronic; does not appear to be taking chronic medications.    Fall/aspiration precautions.  Delirium precautions.      Hypothyroid  Chronic; TSH is high  increased levothyroxine. 125 mcg daily.      Hypertension associated with diabetes  Chronic; stable.  Continue chronic antihypertensives.  Monitor BP closely and treat as indicated for sustained control.        VTE Risk Mitigation (From admission, onward)         Ordered     enoxaparin injection 40 mg  Daily      11/20/19 0129     IP VTE HIGH RISK PATIENT  Once      11/20/19 0131     Place LEOBARDO hose  Until discontinued      11/20/19 0131     Place sequential compression device  Until discontinued      11/20/19 0131     Place LEOBARDO hose  Until discontinued      11/20/19 0129     Place sequential compression device  Until discontinued      11/20/19 0129                      Reanna Martinez MD  Department of Hospital Medicine   [facility]

## 2019-11-20 NOTE — PLAN OF CARE
Problem: Physical Therapy Goal  Goal: Physical Therapy Goal  Description  Goals to be met by: discharge     Patient will increase functional independence with mobility by performin.. Supine to sit with Modified [place]  2.. Sit to supine with Modified [place]  3.. Sit to stand transfer with Modified [place]  4.. Bed to chair transfer with Modified [place]   5.  Gait  x 150 feet with Modified [place] using Single-point Cane .      Outcome: Ongoing, Progressing  Patient seen for initial evaluation and treatment to establish goals.

## 2019-11-20 NOTE — ED PROVIDER NOTES
Encounter Date: 11/19/2019    SCRIBE #1 NOTE: Monie ARGUETA, carmen scribing for, and in the presence of, Alyson Hall PA-C.       History     Chief Complaint   Patient presents with    General Illness     Decerased po intake, increased weakness       Time seen by provider: 9:39 PM on 11/19/2019    Nina Portillo is a 79 y.o. female with PMHx of DM, dementia, hypothyroidism who presents to the ED accompanied by her daughter with c/o generalized weakness, urine incontinence, decreased appetite, and pain with urination.  Daughter reports generalized weakness worsening over the last week with urine incontinence for 2 days.  The patient normally is able to walk to the bathroom, but she has had difficulty walking, and her bed and briefs have been found soiled. The daughter states that the patient had a previous episode of similar symptoms and was admitted to the hospital for UTI and sepsis. The patient did not take her Metformin or Norvasc today. The patient denies abdominal pain, chest pain, SOB, fever, or any other symptoms at this time. PSHx of partial nephrectomy.    The history is provided by the patient and a relative (daughter).     Review of patient's allergies indicates:   Allergen Reactions    Phenergan [promethazine] Other (See Comments)     hallucinations    Prilosec [omeprazole magnesium] Hives and Rash     Past Medical History:   Diagnosis Date    Alzheimer disease 2012    Cancer     renal cell. right    Dementia     Diabetes mellitus     GERD (gastroesophageal reflux disease)     Hyperlipidemia     Hypothyroid     Memory loss     Movement disorder      Past Surgical History:   Procedure Laterality Date    HYSTERECTOMY      JOINT REPLACEMENT      right    KNEE ARTHROSCOPY W/ DEBRIDEMENT      left    PARTIAL NEPHRECTOMY      right.     TONSILLECTOMY       Family History   Problem Relation Age of Onset    Cancer Mother 90        breast     Social History     Tobacco Use    Smoking  status: Never Smoker    Smokeless tobacco: Never Used   Substance Use Topics    Alcohol use: No    Drug use: No     Review of Systems   Unable to perform ROS: Dementia (Hx provided by daughter.)   Constitutional: Positive for appetite change. Negative for fever.   Eyes: Negative for visual disturbance.   Respiratory: Negative for shortness of breath.    Cardiovascular: Negative for chest pain.   Gastrointestinal: Negative for abdominal pain.   Genitourinary: Positive for dysuria and enuresis.   Musculoskeletal: Negative for joint swelling.   Skin: Negative for rash.   Neurological: Positive for weakness.   Psychiatric/Behavioral: Negative for agitation.       Physical Exam     Initial Vitals [11/19/19 2002]   BP Pulse Resp Temp SpO2   (!) 150/85 108 16 98.2 °F (36.8 °C) 95 %      MAP       --         Vitals:    11/19/19 2248 11/19/19 2304 11/19/19 2325 11/19/19 2348   BP:  (!) 180/74  (!) 177/77   Pulse: 88 85 83 80   Resp:       Temp:       TempSrc:       SpO2: 97% 100% 98% 98%   Weight:       Height:        11/20/19 0008   BP: (!) 138/94   Pulse: 83   Resp:    Temp:    TempSrc:    SpO2:    Weight:    Height:        Physical Exam    Nursing note and vitals reviewed.  Constitutional: She appears well-developed and well-nourished.   Thin. Appears calm and comfortable.   HENT:   Head: Atraumatic.   Mouth/Throat: Oropharynx is clear and moist.   Eyes: EOM are normal. Pupils are equal, round, and reactive to light.   Cardiovascular: Normal rate, regular rhythm, normal heart sounds, intact distal pulses and normal pulses.   Pulmonary/Chest: Breath sounds normal. She has no wheezes. She has no rhonchi.   Abdominal: Soft. She exhibits no distension. There is no tenderness. There is no rebound and no guarding.   Musculoskeletal: She exhibits no edema.   Neurological: She is alert.   Patient is verbal. Oriented to person and place, but not oriented to time (normal per daughter).  5/5 motor strength and sensation to all 4  extremities.   Skin: Skin is warm. Capillary refill takes less than 2 seconds.   Psychiatric: She has a normal mood and affect. Her speech is normal.         ED Course   Critical Care  Date/Time: 11/20/2019 2:14 AM  Performed by: TOMMY Miller  Authorized by: Selvin Charles MD   Direct patient critical care time: 10 minutes  Additional history critical care time: 5 minutes  Ordering / reviewing critical care time: 5 minutes  Documentation critical care time: 10 minutes  Consult with family critical care time: 5 minutes  Total critical care time (exclusive of procedural time) : 35 minutes  Critical care was necessary to treat or prevent imminent or life-threatening deterioration of the following conditions: sepsis and shock.  Critical care was time spent personally by me on the following activities: development of treatment plan with patient or surrogate, evaluation of patient's response to treatment, examination of patient, obtaining history from patient or surrogate, ordering and performing treatments and interventions, ordering and review of laboratory studies, re-evaluation of patient's condition and review of old charts.        Labs Reviewed   URINALYSIS, REFLEX TO URINE CULTURE - Abnormal; Notable for the following components:       Result Value    Occult Blood UA Trace (*)     Leukocytes, UA 1+ (*)     All other components within normal limits    Narrative:     Preferred Collection Type->Urine, Clean Catch   CBC W/ AUTO DIFFERENTIAL - Abnormal; Notable for the following components:    WBC 17.86 (*)     Mean Corpuscular Hemoglobin 32.5 (*)     MPV 8.5 (*)     Gran # (ANC) 15.9 (*)     Immature Grans (Abs) 0.07 (*)     Lymph # 0.8 (*)     Gran% 89.2 (*)     Lymph% 4.4 (*)     All other components within normal limits   COMPREHENSIVE METABOLIC PANEL - Abnormal; Notable for the following components:    Sodium 134 (*)     Glucose 190 (*)     Calcium 11.7 (*)     eGFR if  54 (*)     eGFR if  non  47 (*)     All other components within normal limits   URINALYSIS MICROSCOPIC - Abnormal; Notable for the following components:    WBC, UA 35 (*)     Bacteria Many (*)     Hyaline Casts, UA 8 (*)     All other components within normal limits    Narrative:     Preferred Collection Type->Urine, Clean Catch   CULTURE, URINE   CULTURE, BLOOD   CULTURE, BLOOD   LACTIC ACID, PLASMA          Imaging Results    None          Medical Decision Making:   History:   Old Medical Records: I decided to obtain old medical records.  Clinical Tests:   Lab Tests: Ordered and Reviewed  Medical Tests: Ordered and Reviewed       APC / Resident Notes:   Patient presents to the ER with her daughter with concern for possible UTI due to urine incontinence developing over the last 2 days.  Daughter also reports increased generalized weakness. I reviewed the patient's chart.  She was admitted February of 2019 with urosepsis.  Patient's daughter states that she wanted to bring her in earlier this time.  Patient's blood work reveals elevated WBC count to 17.86, lactic acid is WNL, UA with many bacteria and 35 WBC. Presentation is most consistent with UTI.   Will obtain blood cultures, give IV Rocephin, IV fluids, and plan for admission.  I discussed patients presentation and admission with hospital medicine NP, Shoaib.  She has evaluated the patient in the ER and agreed to admit the patient.    Patient is afebrile with stable vital signs in the ED.  Tachycardia improved with fluids.  She is given home dose of Norvasc.  Patient and daughter are comfortable with plan for admission.       Scribe Attestation:   Scribe #1: I performed the above scribed service and the documentation accurately describes the services I performed. I attest to the accuracy of the note.    I, Alyson Hall, personally performed the services described in this documentation. All medical record entries made by the scribe were at my direction and in  my presence.  I have reviewed the chart and agree that the record reflects my personal performance and is accurate and complete. Alyson Hall, [place].  2:11 AM 11/20/2019          ED Course as of Nov 20 0221   Tue Nov 19, 2019 2303 Call placed to hospital medicine to discuss patients admission, message left.     [LH]   2307 I discussed patients presentation and admission with Shoaib.  She will evaluate the patient in the ER and has agreed to admit the patient.      [LH]      ED Course User Index  [LH] TOMMY Miller                Clinical Impression:       ICD-10-CM ICD-9-CM   1. Urinary tract infection without hematuria, site unspecified N39.0 599.0   2. Weakness R53.1 780.79   3. Sepsis, due to unspecified organism, unspecified whether acute organ dysfunction present A41.9 038.9     995.91         Disposition:   Disposition: Admitted                     TOMMY Miller  11/20/19 0221

## 2019-11-20 NOTE — PLAN OF CARE
Pt's daughter Soledad Kahn ([phone]) returned CM's call, able to complete discharge assessment. Verified information as correct on facesheet. Verified that pt lives at listed address with her. PCP is Dr. Rosa. Pharm is [facility] on [address]. POA is Soledad. Denies hh/hd. DME- wc, sc, glucometer, rw, cane. Pt needs assistance with ADLs. Transportation is provided by Soledad. DC plan is home- pt could benefit from hh services. CM following to assist in any DC needs       11/20/19 1235   Discharge Assessment   Assessment Type Discharge Planning Assessment   Confirmed/corrected address and phone number on facesheet? Yes   Assessment information obtained from? Other  (Soledad Kahn)   Communicated expected length of stay with patient/caregiver yes   Prior to hospitilization cognitive status: Alert/Oriented   Prior to hospitalization functional status: Assistive Equipment;Needs Assistance   Current cognitive status: Not Oriented to Time;Not Oriented to Place   Current Functional Status: Needs Assistance;Assistive Equipment   Lives With child(dianna), adult   Able to Return to Prior Arrangements yes   Is patient able to care for self after discharge? Yes   Patient's perception of discharge disposition home health   Readmission Within the Last 30 Days no previous admission in last 30 days   Patient currently being followed by outpatient case management? No   Patient currently receives any other outside agency services? No   Equipment Currently Used at Home bedside commode;shower chair;cane, straight;walker, rolling;wheelchair   Do you have any problems affording any of your prescribed medications? No   Is the patient taking medications as prescribed? yes   Does the patient have transportation home? Yes   Transportation Anticipated family or friend will provide   Does the patient receive services at the [facility]? No   Discharge Plan A Home Health   Discharge Plan B Home with family   DME Needed Upon Discharge  none    Patient/Family in Agreement with Plan yes

## 2019-11-20 NOTE — H&P
[facility] Medicine  History & Physical    Patient Name: Nina Portillo  MRN: [id number]  Admission Date: 11/19/2019  Attending Physician: Reanna Martinez MD   Primary Care Provider: Rush Rosa MD         Patient information was obtained from past medical records and ER records.     Subjective:     Principal Problem:Sepsis    Chief Complaint:   Chief Complaint   Patient presents with    General Illness     Decerased po intake, increased weakness        HPI: Nina Portillo is an 78 y/o female with a past medical history of HTN, alzheimer's disease, DM2, HLD, hypothyroidism and GERD who presented to the ED accompanied by her daughter with c/o increased weakness, decreased appetite and urinary incontinence.  Daughter reported that pt had similar symptoms in the past and was admitted into the hospital due to severe urinary tract infection.  While in the ED, pt was noted to have a UTI and meets sepsis criteria.  IV antibiotics were initiated.  She is placed in observation under the service of hospital medicine for further treatment.    Past Medical History:   Diagnosis Date    Alzheimer disease 2012    Cancer     renal cell. right    Dementia     Diabetes mellitus     GERD (gastroesophageal reflux disease)     Hyperlipidemia     Hypothyroid     Memory loss     Movement disorder        Past Surgical History:   Procedure Laterality Date    HYSTERECTOMY      JOINT REPLACEMENT      right    KNEE ARTHROSCOPY W/ DEBRIDEMENT      left    PARTIAL NEPHRECTOMY      right.     TONSILLECTOMY         Review of patient's allergies indicates:   Allergen Reactions    Phenergan [promethazine] Other (See Comments)     hallucinations    Prilosec [omeprazole magnesium] Hives and Rash       No current facility-administered medications on file prior to encounter.      Current Outpatient Medications on File Prior to Encounter   Medication Sig    amLODIPine (NORVASC) 5 MG tablet Take 2 tablets  (10 mg total) by mouth once daily.    aspirin 325 MG tablet Take 325 mg by mouth once daily.    CRANBERRY FRUIT EXTRACT (CRANBERRY EXTRACT ORAL) Take 1 capsule by mouth once daily at 6am.    levothyroxine (SYNTHROID) 100 MCG tablet 1 tab po qod odd days with 88 mcg. Po on even days.    levothyroxine (SYNTHROID) 88 MCG tablet 1 tab po qod even days with 100 mcg. po Tab odd days    metFORMIN (GLUCOPHAGE-XR) 500 MG 24 hr tablet Take 1 tablet (500 mg total) by mouth after dinner.    mirtazapine (REMERON) 15 MG tablet Take 1 tablet (15 mg total) by mouth every evening.    MULTIVIT-MINERALS/FERROUS FUM (MULTI VITAMIN ORAL) Take 1 tablet by mouth once daily.    pantoprazole (PROTONIX) 40 MG tablet Take 1 tablet (40 mg total) by mouth once daily.     Family History     Problem Relation (Age of Onset)    Cancer Mother (90)        Tobacco Use    Smoking status: Never Smoker    Smokeless tobacco: Never Used   Substance and Sexual Activity    Alcohol use: No    Drug use: No    Sexual activity: Never     Review of Systems   Unable to perform ROS: Dementia     Objective:     Vital Signs (Most Recent):  Temp: 98.2 °F (36.8 °C) (11/19/19 2002)  Pulse: 83 (11/20/19 0008)  Resp: 16 (11/19/19 2002)  BP: (!) 138/94 (11/20/19 0008)  SpO2: 98 % (11/19/19 2348) Vital Signs (24h Range):  Temp:  [98.2 °F (36.8 °C)] 98.2 °F (36.8 °C)  Pulse:  [] 83  Resp:  [16] 16  SpO2:  [95 %-100 %] 98 %  BP: (138-180)/() 138/94     Weight: 60.2 kg (132 lb 11.5 oz)  Body mass index is 22.09 kg/m².    Physical Exam   Constitutional: She appears well-developed.   Thin, frail appearing.   HENT:   Head: Normocephalic and atraumatic.   Eyes: Pupils are equal, round, and reactive to light. Conjunctivae and EOM are normal.   Neck: Normal range of motion. Neck supple.   Cardiovascular: Normal rate, regular rhythm and intact distal pulses.   Pulmonary/Chest: Effort normal and breath sounds normal. No respiratory distress.   Abdominal: Soft.  Bowel sounds are normal. She exhibits no distension.   Genitourinary:   Genitourinary Comments: deferred   Musculoskeletal: Normal range of motion. She exhibits edema (mild BLE).   Neurological: She is alert.   Oriented to person only.   Skin: Skin is warm and dry.   Psychiatric: She is slowed. Cognition and memory are impaired. She is attentive.   Vitals reviewed.        CRANIAL NERVES     CN III, IV, VI   Pupils are equal, round, and reactive to light.  Extraocular motions are normal.        Significant Labs:   CBC:   Recent Labs   Lab 11/19/19 2205   WBC 17.86*   HGB 14.2   HCT 42.8        CMP:   Recent Labs   Lab 11/19/19 2205   *   K 4.6   CL 99   CO2 25   *   BUN 17   CREATININE 1.1   CALCIUM 11.7*   PROT 8.1   ALBUMIN 3.6   BILITOT 0.7   ALKPHOS 102   AST 19   ALT 15   ANIONGAP 10   EGFRNONAA 47*     Lactic Acid:   Recent Labs   Lab 11/19/19 2205   LACTATE 1.9     Urine Studies:   Recent Labs   Lab 11/19/19 2132   COLORU Yellow   APPEARANCEUA Clear   PHUR 7.0   SPECGRAV 1.020   PROTEINUA Negative   GLUCUA Negative   KETONESU Negative   BILIRUBINUA Negative   OCCULTUA Trace*   NITRITE Negative   UROBILINOGEN 1.0   LEUKOCYTESUR 1+*   RBCUA 3   WBCUA 35*   BACTERIA Many*   SQUAMEPITHEL 2   HYALINECASTS 8*           Assessment/Plan:     * Sepsis  This patient does have evidence of infective focus  My overall impression is sepsis.  Antibiotics given-   Antibiotics (From admission, onward)    Start     Stop Route Frequency Ordered    11/20/19 2200  cefTRIAXone (ROCEPHIN) 1 g in dextrose 5 % 50 mL IVPB      -- IV Every 24 hours (non-standard times) 11/20/19 0045          Latest lactate reviewed, they are-  Recent Labs   Lab 11/19/19 2205   LACTATE 1.9       Organ dysfunction indicated by Acute kidney injury and Encephalopathy  Source-    Source control Achieved by- IV abx        KAL (acute kidney injury)  Mild, likely 2/2 IVVD.    Gentle IV hydration  Trend creat.  Baseline appears to be  0.7 and was 1.1 on presentation.  UA-+ UTI    Moderate malnutrition  Thin, frail in appearance.  Decreased po intake.  Consult nutrition.      Controlled type 2 diabetes mellitus with diabetic neuropathy, without long-term current use of insulin  Chronic; appears to be controlled.  Last Hgb A1c was 5.7% 9 mos ago.  Hold chronic oral hypoglycemics for now.  Accuchecks ac/hs with SSI coverage as needed.  Resume chronic meds when clinically appropriate.    Acute cystitis  IV rocephin  Follow culture    Alzheimer's disease  Chronic; does not appear to be taking chronic medications.    Fall/aspiration precautions.  Delirium precautions.      Hypothyroid  Chronic; check TSH.  Continue levothyroxine.      Hypertension associated with diabetes  Chronic; stable.  Continue chronic antihypertensives.  Monitor BP closely and treat as indicated for sustained control.      VTE Risk Mitigation (From admission, onward)         Ordered     enoxaparin injection 40 mg  Daily      11/20/19 0129     IP VTE HIGH RISK PATIENT  Once      11/20/19 0131     Place LEOBARDO hose  Until discontinued      11/20/19 0131     Place sequential compression device  Until discontinued      11/20/19 0131     Place LEOBARDO hose  Until discontinued      11/20/19 0129     Place sequential compression device  Until discontinued      11/20/19 0129            Time spent seeing patient( greater than 1/2 spent in direct contact) : 35 minutes    EZEQUIEL Lang  Department of Hospital Medicine   [facility]

## 2019-11-20 NOTE — PT/OT/SLP EVAL
Occupational Therapy   Evaluation    Name: Nina Portillo  MRN: [id number]  Admitting Diagnosis:  Sepsis      Recommendations:     Discharge Recommendations: home, home health OT  Discharge Equipment Recommendations:  walker, rolling  Barriers to discharge:  None    Assessment:     Nina Portillo is a 79 y.o. female with a medical diagnosis of Sepsis.  She presents with CGA using RW for sit<>stand, bedside chair transfer and walking from EOB > sink > bedside chair with no LOB; however, pt requires Mod/Max (A) to complete LB dressing and Max (A) for toileting. Patient found saturated in urine in bed; however, pt did not report and when OTR inquiring, pt reports that she was unaware she was wet but then also reports she is continent of bladder. NurseRoxy notified and acknowledging. Performance deficits affecting function: weakness, impaired endurance, impaired self care skills, impaired functional mobilty, gait instability, impaired balance, decreased safety awareness.      Rehab Prognosis: Good; patient would benefit from acute skilled OT services to address these deficits and reach maximum level of function.       Plan:     Patient to be seen 2 x/week to address the above listed problems via self-care/home management, therapeutic exercises, therapeutic groups  · Plan of Care Expires: 12/06/19  · Plan of Care Reviewed with: patient    Subjective     Chief Complaint: None  Patient/Family Comments/goals: None stated    Occupational Profile:  Living Environment: Patient lives with her daughter and son in law in mobile home with 3 TIKA and walk-in shower with shower chair   Previous level of function: Mod (I) to Independent   Equipment Used at Home:  bedside commode, shower chair, cane, straight  Assistance upon Discharge: Assistance from daughter and son in law    Pain/Comfort:  · Pain Rating 1: 0/10    Patients cultural, spiritual, Rastafari conflicts given the current situation: no    Objective:      Communicated with: Roxy Martinez prior to/post session.  Patient found HOB elevated with bed alarm, peripheral IV, telemetry(pt found soaked with urine ) upon OT entry to room.    General Precautions: Standard, fall   Orthopedic Precautions:N/A   Braces: N/A     Occupational Performance:    Bed Mobility:    · Patient completed Rolling/Turning to Right with supervision  · Patient completed Supine to Sit with supervision    Functional Mobility/Transfers:  · Patient completed Sit <> Stand Transfer with contact guard assistance  with  rolling walker and verbal instruction for proper hand placement to prevent patient from pulling on walker to stand   · Patient completed Bed <> Chair Transfer using Stand Pivot technique with contact guard assistance with rolling walker and step by step verbal instruction for correct/safe transfer sequence/technique with proper hand placement   · Functional Mobility: Patient standing with use of RW more than 7 minutes without seated rest break to receive assistance to doff wet brief, perform hygiene, and don clean brief while having bed linens changed by CNA; pt then ambulating with CGA and use of RW to sink for grooming and then to bedside chair with no LOB and instruction for safe/correct walker management and transfer sequence/techniques     Activities of Daily Living:  · Grooming: stand by assistance standing sink side with use of RW - OTR instructing on correct/safe walker management and adaptive techniques to increase safety and independence with grooming tasks  · Lower Body Dressing: moderate assistance sitting EOB to don/doff shoes   · Toileting: maximal assistance to doff wet brief, perform hygiene and don clean/dry brief using RW    Cognitive/Visual Perceptual:  Cognitive/Psychosocial Skills:     -       Oriented to: Person   -       Follows Commands/attention:Follows one-step commands  -       Communication: clear/fluent  -       Safety awareness/insight to disability:  impaired   -       Mood/Affect/Coping skills/emotional control: Flat affect and Withdrawn    Physical Exam:  Postural examination/scapula alignment:    -       Rounded shoulders  Dominant hand:    -       Right  Upper Extremity Strength: Bilateral UE strength grossly 3+/5  Fine Motor Coordination:    -       Intact  Gross motor coordination:   WFL    AMPAC 6 Click ADL:  AMPAC Total Score: 16    Treatment & Education:  - OTR providing education regarding OT role/purpose, safety awareness - use of bed/wheelchair alarms, calling for assistance, etc - pt verbalizes understanding and in agreement.    - OTR providing education regarding safety awareness in home environment, fall prevention in home environment, correct transfer sequence/techniques with proper hand placement - with emphasis on slowing pace during step, turn pivots as this is a time when pt most susceptible to loss of balance and at very high risk of fall, correct/safe use of AD/AE, and adaptive techniques to increase independence with self care tasks - pt verbalizes/demonstrates understanding and in agreement with all ed/instruction provided but will require reinforcement of all education/instruction   Education:    Patient left up in bedside chair  with all lines intact, call button in reach, chair alarm on and nurse notified    GOALS:   Multidisciplinary Problems     Occupational Therapy Goals        Problem: Occupational Therapy Goal    Goal Priority Disciplines Outcome Interventions   Occupational Therapy Goal     OT, PT/OT Ongoing, Progressing    Description:  Goals to be met by: 12/6/19     Patient will increase functional independence with ADLs by performing:    LE Dressing with Modified [place].  Grooming while standing with Modified [place].  Toileting from toilet with Modified [place] for hygiene and clothing management.   Toilet transfer to toilet with Modified [place] with AD/DME as needed.                      History:      Past Medical History:   Diagnosis Date    Alzheimer disease 2012    Cancer     renal cell. right    Dementia     Diabetes mellitus     GERD (gastroesophageal reflux disease)     Hyperlipidemia     Hypothyroid     Memory loss     Movement disorder        Past Surgical History:   Procedure Laterality Date    HYSTERECTOMY      JOINT REPLACEMENT      right    KNEE ARTHROSCOPY W/ DEBRIDEMENT      left    PARTIAL NEPHRECTOMY      right.     TONSILLECTOMY         Time Tracking:     OT Date of Treatment: 11/20/19  OT Start Time: 0936  OT Stop Time: 1002  OT Total Time (min): 26 min    Billable Minutes:Evaluation 15  Therapeutic Activity 11    JULY Shay LOTR  11/20/2019

## 2019-11-20 NOTE — ASSESSMENT & PLAN NOTE
Mild, likely 2/2 IVVD.    Gentle IV hydration  Trend creat.  Baseline appears to be 0.7 and was 1.1 on presentation.  UA-+ UTI

## 2019-11-20 NOTE — CONSULTS
" [facility]  Adult Nutrition  Consult Note    SUMMARY   Intervention: carbohydrate modified diet and nutrition suplement therapy-commercial beverage     Recommendation:   1) change diet to DM diet 3-4 carb servings/meal   2) Add boost glucose control strawberry BID, continue appetite stimulant   3) Weigh pt weekly     Goals: PO intakes > 50% of meals and supplements at f/u   Nutrition Goal Status: new  Communication of RD Recs: (POC, sticky note, second sign )    Reason for Assessment    Reason For Assessment: consult  Diagnosis: (sepsis)  Relevant Medical History: Dm2, renal CA, Alzheimers, nephrectomy, HTN, UTI  Interdisciplinary Rounds: attended    General Information Comments: 80 y/o female with Alzheimers in with KAL and cystitis. Claims to have eaten poorly for the past week. Per last nutrition note usually eats 2 meals + 2-3 ice cream shakes daily ( ? reliablility of stated history).  No significant wt loss noted. Agreeable to Boost, on appetite stimualnt. NFPE done 19, mod-mild wasting seen.    Nutrition Discharge Planning: To be determined- DM diet 3-4 carb servings/meal + boost glucose control 2-3 x daily    Nutrition Risk Screen    Nutrition Risk Screen: no indicators present    Nutrition/Diet History    Patient Reported Diet/Restrictions/Preferences: diabetic diet  Spiritual, Cultural Beliefs, Episcopal Practices, Values that Affect Care: no  Food Allergies: NKFA  Factors Affecting Nutritional Intake: decreased appetite    Anthropometrics    Temp: 97.5 °F (36.4 °C)  Height Method: Measured(office 1/15/19)  Height: 5' 5"  Height (inches): 65 in  Weight Method: Bed Scale  Weight: 60.2 kg (132 lb 11.5 oz)  Weight (lb): 132.72 lb  Ideal Body Weight (IBW), Female: 125 lb  % Ideal Body Weight, Female (lb): 106.18 lb  BMI (Calculated): 22.1  BMI Grade: (underweight for age)  Weight Loss: intentional  Usual Body Weight (UBW), k kg(1/15/19)  % Usual Body Weight: 109.68  % Weight " Change From Usual Weight: 9.45 %       Lab/Procedures/Meds    Pertinent Labs Reviewed: reviewed  BMP  Lab Results   Component Value Date     (L) 11/20/2019    K 3.9 11/20/2019     11/20/2019    CO2 23 11/20/2019    BUN 15 11/20/2019    CREATININE 0.8 11/20/2019    CALCIUM 10.2 11/20/2019    ANIONGAP 10 11/20/2019    ESTGFRAFRICA >60 11/20/2019    EGFRNONAA >60 11/20/2019     Recent Labs   Lab 11/20/19  1137   POCTGLUCOSE 150*     Lab Results   Component Value Date    HGBA1C 5.7 (H) 02/16/2019     Lab Results   Component Value Date    CALCIUM 10.2 11/20/2019    PHOS 2.0 (L) 11/20/2019     Lab Results   Component Value Date    ALBUMIN 3.6 11/19/2019       Pertinent Medications Reviewed: reviewed  Pertinent Medications Comments: NS @ 100 ml/hr, insulin, zofran, mirtazapine, MVI, KNaPhos    Estimated/Assessed Needs    Weight Used For Calorie Calculations: 60.2 kg (132 lb 11.5 oz)  Energy Calorie Requirements (kcal): [place] St Jeor ( x 1.5 wt gain ) = 1600 kcal  Energy Need Method: [place]-St Jeor  Protein Requirements: 0.9 g protein/kg ( KAL vs. wasting/age) = 54 g protein  Weight Used For Protein Calculations: 60.2 kg (132 lb 11.5 oz)  Fluid Requirements (mL): urine output + 500 ml  Estimated Fluid Requirement Method: other (see comments)  CHO Requirement: 180-200 g      Nutrition Prescription Ordered    Current Diet Order: DM 2000 kcal    Evaluation of Received Nutrient/Fluid Intake    Energy Calories Required: not meeting needs  Protein Required: not meeting needs  Fluid Required: not meeting needs  Tolerance: tolerating  % Intake of Estimated Energy Needs: 0% today  % Meal Intake: 0% today    Nutrition Risk    Level of Risk/Frequency of Follow-up: moderate 2 x weekly     Assessment and Plan    Moderate malnutrition  Contributing Nutrition Diagnosis  Moderate acute illness related malnutrition    Related to (etiology):   Decreased appetite    Signs and Symptoms (as evidenced by):   1) PO intakes < 50% x  1 week  2) mild-moderate muscle/fat wasting as charted bleow  3) Mild BLE edema    Interventions/Recommendations (treatment strategy):  Above    Nutrition Diagnosis Status:   New           Monitor and Evaluation    Food and Nutrient Intake: energy intake, food and beverage intake  Food and Nutrient Adminstration: diet order  Anthropometric Measurements: weight  Biochemical Data, Medical Tests and Procedures: electrolyte and renal panel, glucose/endocrine profile  Nutrition-Focused Physical Findings: overall appearance     Malnutrition Assessment  Malnutrition Type: acute illness or injury  Skin (Micronutrient): (Luis = 19)  Teeth (Micronutrient): (denies chewing trouble)   Micronutrient Evaluation: suspected deficiency  Micronutrient Evaluation Comments: Na   Energy Intake (Malnutrition): less than or equal to 50% for greater than or equal to 5 days   Orbital Region (Subcutaneous Fat Loss): moderate depletion  Upper Arm Region (Subcutaneous Fat Loss): mild depletion  Thoracic and Lumbar Region: mild depletion   Sabianism Region (Muscle Loss): moderate depletion  Clavicle Bone Region (Muscle Loss): mild depletion  Clavicle and Acromion Bone Region (Muscle Loss): mild depletion  Scapular Bone Region (Muscle Loss): mild depletion  Dorsal Hand (Muscle Loss): mild depletion  Patellar Region (Muscle Loss): moderate depletion  Anterior Thigh Region (Muscle Loss): mild depletion  Posterior Calf Region (Muscle Loss): mild depletion   Edema (Fluid Accumulation): 1-->trace   Subcutaneous Fat Loss (Final Summary): moderate protein-calorie malnutrition  Muscle Loss Evaluation (Final Summary): moderate protein-calorie malnutrition         Nutrition Follow-Up    RD Follow-up?: Yes

## 2019-11-20 NOTE — ASSESSMENT & PLAN NOTE
This patient does have evidence of infective focus  My overall impression is sepsis.  Antibiotics given-   Antibiotics (From admission, onward)    Start     Stop Route Frequency Ordered    11/20/19 2200  cefTRIAXone (ROCEPHIN) 1 g in dextrose 5 % 50 mL IVPB      -- IV Every 24 hours (non-standard times) 11/20/19 0045          Latest lactate reviewed, they are-  Recent Labs   Lab 11/19/19 2205   LACTATE 1.9       Organ dysfunction indicated by Acute kidney injury and Encephalopathy  Source-    Source control Achieved by- IV abx

## 2019-11-20 NOTE — ASSESSMENT & PLAN NOTE
This patient does have evidence of infective focus  My overall impression is sepsis.  Antibiotics given-   Antibiotics (From admission, onward)    Start     Stop Route Frequency Ordered    11/20/19 2200  cefTRIAXone (ROCEPHIN) 1 g in dextrose 5 % 50 mL IVPB      -- IV Every 24 hours (non-standard times) 11/20/19 0045          Latest lactate reviewed, they are-  Recent Labs   Lab 11/19/19 2205 11/20/19  0522   LACTATE 1.9 1.1       Organ dysfunction indicated by Acute kidney injury and Encephalopathy  Source-    Source control Achieved by- IV abx

## 2019-11-20 NOTE — SUBJECTIVE & OBJECTIVE
Interval History:  Patient is afebrile.  Reporting generalized weakness and lethargy.  No confusion present today.  Patient has baseline dementia.  No acute focal complaints.    Review of Systems   Unable to perform ROS: Dementia     Objective:     Vital Signs (Most Recent):  Temp: 97.5 °F (36.4 °C) (11/20/19 0721)  Pulse: 75 (11/20/19 0721)  Resp: 16 (11/20/19 0721)  BP: (!) 148/67 (11/20/19 0721)  SpO2: 98 % (11/20/19 0721) Vital Signs (24h Range):  Temp:  [97.5 °F (36.4 °C)-98.4 °F (36.9 °C)] 97.5 °F (36.4 °C)  Pulse:  [] 75  Resp:  [16] 16  SpO2:  [95 %-100 %] 98 %  BP: (138-180)/() 148/67     Weight: 60.2 kg (132 lb 11.5 oz)  Body mass index is 22.09 kg/m².    Intake/Output Summary (Last 24 hours) at 11/20/2019 1003  Last data filed at 11/19/2019 2349  Gross per 24 hour   Intake 2000 ml   Output --   Net 2000 ml      Physical Exam   Constitutional: She appears well-developed.   Thin, frail appearing.   HENT:   Head: Normocephalic and atraumatic.   Eyes: Pupils are equal, round, and reactive to light. Conjunctivae and EOM are normal.   Neck: Normal range of motion. Neck supple.   Cardiovascular: Normal rate, regular rhythm and intact distal pulses.   Pulmonary/Chest: Effort normal and breath sounds normal. No respiratory distress.   Abdominal: Soft. Bowel sounds are normal. She exhibits no distension.   Genitourinary:   Genitourinary Comments: deferred   Musculoskeletal: Normal range of motion. She exhibits edema (mild BLE).   Neurological: She is alert.   Oriented to person only.   Skin: Skin is warm and dry.   Psychiatric: She is slowed. Cognition and memory are impaired. She is attentive.   Vitals reviewed.    Significant Labs:   CBC:   Recent Labs   Lab 11/19/19  2205 11/20/19  0522   WBC 17.86* 14.04*   HGB 14.2 13.0   HCT 42.8 39.6    207     CMP:   Recent Labs   Lab 11/19/19  2205 11/20/19  0522   * 133*   K 4.6 3.9   CL 99 100   CO2 25 23   * 169*   BUN 17 15   CREATININE  1.1 0.8   CALCIUM 11.7* 10.2   PROT 8.1  --    ALBUMIN 3.6  --    BILITOT 0.7  --    ALKPHOS 102  --    AST 19  --    ALT 15  --    ANIONGAP 10 10   EGFRNONAA 47* >60     TSH:   Recent Labs   Lab 11/20/19  0522   TSH 27.685*     Urine Culture: No results for input(s): LABURIN in the last 48 hours.  Urine Studies:   Recent Labs   Lab 11/19/19  2132   COLORU Yellow   APPEARANCEUA Clear   PHUR 7.0   SPECGRAV 1.020   PROTEINUA Negative   GLUCUA Negative   KETONESU Negative   BILIRUBINUA Negative   OCCULTUA Trace*   NITRITE Negative   UROBILINOGEN 1.0   LEUKOCYTESUR 1+*   RBCUA 3   WBCUA 35*   BACTERIA Many*   SQUAMEPITHEL 2   HYALINECASTS 8*       Significant Imaging: None

## 2019-11-20 NOTE — PLAN OF CARE
Patient alert and oriented resting in bed. NAD. Denies pain or SOB. VSS. Plan of care reviewed with patient and patient daughter. Bed alarm active. Verbalizes understanding.Call light in reach. Pt free from fall or injury. Will monitor.

## 2019-11-20 NOTE — PLAN OF CARE
CM went to pt's room to complete discharge assessment. Pt unable to verify information on facesheet as correct. CM will try again at later time.     1230- CM left VM for pt's daughter (Soledad Kahn [phone]) requesting call back.        11/20/19 1125   Discharge Assessment   Assessment Type Discharge Planning Assessment

## 2019-11-20 NOTE — PLAN OF CARE
POC reviewed with patient. Patient verbalizes understanding. Safety and tele maintained throughout shift. Patient sat up in chair most of shift. No complaints of pain. Call light within reach.

## 2019-11-20 NOTE — PLAN OF CARE
Intervention: carbohydrate modified diet and nutrition suplement therapy-commercial beverage      Recommendation:   1) change diet to DM diet 3-4 carb servings/meal   2) Add boost glucose control strawberry BID, continue appetite stimulant   3) Weigh pt weekly      Goals: PO intakes > 50% of meals and supplements at f/u   Nutrition Goal Status: new  Communication of RD Recs: (POC, sticky note, second sign )

## 2019-11-20 NOTE — PLAN OF CARE
Problem: Occupational Therapy Goal  Goal: Occupational Therapy Goal  Description  Goals to be met by: 12/6/19     Patient will increase functional independence with ADLs by performing:    LE Dressing with Modified [place].  Grooming while standing with Modified [place].  Toileting from toilet with Modified [place] for hygiene and clothing management.   Toilet transfer to toilet with Modified [place] with AD/DME as needed.     Outcome: Ongoing, Progressing

## 2019-11-20 NOTE — HPI
Nina Portillo is an 80 y/o female with a past medical history of HTN, alzheimer's disease, DM2, HLD, hypothyroidism and GERD who presented to the ED accompanied by her daughter with c/o increased weakness, decreased appetite and urinary incontinence.  Daughter reported that pt had similar symptoms in the past and was admitted into the hospital due to severe urinary tract infection.  While in the ED, pt was noted to have a UTI and meets sepsis criteria.  IV antibiotics were initiated.  She is placed in observation under the service of hospital medicine for further treatment.

## 2019-11-20 NOTE — SUBJECTIVE & OBJECTIVE
Past Medical History:   Diagnosis Date    Alzheimer disease 2012    Cancer     renal cell. right    Dementia     Diabetes mellitus     GERD (gastroesophageal reflux disease)     Hyperlipidemia     Hypothyroid     Memory loss     Movement disorder        Past Surgical History:   Procedure Laterality Date    HYSTERECTOMY      JOINT REPLACEMENT      right    KNEE ARTHROSCOPY W/ DEBRIDEMENT      left    PARTIAL NEPHRECTOMY      right.     TONSILLECTOMY         Review of patient's allergies indicates:   Allergen Reactions    Phenergan [promethazine] Other (See Comments)     hallucinations    Prilosec [omeprazole magnesium] Hives and Rash       No current facility-administered medications on file prior to encounter.      Current Outpatient Medications on File Prior to Encounter   Medication Sig    amLODIPine (NORVASC) 5 MG tablet Take 2 tablets (10 mg total) by mouth once daily.    aspirin 325 MG tablet Take 325 mg by mouth once daily.    CRANBERRY FRUIT EXTRACT (CRANBERRY EXTRACT ORAL) Take 1 capsule by mouth once daily at 6am.    levothyroxine (SYNTHROID) 100 MCG tablet 1 tab po qod odd days with 88 mcg. Po on even days.    levothyroxine (SYNTHROID) 88 MCG tablet 1 tab po qod even days with 100 mcg. po Tab odd days    metFORMIN (GLUCOPHAGE-XR) 500 MG 24 hr tablet Take 1 tablet (500 mg total) by mouth after dinner.    mirtazapine (REMERON) 15 MG tablet Take 1 tablet (15 mg total) by mouth every evening.    MULTIVIT-MINERALS/FERROUS FUM (MULTI VITAMIN ORAL) Take 1 tablet by mouth once daily.    pantoprazole (PROTONIX) 40 MG tablet Take 1 tablet (40 mg total) by mouth once daily.     Family History     Problem Relation (Age of Onset)    Cancer Mother (90)        Tobacco Use    Smoking status: Never Smoker    Smokeless tobacco: Never Used   Substance and Sexual Activity    Alcohol use: No    Drug use: No    Sexual activity: Never     Review of Systems   Unable to perform ROS: Dementia      Objective:     Vital Signs (Most Recent):  Temp: 98.2 °F (36.8 °C) (11/19/19 2002)  Pulse: 83 (11/20/19 0008)  Resp: 16 (11/19/19 2002)  BP: (!) 138/94 (11/20/19 0008)  SpO2: 98 % (11/19/19 2348) Vital Signs (24h Range):  Temp:  [98.2 °F (36.8 °C)] 98.2 °F (36.8 °C)  Pulse:  [] 83  Resp:  [16] 16  SpO2:  [95 %-100 %] 98 %  BP: (138-180)/() 138/94     Weight: 60.2 kg (132 lb 11.5 oz)  Body mass index is 22.09 kg/m².    Physical Exam   Constitutional: She appears well-developed.   Thin, frail appearing.   HENT:   Head: Normocephalic and atraumatic.   Eyes: Pupils are equal, round, and reactive to light. Conjunctivae and EOM are normal.   Neck: Normal range of motion. Neck supple.   Cardiovascular: Normal rate, regular rhythm and intact distal pulses.   Pulmonary/Chest: Effort normal and breath sounds normal. No respiratory distress.   Abdominal: Soft. Bowel sounds are normal. She exhibits no distension.   Genitourinary:   Genitourinary Comments: deferred   Musculoskeletal: Normal range of motion. She exhibits edema (mild BLE).   Neurological: She is alert.   Oriented to person only.   Skin: Skin is warm and dry.   Psychiatric: She is slowed. Cognition and memory are impaired. She is attentive.   Vitals reviewed.        CRANIAL NERVES     CN III, IV, VI   Pupils are equal, round, and reactive to light.  Extraocular motions are normal.        Significant Labs:   CBC:   Recent Labs   Lab 11/19/19 2205   WBC 17.86*   HGB 14.2   HCT 42.8        CMP:   Recent Labs   Lab 11/19/19 2205   *   K 4.6   CL 99   CO2 25   *   BUN 17   CREATININE 1.1   CALCIUM 11.7*   PROT 8.1   ALBUMIN 3.6   BILITOT 0.7   ALKPHOS 102   AST 19   ALT 15   ANIONGAP 10   EGFRNONAA 47*     Lactic Acid:   Recent Labs   Lab 11/19/19 2205   LACTATE 1.9     Urine Studies:   Recent Labs   Lab 11/19/19 2132   COLORU Yellow   APPEARANCEUA Clear   PHUR 7.0   SPECGRAV 1.020   PROTEINUA Negative   GLUCUA Negative   KETONESU  Negative   BILIRUBINUA Negative   OCCULTUA Trace*   NITRITE Negative   UROBILINOGEN 1.0   LEUKOCYTESUR 1+*   RBCUA 3   WBCUA 35*   BACTERIA Many*   SQUAMEPITHEL 2   HYALINECASTS 8*

## 2019-11-20 NOTE — ASSESSMENT & PLAN NOTE
Chronic; appears to be controlled.  Last Hgb A1c was 5.7% 9 mos ago.  Hold chronic oral hypoglycemics for now.  Accuchecks ac/hs with SSI coverage as needed.  Resume chronic meds when clinically appropriate.

## 2019-11-20 NOTE — ASSESSMENT & PLAN NOTE
Chronic; does not appear to be taking chronic medications.    Fall/aspiration precautions.  Delirium precautions.

## 2019-11-21 LAB
ANION GAP SERPL CALC-SCNC: 7 MMOL/L (ref 8–16)
BASOPHILS # BLD AUTO: 0.03 K/UL (ref 0–0.2)
BASOPHILS NFR BLD: 0.4 % (ref 0–1.9)
BUN SERPL-MCNC: 9 MG/DL (ref 8–23)
CALCIUM SERPL-MCNC: 9.2 MG/DL (ref 8.7–10.5)
CHLORIDE SERPL-SCNC: 102 MMOL/L (ref 95–110)
CO2 SERPL-SCNC: 26 MMOL/L (ref 23–29)
CREAT SERPL-MCNC: 0.8 MG/DL (ref 0.5–1.4)
DIFFERENTIAL METHOD: ABNORMAL
EOSINOPHIL # BLD AUTO: 0.1 K/UL (ref 0–0.5)
EOSINOPHIL NFR BLD: 1 % (ref 0–8)
ERYTHROCYTE [DISTWIDTH] IN BLOOD BY AUTOMATED COUNT: 12.8 % (ref 11.5–14.5)
EST. GFR  (AFRICAN AMERICAN): >60 ML/MIN/1.73 M^2
EST. GFR  (NON AFRICAN AMERICAN): >60 ML/MIN/1.73 M^2
GLUCOSE SERPL-MCNC: 159 MG/DL (ref 70–110)
HCT VFR BLD AUTO: 40.8 % (ref 37–48.5)
HGB BLD-MCNC: 13.3 G/DL (ref 12–16)
IMM GRANULOCYTES # BLD AUTO: 0.03 K/UL (ref 0–0.04)
LYMPHOCYTES # BLD AUTO: 1.4 K/UL (ref 1–4.8)
LYMPHOCYTES NFR BLD: 16.2 % (ref 18–48)
MAGNESIUM SERPL-MCNC: 1.9 MG/DL (ref 1.6–2.6)
MCH RBC QN AUTO: 31.8 PG (ref 27–31)
MCHC RBC AUTO-ENTMCNC: 32.6 G/DL (ref 32–36)
MCV RBC AUTO: 98 FL (ref 82–98)
MONOCYTES # BLD AUTO: 0.8 K/UL (ref 0.3–1)
MONOCYTES NFR BLD: 9.8 % (ref 4–15)
NEUTROPHILS # BLD AUTO: 6.1 K/UL (ref 1.8–7.7)
NEUTROPHILS NFR BLD: 72.2 % (ref 38–73)
NRBC BLD-RTO: 0 /100 WBC
PHOSPHATE SERPL-MCNC: 1.7 MG/DL (ref 2.7–4.5)
PLATELET # BLD AUTO: 203 K/UL (ref 150–350)
PMV BLD AUTO: 8.8 FL (ref 9.2–12.9)
POCT GLUCOSE: 162 MG/DL (ref 70–110)
POCT GLUCOSE: 172 MG/DL (ref 70–110)
POCT GLUCOSE: 184 MG/DL (ref 70–110)
POCT GLUCOSE: 208 MG/DL (ref 70–110)
POCT GLUCOSE: 219 MG/DL (ref 70–110)
POTASSIUM SERPL-SCNC: 3.5 MMOL/L (ref 3.5–5.1)
RBC # BLD AUTO: 4.18 M/UL (ref 4–5.4)
SODIUM SERPL-SCNC: 135 MMOL/L (ref 136–145)
WBC # BLD AUTO: 8.41 K/UL (ref 3.9–12.7)

## 2019-11-21 PROCEDURE — 80048 BASIC METABOLIC PNL TOTAL CA: CPT

## 2019-11-21 PROCEDURE — G0378 HOSPITAL OBSERVATION PER HR: HCPCS

## 2019-11-21 PROCEDURE — 36415 COLL VENOUS BLD VENIPUNCTURE: CPT

## 2019-11-21 PROCEDURE — 63600175 PHARM REV CODE 636 W HCPCS: Performed by: NURSE PRACTITIONER

## 2019-11-21 PROCEDURE — 96376 TX/PRO/DX INJ SAME DRUG ADON: CPT

## 2019-11-21 PROCEDURE — 25000003 PHARM REV CODE 250: Performed by: INTERNAL MEDICINE

## 2019-11-21 PROCEDURE — 85025 COMPLETE CBC W/AUTO DIFF WBC: CPT

## 2019-11-21 PROCEDURE — 84100 ASSAY OF PHOSPHORUS: CPT

## 2019-11-21 PROCEDURE — 96372 THER/PROPH/DIAG INJ SC/IM: CPT | Mod: 59

## 2019-11-21 PROCEDURE — 96375 TX/PRO/DX INJ NEW DRUG ADDON: CPT

## 2019-11-21 PROCEDURE — 83735 ASSAY OF MAGNESIUM: CPT

## 2019-11-21 PROCEDURE — 97530 THERAPEUTIC ACTIVITIES: CPT

## 2019-11-21 PROCEDURE — 96361 HYDRATE IV INFUSION ADD-ON: CPT

## 2019-11-21 PROCEDURE — 25000003 PHARM REV CODE 250: Performed by: NURSE PRACTITIONER

## 2019-11-21 PROCEDURE — 63600175 PHARM REV CODE 636 W HCPCS: Performed by: INTERNAL MEDICINE

## 2019-11-21 PROCEDURE — 97116 GAIT TRAINING THERAPY: CPT

## 2019-11-21 PROCEDURE — 94761 N-INVAS EAR/PLS OXIMETRY MLT: CPT

## 2019-11-21 RX ADMIN — THERA TABS 1 TABLET: TAB at 08:11

## 2019-11-21 RX ADMIN — MIRTAZAPINE 15 MG: 15 TABLET, FILM COATED ORAL at 09:11

## 2019-11-21 RX ADMIN — SODIUM CHLORIDE: 0.9 INJECTION, SOLUTION INTRAVENOUS at 08:11

## 2019-11-21 RX ADMIN — ENOXAPARIN SODIUM 40 MG: 100 INJECTION SUBCUTANEOUS at 04:11

## 2019-11-21 RX ADMIN — POTASSIUM PHOSPHATE, MONOBASIC AND POTASSIUM PHOSPHATE, DIBASIC 20 MMOL: 224; 236 INJECTION, SOLUTION, CONCENTRATE INTRAVENOUS at 11:11

## 2019-11-21 RX ADMIN — PANTOPRAZOLE SODIUM 40 MG: 40 TABLET, DELAYED RELEASE ORAL at 08:11

## 2019-11-21 RX ADMIN — CEFTRIAXONE 1 G: 1 INJECTION, SOLUTION INTRAVENOUS at 09:11

## 2019-11-21 RX ADMIN — AMLODIPINE BESYLATE 10 MG: 5 TABLET ORAL at 08:11

## 2019-11-21 RX ADMIN — LEVOTHYROXINE SODIUM 125 MCG: 125 TABLET ORAL at 06:11

## 2019-11-21 RX ADMIN — INSULIN ASPART 2 UNITS: 100 INJECTION, SOLUTION INTRAVENOUS; SUBCUTANEOUS at 01:11

## 2019-11-21 RX ADMIN — ASPIRIN 325 MG ORAL TABLET 325 MG: 325 PILL ORAL at 08:11

## 2019-11-21 RX ADMIN — INSULIN ASPART 2 UNITS: 100 INJECTION, SOLUTION INTRAVENOUS; SUBCUTANEOUS at 04:11

## 2019-11-21 NOTE — PLAN OF CARE
Patient participated in gait training, therapeutic exercise and activities to improve functional mobility, improve ADL's and promote safe ambulation to decrease fall risk.

## 2019-11-21 NOTE — PLAN OF CARE
Pt resting with eyes closed. She reports pain 0/10 throughout shift. Medication given per MAR. Bed in lowest position with wheels locked, and side rails up x2. Safety maintained. Plan of care reviewed. Call light in reach. No further requests at this time. Will continue to monitor.

## 2019-11-21 NOTE — PT/OT/SLP PROGRESS
"Physical Therapy Treatment    Patient Name:  Nina Portillo   MRN:  [id number]    Recommendations:     Discharge Recommendations:  home   Discharge Equipment Recommendations: none   Barriers to discharge: None    Assessment:     Nina Portillo is a 79 y.o. female admitted with a medical diagnosis of Sepsis.  She presents with the following impairments/functional limitations:  weakness, gait instability, impaired cognition, decreased upper extremity function, impaired functional mobilty, impaired self care skills, impaired endurance. Patient found sitting on the EOB upon entering room. Her bed alarm was on and nurse was aware. She complained that she was wet although her gown felt dry upon pt standing her brief and bedding was soaked. Called for assistance in changing pt and preparing her for therapy. Patient ambulated 150' with RW and CGA with vc's for posture and control of RW. She has slight fwd flexed posture and decreased silvia. She requested to sit back on the EOB rather than in chair even though she was advised she may be more comfortable with back support. Continue with PT's POC.    Rehab Prognosis: Good; patient would benefit from acute skilled PT services to address these deficits and reach maximum level of function.    Recent Surgery: * No surgery found *      Plan:     During this hospitalization, patient to be seen 6 x/week to address the identified rehab impairments via gait training and progress toward the following goals:    · Plan of Care Expires:       Subjective     Chief Complaint: "I am wet."  Patient/Family Comments/goals: none stated.  Pain/Comfort:  · Pain Rating 1: 0/10  · Pain Rating Post-Intervention 1: 0/10      Objective:     Communicated with Adelaide Ramsey prior to session.  Patient found sitting on EOB with peripheral IV, telemetry upon PT entry to room.     General Precautions: Standard, fall   Orthopedic Precautions:N/A   Braces: N/A     Functional Mobility:  · Transfers:     · Sit " to Stand:  contact guard assistance with rolling walker  · Gait: 150' CGA, RW - very slow ambulater with decreased silvia and fwd flexed posture.      AM-PAC 6 CLICK MOBILITY          Therapeutic Activities and Exercises:   Transfers, gait training, changed gown.    Patient left sitting on EOB with all lines intact, call button in reach and nurses aid present..    GOALS:   Multidisciplinary Problems     Physical Therapy Goals        Problem: Physical Therapy Goal    Goal Priority Disciplines Outcome Goal Variances Interventions   Physical Therapy Goal     PT, PT/OT Ongoing, Progressing     Description:  Goals to be met by: discharge     Patient will increase functional independence with mobility by performin.. Supine to sit with Modified [place]  2.. Sit to supine with Modified [place]  3.. Sit to stand transfer with Modified [place]  4.. Bed to chair transfer with Modified [place]   5.  Gait  x 150 feet with Modified [place] using Single-point Cane .                       Time Tracking:     PT Received On: 19  PT Start Time: 1115     PT Stop Time: 1139  PT Total Time (min): 24 min     Billable Minutes: Gait Training 16 and Therapeutic Activity 8    Treatment Type: Treatment  PT/PTA: PTA     PTA Visit Number: 1     Chel Ramirez, PTA  2019

## 2019-11-21 NOTE — PT/OT/SLP EVAL
Physical Therapy Evaluation    Patient Name:  Nina Portillo   MRN:  [id number]    Recommendations:     Discharge Recommendations:  home   Discharge Equipment Recommendations: none   Barriers to discharge: None    Assessment:     Nina Portillo is a 79 y.o. female admitted with a medical diagnosis of Sepsis.  She presents with the following impairments/functional limitations:  weakness, gait instability, impaired cognition, decreased upper extremity function, impaired functional mobilty, impaired self care skills, impaired endurance ..    Rehab Prognosis: Good; patient would benefit from acute skilled PT services to address these deficits and reach maximum level of function.    Recent Surgery: * No surgery found *      Plan:     During this hospitalization, patient to be seen 6 x/week to address the identified rehab impairments via gait training, therapeutic activities, therapeutic exercises and progress toward the following goals:    · Plan of Care Expires:   12/12/19    Subjective     Chief Complaint: Patient had urine incontinence.  Patient/Family Comments/goals: to return home with daughter  Pain/Comfort:  · Pain Rating 1: 0/10    Patients cultural, spiritual, Jainism conflicts given the current situation: no    Living Environment:  No steps. Living with daughter and her   Prior to admission, patients level of function was modified Independent on cane.  Equipment used at home: cane, straight.  DME owned (not currently used): rolling walker, shower chair and wheelchair.  Upon discharge, patient will have assistance from daughter.    Objective:     Communicated with nurse Ramsey prior to session.  Patient found up in chair with telemetry, peripheral IV  upon PT entry to room.    General Precautions: Standard, fall   Orthopedic Precautions:N/A   Braces: N/A     Exams:  · Cognitive Exam:  Patient is oriented to Person and Situation  · Gross Motor Coordination:  slow  · RUE ROM: WFL except sh flex/abd  90 deg  · RUE Strength: WFL  · LUE ROM: WFL except sh flex/abd 100 deg  · LUE Strength: WFL  · RLE ROM: WFL  · RLE Strength: WFL  · LLE ROM: WFL  · LLE Strength: WFL    Functional Mobility:  · Bed Mobility:     · Supine to Sit: supervision  · Sit to Supine: supervision  · Transfers:     · Sit to Stand:  contact guard assistance with hand-held assist  · Bed to Chair: minimum assistance with  hand-held assist  using  Stand Pivot  · Gait: 80 ft on RW min A. Sloew silvia, small steps and decreased knee flexion on swing  · Balance: good sitting; fair standing static      Therapeutic Activities and Exercises:   OOb activity, sit to stand, Gt with RW min A with IV pole 80 ft.    AM-PAC 6 CLICK MOBILITY  Total Score:17     Patient left up in chair with chair alarm on and nurse Roxy notified.    GOALS:   Multidisciplinary Problems     Physical Therapy Goals        Problem: Physical Therapy Goal    Goal Priority Disciplines Outcome Goal Variances Interventions   Physical Therapy Goal     PT, PT/OT Ongoing, Progressing     Description:  Goals to be met by: discharge     Patient will increase functional independence with mobility by performin.. Supine to sit with Modified [place]  2.. Sit to supine with Modified [place]  3.. Sit to stand transfer with Modified [place]  4.. Bed to chair transfer with Modified [place]   5.  Gait  x 150 feet with Modified [place] using Single-point Cane .                       History:     Past Medical History:   Diagnosis Date    Alzheimer disease 2012    Cancer     renal cell. right    Dementia     Diabetes mellitus     GERD (gastroesophageal reflux disease)     Hyperlipidemia     Hypothyroid     Memory loss     Movement disorder        Past Surgical History:   Procedure Laterality Date    HYSTERECTOMY      JOINT REPLACEMENT      right    KNEE ARTHROSCOPY W/ DEBRIDEMENT      left    PARTIAL NEPHRECTOMY      right.     TONSILLECTOMY         Time  Tracking:     PT Received On: 11/20/19  PT Start Time: 1511     PT Stop Time: 1541  PT Total Time (min): 30 min     Billable Minutes: Evaluation 15 and Gait Training 15      Gianfranco Flores, PT  11/20/2019

## 2019-11-21 NOTE — PLAN OF CARE
11/20/19 1933   PRE-TX-O2   O2 Device (Oxygen Therapy) room air   SpO2 96 %   Pulse Oximetry Type Intermittent

## 2019-11-21 NOTE — PROGRESS NOTES
[facility] Hospital Medicine  Progress Note    Patient Name: Nina Portillo  MRN: [id number]  Patient Class: OP- Observation   Admission Date: 11/19/2019  Length of Stay: 0 days  Attending Physician: Reanna Martinez MD  Primary Care Provider: Rush Rosa MD        Subjective:     Principal Problem:Sepsis    HPI:  iNna Portillo is an 80 y/o female with a past medical history of HTN, alzheimer's disease, DM2, HLD, hypothyroidism and GERD who presented to the ED accompanied by her daughter with c/o increased weakness, decreased appetite and urinary incontinence.  Daughter reported that pt had similar symptoms in the past and was admitted into the hospital due to severe urinary tract infection.  While in the ED, pt was noted to have a UTI and meets sepsis criteria.  IV antibiotics were initiated.  She is placed in observation under the service of hospital medicine for further treatment.    Overview/Hospital Course:  No notes on file    Interval History:  Patient is afebrile.  Reporting persisting generalized weakness and lethargy.  No confusion present today.  Patient has baseline dementia.  No acute focal complaints.    Review of Systems   Unable to perform ROS: Dementia     Objective:     Vital Signs (Most Recent):  Temp: 98.5 °F (36.9 °C) (11/21/19 0711)  Pulse: 77 (11/21/19 0722)  Resp: 18 (11/21/19 0722)  BP: (!) 146/67 (11/21/19 0711)  SpO2: 97 % (11/21/19 0722) Vital Signs (24h Range):  Temp:  [97.7 °F (36.5 °C)-100.1 °F (37.8 °C)] 98.5 °F (36.9 °C)  Pulse:  [77-91] 77  Resp:  [16-20] 18  SpO2:  [95 %-98 %] 97 %  BP: (125-161)/(61-77) 146/67     Weight: 60.2 kg (132 lb 11.5 oz)  Body mass index is 22.09 kg/m².  No intake or output data in the 24 hours ending 11/21/19 0907   Physical Exam   Constitutional: She appears well-developed.   Thin, frail appearing.   HENT:   Head: Normocephalic and atraumatic.   Eyes: Pupils are equal, round, and reactive to light. Conjunctivae and EOM are normal.    Neck: Normal range of motion. Neck supple.   Cardiovascular: Normal rate, regular rhythm and intact distal pulses.   Pulmonary/Chest: Effort normal and breath sounds normal. No respiratory distress.   Abdominal: Soft. Bowel sounds are normal. She exhibits no distension.   Genitourinary:   Genitourinary Comments: deferred   Musculoskeletal: Normal range of motion. She exhibits edema (mild BLE).   Neurological: She is alert.   Oriented to person only.   Skin: Skin is warm and dry.   Psychiatric: She is slowed. Cognition and memory are impaired. She is attentive.   Vitals reviewed.    Significant Labs:   CBC:   Recent Labs   Lab 11/19/19 2205 11/20/19 0522 11/21/19 0555   WBC 17.86* 14.04* 8.41   HGB 14.2 13.0 13.3   HCT 42.8 39.6 40.8    207 203     CMP:   Recent Labs   Lab 11/19/19 2205 11/20/19 0522 11/21/19 0555   * 133* 135*   K 4.6 3.9 3.5   CL 99 100 102   CO2 25 23 26   * 169* 159*   BUN 17 15 9   CREATININE 1.1 0.8 0.8   CALCIUM 11.7* 10.2 9.2   PROT 8.1  --   --    ALBUMIN 3.6  --   --    BILITOT 0.7  --   --    ALKPHOS 102  --   --    AST 19  --   --    ALT 15  --   --    ANIONGAP 10 10 7*   EGFRNONAA 47* >60 >60     TSH:   Recent Labs   Lab 11/20/19 0522   TSH 27.685*     Urine Culture: No results for input(s): LABURIN in the last 48 hours.  Urine Studies:   Recent Labs   Lab 11/19/19 2132   COLORU Yellow   APPEARANCEUA Clear   PHUR 7.0   SPECGRAV 1.020   PROTEINUA Negative   GLUCUA Negative   KETONESU Negative   BILIRUBINUA Negative   OCCULTUA Trace*   NITRITE Negative   UROBILINOGEN 1.0   LEUKOCYTESUR 1+*   RBCUA 3   WBCUA 35*   BACTERIA Many*   SQUAMEPITHEL 2   HYALINECASTS 8*       Significant Imaging: None      Assessment/Plan:      * Sepsis  This patient does have evidence of infective focus  My overall impression is sepsis.  Antibiotics given-   Antibiotics (From admission, onward)    Start     Stop Route Frequency Ordered    11/20/19 2200  cefTRIAXone (ROCEPHIN) 1 g in  dextrose 5 % 50 mL IVPB      -- IV Every 24 hours (non-standard times) 11/20/19 0045          Latest lactate reviewed, they are-  Recent Labs   Lab 11/19/19 2205 11/20/19  0522   LACTATE 1.9 1.1       Organ dysfunction indicated by Acute kidney injury and Encephalopathy  Source-    Source control Achieved by- IV abx        KAL (acute kidney injury)  Mild, likely 2/2 IVVD.    Gentle IV hydration  Trend creat.  Baseline appears to be 0.7 and was 1.1 on presentation.  UA-+ UTI    Moderate malnutrition  Thin, frail in appearance.  Decreased po intake.  Consult nutrition.      Controlled type 2 diabetes mellitus with diabetic neuropathy, without long-term current use of insulin  Chronic; appears to be controlled.  Last Hgb A1c was 5.7% 9 mos ago.  Hold chronic oral hypoglycemics for now.  Accuchecks ac/hs with SSI coverage as needed.  Resume chronic meds when clinically appropriate.    Acute cystitis  IV rocephin  Follow culture    Alzheimer's disease  Chronic; does not appear to be taking chronic medications.    Fall/aspiration precautions.  Delirium precautions.      Hypothyroid  Chronic; check TSH.  Continue levothyroxine.      Hypertension associated with diabetes  Chronic; stable.  Continue chronic antihypertensives.  Monitor BP closely and treat as indicated for sustained control.       Continue physical therapy.  VTE Risk Mitigation (From admission, onward)         Ordered     enoxaparin injection 40 mg  Daily      11/20/19 0129     IP VTE HIGH RISK PATIENT  Once      11/20/19 0131     Place LEOBARDO hose  Until discontinued      11/20/19 0131     Place sequential compression device  Until discontinued      11/20/19 0131     Place LEOBARDO hose  Until discontinued      11/20/19 0129     Place sequential compression device  Until discontinued      11/20/19 0129                      Reanna Martinez MD  Department of Hospital Medicine   [facility]

## 2019-11-21 NOTE — SUBJECTIVE & OBJECTIVE
Interval History:  Patient is afebrile.  Reporting generalized weakness and lethargy.  No confusion present today.  Patient has baseline dementia.  No acute focal complaints.    Review of Systems   Unable to perform ROS: Dementia     Objective:     Vital Signs (Most Recent):  Temp: 98.5 °F (36.9 °C) (11/21/19 0711)  Pulse: 77 (11/21/19 0722)  Resp: 18 (11/21/19 0722)  BP: (!) 146/67 (11/21/19 0711)  SpO2: 97 % (11/21/19 0722) Vital Signs (24h Range):  Temp:  [97.7 °F (36.5 °C)-100.1 °F (37.8 °C)] 98.5 °F (36.9 °C)  Pulse:  [77-91] 77  Resp:  [16-20] 18  SpO2:  [95 %-98 %] 97 %  BP: (125-161)/(61-77) 146/67     Weight: 60.2 kg (132 lb 11.5 oz)  Body mass index is 22.09 kg/m².  No intake or output data in the 24 hours ending 11/21/19 0907   Physical Exam   Constitutional: She appears well-developed.   Thin, frail appearing.   HENT:   Head: Normocephalic and atraumatic.   Eyes: Pupils are equal, round, and reactive to light. Conjunctivae and EOM are normal.   Neck: Normal range of motion. Neck supple.   Cardiovascular: Normal rate, regular rhythm and intact distal pulses.   Pulmonary/Chest: Effort normal and breath sounds normal. No respiratory distress.   Abdominal: Soft. Bowel sounds are normal. She exhibits no distension.   Genitourinary:   Genitourinary Comments: deferred   Musculoskeletal: Normal range of motion. She exhibits edema (mild BLE).   Neurological: She is alert.   Oriented to person only.   Skin: Skin is warm and dry.   Psychiatric: She is slowed. Cognition and memory are impaired. She is attentive.   Vitals reviewed.    Significant Labs:   CBC:   Recent Labs   Lab 11/19/19 2205 11/20/19  0522 11/21/19  0555   WBC 17.86* 14.04* 8.41   HGB 14.2 13.0 13.3   HCT 42.8 39.6 40.8    207 203     CMP:   Recent Labs   Lab 11/19/19 2205 11/20/19  0522 11/21/19  0555   * 133* 135*   K 4.6 3.9 3.5   CL 99 100 102   CO2 25 23 26   * 169* 159*   BUN 17 15 9   CREATININE 1.1 0.8 0.8   CALCIUM  11.7* 10.2 9.2   PROT 8.1  --   --    ALBUMIN 3.6  --   --    BILITOT 0.7  --   --    ALKPHOS 102  --   --    AST 19  --   --    ALT 15  --   --    ANIONGAP 10 10 7*   EGFRNONAA 47* >60 >60     TSH:   Recent Labs   Lab 11/20/19  0522   TSH 27.685*     Urine Culture: No results for input(s): LABURIN in the last 48 hours.  Urine Studies:   Recent Labs   Lab 11/19/19  2132   COLORU Yellow   APPEARANCEUA Clear   PHUR 7.0   SPECGRAV 1.020   PROTEINUA Negative   GLUCUA Negative   KETONESU Negative   BILIRUBINUA Negative   OCCULTUA Trace*   NITRITE Negative   UROBILINOGEN 1.0   LEUKOCYTESUR 1+*   RBCUA 3   WBCUA 35*   BACTERIA Many*   SQUAMEPITHEL 2   HYALINECASTS 8*       Significant Imaging: None

## 2019-11-21 NOTE — PLAN OF CARE
POC reviewed with patient. Patient verbalizes understanding. Safety and tele maintained throughout shift. No complaints of pain noted. Call light within reach.

## 2019-11-21 NOTE — PLAN OF CARE
11/21/19 0722   Patient Assessment/Suction   Level of Consciousness (AVPU) alert   Respiratory Effort Unlabored   All Lung Fields Breath Sounds diminished   PRE-TX-O2   O2 Device (Oxygen Therapy) room air   SpO2 97 %   Pulse Oximetry Type Intermittent   $ Pulse Oximetry - Multiple Charge Pulse Oximetry - Multiple   Pulse 77   Resp 18   pt lying in bed resting

## 2019-11-22 LAB
ANION GAP SERPL CALC-SCNC: 8 MMOL/L (ref 8–16)
BASOPHILS # BLD AUTO: 0.03 K/UL (ref 0–0.2)
BASOPHILS NFR BLD: 0.4 % (ref 0–1.9)
BUN SERPL-MCNC: 13 MG/DL (ref 8–23)
CALCIUM SERPL-MCNC: 8.8 MG/DL (ref 8.7–10.5)
CHLORIDE SERPL-SCNC: 106 MMOL/L (ref 95–110)
CO2 SERPL-SCNC: 23 MMOL/L (ref 23–29)
CREAT SERPL-MCNC: 0.8 MG/DL (ref 0.5–1.4)
DIFFERENTIAL METHOD: ABNORMAL
EOSINOPHIL # BLD AUTO: 0.2 K/UL (ref 0–0.5)
EOSINOPHIL NFR BLD: 2.2 % (ref 0–8)
ERYTHROCYTE [DISTWIDTH] IN BLOOD BY AUTOMATED COUNT: 12.9 % (ref 11.5–14.5)
EST. GFR  (AFRICAN AMERICAN): >60 ML/MIN/1.73 M^2
EST. GFR  (NON AFRICAN AMERICAN): >60 ML/MIN/1.73 M^2
GLUCOSE SERPL-MCNC: 191 MG/DL (ref 70–110)
HCT VFR BLD AUTO: 37 % (ref 37–48.5)
HGB BLD-MCNC: 12.2 G/DL (ref 12–16)
IMM GRANULOCYTES # BLD AUTO: 0.03 K/UL (ref 0–0.04)
LYMPHOCYTES # BLD AUTO: 1.5 K/UL (ref 1–4.8)
LYMPHOCYTES NFR BLD: 20.6 % (ref 18–48)
MAGNESIUM SERPL-MCNC: 1.9 MG/DL (ref 1.6–2.6)
MCH RBC QN AUTO: 31.8 PG (ref 27–31)
MCHC RBC AUTO-ENTMCNC: 33 G/DL (ref 32–36)
MCV RBC AUTO: 96 FL (ref 82–98)
MONOCYTES # BLD AUTO: 0.9 K/UL (ref 0.3–1)
MONOCYTES NFR BLD: 12.8 % (ref 4–15)
NEUTROPHILS # BLD AUTO: 4.6 K/UL (ref 1.8–7.7)
NEUTROPHILS NFR BLD: 63.6 % (ref 38–73)
NRBC BLD-RTO: 0 /100 WBC
PHOSPHATE SERPL-MCNC: 1.6 MG/DL (ref 2.7–4.5)
PLATELET # BLD AUTO: 192 K/UL (ref 150–350)
PMV BLD AUTO: 9 FL (ref 9.2–12.9)
POCT GLUCOSE: 180 MG/DL (ref 70–110)
POCT GLUCOSE: 203 MG/DL (ref 70–110)
POCT GLUCOSE: 204 MG/DL (ref 70–110)
POCT GLUCOSE: 300 MG/DL (ref 70–110)
POTASSIUM SERPL-SCNC: 3.6 MMOL/L (ref 3.5–5.1)
RBC # BLD AUTO: 3.84 M/UL (ref 4–5.4)
SODIUM SERPL-SCNC: 137 MMOL/L (ref 136–145)
WBC # BLD AUTO: 7.18 K/UL (ref 3.9–12.7)

## 2019-11-22 PROCEDURE — 25000003 PHARM REV CODE 250: Performed by: INTERNAL MEDICINE

## 2019-11-22 PROCEDURE — 96372 THER/PROPH/DIAG INJ SC/IM: CPT | Mod: 59

## 2019-11-22 PROCEDURE — 80048 BASIC METABOLIC PNL TOTAL CA: CPT

## 2019-11-22 PROCEDURE — 63600175 PHARM REV CODE 636 W HCPCS: Performed by: NURSE PRACTITIONER

## 2019-11-22 PROCEDURE — 94761 N-INVAS EAR/PLS OXIMETRY MLT: CPT

## 2019-11-22 PROCEDURE — 63600175 PHARM REV CODE 636 W HCPCS: Performed by: INTERNAL MEDICINE

## 2019-11-22 PROCEDURE — 85025 COMPLETE CBC W/AUTO DIFF WBC: CPT

## 2019-11-22 PROCEDURE — 84100 ASSAY OF PHOSPHORUS: CPT

## 2019-11-22 PROCEDURE — G0378 HOSPITAL OBSERVATION PER HR: HCPCS

## 2019-11-22 PROCEDURE — 96376 TX/PRO/DX INJ SAME DRUG ADON: CPT

## 2019-11-22 PROCEDURE — 96361 HYDRATE IV INFUSION ADD-ON: CPT

## 2019-11-22 PROCEDURE — 83735 ASSAY OF MAGNESIUM: CPT

## 2019-11-22 PROCEDURE — 97116 GAIT TRAINING THERAPY: CPT | Mod: 59

## 2019-11-22 PROCEDURE — 25000003 PHARM REV CODE 250: Performed by: NURSE PRACTITIONER

## 2019-11-22 PROCEDURE — 97530 THERAPEUTIC ACTIVITIES: CPT

## 2019-11-22 PROCEDURE — 36415 COLL VENOUS BLD VENIPUNCTURE: CPT

## 2019-11-22 RX ADMIN — LEVOTHYROXINE SODIUM 125 MCG: 125 TABLET ORAL at 05:11

## 2019-11-22 RX ADMIN — SODIUM CHLORIDE: 0.9 INJECTION, SOLUTION INTRAVENOUS at 11:11

## 2019-11-22 RX ADMIN — INSULIN ASPART 2 UNITS: 100 INJECTION, SOLUTION INTRAVENOUS; SUBCUTANEOUS at 05:11

## 2019-11-22 RX ADMIN — AMLODIPINE BESYLATE 10 MG: 5 TABLET ORAL at 08:11

## 2019-11-22 RX ADMIN — POTASSIUM PHOSPHATE, MONOBASIC AND POTASSIUM PHOSPHATE, DIBASIC 30 MMOL: 224; 236 INJECTION, SOLUTION, CONCENTRATE INTRAVENOUS at 11:11

## 2019-11-22 RX ADMIN — CEFTRIAXONE 1 G: 1 INJECTION, SOLUTION INTRAVENOUS at 10:11

## 2019-11-22 RX ADMIN — INSULIN ASPART 1 UNITS: 100 INJECTION, SOLUTION INTRAVENOUS; SUBCUTANEOUS at 10:11

## 2019-11-22 RX ADMIN — PANTOPRAZOLE SODIUM 40 MG: 40 TABLET, DELAYED RELEASE ORAL at 08:11

## 2019-11-22 RX ADMIN — THERA TABS 1 TABLET: TAB at 08:11

## 2019-11-22 RX ADMIN — INSULIN ASPART 3 UNITS: 100 INJECTION, SOLUTION INTRAVENOUS; SUBCUTANEOUS at 05:11

## 2019-11-22 RX ADMIN — ASPIRIN 325 MG ORAL TABLET 325 MG: 325 PILL ORAL at 08:11

## 2019-11-22 RX ADMIN — MIRTAZAPINE 15 MG: 15 TABLET, FILM COATED ORAL at 08:11

## 2019-11-22 RX ADMIN — SODIUM CHLORIDE: 0.9 INJECTION, SOLUTION INTRAVENOUS at 02:11

## 2019-11-22 RX ADMIN — ENOXAPARIN SODIUM 40 MG: 100 INJECTION SUBCUTANEOUS at 05:11

## 2019-11-22 NOTE — PLAN OF CARE
Hospital follow up scheduled with PCP. AVS updated.        11/22/19 1001   Discharge Assessment   Assessment Type Discharge Planning Reassessment

## 2019-11-22 NOTE — SUBJECTIVE & OBJECTIVE
Interval History:  Patient is afebrile.  Feeling better and stronger.  Urine culture growing enterococcus faecalis species.  Patient has baseline dementia.  No acute focal complaints.    Review of Systems   Unable to perform ROS: Dementia     Objective:     Vital Signs (Most Recent):  Temp: 97.5 °F (36.4 °C) (11/22/19 1522)  Pulse: 86 (11/22/19 1522)  Resp: 19 (11/22/19 1522)  BP: (!) 152/70 (11/22/19 1522)  SpO2: 96 % (11/22/19 1522) Vital Signs (24h Range):  Temp:  [97 °F (36.1 °C)-98.8 °F (37.1 °C)] 97.5 °F (36.4 °C)  Pulse:  [76-87] 86  Resp:  [17-19] 19  SpO2:  [95 %-99 %] 96 %  BP: (134-163)/(60-83) 152/70     Weight: 64 kg (141 lb 1.5 oz)  Body mass index is 23.48 kg/m².    Intake/Output Summary (Last 24 hours) at 11/22/2019 1611  Last data filed at 11/22/2019 0800  Gross per 24 hour   Intake 480 ml   Output --   Net 480 ml      Physical Exam   Constitutional: She appears well-developed.   Thin, frail appearing.   HENT:   Head: Normocephalic and atraumatic.   Eyes: Pupils are equal, round, and reactive to light. Conjunctivae and EOM are normal.   Neck: Normal range of motion. Neck supple.   Cardiovascular: Normal rate, regular rhythm and intact distal pulses.   Pulmonary/Chest: Effort normal and breath sounds normal. No respiratory distress.   Abdominal: Soft. Bowel sounds are normal. She exhibits no distension.   Genitourinary:   Genitourinary Comments: deferred   Musculoskeletal: Normal range of motion. She exhibits edema (mild BLE).   Neurological: She is alert.   Oriented to person only.   Skin: Skin is warm and dry.   Psychiatric: She is slowed. Cognition and memory are impaired. She is attentive.   Vitals reviewed.    Significant Labs:   CBC:   Recent Labs   Lab 11/21/19  0555 11/22/19  0612   WBC 8.41 7.18   HGB 13.3 12.2   HCT 40.8 37.0    192     CMP:   Recent Labs   Lab 11/21/19  0555 11/22/19  0612   * 137   K 3.5 3.6    106   CO2 26 23   * 191*   BUN 9 13   CREATININE 0.8  0.8   CALCIUM 9.2 8.8   ANIONGAP 7* 8   EGFRNONAA >60 >60     TSH:   Recent Labs   Lab 11/20/19  0522   TSH 27.685*     Urine Culture: No results for input(s): LABURIN in the last 48 hours.  Urine Studies:   No results for input(s): COLORU, APPEARANCEUA, PHUR, SPECGRAV, PROTEINUA, GLUCUA, KETONESU, BILIRUBINUA, OCCULTUA, NITRITE, UROBILINOGEN, LEUKOCYTESUR, RBCUA, WBCUA, BACTERIA, SQUAMEPITHEL, HYALINECASTS in the last 48 hours.    Invalid input(s): NAOMI    Significant Imaging: None

## 2019-11-22 NOTE — PLAN OF CARE
11/21/19 2033   Patient Assessment/Suction   Level of Consciousness (AVPU) alert   PRE-TX-O2   O2 Device (Oxygen Therapy) room air   SpO2 96 %   Pulse Oximetry Type Intermittent

## 2019-11-22 NOTE — PLAN OF CARE
Plan of care reviewed with pt. KARLOS throughout shift. Diabetic diet. Continue to Monitor Labs. VSS. Continuous telemetry monitor maintained. Safety maintained. Will continue to monitor. No complaints at this time.

## 2019-11-22 NOTE — PT/OT/SLP PROGRESS
"Occupational Therapy  Treatment    Nina Portillo   MRN: [id number]   Admitting Diagnosis: Sepsis    OT Date of Treatment: 11/22/19   OT Start Time: 1115  OT Stop Time: 1139  OT Total Time (min): 24 min    Billable Minutes:  Therapeutic Activity 24    General Precautions: Standard, fall  Orthopedic Precautions: N/A  Braces: N/A         Subjective:  Communicated with nursing (Rin) prior to session.    Pain/Comfort  Pain Rating 1: 0/10    Objective:  Patient found with: peripheral IV, telemetry, patient was sitting up in bed with an active IV line running.  She requested to remain in bed for session, denied need  to do grooming this visit.  Discussed fall prevention.     Functional Mobility:  Patient did self adjust higher up in bed for BUE activites. (Supervision)    Therapeutic Activities and Exercises:  BUE arm raises ( to gravity) complete with 2-3 sec holds x 20 reps, Elbow flex/ext done x 20 reps with short rest breaks needed.    AM-PAC 6 CLICK ADL   How much help from another person does this patient currently need?   1 = Unable, Total/Dependent Assistance  2 = A lot, Maximum/Moderate Assistance  3 = A little, Minimum/Contact Guard/Supervision  4 = None, Modified [place]/Independent          AM-PAC Raw Score CMS "G-Code Modifier Level of Impairment Assistance   6 % Total / Unable   7 - 8 CM 80 - 100% Maximal Assist   9-13 CL 60 - 80% Moderate Assist   14 - 19 CK 40 - 60% Moderate Assist   20 - 22 CJ 20 - 40% Minimal Assist   23 CI 1-20% SBA / CGA   24 CH 0% Independent/ Mod I       Patient left HOB elevated with all lines intact, call button in reach, bed alarm on and nursing notified    ASSESSMENT:  Nina Potrillo is a 79 y.o. female with a medical diagnosis of Sepsis and presents with decreased activity endurance and BUE arm strength.    Rehab potential is excellent.    Activity tolerance: Fair    Discharge recommendations: Discharge Facility/Level of Care Needs: nursing facility, skilled "     Barriers to discharge:      Equipment recommendations:       GOALS:   Multidisciplinary Problems     Occupational Therapy Goals        Problem: Occupational Therapy Goal    Goal Priority Disciplines Outcome Interventions   Occupational Therapy Goal     OT, PT/OT Ongoing, Progressing    Description:  Goals to be met by: 12/6/19     Patient will increase functional independence with ADLs by performing:    LE Dressing with Modified [place].  Grooming while standing with Modified [place].  Toileting from toilet with Modified [place] for hygiene and clothing management.   Toilet transfer to toilet with Modified [place] with AD/DME as needed.                      Plan:  Patient to be seen 2 x/week to address the above listed problems via self-care/home management, therapeutic exercises, therapeutic groups  Plan of Care expires: 12/06/19  Plan of Care reviewed with: patient         PARIS Cardona  11/22/2019

## 2019-11-22 NOTE — PLAN OF CARE
Problem: Physical Therapy Goal  Goal: Physical Therapy Goal  Description  Goals to be met by: discharge     Patient will increase functional independence with mobility by performin.. Supine to sit with Modified [place]  2.. Sit to supine with Modified [place]  3.. Sit to stand transfer with Modified [place]  4.. Bed to chair transfer with Modified [place]   5.  Gait  x 150 feet with Modified [place] using Single-point Cane .      Outcome: Ongoing, Progressing   Ambulate with rw to improve mobility and increase endurance.

## 2019-11-22 NOTE — PT/OT/SLP PROGRESS
Physical Therapy Treatment    Patient Name:  Nina Portillo   MRN:  [id number]    Recommendations:     Discharge Recommendations:  home   Discharge Equipment Recommendations: none   Barriers to discharge: None    Assessment:     Nina Portillo is a 79 y.o. female admitted with a medical diagnosis of Sepsis.  She presents with the following impairments/functional limitations:  weakness, impaired endurance, impaired functional mobilty, gait instability .Tolerated treatment well. Ambulated with rw and assistance for safety. Required encouragement to participate due to having just returned to bed with nurse.    Rehab Prognosis: Good; patient would benefit from acute skilled PT services to address these deficits and reach maximum level of function.    Recent Surgery: * No surgery found *      Plan:     During this hospitalization, patient to be seen 6 x/week to address the identified rehab impairments via gait training and progress toward the following goals:    · Plan of Care Expires:       Subjective     Chief Complaint: tired  Patient/Family Comments/goals: to return home  Pain/Comfort:  · Pain Rating 1: 0/10      Objective:     Communicated with nurse Gar prior to session.  Patient found supine with telemetry, peripheral IV upon PT entry to room.     General Precautions: Standard, fall   Orthopedic Precautions:N/A   Braces: N/A     Functional Mobility:  · Bed Mobility:     · Rolling Left:  stand by assistance  · Rolling Right: stand by assistance  · Supine to Sit: contact guard assistance  · Sit to Supine: contact guard assistance  · Transfers:     · Sit to Stand:  contact guard assistance with rolling walker  · Gait: 150' with rw and CGA.      AM-PAC 6 CLICK MOBILITY          Therapeutic Activities and Exercises:   Returned to bed with CGA.    Patient left supine with all lines intact, call button in reach, bed alarm on and nurse Gar notified..    GOALS:   Multidisciplinary Problems     Physical  Therapy Goals        Problem: Physical Therapy Goal    Goal Priority Disciplines Outcome Goal Variances Interventions   Physical Therapy Goal     PT, PT/OT Ongoing, Progressing     Description:  Goals to be met by: discharge     Patient will increase functional independence with mobility by performin.. Supine to sit with Modified [place]  2.. Sit to supine with Modified [place]  3.. Sit to stand transfer with Modified [place]  4.. Bed to chair transfer with Modified [place]   5.  Gait  x 150 feet with Modified [place] using Single-point Cane .                       Time Tracking:     PT Received On: 19  PT Start Time: 1111     PT Stop Time: 1120  PT Total Time (min): 9 min     Billable Minutes: Gait Training 9min    Treatment Type: Treatment  PT/PTA: PTA     PTA Visit Number: 2     Edith Tomlinson PTA  2019

## 2019-11-22 NOTE — PLAN OF CARE
I sent the pts HH order and HH packet to [employer] to assign a HH provider. CM following. Linda Trejo, CEDRIC     11/22/19 1246   Post-Acute Status   Post-Acute Authorization Home Health/Hospice   Home Health/Hospice Status Referrals Sent

## 2019-11-22 NOTE — PLAN OF CARE
Pt resting with eyes closed. She reports pain 0/10 throughout shift. Medication administered per MAR. VS and tele monitored. Bed in lowest position with wheels locked, and side rails up x2. Safety maintained. Plan of care reviewed. Call light in reach. No further requests at this time. Will continue to monitor.

## 2019-11-22 NOTE — PLAN OF CARE
Per Jodi with PHN- the pt is set up with [facility] . I updated the pts AVS and sent the referral via [facility] . PTs discharge destination booked. Linda Trejo, RENETTAW     11/22/19 2243   Post-Acute Status   Post-Acute Authorization Home Health/Hospice   Home Health/Hospice Status Set-up Complete

## 2019-11-22 NOTE — ASSESSMENT & PLAN NOTE
IV rocephin  Follow culture.  Urine culture growing enterococcus faecalis species.  Final sensitivity results are pending.

## 2019-11-22 NOTE — PROGRESS NOTES
[facility] Hospital Medicine  Progress Note    Patient Name: Nina Portillo  MRN: [id number]  Patient Class: OP- Observation   Admission Date: 11/19/2019  Length of Stay: 0 days  Attending Physician: Reanna Martinez MD  Primary Care Provider: Rush Rosa MD        Subjective:     Principal Problem:Sepsis        HPI:  Nina Poritllo is an 80 y/o female with a past medical history of HTN, alzheimer's disease, DM2, HLD, hypothyroidism and GERD who presented to the ED accompanied by her daughter with c/o increased weakness, decreased appetite and urinary incontinence.  Daughter reported that pt had similar symptoms in the past and was admitted into the hospital due to severe urinary tract infection.  While in the ED, pt was noted to have a UTI and meets sepsis criteria.  IV antibiotics were initiated.  She is placed in observation under the service of hospital medicine for further treatment.    Overview/Hospital Course:  No notes on file    Interval History:  Patient is afebrile.  Feeling better and stronger.  Urine culture growing enterococcus faecalis species.  Patient has baseline dementia.  No acute focal complaints.    Review of Systems   Unable to perform ROS: Dementia     Objective:     Vital Signs (Most Recent):  Temp: 97.5 °F (36.4 °C) (11/22/19 1522)  Pulse: 86 (11/22/19 1522)  Resp: 19 (11/22/19 1522)  BP: (!) 152/70 (11/22/19 1522)  SpO2: 96 % (11/22/19 1522) Vital Signs (24h Range):  Temp:  [97 °F (36.1 °C)-98.8 °F (37.1 °C)] 97.5 °F (36.4 °C)  Pulse:  [76-87] 86  Resp:  [17-19] 19  SpO2:  [95 %-99 %] 96 %  BP: (134-163)/(60-83) 152/70     Weight: 64 kg (141 lb 1.5 oz)  Body mass index is 23.48 kg/m².    Intake/Output Summary (Last 24 hours) at 11/22/2019 1611  Last data filed at 11/22/2019 0800  Gross per 24 hour   Intake 480 ml   Output --   Net 480 ml      Physical Exam   Constitutional: She appears well-developed.   Thin, frail appearing.   HENT:   Head: Normocephalic and  atraumatic.   Eyes: Pupils are equal, round, and reactive to light. Conjunctivae and EOM are normal.   Neck: Normal range of motion. Neck supple.   Cardiovascular: Normal rate, regular rhythm and intact distal pulses.   Pulmonary/Chest: Effort normal and breath sounds normal. No respiratory distress.   Abdominal: Soft. Bowel sounds are normal. She exhibits no distension.   Genitourinary:   Genitourinary Comments: deferred   Musculoskeletal: Normal range of motion. She exhibits edema (mild BLE).   Neurological: She is alert.   Oriented to person only.   Skin: Skin is warm and dry.   Psychiatric: She is slowed. Cognition and memory are impaired. She is attentive.   Vitals reviewed.    Significant Labs:   CBC:   Recent Labs   Lab 11/21/19 0555 11/22/19 0612   WBC 8.41 7.18   HGB 13.3 12.2   HCT 40.8 37.0    192     CMP:   Recent Labs   Lab 11/21/19 0555 11/22/19 0612   * 137   K 3.5 3.6    106   CO2 26 23   * 191*   BUN 9 13   CREATININE 0.8 0.8   CALCIUM 9.2 8.8   ANIONGAP 7* 8   EGFRNONAA >60 >60     TSH:   Recent Labs   Lab 11/20/19  0522   TSH 27.685*     Urine Culture: No results for input(s): LABURIN in the last 48 hours.  Urine Studies:   No results for input(s): COLORU, APPEARANCEUA, PHUR, SPECGRAV, PROTEINUA, GLUCUA, KETONESU, BILIRUBINUA, OCCULTUA, NITRITE, UROBILINOGEN, LEUKOCYTESUR, RBCUA, WBCUA, BACTERIA, SQUAMEPITHEL, HYALINECASTS in the last 48 hours.    Invalid input(s): WRIGHTSUR    Significant Imaging: None      Assessment/Plan:      * Sepsis  This patient does have evidence of infective focus  My overall impression is sepsis.  Antibiotics given-   Antibiotics (From admission, onward)    Start     Stop Route Frequency Ordered    11/20/19 2200  cefTRIAXone (ROCEPHIN) 1 g in dextrose 5 % 50 mL IVPB      -- IV Every 24 hours (non-standard times) 11/20/19 0045          Latest lactate reviewed, they are-  Recent Labs   Lab 11/19/19 2205 11/20/19  0522   LACTATE 1.9 1.1        Organ dysfunction indicated by Acute kidney injury and Encephalopathy  Source-    Source control Achieved by- IV abx        KAL (acute kidney injury)  Mild, likely 2/2 IVVD.    Gentle IV hydration  Trend creat.  Baseline appears to be 0.7 and was 1.1 on presentation.  UA-+ UTI    Moderate malnutrition  Thin, frail in appearance.  Decreased po intake.  Consult nutrition.      Controlled type 2 diabetes mellitus with diabetic neuropathy, without long-term current use of insulin  Chronic; appears to be controlled.  Last Hgb A1c was 5.7% 9 mos ago.  Hold chronic oral hypoglycemics for now.  Accuchecks ac/hs with SSI coverage as needed.  Resume chronic meds when clinically appropriate.    Acute cystitis  IV rocephin  Follow culture.  Urine culture growing enterococcus faecalis species.  Final sensitivity results are pending.    Alzheimer's disease  Chronic; does not appear to be taking chronic medications.    Fall/aspiration precautions.  Delirium precautions.      Hypothyroid  Chronic; check TSH.  Continue levothyroxine.      Hypertension associated with diabetes  Chronic; stable.  Continue chronic antihypertensives.  Monitor BP closely and treat as indicated for sustained control.       Continue physical therapy.  DC home in a.m. once final urine culture results are available.  Patient would require home health and home physical therapy.    VTE Risk Mitigation (From admission, onward)         Ordered     enoxaparin injection 40 mg  Daily      11/20/19 0129     IP VTE HIGH RISK PATIENT  Once      11/20/19 0131     Place LEOBARDO hose  Until discontinued      11/20/19 0131     Place sequential compression device  Until discontinued      11/20/19 0131     Place LEOBARDO hose  Until discontinued      11/20/19 0129     Place sequential compression device  Until discontinued      11/20/19 0129                      Reanna Martinez MD  Department of Hospital Medicine   [facility]

## 2019-11-23 LAB
ANION GAP SERPL CALC-SCNC: 9 MMOL/L (ref 8–16)
BACTERIA UR CULT: ABNORMAL
BASOPHILS # BLD AUTO: 0.03 K/UL (ref 0–0.2)
BASOPHILS NFR BLD: 0.4 % (ref 0–1.9)
BUN SERPL-MCNC: 9 MG/DL (ref 8–23)
CALCIUM SERPL-MCNC: 9.2 MG/DL (ref 8.7–10.5)
CHLORIDE SERPL-SCNC: 106 MMOL/L (ref 95–110)
CO2 SERPL-SCNC: 21 MMOL/L (ref 23–29)
CREAT SERPL-MCNC: 0.8 MG/DL (ref 0.5–1.4)
DIFFERENTIAL METHOD: ABNORMAL
EOSINOPHIL # BLD AUTO: 0.3 K/UL (ref 0–0.5)
EOSINOPHIL NFR BLD: 3 % (ref 0–8)
ERYTHROCYTE [DISTWIDTH] IN BLOOD BY AUTOMATED COUNT: 12.8 % (ref 11.5–14.5)
EST. GFR  (AFRICAN AMERICAN): >60 ML/MIN/1.73 M^2
EST. GFR  (NON AFRICAN AMERICAN): >60 ML/MIN/1.73 M^2
GLUCOSE SERPL-MCNC: 186 MG/DL (ref 70–110)
HCT VFR BLD AUTO: 38.7 % (ref 37–48.5)
HGB BLD-MCNC: 12.8 G/DL (ref 12–16)
IMM GRANULOCYTES # BLD AUTO: 0.02 K/UL (ref 0–0.04)
LYMPHOCYTES # BLD AUTO: 1.7 K/UL (ref 1–4.8)
LYMPHOCYTES NFR BLD: 20 % (ref 18–48)
MAGNESIUM SERPL-MCNC: 2 MG/DL (ref 1.6–2.6)
MCH RBC QN AUTO: 32 PG (ref 27–31)
MCHC RBC AUTO-ENTMCNC: 33.1 G/DL (ref 32–36)
MCV RBC AUTO: 97 FL (ref 82–98)
MONOCYTES # BLD AUTO: 0.9 K/UL (ref 0.3–1)
MONOCYTES NFR BLD: 10.3 % (ref 4–15)
NEUTROPHILS # BLD AUTO: 5.4 K/UL (ref 1.8–7.7)
NEUTROPHILS NFR BLD: 66.1 % (ref 38–73)
NRBC BLD-RTO: 0 /100 WBC
PHOSPHATE SERPL-MCNC: 1.9 MG/DL (ref 2.7–4.5)
PLATELET # BLD AUTO: 206 K/UL (ref 150–350)
PMV BLD AUTO: 8.6 FL (ref 9.2–12.9)
POCT GLUCOSE: 164 MG/DL (ref 70–110)
POCT GLUCOSE: 195 MG/DL (ref 70–110)
POCT GLUCOSE: 209 MG/DL (ref 70–110)
POCT GLUCOSE: 245 MG/DL (ref 70–110)
POTASSIUM SERPL-SCNC: 4.2 MMOL/L (ref 3.5–5.1)
RBC # BLD AUTO: 4 M/UL (ref 4–5.4)
SODIUM SERPL-SCNC: 136 MMOL/L (ref 136–145)
WBC # BLD AUTO: 8.24 K/UL (ref 3.9–12.7)

## 2019-11-23 PROCEDURE — 84100 ASSAY OF PHOSPHORUS: CPT

## 2019-11-23 PROCEDURE — 85025 COMPLETE CBC W/AUTO DIFF WBC: CPT

## 2019-11-23 PROCEDURE — 96361 HYDRATE IV INFUSION ADD-ON: CPT

## 2019-11-23 PROCEDURE — 25000003 PHARM REV CODE 250: Performed by: NURSE PRACTITIONER

## 2019-11-23 PROCEDURE — 94761 N-INVAS EAR/PLS OXIMETRY MLT: CPT

## 2019-11-23 PROCEDURE — 25000003 PHARM REV CODE 250: Performed by: INTERNAL MEDICINE

## 2019-11-23 PROCEDURE — 63600175 PHARM REV CODE 636 W HCPCS: Performed by: NURSE PRACTITIONER

## 2019-11-23 PROCEDURE — G0378 HOSPITAL OBSERVATION PER HR: HCPCS

## 2019-11-23 PROCEDURE — 25000003 PHARM REV CODE 250: Performed by: FAMILY MEDICINE

## 2019-11-23 PROCEDURE — 96372 THER/PROPH/DIAG INJ SC/IM: CPT | Mod: 59

## 2019-11-23 PROCEDURE — 80048 BASIC METABOLIC PNL TOTAL CA: CPT

## 2019-11-23 PROCEDURE — 83735 ASSAY OF MAGNESIUM: CPT

## 2019-11-23 PROCEDURE — 36415 COLL VENOUS BLD VENIPUNCTURE: CPT

## 2019-11-23 RX ORDER — NITROFURANTOIN 25; 75 MG/1; MG/1
100 CAPSULE ORAL EVERY 12 HOURS
Status: DISCONTINUED | OUTPATIENT
Start: 2019-11-23 | End: 2019-11-24 | Stop reason: HOSPADM

## 2019-11-23 RX ORDER — NITROFURANTOIN 25; 75 MG/1; MG/1
100 CAPSULE ORAL EVERY 12 HOURS
Status: DISCONTINUED | OUTPATIENT
Start: 2019-11-23 | End: 2019-11-23

## 2019-11-23 RX ADMIN — ASPIRIN 325 MG ORAL TABLET 325 MG: 325 PILL ORAL at 09:11

## 2019-11-23 RX ADMIN — NITROFURANTOIN MONOHYDRATE/MACROCRYSTALLINE 100 MG: 25; 75 CAPSULE ORAL at 04:11

## 2019-11-23 RX ADMIN — MIRTAZAPINE 15 MG: 15 TABLET, FILM COATED ORAL at 08:11

## 2019-11-23 RX ADMIN — THERA TABS 1 TABLET: TAB at 09:11

## 2019-11-23 RX ADMIN — SODIUM CHLORIDE: 0.9 INJECTION, SOLUTION INTRAVENOUS at 05:11

## 2019-11-23 RX ADMIN — INSULIN ASPART 2 UNITS: 100 INJECTION, SOLUTION INTRAVENOUS; SUBCUTANEOUS at 12:11

## 2019-11-23 RX ADMIN — LEVOTHYROXINE SODIUM 125 MCG: 125 TABLET ORAL at 05:11

## 2019-11-23 RX ADMIN — AMLODIPINE BESYLATE 10 MG: 5 TABLET ORAL at 09:11

## 2019-11-23 RX ADMIN — PANTOPRAZOLE SODIUM 40 MG: 40 TABLET, DELAYED RELEASE ORAL at 09:11

## 2019-11-23 NOTE — PT/OT/SLP PROGRESS
Physical Therapy      Patient Name:  Nina Portillo   MRN:  [id number]    Patient not seen today secondary to pt eating lunch  . Will follow-up 11/24/19.    Arlen Forman, PTA

## 2019-11-23 NOTE — PLAN OF CARE
Plans to discharge pt this afternoon. Pt is already set up with [facility] care for home health.        11/23/19 1537   Discharge Assessment   Assessment Type Discharge Planning Reassessment

## 2019-11-23 NOTE — PLAN OF CARE
11/22/19 2013   Patient Assessment/Suction   Level of Consciousness (AVPU) alert   PRE-TX-O2   O2 Device (Oxygen Therapy) room air   SpO2 97 %   Pulse Oximetry Type Intermittent

## 2019-11-23 NOTE — PLAN OF CARE
Maintain glycemic control,monitor lab results,administer antibiotics per MAR,discharge home once urine culture results are available,NAD noted,safety maintained,bed low with wheels locked,call light in reach,will continue to monitor.

## 2019-11-24 VITALS
DIASTOLIC BLOOD PRESSURE: 74 MMHG | BODY MASS INDEX: 23.52 KG/M2 | WEIGHT: 141.13 LBS | SYSTOLIC BLOOD PRESSURE: 147 MMHG | RESPIRATION RATE: 18 BRPM | HEIGHT: 65 IN | TEMPERATURE: 97 F | OXYGEN SATURATION: 91 % | HEART RATE: 66 BPM

## 2019-11-24 PROBLEM — E44.0 MODERATE MALNUTRITION: Status: RESOLVED | Noted: 2019-02-18 | Resolved: 2019-11-24

## 2019-11-24 PROBLEM — N17.9 AKI (ACUTE KIDNEY INJURY): Status: RESOLVED | Noted: 2019-11-20 | Resolved: 2019-11-24

## 2019-11-24 PROBLEM — A41.9 SEPSIS: Status: RESOLVED | Noted: 2019-11-20 | Resolved: 2019-11-24

## 2019-11-24 PROBLEM — N30.00 ACUTE CYSTITIS: Status: RESOLVED | Noted: 2017-03-24 | Resolved: 2019-11-24

## 2019-11-24 LAB
ANION GAP SERPL CALC-SCNC: 10 MMOL/L (ref 8–16)
BASOPHILS # BLD AUTO: 0.04 K/UL (ref 0–0.2)
BASOPHILS NFR BLD: 0.5 % (ref 0–1.9)
BUN SERPL-MCNC: 12 MG/DL (ref 8–23)
CALCIUM SERPL-MCNC: 9.8 MG/DL (ref 8.7–10.5)
CHLORIDE SERPL-SCNC: 106 MMOL/L (ref 95–110)
CO2 SERPL-SCNC: 20 MMOL/L (ref 23–29)
CREAT SERPL-MCNC: 0.7 MG/DL (ref 0.5–1.4)
DIFFERENTIAL METHOD: ABNORMAL
EOSINOPHIL # BLD AUTO: 0.3 K/UL (ref 0–0.5)
EOSINOPHIL NFR BLD: 3.8 % (ref 0–8)
ERYTHROCYTE [DISTWIDTH] IN BLOOD BY AUTOMATED COUNT: 12.9 % (ref 11.5–14.5)
EST. GFR  (AFRICAN AMERICAN): >60 ML/MIN/1.73 M^2
EST. GFR  (NON AFRICAN AMERICAN): >60 ML/MIN/1.73 M^2
GLUCOSE SERPL-MCNC: 195 MG/DL (ref 70–110)
HCT VFR BLD AUTO: 39.5 % (ref 37–48.5)
HGB BLD-MCNC: 13 G/DL (ref 12–16)
IMM GRANULOCYTES # BLD AUTO: 0.03 K/UL (ref 0–0.04)
LYMPHOCYTES # BLD AUTO: 1.5 K/UL (ref 1–4.8)
LYMPHOCYTES NFR BLD: 18.6 % (ref 18–48)
MAGNESIUM SERPL-MCNC: 2.1 MG/DL (ref 1.6–2.6)
MCH RBC QN AUTO: 31.7 PG (ref 27–31)
MCHC RBC AUTO-ENTMCNC: 32.9 G/DL (ref 32–36)
MCV RBC AUTO: 96 FL (ref 82–98)
MONOCYTES # BLD AUTO: 0.7 K/UL (ref 0.3–1)
MONOCYTES NFR BLD: 8.9 % (ref 4–15)
NEUTROPHILS # BLD AUTO: 5.5 K/UL (ref 1.8–7.7)
NEUTROPHILS NFR BLD: 67.8 % (ref 38–73)
NRBC BLD-RTO: 0 /100 WBC
PHOSPHATE SERPL-MCNC: 2.4 MG/DL (ref 2.7–4.5)
PLATELET # BLD AUTO: 236 K/UL (ref 150–350)
PMV BLD AUTO: 8.9 FL (ref 9.2–12.9)
POCT GLUCOSE: 172 MG/DL (ref 70–110)
POCT GLUCOSE: 223 MG/DL (ref 70–110)
POTASSIUM SERPL-SCNC: 4.6 MMOL/L (ref 3.5–5.1)
RBC # BLD AUTO: 4.1 M/UL (ref 4–5.4)
SODIUM SERPL-SCNC: 136 MMOL/L (ref 136–145)
WBC # BLD AUTO: 8.11 K/UL (ref 3.9–12.7)

## 2019-11-24 PROCEDURE — G0378 HOSPITAL OBSERVATION PER HR: HCPCS

## 2019-11-24 PROCEDURE — 36415 COLL VENOUS BLD VENIPUNCTURE: CPT

## 2019-11-24 PROCEDURE — 97535 SELF CARE MNGMENT TRAINING: CPT

## 2019-11-24 PROCEDURE — 25000003 PHARM REV CODE 250: Performed by: FAMILY MEDICINE

## 2019-11-24 PROCEDURE — 80048 BASIC METABOLIC PNL TOTAL CA: CPT

## 2019-11-24 PROCEDURE — 25000003 PHARM REV CODE 250: Performed by: NURSE PRACTITIONER

## 2019-11-24 PROCEDURE — 96361 HYDRATE IV INFUSION ADD-ON: CPT

## 2019-11-24 PROCEDURE — 84100 ASSAY OF PHOSPHORUS: CPT

## 2019-11-24 PROCEDURE — 97530 THERAPEUTIC ACTIVITIES: CPT

## 2019-11-24 PROCEDURE — 83735 ASSAY OF MAGNESIUM: CPT

## 2019-11-24 PROCEDURE — 94761 N-INVAS EAR/PLS OXIMETRY MLT: CPT

## 2019-11-24 PROCEDURE — 25000003 PHARM REV CODE 250: Performed by: INTERNAL MEDICINE

## 2019-11-24 PROCEDURE — 96372 THER/PROPH/DIAG INJ SC/IM: CPT | Mod: 59

## 2019-11-24 PROCEDURE — 85025 COMPLETE CBC W/AUTO DIFF WBC: CPT

## 2019-11-24 PROCEDURE — 63600175 PHARM REV CODE 636 W HCPCS: Performed by: NURSE PRACTITIONER

## 2019-11-24 RX ORDER — NITROFURANTOIN 25; 75 MG/1; MG/1
100 CAPSULE ORAL EVERY 12 HOURS
Qty: 6 CAPSULE | Refills: 0 | Status: SHIPPED | OUTPATIENT
Start: 2019-11-25 | End: 2019-11-28

## 2019-11-24 RX ORDER — NITROFURANTOIN 25; 75 MG/1; MG/1
100 CAPSULE ORAL ONCE
Status: COMPLETED | OUTPATIENT
Start: 2019-11-24 | End: 2019-11-24

## 2019-11-24 RX ADMIN — AMLODIPINE BESYLATE 10 MG: 5 TABLET ORAL at 08:11

## 2019-11-24 RX ADMIN — NITROFURANTOIN MONOHYDRATE/MACROCRYSTALLINE 100 MG: 25; 75 CAPSULE ORAL at 08:11

## 2019-11-24 RX ADMIN — LEVOTHYROXINE SODIUM 125 MCG: 125 TABLET ORAL at 05:11

## 2019-11-24 RX ADMIN — INSULIN ASPART 2 UNITS: 100 INJECTION, SOLUTION INTRAVENOUS; SUBCUTANEOUS at 11:11

## 2019-11-24 RX ADMIN — PANTOPRAZOLE SODIUM 40 MG: 40 TABLET, DELAYED RELEASE ORAL at 08:11

## 2019-11-24 RX ADMIN — ASPIRIN 325 MG ORAL TABLET 325 MG: 325 PILL ORAL at 08:11

## 2019-11-24 RX ADMIN — THERA TABS 1 TABLET: TAB at 08:11

## 2019-11-24 RX ADMIN — SODIUM CHLORIDE: 0.9 INJECTION, SOLUTION INTRAVENOUS at 05:11

## 2019-11-24 RX ADMIN — NITROFURANTOIN MONOHYDRATE/MACROCRYSTALLINE 100 MG: 25; 75 CAPSULE ORAL at 02:11

## 2019-11-24 NOTE — DISCHARGE SUMMARY
[facility] Hospital Medicine  Discharge Summary      Patient Name: Nina Portillo  MRN: [id number]  Admission Date: 11/19/2019  Hospital Length of Stay: 0 days  Discharge Date and Time:  11/24/2019 2:06 PM  Attending Physician: Reanna Martinez MD   Discharging Provider: Yoana Majano MD  Primary Care Provider: Rush Rosa MD      HPI:   Nina Portillo is an 78 y/o female with a past medical history of HTN, alzheimer's disease, DM2, HLD, hypothyroidism and GERD who presented to the ED accompanied by her daughter with c/o increased weakness, decreased appetite and urinary incontinence.  Daughter reported that pt had similar symptoms in the past and was admitted into the hospital due to severe urinary tract infection.  While in the ED, pt was noted to have a UTI and meets sepsis criteria.  IV antibiotics were initiated.  She is placed in observation under the service of hospital medicine for further treatment.    * No surgery found *      Hospital Course:   Patient admitted to the [facility] inpatient services. Given iv abx and ivf which improved symptoms and kidney function. Urine culture grew out e. Faecalis with sensitivity to Macrobid. Patient tolerated Macrobid while in hospital. Reached maximum benefit of hospitalization and was discharged in a stable condition.      Consults:   Consults (From admission, onward)        Status Ordering Provider     Case Management/  Once     Provider:  (Not yet assigned)    Completed NINA KOTHARI     Inpatient consult to Registered Dietitian/Nutritionist  Once     Provider:  (Not yet assigned)    Completed NINA KOTHARI          No new Assessment & Plan notes have been filed under this hospital service since the last note was generated.  Service: Hospital Medicine    Final Active Diagnoses:    Diagnosis Date Noted POA    Controlled type 2 diabetes mellitus with diabetic neuropathy, without long-term current use of  insulin [E11.40] 02/16/2019 Yes    Alzheimer's disease [G30.9, F02.80] 03/24/2014 Yes    Hypothyroid [E03.9] 05/31/2012 Yes    Hypertension associated with diabetes [E11.59, I10] 05/31/2012 Yes      Problems Resolved During this Admission:    Diagnosis Date Noted Date Resolved POA    PRINCIPAL PROBLEM:  Sepsis [A41.9] 11/20/2019 11/24/2019 Yes    KAL (acute kidney injury) [N17.9] 11/20/2019 11/24/2019 Yes    Moderate malnutrition [E44.0] 02/18/2019 11/24/2019 Yes    Acute cystitis [N30.00] 03/24/2017 11/24/2019 Yes       Discharged Condition: good    Disposition: Home or Self Care    Follow Up:  Follow-up Information     [facility].    Specialty:  Home Health Services  Why:  Home Health  Contact information:  [address] [facility] [address]  [phone]             Rush Rosa MD In 1 week.    Specialties:  Family Medicine, Internal Medicine  Why:  hospital f/u  Contact information:  [address] [facility] [address]  [phone]                 Patient Instructions:      Ambulatory referral to Home Health   Referral Priority: Routine Referral Type: Home Health   Referral Reason: Specialty Services Required   Requested Specialty: Home Health Services   Number of Visits Requested: 1     Diet Adult Regular     Activity as tolerated       Significant Diagnostic Studies:  Urine culture:    Enterococcus faecalis     Antibiotic Interpretation Value Comment    Ampicillin Sensitive <=2 mcg/mL     Nitrofurantoin Sensitive <=32 mcg/mL     Tetracycline Sensitive <=4 mcg/mL     Vancomycin Sensitive 2 mcg/mL             Pending Diagnostic Studies:     None         Medications:  Reconciled Home Medications:      Medication List      START taking these medications    nitrofurantoin (macrocrystal-monohydrate) 100 MG capsule  Commonly known as:  MACROBID  Take 1 capsule (100 mg total) by mouth every 12 (twelve) hours. for 3 days  Start taking on:  November 25, 2019        CONTINUE taking these  medications    amLODIPine 5 MG tablet  Commonly known as:  NORVASC  Take 2 tablets (10 mg total) by mouth once daily.     aspirin 325 MG tablet  Take 325 mg by mouth once daily.     CRANBERRY EXTRACT ORAL  Take 1 capsule by mouth once daily at 6am.     * levothyroxine 88 MCG tablet  Commonly known as:  SYNTHROID  1 tab po qod even days with 100 mcg. po Tab odd days     * levothyroxine 100 MCG tablet  Commonly known as:  SYNTHROID  1 tab po qod odd days with 88 mcg. Po on even days.     metFORMIN 500 MG 24 hr tablet  Commonly known as:  GLUCOPHAGE-XR  Take 1 tablet (500 mg total) by mouth after dinner.     mirtazapine 15 MG tablet  Commonly known as:  REMERON  Take 1 tablet (15 mg total) by mouth every evening.     MULTI VITAMIN ORAL  Take 1 tablet by mouth once daily.     pantoprazole 40 MG tablet  Commonly known as:  PROTONIX  Take 1 tablet (40 mg total) by mouth once daily.         * This list has 2 medication(s) that are the same as other medications prescribed for you. Read the directions carefully, and ask your doctor or other care provider to review them with you.                Indwelling Lines/Drains at time of discharge:   Lines/Drains/Airways     None                 Time spent on the discharge of patient: 30 minutes  Patient was seen and examined on the date of discharge and determined to be suitable for discharge.         Yoana Majano MD  Department of Hospital Medicine  [facility]

## 2019-11-24 NOTE — HOSPITAL COURSE
Patient admitted to the [facility] inpatient services. Given iv abx and ivf which improved symptoms and kidney function. Urine culture grew out e. Faecalis with sensitivity to Macrobid. Patient tolerated Macrobid while in hospital. Reached maximum benefit of hospitalization and was discharged in a stable condition.

## 2019-11-24 NOTE — NURSING
Discharged patient per MD order. Educated patient and daughter about medications and when to make her follow up appointments. Gave patient last dose of antibiotic for the day. Removed patients IV and tele. Was taken by Aminah via wheelchair to her daughter's vehicle outside.

## 2019-11-24 NOTE — PT/OT/SLP PROGRESS
Occupational Therapy  Treatment    Nina Portillo   MRN: [id number]   Admitting Diagnosis: Sepsis    OT Date of Treatment: 11/24/19   OT Start Time: 1310  OT Stop Time: 1342  OT Total Time (min): 32 min    Billable Minutes:  Self Care/Home Management 15 and Therapeutic Activity 17    General Precautions: Standard, fall  Orthopedic Precautions: Full weight bearing  Braces: N/A      Subjective:  Communicated with nursing (Jody) prior to / post session.    Pain/Comfort  Pain Rating 1: 0/10    Objective:  Patient found with: peripheral IV, telemetry Patient was found supine in bed, under the covers/sleeping, all lights off.  Upon waking patient and stating what this session was going to include, she seem to stall, then ask for nursing to change her adult diaper.  With coaxing and after she was changed, I was able to get her to agree with getting up out of bed and sitting at a bedside chair for grooming/feeding (lunch), where she remained after my session,nursing alerted.         Functional Mobility:  Bed Mobility:    Rolling -supervision    Supine to sitting at EOB- supervision/contact guard/ verbal cues to ensure safety and proper hand/feet placement.  Transfers:    EOB to standing, then ambulating 6 feet with a rolling walker to a bedside chair- SBA/close supervision.    Activities of Daily Living:   Dressing- Supervision with donning grippy socks while at EOB, before standing  Feeding    Set-up for self feeding- no assistance needed for feeding    Grooming- Set-up and supervision for face/hand washing, then hair combing.    Balance:   Static Sit: GOOD-: Takes MODERATE challenges from all directions but inconsistently  Dynamic Sit: GOOD-: Incosistently Maintains balance through MODERATE excursions of active trunk movement,     Dynamic stand: FAIR+: Needs CLOSE SUPERVISION during gait and is able to right self with minor LOB        Patient left up in chair with all lines intact, call button in reach and nursing  notified    ASSESSMENT:  Nina Portillo is a 79 y.o. female with a medical diagnosis of Sepsis and presents with decreased BUE strength/ overall signs of debility.    Rehab identified problem list/impairments: Rehab identified problem list/impairments: weakness, gait instability, decreased upper extremity function, decreased lower extremity function, decreased safety awareness, impaired self care skills    Rehab potential is excellent.    Activity tolerance: Good    Discharge recommendations: Discharge Facility/Level of Care Needs: nursing facility, skilled, rehabilitation facility     Barriers to discharge: Barriers to Discharge: Decreased caregiver support      GOALS:   Multidisciplinary Problems     Occupational Therapy Goals     Not on file          Multidisciplinary Problems (Resolved)        Problem: Occupational Therapy Goal    Goal Priority Disciplines Outcome Interventions   Occupational Therapy Goal   (Resolved)     OT, PT/OT Met    Description:  Goals to be met by: 12/6/19     Patient will increase functional independence with ADLs by performing:    LE Dressing with Modified [place].  Grooming while standing with Modified [place].  Toileting from toilet with Modified [place] for hygiene and clothing management.   Toilet transfer to toilet with Modified [place] with AD/DME as needed.                      Plan:  Patient to be seen 2 x/week to address the above listed problems via self-care/home management, therapeutic exercises, therapeutic activities, wheelchair management/training  Plan of Care expires: 12/06/19  Plan of Care reviewed with: patient         PARIS Cardona  11/24/2019

## 2019-11-24 NOTE — PLAN OF CARE
11/23/19 2017   Patient Assessment/Suction   Level of Consciousness (AVPU) alert   PRE-TX-O2   O2 Device (Oxygen Therapy) room air   SpO2 98 %   Pulse Oximetry Type Intermittent

## 2019-11-24 NOTE — PLAN OF CARE
11/24/19 1439   Final Note   Assessment Type Final Discharge Note   Anticipated Discharge Disposition Home-Health

## 2019-11-24 NOTE — SUBJECTIVE & OBJECTIVE
Interval History: Patient resting in bed with daughter. Has no complaints.     Review of Systems   Constitutional: Negative for chills and fever.   Respiratory: Negative for cough.    Cardiovascular: Negative for chest pain.   Gastrointestinal: Negative for abdominal pain.   Genitourinary: Negative for dysuria.   Skin: Negative for rash.   Neurological: Negative for headaches.     Objective:     Vital Signs (Most Recent):  Temp: 97.4 °F (36.3 °C) (11/23/19 1650)  Pulse: 78 (11/23/19 1650)  Resp: 16 (11/23/19 0758)  BP: 126/76 (11/23/19 1650)  SpO2: 97 % (11/23/19 1650) Vital Signs (24h Range):  Temp:  [97.4 °F (36.3 °C)-98.7 °F (37.1 °C)] 97.4 °F (36.3 °C)  Pulse:  [78-95] 78  Resp:  [16-18] 16  SpO2:  [96 %-98 %] 97 %  BP: (126-171)/(76-86) 126/76     Weight: 64 kg (141 lb 1.5 oz)  Body mass index is 23.48 kg/m².    Intake/Output Summary (Last 24 hours) at 11/23/2019 1849  Last data filed at 11/23/2019 1302  Gross per 24 hour   Intake 628 ml   Output --   Net 628 ml      Physical Exam   Constitutional: She appears well-developed and well-nourished.   HENT:   Head: Normocephalic and atraumatic.   Eyes: Conjunctivae are normal.   Cardiovascular: Normal rate, regular rhythm and normal heart sounds.   Pulmonary/Chest: Effort normal and breath sounds normal.   Abdominal: Soft. Bowel sounds are normal.   Skin: Skin is warm.   Psychiatric: She has a normal mood and affect.       Significant Labs:       Recent Lab Results       11/23/19  1607   11/23/19  1158   11/23/19  0556   11/23/19  0538   11/22/19  2234        Anion Gap     9         Baso #     0.03         Basophil%     0.4         BUN, Bld     9         Calcium     9.2         Chloride     106         CO2     21         Creatinine     0.8         Differential Method     Automated         eGFR if      >60         eGFR if non      >60  Comment:  Calculation used to obtain the estimated glomerular filtration  rate (eGFR) is the CKD-EPI  equation.            Eos #     0.3         Eosinophil%     3.0         Glucose     186         Gran # (ANC)     5.4         Gran%     66.1         Hematocrit     38.7         Hemoglobin     12.8         Immature Grans (Abs)     0.02  Comment:  Mild elevation in immature granulocytes is non specific and   can be seen in a variety of conditions including stress response,   acute inflammation, trauma and pregnancy. Correlation with other   laboratory and clinical findings is essential.           Lymph #     1.7         Lymph%     20.0         Magnesium     2.0         MCH     32.0         MCHC     33.1         MCV     97         Mono #     0.9         Mono%     10.3         MPV     8.6         nRBC     0         Phosphorus     1.9         Platelets     206         POCT Glucose 209 245   164 203     Potassium     4.2         RBC     4.00         RDW     12.8         Sodium     136         WBC     8.24

## 2019-11-24 NOTE — ASSESSMENT & PLAN NOTE
Improved  Antibiotics given-   Antibiotics (From admission, onward)    Start     Stop Route Frequency Ordered    11/23/19 1648  nitrofurantoin (macrocrystal-monohydrate) 100 MG capsule 100 mg      -- Oral Every 12 hours 11/23/19 1141

## 2019-11-24 NOTE — PLAN OF CARE
Maintain glycemic control,monitor lab results,administer antibiotics per MAR,NAD noted,safety maintained,bed low with wheels locked,call light in reach,will continue to monitor.

## 2019-11-24 NOTE — ASSESSMENT & PLAN NOTE
Chronic; appears to be controlled.  Last Hgb A1c was 5.7% 9 mos ago.  Hold chronic oral hyperglycemics for now.  Accuchecks ac/hs with SSI coverage as needed.  Resume chronic meds when clinically appropriate.

## 2019-11-24 NOTE — PT/OT/SLP PROGRESS
Physical Therapy      Patient Name:  Nina Portillo   MRN:  [id number]    Patient not seen today secondary to receiving OT treatment. Will follow-up on 11/25/19.    Terry Huerta, PT

## 2019-11-24 NOTE — PROGRESS NOTES
[facility] Hospital Medicine  Progress Note    Patient Name: Nina Portillo  MRN: [id number]  Patient Class: OP- Observation   Admission Date: 11/19/2019  Length of Stay: 0 days  Attending Physician: Reanna Martinez MD  Primary Care Provider: Rush Rosa MD        Subjective:     Principal Problem:Sepsis        HPI:  Nina Portillo is an 80 y/o female with a past medical history of HTN, alzheimer's disease, DM2, HLD, hypothyroidism and GERD who presented to the ED accompanied by her daughter with c/o increased weakness, decreased appetite and urinary incontinence.  Daughter reported that pt had similar symptoms in the past and was admitted into the hospital due to severe urinary tract infection.  While in the ED, pt was noted to have a UTI and meets sepsis criteria.  IV antibiotics were initiated.  She is placed in observation under the service of hospital medicine for further treatment.    Overview/Hospital Course:  No notes on file    Interval History: Patient resting in bed with daughter. Has no complaints.     Review of Systems   Constitutional: Negative for chills and fever.   Respiratory: Negative for cough.    Cardiovascular: Negative for chest pain.   Gastrointestinal: Negative for abdominal pain.   Genitourinary: Negative for dysuria.   Skin: Negative for rash.   Neurological: Negative for headaches.     Objective:     Vital Signs (Most Recent):  Temp: 97.4 °F (36.3 °C) (11/23/19 1650)  Pulse: 78 (11/23/19 1650)  Resp: 16 (11/23/19 0758)  BP: 126/76 (11/23/19 1650)  SpO2: 97 % (11/23/19 1650) Vital Signs (24h Range):  Temp:  [97.4 °F (36.3 °C)-98.7 °F (37.1 °C)] 97.4 °F (36.3 °C)  Pulse:  [78-95] 78  Resp:  [16-18] 16  SpO2:  [96 %-98 %] 97 %  BP: (126-171)/(76-86) 126/76     Weight: 64 kg (141 lb 1.5 oz)  Body mass index is 23.48 kg/m².    Intake/Output Summary (Last 24 hours) at 11/23/2019 1849  Last data filed at 11/23/2019 1302  Gross per 24 hour   Intake 628 ml   Output --   Net  628 ml      Physical Exam   Constitutional: She appears well-developed and well-nourished.   HENT:   Head: Normocephalic and atraumatic.   Eyes: Conjunctivae are normal.   Cardiovascular: Normal rate, regular rhythm and normal heart sounds.   Pulmonary/Chest: Effort normal and breath sounds normal.   Abdominal: Soft. Bowel sounds are normal.   Skin: Skin is warm.   Psychiatric: She has a normal mood and affect.       Significant Labs:       Recent Lab Results       11/23/19  1607   11/23/19  1158   11/23/19  0556   11/23/19  0538   11/22/19  2234        Anion Gap     9         Baso #     0.03         Basophil%     0.4         BUN, Bld     9         Calcium     9.2         Chloride     106         CO2     21         Creatinine     0.8         Differential Method     Automated         eGFR if      >60         eGFR if non      >60  Comment:  Calculation used to obtain the estimated glomerular filtration  rate (eGFR) is the CKD-EPI equation.            Eos #     0.3         Eosinophil%     3.0         Glucose     186         Gran # (ANC)     5.4         Gran%     66.1         Hematocrit     38.7         Hemoglobin     12.8         Immature Grans (Abs)     0.02  Comment:  Mild elevation in immature granulocytes is non specific and   can be seen in a variety of conditions including stress response,   acute inflammation, trauma and pregnancy. Correlation with other   laboratory and clinical findings is essential.           Lymph #     1.7         Lymph%     20.0         Magnesium     2.0         MCH     32.0         MCHC     33.1         MCV     97         Mono #     0.9         Mono%     10.3         MPV     8.6         nRBC     0         Phosphorus     1.9         Platelets     206         POCT Glucose 209 245   164 203     Potassium     4.2         RBC     4.00         RDW     12.8         Sodium     136         WBC     8.24                              Assessment/Plan:      *  Sepsis  Improved  Antibiotics given-   Antibiotics (From admission, onward)    Start     Stop Route Frequency Ordered    11/23/19 1645  nitrofurantoin (macrocrystal-monohydrate) 100 MG capsule 100 mg      -- Oral Every 12 hours 11/23/19 1641            KAL (acute kidney injury)  Resolved    Moderate malnutrition  Thin, frail in appearance.  Decreased po intake.  Consult nutrition.      Controlled type 2 diabetes mellitus with diabetic neuropathy, without long-term current use of insulin  Chronic; appears to be controlled.  Last Hgb A1c was 5.7% 9 mos ago.  Hold chronic oral hyperglycemics for now.  Accuchecks ac/hs with SSI coverage as needed.  Resume chronic meds when clinically appropriate.    Acute cystitis  Will try macrobid. Patient states that she vomited once with this medication so will watch overnight to see how she reacts to it.     Alzheimer's disease  Chronic; does not appear to be taking chronic medications.    Fall/aspiration precautions.  Delirium precautions.      Hypothyroid  Chronic; TSH elevated. Possibly due to illness?  Continue levothyroxine.      Hypertension associated with diabetes  Chronic; stable.  Continue chronic antihypertensives.  Monitor BP closely and treat as indicated for sustained control.        VTE Risk Mitigation (From admission, onward)         Ordered     enoxaparin injection 40 mg  Daily      11/20/19 0129     IP VTE HIGH RISK PATIENT  Once      11/20/19 0131     Place LEOBARDO hose  Until discontinued      11/20/19 0131     Place sequential compression device  Until discontinued      11/20/19 0131     Place LEOBARDO hose  Until discontinued      11/20/19 0129     Place sequential compression device  Until discontinued      11/20/19 0129                      Yoana Majano MD  Department of Hospital Medicine   [facility]

## 2019-11-24 NOTE — ASSESSMENT & PLAN NOTE
Will try macrobid. Patient states that she vomited once with this medication so will watch overnight to see how she reacts to it.

## 2019-11-25 LAB
BACTERIA BLD CULT: NORMAL
BACTERIA BLD CULT: NORMAL

## 2019-11-25 PROCEDURE — G0180 PR HOME HEALTH MD CERTIFICATION: ICD-10-PCS | Mod: ,,, | Performed by: INTERNAL MEDICINE

## 2019-11-25 PROCEDURE — G0180 MD CERTIFICATION HHA PATIENT: HCPCS | Mod: ,,, | Performed by: INTERNAL MEDICINE

## 2019-12-03 ENCOUNTER — TELEPHONE (OUTPATIENT)
Dept: HOME HEALTH SERVICES | Facility: HOSPITAL | Age: 79
End: 2019-12-03

## 2019-12-10 ENCOUNTER — LAB VISIT (OUTPATIENT)
Dept: LAB | Facility: HOSPITAL | Age: 79
End: 2019-12-10
Attending: INTERNAL MEDICINE
Payer: MEDICARE

## 2019-12-10 DIAGNOSIS — N39.0 URINARY TRACT INFECTION, SITE NOT SPECIFIED: Primary | ICD-10-CM

## 2019-12-10 LAB
BACTERIA #/AREA URNS HPF: ABNORMAL /HPF
BILIRUB UR QL STRIP: NEGATIVE
CLARITY UR: ABNORMAL
COLOR UR: YELLOW
GLUCOSE UR QL STRIP: NEGATIVE
HGB UR QL STRIP: NEGATIVE
HYALINE CASTS #/AREA URNS LPF: 23 /LPF
KETONES UR QL STRIP: NEGATIVE
LEUKOCYTE ESTERASE UR QL STRIP: ABNORMAL
MICROSCOPIC COMMENT: ABNORMAL
NITRITE UR QL STRIP: NEGATIVE
PH UR STRIP: 6 [PH] (ref 5–8)
PROT UR QL STRIP: ABNORMAL
RBC #/AREA URNS HPF: 5 /HPF (ref 0–4)
SP GR UR STRIP: 1.02 (ref 1–1.03)
SQUAMOUS #/AREA URNS HPF: 3 /HPF
URN SPEC COLLECT METH UR: ABNORMAL
UROBILINOGEN UR STRIP-ACNC: ABNORMAL EU/DL
WBC #/AREA URNS HPF: 35 /HPF (ref 0–5)

## 2019-12-10 PROCEDURE — 81001 URINALYSIS AUTO W/SCOPE: CPT

## 2019-12-13 ENCOUNTER — TELEPHONE (OUTPATIENT)
Dept: HOME HEALTH SERVICES | Facility: HOSPITAL | Age: 79
End: 2019-12-13

## 2019-12-23 ENCOUNTER — EXTERNAL HOME HEALTH (OUTPATIENT)
Dept: HOME HEALTH SERVICES | Facility: HOSPITAL | Age: 79
End: 2019-12-23
Payer: MEDICARE

## 2020-01-10 ENCOUNTER — APPOINTMENT (OUTPATIENT)
Dept: LAB | Facility: HOSPITAL | Age: 80
End: 2020-01-10
Attending: NURSE PRACTITIONER
Payer: MEDICARE

## 2020-01-10 ENCOUNTER — OFFICE VISIT (OUTPATIENT)
Dept: FAMILY MEDICINE | Facility: CLINIC | Age: 80
End: 2020-01-10
Payer: MEDICARE

## 2020-01-10 ENCOUNTER — TELEPHONE (OUTPATIENT)
Dept: HOME HEALTH SERVICES | Facility: HOSPITAL | Age: 80
End: 2020-01-10

## 2020-01-10 VITALS
HEIGHT: 65 IN | TEMPERATURE: 98 F | HEART RATE: 84 BPM | BODY MASS INDEX: 22.44 KG/M2 | SYSTOLIC BLOOD PRESSURE: 118 MMHG | DIASTOLIC BLOOD PRESSURE: 70 MMHG | OXYGEN SATURATION: 98 % | WEIGHT: 134.69 LBS | RESPIRATION RATE: 20 BRPM

## 2020-01-10 DIAGNOSIS — I15.2 HYPERTENSION ASSOCIATED WITH DIABETES: ICD-10-CM

## 2020-01-10 DIAGNOSIS — F33.0 MAJOR DEPRESSIVE DISORDER, RECURRENT EPISODE, MILD: ICD-10-CM

## 2020-01-10 DIAGNOSIS — E11.42 CONTROLLED TYPE 2 DIABETES MELLITUS WITH DIABETIC POLYNEUROPATHY, WITHOUT LONG-TERM CURRENT USE OF INSULIN: ICD-10-CM

## 2020-01-10 DIAGNOSIS — E03.9 HYPOTHYROIDISM, UNSPECIFIED TYPE: ICD-10-CM

## 2020-01-10 DIAGNOSIS — N30.00 ACUTE CYSTITIS WITHOUT HEMATURIA: Primary | ICD-10-CM

## 2020-01-10 DIAGNOSIS — I73.9 PAD (PERIPHERAL ARTERY DISEASE): ICD-10-CM

## 2020-01-10 DIAGNOSIS — E11.59 HYPERTENSION ASSOCIATED WITH DIABETES: ICD-10-CM

## 2020-01-10 PROBLEM — R65.20 SEVERE SEPSIS: Status: RESOLVED | Noted: 2019-02-15 | Resolved: 2020-01-10

## 2020-01-10 PROBLEM — A41.9 SEVERE SEPSIS: Status: RESOLVED | Noted: 2019-02-15 | Resolved: 2020-01-10

## 2020-01-10 LAB
ALBUMIN SERPL BCP-MCNC: 3.6 G/DL (ref 3.5–5.2)
ALP SERPL-CCNC: 123 U/L (ref 55–135)
ALT SERPL W/O P-5'-P-CCNC: 18 U/L (ref 10–44)
ANION GAP SERPL CALC-SCNC: 9 MMOL/L (ref 8–16)
AST SERPL-CCNC: 18 U/L (ref 10–40)
BILIRUB SERPL-MCNC: 0.4 MG/DL (ref 0.1–1)
BILIRUB SERPL-MCNC: NEGATIVE MG/DL
BLOOD URINE, POC: NEGATIVE
BUN SERPL-MCNC: 18 MG/DL (ref 8–23)
CALCIUM SERPL-MCNC: 10.4 MG/DL (ref 8.7–10.5)
CHLORIDE SERPL-SCNC: 102 MMOL/L (ref 95–110)
CO2 SERPL-SCNC: 24 MMOL/L (ref 23–29)
COLOR, POC UA: YELLOW
CREAT SERPL-MCNC: 1 MG/DL (ref 0.5–1.4)
EST. GFR  (AFRICAN AMERICAN): >60 ML/MIN/1.73 M^2
EST. GFR  (NON AFRICAN AMERICAN): 53 ML/MIN/1.73 M^2
GLUCOSE SERPL-MCNC: 173 MG/DL (ref 70–110)
GLUCOSE UR QL STRIP: 250
KETONES UR QL STRIP: NEGATIVE
LEUKOCYTE ESTERASE URINE, POC: ABNORMAL
NITRITE, POC UA: NEGATIVE
PH, POC UA: 5
POTASSIUM SERPL-SCNC: 4.6 MMOL/L (ref 3.5–5.1)
PROT SERPL-MCNC: 7.8 G/DL (ref 6–8.4)
PROTEIN, POC: ABNORMAL
SODIUM SERPL-SCNC: 135 MMOL/L (ref 136–145)
SPECIFIC GRAVITY, POC UA: 1.02
T4 FREE SERPL-MCNC: 1.09 NG/DL (ref 0.71–1.51)
TSH SERPL DL<=0.005 MIU/L-ACNC: 15.81 UIU/ML (ref 0.4–4)
UROBILINOGEN, POC UA: NORMAL

## 2020-01-10 PROCEDURE — 81002 POCT URINE DIPSTICK WITHOUT MICROSCOPE: ICD-10-PCS | Mod: S$GLB,,, | Performed by: NURSE PRACTITIONER

## 2020-01-10 PROCEDURE — 82043 UR ALBUMIN QUANTITATIVE: CPT

## 2020-01-10 PROCEDURE — 87086 URINE CULTURE/COLONY COUNT: CPT

## 2020-01-10 PROCEDURE — 84439 ASSAY OF FREE THYROXINE: CPT

## 2020-01-10 PROCEDURE — 99214 OFFICE O/P EST MOD 30 MIN: CPT | Mod: 25,S$GLB,, | Performed by: NURSE PRACTITIONER

## 2020-01-10 PROCEDURE — 84443 ASSAY THYROID STIM HORMONE: CPT

## 2020-01-10 PROCEDURE — 87077 CULTURE AEROBIC IDENTIFY: CPT

## 2020-01-10 PROCEDURE — 99214 PR OFFICE/OUTPT VISIT, EST, LEVL IV, 30-39 MIN: ICD-10-PCS | Mod: 25,S$GLB,, | Performed by: NURSE PRACTITIONER

## 2020-01-10 PROCEDURE — 83036 HEMOGLOBIN GLYCOSYLATED A1C: CPT

## 2020-01-10 PROCEDURE — 81002 URINALYSIS NONAUTO W/O SCOPE: CPT | Mod: S$GLB,,, | Performed by: NURSE PRACTITIONER

## 2020-01-10 PROCEDURE — 87088 URINE BACTERIA CULTURE: CPT

## 2020-01-10 PROCEDURE — 80053 COMPREHEN METABOLIC PANEL: CPT

## 2020-01-10 PROCEDURE — 87186 SC STD MICRODIL/AGAR DIL: CPT

## 2020-01-10 RX ORDER — ASPIRIN 81 MG/1
81 TABLET ORAL DAILY
COMMUNITY

## 2020-01-10 RX ORDER — METFORMIN HYDROCHLORIDE 500 MG/1
1000 TABLET, EXTENDED RELEASE ORAL
Qty: 180 TABLET | Refills: 3 | Status: ON HOLD | OUTPATIENT
Start: 2020-01-10 | End: 2020-02-19 | Stop reason: SDUPTHER

## 2020-01-10 NOTE — PROGRESS NOTES
Subjective:       Patient ID: Nina Portillo is a 79 y.o. female.    Chief Complaint: Urinary Tract Infection  Patient is here with her daughter, who is a nurse.     Pt. had severe sepsis from UTI. Was hospitalized and treated. She has been better since she has been home, but is having urinary frequency. She has dementia and does not c/o pain. She seems very weak and legs are much weaker than at last visit. Pt. Needs a w/c to get around and is in w/c today.     Diabetes was uncontrolled in the hospital and she has continued to take metformin, but only 500 mg. Qd. She denies any side effects from it. She cannot check her own blood sugar, but daughter is a nurse and checks it for her, but has not been doing it daily recently. Pt. Has been eating a little better since coming home from hospital and has gained a little weight.     Has chronic hypothyroidism. TSH was very high in hospital, but was never rechecked. She takes levothyroxine 88 mg. Alternating with 100 mg. Daily.     Has major depression, but has stopped taking effexor and seems to be in remission, or very mild at this time.   HPI  Review of Systems    Objective:    U/A negative except for trace leukocytes and 250 glucose  Physical Exam   Constitutional: She is oriented to person, place, and time. She appears well-developed and well-nourished. No distress.   HENT:   Head: Normocephalic and atraumatic.   Eyes: Conjunctivae are normal. Right eye exhibits no discharge. Left eye exhibits no discharge. No scleral icterus.   Cardiovascular: Normal rate, regular rhythm and normal heart sounds. Exam reveals no gallop and no friction rub.   No murmur heard.  Pulmonary/Chest: Effort normal and breath sounds normal. No stridor. No respiratory distress. She has no wheezes. She has no rales.   Abdominal: Soft. There is no tenderness.   Musculoskeletal: She exhibits no edema.   Neurological: She is alert and oriented to person, place, and time.   Skin: Skin is warm and  dry. No rash noted. She is not diaphoretic. No pallor.   Psychiatric: She has a normal mood and affect. Her behavior is normal.   Nursing note and vitals reviewed.      Assessment:       1. Acute cystitis without hematuria    2. Major depressive disorder, recurrent episode, mild    3. Hypertension associated with diabetes    4. Controlled type 2 diabetes mellitus with diabetic polyneuropathy, without long-term current use of insulin    5. Urine frequency    6. Hypothyroidism, unspecified type    7. PAD (peripheral artery disease)        Plan:       Acute cystitis without hematuria  -     POCT urine dipstick without microscope  -     Urine culture    Major depressive disorder, recurrent episode, mild  Comments:  Stable. Will continue to monitor.     Hypertension associated with diabetes  Comments:  Stable. Will continue to monitor.   Orders:  -     Comprehensive metabolic panel; Future; Expected date: 01/10/2020    Controlled type 2 diabetes mellitus with diabetic polyneuropathy, without long-term current use of insulin  -     metFORMIN (GLUCOPHAGE-XR) 500 MG 24 hr tablet; Take 2 tablets (1,000 mg total) by mouth after dinner.  Dispense: 180 tablet; Refill: 3  -     Hemoglobin A1c; Future; Expected date: 01/10/2020  -     Microalbumin/creatinine urine ratio; Future; Expected date: 01/10/2020    Urine frequency    Hypothyroidism, unspecified type  -     TSH; Future; Expected date: 01/10/2020    PAD (peripheral artery disease)  Comments:  Legs are much weaker and pt. needs W/C to go outside the home.     Other orders  -     T4, free    Increase metformin back to 2 tabs by mouth qd. Continue levothyroxine as ordered until we get the TSH back. I will call you if we need to change it.

## 2020-01-11 LAB
ALBUMIN/CREAT UR: 10.4 UG/MG (ref 0–30)
CREAT UR-MCNC: 125 MG/DL (ref 15–325)
ESTIMATED AVG GLUCOSE: 171 MG/DL (ref 68–131)
HBA1C MFR BLD HPLC: 7.6 % (ref 4–5.6)
MICROALBUMIN UR DL<=1MG/L-MCNC: 13 UG/ML

## 2020-01-13 DIAGNOSIS — N30.00 ACUTE CYSTITIS WITHOUT HEMATURIA: Primary | ICD-10-CM

## 2020-01-13 DIAGNOSIS — E03.9 ACQUIRED HYPOTHYROIDISM: ICD-10-CM

## 2020-01-13 LAB — BACTERIA UR CULT: ABNORMAL

## 2020-01-13 RX ORDER — LEVOTHYROXINE SODIUM 100 UG/1
TABLET ORAL
Qty: 90 TABLET | Refills: 3 | Status: SHIPPED | OUTPATIENT
Start: 2020-01-13 | End: 2020-04-09 | Stop reason: SDUPTHER

## 2020-01-13 RX ORDER — NITROFURANTOIN (MACROCRYSTALS) 100 MG/1
100 CAPSULE ORAL EVERY 12 HOURS
Qty: 20 CAPSULE | Refills: 0 | Status: SHIPPED | OUTPATIENT
Start: 2020-01-13 | End: 2020-02-12 | Stop reason: CLARIF

## 2020-02-12 ENCOUNTER — HOSPITAL ENCOUNTER (INPATIENT)
Facility: HOSPITAL | Age: 80
LOS: 8 days | Discharge: HOME HEALTH | DRG: 871 | End: 2020-02-20
Attending: EMERGENCY MEDICINE | Admitting: HOSPITALIST
Payer: MEDICARE

## 2020-02-12 DIAGNOSIS — N30.00 ACUTE CYSTITIS WITHOUT HEMATURIA: Primary | ICD-10-CM

## 2020-02-12 DIAGNOSIS — E11.42 CONTROLLED TYPE 2 DIABETES MELLITUS WITH DIABETIC POLYNEUROPATHY, WITHOUT LONG-TERM CURRENT USE OF INSULIN: ICD-10-CM

## 2020-02-12 DIAGNOSIS — R78.81 GRAM-NEGATIVE BACTEREMIA: ICD-10-CM

## 2020-02-12 DIAGNOSIS — F02.80 ALZHEIMER'S DEMENTIA WITHOUT BEHAVIORAL DISTURBANCE, UNSPECIFIED TIMING OF DEMENTIA ONSET: ICD-10-CM

## 2020-02-12 DIAGNOSIS — G30.9 ALZHEIMER'S DEMENTIA WITHOUT BEHAVIORAL DISTURBANCE, UNSPECIFIED TIMING OF DEMENTIA ONSET: ICD-10-CM

## 2020-02-12 DIAGNOSIS — R53.1 WEAKNESS: ICD-10-CM

## 2020-02-12 DIAGNOSIS — R41.0 CONFUSION: ICD-10-CM

## 2020-02-12 LAB
ANION GAP SERPL CALC-SCNC: 11 MMOL/L (ref 8–16)
BACTERIA #/AREA URNS HPF: ABNORMAL /HPF
BASOPHILS # BLD AUTO: 0.02 K/UL (ref 0–0.2)
BASOPHILS NFR BLD: 0.1 % (ref 0–1.9)
BILIRUB UR QL STRIP: NEGATIVE
BUN SERPL-MCNC: 16 MG/DL (ref 8–23)
CALCIUM SERPL-MCNC: 10.4 MG/DL (ref 8.7–10.5)
CHLORIDE SERPL-SCNC: 100 MMOL/L (ref 95–110)
CLARITY UR: ABNORMAL
CO2 SERPL-SCNC: 22 MMOL/L (ref 23–29)
COLOR UR: YELLOW
CREAT SERPL-MCNC: 1.2 MG/DL (ref 0.5–1.4)
DIFFERENTIAL METHOD: ABNORMAL
EOSINOPHIL # BLD AUTO: 0 K/UL (ref 0–0.5)
EOSINOPHIL NFR BLD: 0.1 % (ref 0–8)
ERYTHROCYTE [DISTWIDTH] IN BLOOD BY AUTOMATED COUNT: 12.1 % (ref 11.5–14.5)
EST. GFR  (AFRICAN AMERICAN): 49 ML/MIN/1.73 M^2
EST. GFR  (NON AFRICAN AMERICAN): 42 ML/MIN/1.73 M^2
GLUCOSE SERPL-MCNC: 373 MG/DL (ref 70–110)
GLUCOSE UR QL STRIP: ABNORMAL
HCT VFR BLD AUTO: 40 % (ref 37–48.5)
HGB BLD-MCNC: 13.2 G/DL (ref 12–16)
HGB UR QL STRIP: ABNORMAL
IMM GRANULOCYTES # BLD AUTO: 0.05 K/UL (ref 0–0.04)
KETONES UR QL STRIP: NEGATIVE
LACTATE SERPL-SCNC: 1.8 MMOL/L (ref 0.5–2.2)
LEUKOCYTE ESTERASE UR QL STRIP: ABNORMAL
LYMPHOCYTES # BLD AUTO: 0.6 K/UL (ref 1–4.8)
LYMPHOCYTES NFR BLD: 4.2 % (ref 18–48)
MCH RBC QN AUTO: 32.3 PG (ref 27–31)
MCHC RBC AUTO-ENTMCNC: 33 G/DL (ref 32–36)
MCV RBC AUTO: 98 FL (ref 82–98)
MICROSCOPIC COMMENT: ABNORMAL
MONOCYTES # BLD AUTO: 1.1 K/UL (ref 0.3–1)
MONOCYTES NFR BLD: 7.7 % (ref 4–15)
NEUTROPHILS # BLD AUTO: 12 K/UL (ref 1.8–7.7)
NEUTROPHILS NFR BLD: 87.5 % (ref 38–73)
NITRITE UR QL STRIP: POSITIVE
NRBC BLD-RTO: 0 /100 WBC
PH UR STRIP: 6 [PH] (ref 5–8)
PLATELET # BLD AUTO: 181 K/UL (ref 150–350)
PMV BLD AUTO: 9 FL (ref 9.2–12.9)
POTASSIUM SERPL-SCNC: 5 MMOL/L (ref 3.5–5.1)
PROT UR QL STRIP: ABNORMAL
RBC # BLD AUTO: 4.09 M/UL (ref 4–5.4)
RBC #/AREA URNS HPF: 2 /HPF (ref 0–4)
SODIUM SERPL-SCNC: 133 MMOL/L (ref 136–145)
SP GR UR STRIP: 1.02 (ref 1–1.03)
SQUAMOUS #/AREA URNS HPF: 8 /HPF
URN SPEC COLLECT METH UR: ABNORMAL
UROBILINOGEN UR STRIP-ACNC: 1 EU/DL
WBC # BLD AUTO: 13.76 K/UL (ref 3.9–12.7)
WBC #/AREA URNS HPF: 68 /HPF (ref 0–5)
YEAST URNS QL MICRO: ABNORMAL

## 2020-02-12 PROCEDURE — P9612 CATHETERIZE FOR URINE SPEC: HCPCS

## 2020-02-12 PROCEDURE — 12000002 HC ACUTE/MED SURGE SEMI-PRIVATE ROOM

## 2020-02-12 PROCEDURE — 93005 ELECTROCARDIOGRAM TRACING: CPT

## 2020-02-12 PROCEDURE — 83605 ASSAY OF LACTIC ACID: CPT

## 2020-02-12 PROCEDURE — 87077 CULTURE AEROBIC IDENTIFY: CPT

## 2020-02-12 PROCEDURE — 96365 THER/PROPH/DIAG IV INF INIT: CPT

## 2020-02-12 PROCEDURE — 87086 URINE CULTURE/COLONY COUNT: CPT

## 2020-02-12 PROCEDURE — G0378 HOSPITAL OBSERVATION PER HR: HCPCS

## 2020-02-12 PROCEDURE — 87186 SC STD MICRODIL/AGAR DIL: CPT

## 2020-02-12 PROCEDURE — 99285 EMERGENCY DEPT VISIT HI MDM: CPT | Mod: 25

## 2020-02-12 PROCEDURE — 36415 COLL VENOUS BLD VENIPUNCTURE: CPT

## 2020-02-12 PROCEDURE — 80048 BASIC METABOLIC PNL TOTAL CA: CPT

## 2020-02-12 PROCEDURE — 87040 BLOOD CULTURE FOR BACTERIA: CPT

## 2020-02-12 PROCEDURE — 63600175 PHARM REV CODE 636 W HCPCS: Performed by: EMERGENCY MEDICINE

## 2020-02-12 PROCEDURE — 87088 URINE BACTERIA CULTURE: CPT

## 2020-02-12 PROCEDURE — 81000 URINALYSIS NONAUTO W/SCOPE: CPT

## 2020-02-12 PROCEDURE — 96361 HYDRATE IV INFUSION ADD-ON: CPT

## 2020-02-12 PROCEDURE — 85025 COMPLETE CBC W/AUTO DIFF WBC: CPT

## 2020-02-12 RX ORDER — AMLODIPINE BESYLATE 10 MG/1
10 TABLET ORAL DAILY
COMMUNITY
End: 2020-04-09 | Stop reason: SDUPTHER

## 2020-02-12 RX ADMIN — CEFTRIAXONE 1 G: 1 INJECTION, SOLUTION INTRAVENOUS at 11:02

## 2020-02-12 RX ADMIN — SODIUM CHLORIDE 1000 ML: 0.9 INJECTION, SOLUTION INTRAVENOUS at 09:02

## 2020-02-13 LAB
ALBUMIN SERPL BCP-MCNC: 2.7 G/DL (ref 3.5–5.2)
ALP SERPL-CCNC: 90 U/L (ref 55–135)
ALT SERPL W/O P-5'-P-CCNC: 10 U/L (ref 10–44)
ANION GAP SERPL CALC-SCNC: 9 MMOL/L (ref 8–16)
AST SERPL-CCNC: 13 U/L (ref 10–40)
BASOPHILS # BLD AUTO: 0.01 K/UL (ref 0–0.2)
BASOPHILS NFR BLD: 0.1 % (ref 0–1.9)
BILIRUB SERPL-MCNC: 0.6 MG/DL (ref 0.1–1)
BUN SERPL-MCNC: 12 MG/DL (ref 8–23)
CALCIUM SERPL-MCNC: 9 MG/DL (ref 8.7–10.5)
CHLORIDE SERPL-SCNC: 106 MMOL/L (ref 95–110)
CO2 SERPL-SCNC: 21 MMOL/L (ref 23–29)
CREAT SERPL-MCNC: 0.8 MG/DL (ref 0.5–1.4)
DIFFERENTIAL METHOD: ABNORMAL
EOSINOPHIL # BLD AUTO: 0 K/UL (ref 0–0.5)
EOSINOPHIL NFR BLD: 0 % (ref 0–8)
ERYTHROCYTE [DISTWIDTH] IN BLOOD BY AUTOMATED COUNT: 12.1 % (ref 11.5–14.5)
EST. GFR  (AFRICAN AMERICAN): >60 ML/MIN/1.73 M^2
EST. GFR  (NON AFRICAN AMERICAN): >60 ML/MIN/1.73 M^2
GLUCOSE SERPL-MCNC: 236 MG/DL (ref 70–110)
HCT VFR BLD AUTO: 33.9 % (ref 37–48.5)
HGB BLD-MCNC: 11.2 G/DL (ref 12–16)
IMM GRANULOCYTES # BLD AUTO: 0.05 K/UL (ref 0–0.04)
LACTATE SERPL-SCNC: 1.1 MMOL/L (ref 0.5–2.2)
LYMPHOCYTES # BLD AUTO: 0.4 K/UL (ref 1–4.8)
LYMPHOCYTES NFR BLD: 2.9 % (ref 18–48)
MAGNESIUM SERPL-MCNC: 1.5 MG/DL (ref 1.6–2.6)
MCH RBC QN AUTO: 31.3 PG (ref 27–31)
MCHC RBC AUTO-ENTMCNC: 33 G/DL (ref 32–36)
MCV RBC AUTO: 95 FL (ref 82–98)
MONOCYTES # BLD AUTO: 1.3 K/UL (ref 0.3–1)
MONOCYTES NFR BLD: 9.5 % (ref 4–15)
NEUTROPHILS # BLD AUTO: 11.8 K/UL (ref 1.8–7.7)
NEUTROPHILS NFR BLD: 87.1 % (ref 38–73)
NRBC BLD-RTO: 0 /100 WBC
PHOSPHATE SERPL-MCNC: 1.3 MG/DL (ref 2.7–4.5)
PLATELET # BLD AUTO: 152 K/UL (ref 150–350)
PMV BLD AUTO: 9.1 FL (ref 9.2–12.9)
POCT GLUCOSE: 219 MG/DL (ref 70–110)
POCT GLUCOSE: 240 MG/DL (ref 70–110)
POCT GLUCOSE: 272 MG/DL (ref 70–110)
POTASSIUM SERPL-SCNC: 3.5 MMOL/L (ref 3.5–5.1)
PROT SERPL-MCNC: 6.2 G/DL (ref 6–8.4)
RBC # BLD AUTO: 3.58 M/UL (ref 4–5.4)
SODIUM SERPL-SCNC: 136 MMOL/L (ref 136–145)
TSH SERPL DL<=0.005 MIU/L-ACNC: 3.07 UIU/ML (ref 0.4–4)
WBC # BLD AUTO: 13.57 K/UL (ref 3.9–12.7)

## 2020-02-13 PROCEDURE — 80053 COMPREHEN METABOLIC PANEL: CPT

## 2020-02-13 PROCEDURE — C9399 UNCLASSIFIED DRUGS OR BIOLOG: HCPCS | Performed by: HOSPITALIST

## 2020-02-13 PROCEDURE — 96375 TX/PRO/DX INJ NEW DRUG ADDON: CPT

## 2020-02-13 PROCEDURE — 84443 ASSAY THYROID STIM HORMONE: CPT

## 2020-02-13 PROCEDURE — 86580 TB INTRADERMAL TEST: CPT | Performed by: HOSPITALIST

## 2020-02-13 PROCEDURE — 97161 PT EVAL LOW COMPLEX 20 MIN: CPT

## 2020-02-13 PROCEDURE — 97116 GAIT TRAINING THERAPY: CPT

## 2020-02-13 PROCEDURE — 36415 COLL VENOUS BLD VENIPUNCTURE: CPT

## 2020-02-13 PROCEDURE — 96367 TX/PROPH/DG ADDL SEQ IV INF: CPT

## 2020-02-13 PROCEDURE — 12000002 HC ACUTE/MED SURGE SEMI-PRIVATE ROOM

## 2020-02-13 PROCEDURE — 96372 THER/PROPH/DIAG INJ SC/IM: CPT | Mod: 59

## 2020-02-13 PROCEDURE — 63600175 PHARM REV CODE 636 W HCPCS: Performed by: HOSPITALIST

## 2020-02-13 PROCEDURE — 85025 COMPLETE CBC W/AUTO DIFF WBC: CPT

## 2020-02-13 PROCEDURE — 63600175 PHARM REV CODE 636 W HCPCS: Performed by: NURSE PRACTITIONER

## 2020-02-13 PROCEDURE — G0378 HOSPITAL OBSERVATION PER HR: HCPCS

## 2020-02-13 PROCEDURE — 96376 TX/PRO/DX INJ SAME DRUG ADON: CPT

## 2020-02-13 PROCEDURE — 83735 ASSAY OF MAGNESIUM: CPT

## 2020-02-13 PROCEDURE — 84100 ASSAY OF PHOSPHORUS: CPT

## 2020-02-13 PROCEDURE — 83605 ASSAY OF LACTIC ACID: CPT

## 2020-02-13 PROCEDURE — 97166 OT EVAL MOD COMPLEX 45 MIN: CPT

## 2020-02-13 PROCEDURE — 97535 SELF CARE MNGMENT TRAINING: CPT

## 2020-02-13 PROCEDURE — 97530 THERAPEUTIC ACTIVITIES: CPT

## 2020-02-13 PROCEDURE — 96361 HYDRATE IV INFUSION ADD-ON: CPT

## 2020-02-13 PROCEDURE — 30200315 PPD INTRADERMAL TEST REV CODE 302: Performed by: HOSPITALIST

## 2020-02-13 PROCEDURE — 25000003 PHARM REV CODE 250: Performed by: NURSE PRACTITIONER

## 2020-02-13 RX ORDER — SODIUM CHLORIDE 9 MG/ML
INJECTION, SOLUTION INTRAVENOUS CONTINUOUS
Status: DISCONTINUED | OUTPATIENT
Start: 2020-02-13 | End: 2020-02-19

## 2020-02-13 RX ORDER — ONDANSETRON 2 MG/ML
4 INJECTION INTRAMUSCULAR; INTRAVENOUS EVERY 6 HOURS PRN
Status: DISCONTINUED | OUTPATIENT
Start: 2020-02-13 | End: 2020-02-20 | Stop reason: HOSPADM

## 2020-02-13 RX ORDER — IBUPROFEN 200 MG
16 TABLET ORAL
Status: DISCONTINUED | OUTPATIENT
Start: 2020-02-13 | End: 2020-02-13

## 2020-02-13 RX ORDER — POTASSIUM CHLORIDE 20 MEQ/15ML
40 SOLUTION ORAL
Status: DISCONTINUED | OUTPATIENT
Start: 2020-02-13 | End: 2020-02-20 | Stop reason: HOSPADM

## 2020-02-13 RX ORDER — AMLODIPINE BESYLATE 5 MG/1
10 TABLET ORAL DAILY
Status: DISCONTINUED | OUTPATIENT
Start: 2020-02-13 | End: 2020-02-20 | Stop reason: HOSPADM

## 2020-02-13 RX ORDER — GLUCAGON 1 MG
1 KIT INJECTION
Status: DISCONTINUED | OUTPATIENT
Start: 2020-02-13 | End: 2020-02-20 | Stop reason: HOSPADM

## 2020-02-13 RX ORDER — MIRTAZAPINE 15 MG/1
15 TABLET, FILM COATED ORAL NIGHTLY
Status: DISCONTINUED | OUTPATIENT
Start: 2020-02-13 | End: 2020-02-20 | Stop reason: HOSPADM

## 2020-02-13 RX ORDER — INSULIN ASPART 100 [IU]/ML
0-5 INJECTION, SOLUTION INTRAVENOUS; SUBCUTANEOUS
Status: DISCONTINUED | OUTPATIENT
Start: 2020-02-13 | End: 2020-02-13

## 2020-02-13 RX ORDER — IBUPROFEN 200 MG
16 TABLET ORAL
Status: DISCONTINUED | OUTPATIENT
Start: 2020-02-13 | End: 2020-02-20 | Stop reason: HOSPADM

## 2020-02-13 RX ORDER — IBUPROFEN 200 MG
24 TABLET ORAL
Status: DISCONTINUED | OUTPATIENT
Start: 2020-02-13 | End: 2020-02-13 | Stop reason: SDUPTHER

## 2020-02-13 RX ORDER — ENOXAPARIN SODIUM 100 MG/ML
40 INJECTION SUBCUTANEOUS EVERY 24 HOURS
Status: DISCONTINUED | OUTPATIENT
Start: 2020-02-13 | End: 2020-02-20

## 2020-02-13 RX ORDER — ACETAMINOPHEN 325 MG/1
650 TABLET ORAL EVERY 4 HOURS PRN
Status: DISCONTINUED | OUTPATIENT
Start: 2020-02-13 | End: 2020-02-20 | Stop reason: HOSPADM

## 2020-02-13 RX ORDER — INSULIN ASPART 100 [IU]/ML
1-10 INJECTION, SOLUTION INTRAVENOUS; SUBCUTANEOUS
Status: DISCONTINUED | OUTPATIENT
Start: 2020-02-13 | End: 2020-02-20 | Stop reason: HOSPADM

## 2020-02-13 RX ORDER — SODIUM CHLORIDE 0.9 % (FLUSH) 0.9 %
10 SYRINGE (ML) INJECTION
Status: DISCONTINUED | OUTPATIENT
Start: 2020-02-13 | End: 2020-02-20 | Stop reason: HOSPADM

## 2020-02-13 RX ORDER — ASPIRIN 81 MG/1
81 TABLET ORAL DAILY
Status: DISCONTINUED | OUTPATIENT
Start: 2020-02-13 | End: 2020-02-20 | Stop reason: HOSPADM

## 2020-02-13 RX ORDER — IBUPROFEN 200 MG
16 TABLET ORAL
Status: DISCONTINUED | OUTPATIENT
Start: 2020-02-13 | End: 2020-02-13 | Stop reason: SDUPTHER

## 2020-02-13 RX ORDER — LANOLIN ALCOHOL/MO/W.PET/CERES
800 CREAM (GRAM) TOPICAL
Status: DISCONTINUED | OUTPATIENT
Start: 2020-02-13 | End: 2020-02-20 | Stop reason: HOSPADM

## 2020-02-13 RX ORDER — IBUPROFEN 200 MG
24 TABLET ORAL
Status: DISCONTINUED | OUTPATIENT
Start: 2020-02-13 | End: 2020-02-20 | Stop reason: HOSPADM

## 2020-02-13 RX ORDER — POTASSIUM CHLORIDE 20 MEQ/15ML
60 SOLUTION ORAL
Status: DISCONTINUED | OUTPATIENT
Start: 2020-02-13 | End: 2020-02-20 | Stop reason: HOSPADM

## 2020-02-13 RX ORDER — IBUPROFEN 200 MG
24 TABLET ORAL
Status: DISCONTINUED | OUTPATIENT
Start: 2020-02-13 | End: 2020-02-13

## 2020-02-13 RX ORDER — LEVOTHYROXINE SODIUM 100 UG/1
100 TABLET ORAL
Status: DISCONTINUED | OUTPATIENT
Start: 2020-02-13 | End: 2020-02-20 | Stop reason: HOSPADM

## 2020-02-13 RX ORDER — GLUCAGON 1 MG
1 KIT INJECTION
Status: DISCONTINUED | OUTPATIENT
Start: 2020-02-13 | End: 2020-02-13 | Stop reason: SDUPTHER

## 2020-02-13 RX ORDER — ACETAMINOPHEN 10 MG/ML
1000 INJECTION, SOLUTION INTRAVENOUS ONCE
Status: COMPLETED | OUTPATIENT
Start: 2020-02-13 | End: 2020-02-13

## 2020-02-13 RX ORDER — GLUCAGON 1 MG
1 KIT INJECTION
Status: DISCONTINUED | OUTPATIENT
Start: 2020-02-13 | End: 2020-02-13

## 2020-02-13 RX ORDER — PANTOPRAZOLE SODIUM 40 MG/1
40 TABLET, DELAYED RELEASE ORAL DAILY
Status: DISCONTINUED | OUTPATIENT
Start: 2020-02-13 | End: 2020-02-20 | Stop reason: HOSPADM

## 2020-02-13 RX ADMIN — INSULIN DETEMIR 4 UNITS: 100 INJECTION, SOLUTION SUBCUTANEOUS at 09:02

## 2020-02-13 RX ADMIN — ACETAMINOPHEN 1000 MG: 10 INJECTION, SOLUTION INTRAVENOUS at 01:02

## 2020-02-13 RX ADMIN — SODIUM CHLORIDE 500 ML: 0.9 INJECTION, SOLUTION INTRAVENOUS at 02:02

## 2020-02-13 RX ADMIN — TUBERCULIN PURIFIED PROTEIN DERIVATIVE 5 UNITS: 5 INJECTION, SOLUTION INTRADERMAL at 05:02

## 2020-02-13 RX ADMIN — CEFTRIAXONE 1 G: 1 INJECTION, SOLUTION INTRAVENOUS at 10:02

## 2020-02-13 RX ADMIN — ENOXAPARIN SODIUM 40 MG: 100 INJECTION SUBCUTANEOUS at 04:02

## 2020-02-13 RX ADMIN — INSULIN ASPART 3 UNITS: 100 INJECTION, SOLUTION INTRAVENOUS; SUBCUTANEOUS at 05:02

## 2020-02-13 RX ADMIN — SODIUM CHLORIDE: 0.9 INJECTION, SOLUTION INTRAVENOUS at 04:02

## 2020-02-13 RX ADMIN — VANCOMYCIN HYDROCHLORIDE 1250 MG: 1.25 INJECTION, POWDER, LYOPHILIZED, FOR SOLUTION INTRAVENOUS at 03:02

## 2020-02-13 RX ADMIN — PANTOPRAZOLE SODIUM 40 MG: 40 TABLET, DELAYED RELEASE ORAL at 11:02

## 2020-02-13 RX ADMIN — LEVOTHYROXINE SODIUM 100 MCG: 100 TABLET ORAL at 05:02

## 2020-02-13 RX ADMIN — AMLODIPINE BESYLATE 10 MG: 5 TABLET ORAL at 11:02

## 2020-02-13 RX ADMIN — SODIUM CHLORIDE: 0.9 INJECTION, SOLUTION INTRAVENOUS at 01:02

## 2020-02-13 RX ADMIN — Medication 800 MG: at 09:02

## 2020-02-13 RX ADMIN — ASPIRIN 81 MG: 81 TABLET, COATED ORAL at 11:02

## 2020-02-13 RX ADMIN — MIRTAZAPINE 15 MG: 15 TABLET, FILM COATED ORAL at 09:02

## 2020-02-13 RX ADMIN — MIRTAZAPINE 15 MG: 15 TABLET, FILM COATED ORAL at 02:02

## 2020-02-13 NOTE — PLAN OF CARE
I sent the pts SNF packet to [facility] for review. Linda Trejo, RENETTAW     02/13/20 9732   Post-Acute Status   Post-Acute Authorization Placement   Post-Acute Placement Status Patient List Provided

## 2020-02-13 NOTE — PLAN OF CARE
CM called pt's daughter to complete discharge assessment due to pt not being able to verify information on facesheet. Pt's daughter Soledad Kahn ([phone]) verified information as correct on facesheet. Verified that pt lives at listed address with her. PCP is Dr. Rosa. Pharm is [facility]. POA is Soledad. Denies hh/hd. DME- wc, sc, glucometer, rw, cane. Pt needs assistance with ADLs. Transportation is provided by Soledad. Soledad is wanting pt placed in SNF if appropriate. Pt disclosure signed for [facility] and [place]. CM requested SNF consult from Dr. Quintero. CM following.        02/13/20 4903   Discharge Assessment   Assessment Type Discharge Planning Assessment   Confirmed/corrected address and phone number on facesheet? Yes   Assessment information obtained from? Caregiver   Communicated expected length of stay with patient/caregiver yes   Prior to hospitilization cognitive status: Not Oriented to Time   Prior to hospitalization functional status: Assistive Equipment   Current cognitive status: Not Oriented to Time   Current Functional Status: Assistive Equipment   Lives With child(dianna), adult   Able to Return to Prior Arrangements yes   Is patient able to care for self after discharge? Yes   Patient's perception of discharge disposition skilled nursing facility   Readmission Within the Last 30 Days no previous admission in last 30 days   Patient currently being followed by outpatient case management? No   Patient currently receives any other outside agency services? No   Equipment Currently Used at Home cane, quad;shower chair;wheelchair;walker, rolling   Do you have any problems affording any of your prescribed medications? No   Is the patient taking medications as prescribed? yes   Does the patient have transportation home? Yes   Transportation Anticipated family or friend will provide   Does the patient receive services at the [facility]? No   Discharge Plan A Skilled Nursing Facility    Discharge Plan B Home with family   DME Needed Upon Discharge  none   Patient/Family in Agreement with Plan yes

## 2020-02-13 NOTE — HPI
Nina Portillo is a 78 y/o a. female with a past medical history significant for DM, dementia, and HTN who presented to the ED with increased confusion, decreased activity, increased incontinence, frequency and decreased PO intake for the last few days. Daughter reports that these symptoms are normally present when the patient has a UTI. She states she has not fully recovered after last UTI one month ago. The patient denies vomiting, fever, abdominal pain or any other symptoms at this time.  While in the emergency department patient was found to have a UTI and meet sepsis criteria.  She will be admitted to the service of hospital Medicine for further treatment.

## 2020-02-13 NOTE — PT/OT/SLP EVAL
Physical Therapy Evaluation    Patient Name:  Nina Portillo   MRN:  [id number]    Recommendations:     Discharge Recommendations:  nursing facility, skilled, home, home health PT(dependending on patient progress in hospital and level of assistance at home)   Discharge Equipment Recommendations: walker, rolling   Barriers to discharge: may benefit from admission to SNF unit if does not progress functionally while in the hospital and depending on level of assist at home.    Assessment:     Nina Portillo is a 79 y.o. female admitted with a medical diagnosis of Sepsis.  She presents with the following impairments/functional limitations:  weakness, impaired endurance, visual deficits, gait instability, impaired cognition, impaired functional mobilty, impaired balance .  Patient readily agreeable to PT evaluation to include gait training but did report feeling very poorly and presented like she felt poorly.  Patient presented supine in bed and was able to transfer supine to sit with min assist and sit to stand with CGA. Patient then ambulated x 120 feet with RW with min assist for direction.  Patient returned to bed.    Rehab Prognosis: Good; patient would benefit from acute skilled PT services to address these deficits and reach maximum level of function.    Recent Surgery: * No surgery found *      Plan:     During this hospitalization, patient to be seen daily to address the identified rehab impairments via gait training, therapeutic activities, therapeutic exercises and progress toward the following goals:    · Plan of Care Expires:  03/12/20    Subjective     Chief Complaint: feeling poorly  Patient/Family Comments/goals: none given  Pain/Comfort:  ·      Patients cultural, spiritual, Church conflicts given the current situation:      Living Environment:  Currently lives with family in [address] [place] [address].  Prior to admission, patients level of function was modified independent with mobility but min assist for  certain tasks.  Equipment used at home: cane, straight.  DME owned (not currently used): none.  Upon discharge, patient will have assistance from family.    Objective:     Communicated with nurse Lechuga prior to session.  Patient found supine with bed alarm, peripheral IV  upon PT entry to room.    General Precautions: Standard, fall   Orthopedic Precautions:    Braces:       Exams:  · RLE ROM: WFL  · RLE Strength: Deficits: 4/5 overall  · LLE ROM: WFL  · LLE Strength: Deficits: 4-/5 overall    Functional Mobility:  · Bed Mobility:     · Supine to Sit: minimum assistance  · Sit to Supine: minimum assistance  · Transfers:     · Sit to Stand:  contact guard assistance with rolling walker  · Gait: x 120 feet with RW with CGA and verbal cues for directions      Therapeutic Activities and Exercises:   gait training x 120 feet with RW with min assist and verbal cues for directions.    AM-PAC 6 CLICK MOBILITY  Total Score:18     Patient left supine with call button in reach and bed alarm on.    GOALS:   Multidisciplinary Problems     Physical Therapy Goals        Problem: Physical Therapy Goal    Goal Priority Disciplines Outcome Goal Variances Interventions   Physical Therapy Goal     PT, PT/OT Ongoing, Progressing     Description:  Goals to be met by: 2020    Patient will increase functional independence with mobility by performin. Supine to sit with Modified [place]  2. Sit to supine with Modified [place]  3. Sit to stand transfer with Modified [place]  4. Bed to chair transfer with Supervision using Rolling Walker  5. Gait  x 150 feet with Supervision using Single-point Cane .                       History:     Past Medical History:   Diagnosis Date    Alzheimer disease     Cancer     renal cell. right    Dementia     Diabetes mellitus     GERD (gastroesophageal reflux disease)     Hyperlipidemia     Hypothyroid     Memory loss     Movement disorder        Past Surgical History:    Procedure Laterality Date    HYSTERECTOMY      JOINT REPLACEMENT      right    KNEE ARTHROSCOPY W/ DEBRIDEMENT      left    PARTIAL NEPHRECTOMY      right.     TONSILLECTOMY         Time Tracking:     PT Received On: 02/13/20  PT Start Time: 0828     PT Stop Time: 0852  PT Total Time (min): 24 min     Billable Minutes: Evaluation 15 and Gait Training 9      Chris Megilligan, PT  02/13/2020

## 2020-02-13 NOTE — PLAN OF CARE
Alert, oriented, appetite is fair to good, med teaching ongoing. Bs management ongoing. Incont of urine at times. Bed alarm in use to prevent falls. Iv fluids in progress

## 2020-02-13 NOTE — PLAN OF CARE
Problem: Physical Therapy Goal  Goal: Physical Therapy Goal  Description  Goals to be met by: 2020    Patient will increase functional independence with mobility by performin. Supine to sit with Modified [place]  2. Sit to supine with Modified [place]  3. Sit to stand transfer with Modified [place]  4. Bed to chair transfer with Supervision using Rolling Walker  5. Gait  x 150 feet with Supervision using Single-point Cane .      Outcome: Ongoing, Progressing

## 2020-02-13 NOTE — ASSESSMENT & PLAN NOTE
IV Rocephin initiated.  Pharmacy consulted to dose IV vancomycin based on culture dated 01/10/2020.  Deescalate antibiotics as appropriate.  Follow urine culture.

## 2020-02-13 NOTE — SUBJECTIVE & OBJECTIVE
Interval History:  Patient seen and examined.  Oriented to self and location.  Denies pain at this time.    Review of Systems   Unable to perform ROS: Dementia     Objective:     Vital Signs (Most Recent):  Temp: 96.2 °F (35.7 °C) (02/13/20 0741)  Pulse: 91 (02/13/20 1612)  Resp: 18 (02/13/20 1612)  BP: (!) 154/66 (02/13/20 1612)  SpO2: 98 % (02/13/20 1612) Vital Signs (24h Range):  Temp:  [96.2 °F (35.7 °C)-103.7 °F (39.8 °C)] 96.2 °F (35.7 °C)  Pulse:  [] 91  Resp:  [16-24] 18  SpO2:  [94 %-98 %] 98 %  BP: (135-175)/(63-84) 154/66     Weight: 63.3 kg (139 lb 8.8 oz)  Body mass index is 23.22 kg/m².    Intake/Output Summary (Last 24 hours) at 2/13/2020 1756  Last data filed at 2/13/2020 0600  Gross per 24 hour   Intake 1311.67 ml   Output --   Net 1311.67 ml      Physical Exam   Constitutional: She appears well-developed.   Thin     HENT:   Head: Normocephalic and atraumatic.   Neck: Normal range of motion. Neck supple. No JVD present.   Cardiovascular: Normal rate and regular rhythm.   Pulmonary/Chest: Effort normal and breath sounds normal. No respiratory distress.   Abdominal: Soft. Bowel sounds are normal. She exhibits no distension. There is no tenderness.   Genitourinary:   Genitourinary Comments: deferred   Musculoskeletal: Normal range of motion. She exhibits no edema.   Neurological: She is alert.   Oriented to self and location   Skin: Skin is warm and dry. No rash noted.   Psychiatric: She has a normal mood and affect. Her behavior is normal.   Nursing note and vitals reviewed.      Significant Labs: All pertinent labs within the past 24 hours have been reviewed.    Significant Imaging: I have reviewed and interpreted all pertinent imaging results/findings within the past 24 hours.

## 2020-02-13 NOTE — ED NOTES
Shaking bilateral arms noted states that she is cold, cool to the touch 3 warm blankets applied daughter remains at bedside.

## 2020-02-13 NOTE — ASSESSMENT & PLAN NOTE
Chronic, controlled.  Latest blood pressure and vitals reviewed-   Temp:  [96.2 °F (35.7 °C)-103.7 °F (39.8 °C)]   Pulse:  []   Resp:  [16-24]   BP: (135-175)/(63-84)   SpO2:  [94 %-98 %] .   Home meds for hypertension were reviewed and noted below. Hospital anti-hypertensive changes were made as shown below.  Hypertension Medications             amLODIPine (NORVASC) 10 MG tablet Take 10 mg by mouth once daily.      Hospital Medications             amLODIPine tablet 10 mg 10 mg, Oral, Daily        Will utilize p.r.n. blood pressure medication only if patient's blood pressure greater than  180/110 and she develops symptoms such as worsening chest pain or shortness of breath.

## 2020-02-13 NOTE — ASSESSMENT & PLAN NOTE
Daughter reports increased confusion.  Patient with history of dementia.  CT head negative for anything acute.  Likely secondary to UTI.  Fall precautions.  Treat source.

## 2020-02-13 NOTE — ASSESSMENT & PLAN NOTE
Chronic.  Stable.  Continue chronic amlodipine.  Monitor blood pressure closely and treat as indicated for sustained control.

## 2020-02-13 NOTE — PLAN OF CARE
Problem: Occupational Therapy Goal  Goal: Occupational Therapy Goal  Description  Goals to be met by: 2/23/20     Patient will increase functional independence with ADLs by performing:    UE Dressing with Set-up Assistance and Supervision.  LE Dressing with Set-up Assistance, Stand-by Assistance and Assistive Devices as needed.  Grooming while standing at sink with Contact Guard Assistance and Assistive Devices as needed.  Toileting from toilet with Minimal Assistance and Assistive Devices as needed for hygiene and clothing management.   Toilet transfer to toilet with Stand-by Assistance.     Outcome: Ongoing, Progressing   OT evaluation completed today. Goals & care plan established.  ORTIZ Ramos  2/13/2020

## 2020-02-13 NOTE — CONSULTS
Pharmacokinetic Initial Assessment: IV Vancomycin    Assessment/Plan:    Initiate intravenous vancomycin with loading dose of 7250 mg once with subsequent doses when random concentrations are less than 20 mcg/mL  Desired empiric serum trough concentration is 15 to 20 mcg/mL  Draw vancomycin random level on 02/14 at 0200.  Pharmacy will continue to follow and monitor vancomycin.      Please contact pharmacy at extension [phone] with any questions regarding this assessment.     Thank you for the consult,   Ruth Annrashmi Velazquez       Patient brief summary:  Nina Portillo is a 79 y.o. female initiated on antimicrobial therapy with IV Vancomycin for treatment of suspected sepsis    Drug Allergies:   Review of patient's allergies indicates:   Allergen Reactions    Phenergan [promethazine] Other (See Comments)     hallucinations    Prilosec [omeprazole magnesium] Hives and Rash       Actual Body Weight:   63.3 kg    Renal Function:   Estimated Creatinine Clearance: 33.1 mL/min (based on SCr of 1.2 mg/dL).,     Dialysis Method (if applicable):  N/A    CBC (last 72 hours):  Recent Labs   Lab Result Units 02/12/20 2153   WBC K/uL 13.76*   Hemoglobin g/dL 13.2   Hematocrit % 40.0   Platelets K/uL 181   Gran% % 87.5*   Lymph% % 4.2*   Mono% % 7.7   Eosinophil% % 0.1   Basophil% % 0.1   Differential Method  Automated       Metabolic Panel (last 72 hours):  Recent Labs   Lab Result Units 02/12/20 2153 02/12/20  2217   Sodium mmol/L 133*  --    Potassium mmol/L 5.0  --    Chloride mmol/L 100  --    CO2 mmol/L 22*  --    Glucose mg/dL 373*  --    Glucose, UA   --  4+*   BUN, Bld mg/dL 16  --    Creatinine mg/dL 1.2  --        Drug levels (last 3 results):  No results for input(s): VANCOMYCINRA, VANCOMYCINPE, VANCOMYCINTR in the last 72 hours.    Microbiologic Results:  Microbiology Results (last 7 days)       Procedure Component Value Units Date/Time    Blood culture [[id number]] Collected:  02/12/20 2233    Order Status:  Sent  Specimen:  Blood Updated:  02/12/20 2233    Urine culture [[id number]] Collected:  02/12/20 2217    Order Status:  No result Specimen:  Urine Updated:  02/12/20 2233    Blood culture [[id number]] Collected:  02/12/20 2225    Order Status:  Sent Specimen:  Blood Updated:  02/12/20 2226

## 2020-02-13 NOTE — ASSESSMENT & PLAN NOTE
Chronic.  Last hemoglobin A1c on January 10, 2020 was 7.6%.  Hold chronic Glucophage.  Accu-Cheks AC and HS with sliding scale insulin coverage as needed.  Diabetic diet when appropriate.

## 2020-02-13 NOTE — H&P
[facility] Hospital Medicine  History & Physical    Patient Name: Nina Portillo  MRN: [id number]  Admission Date: 2/12/2020  Attending Physician: Sadia Quintero MD   Primary Care Provider: Rush Rosa MD         Patient information was obtained from patient, relative(s), past medical records and ER records.     Subjective:     Principal Problem:Sepsis    Chief Complaint:   Chief Complaint   Patient presents with    Altered Mental Status     weakness and altered mental status beginning today    family states pt had UTI within past month and has not fully recovered         HPI: Nina Portillo is a 80 y/o a. female with a past medical history significant for DM, dementia, and HTN who presented to the ED with increased confusion, decreased activity, increased incontinence, frequency and decreased PO intake for the last few days. Daughter reports that these symptoms are normally present when the patient has a UTI. She states she has not fully recovered after last UTI one month ago. The patient denies vomiting, fever, abdominal pain or any other symptoms at this time.  While in the emergency department patient was found to have a UTI and meet sepsis criteria.  She will be admitted to the service of hospital Medicine for further treatment.    Past Medical History:   Diagnosis Date    Alzheimer disease 2012    Cancer     renal cell. right    Dementia     Diabetes mellitus     GERD (gastroesophageal reflux disease)     Hyperlipidemia     Hypothyroid     Memory loss     Movement disorder        Past Surgical History:   Procedure Laterality Date    HYSTERECTOMY      JOINT REPLACEMENT      right    KNEE ARTHROSCOPY W/ DEBRIDEMENT      left    PARTIAL NEPHRECTOMY      right.     TONSILLECTOMY         Review of patient's allergies indicates:   Allergen Reactions    Phenergan [promethazine] Other (See Comments)     hallucinations    Prilosec [omeprazole magnesium] Hives and Rash        No current facility-administered medications on file prior to encounter.      Current Outpatient Medications on File Prior to Encounter   Medication Sig    amLODIPine (NORVASC) 10 MG tablet Take 10 mg by mouth once daily.    aspirin (ECOTRIN) 81 MG EC tablet Take 81 mg by mouth once daily.     levothyroxine (SYNTHROID) 100 MCG tablet 1 tab po qd (Patient taking differently: Take 100 mcg by mouth before breakfast. )    metFORMIN (GLUCOPHAGE-XR) 500 MG 24 hr tablet Take 2 tablets (1,000 mg total) by mouth after dinner.    mirtazapine (REMERON) 15 MG tablet Take 1 tablet (15 mg total) by mouth every evening.    pantoprazole (PROTONIX) 40 MG tablet Take 1 tablet (40 mg total) by mouth once daily.     Family History     Problem Relation (Age of Onset)    Cancer Mother (90)        Tobacco Use    Smoking status: Never Smoker    Smokeless tobacco: Never Used   Substance and Sexual Activity    Alcohol use: No    Drug use: No    Sexual activity: Never     Review of Systems   Unable to perform ROS: Mental status change   Constitutional: Positive for appetite change and fever. Negative for chills.   HENT: Negative for congestion and trouble swallowing.    Gastrointestinal: Positive for nausea.   Genitourinary: Positive for frequency.   Musculoskeletal: Positive for back pain.   Psychiatric/Behavioral: Positive for confusion.   As reported by pt's daughter.  Objective:     Vital Signs (Most Recent):  Temp: (!) 103.7 °F (39.8 °C) (02/13/20 0156)  Pulse: (!) 123 (02/13/20 0113)  Resp: (!) 24 (02/13/20 0113)  BP: (!) 175/84 (02/13/20 0113)  SpO2: 96 % (02/13/20 0113) Vital Signs (24h Range):  Temp:  [99.6 °F (37.6 °C)-103.7 °F (39.8 °C)] 103.7 °F (39.8 °C)  Pulse:  [] 123  Resp:  [19-24] 24  SpO2:  [96 %-98 %] 96 %  BP: (148-175)/(64-84) 175/84     Weight: 63.3 kg (139 lb 8.8 oz)  Body mass index is 23.22 kg/m².    Physical Exam   Constitutional: She appears well-developed.   Thin     HENT:   Head:  Normocephalic and atraumatic.   Neck: Normal range of motion. Neck supple. No JVD present.   Cardiovascular: Regular rhythm. Tachycardia present.   Pulmonary/Chest: Effort normal and breath sounds normal. No respiratory distress.   Abdominal: Soft. Bowel sounds are normal. She exhibits no distension. There is no tenderness.   Genitourinary:   Genitourinary Comments: deferred   Musculoskeletal: Normal range of motion. She exhibits no edema.   Neurological: She is alert. GCS eye subscore is 4. GCS verbal subscore is 4. GCS motor subscore is 6.   Skin: Skin is warm and dry. No rash noted.   Psychiatric: She has a normal mood and affect. Her behavior is normal.   Nursing note and vitals reviewed.          Significant Labs:   CBC:   Recent Labs   Lab 02/12/20 2153   WBC 13.76*   HGB 13.2   HCT 40.0        CMP:   Recent Labs   Lab 02/12/20 2153   *   K 5.0      CO2 22*   *   BUN 16   CREATININE 1.2   CALCIUM 10.4   ANIONGAP 11   EGFRNONAA 42*     Lactic Acid:   Recent Labs   Lab 02/12/20  2225   LACTATE 1.8     Urine Studies:   Recent Labs   Lab 02/12/20  2217   COLORU Yellow   APPEARANCEUA Hazy*   PHUR 6.0   SPECGRAV 1.025   PROTEINUA Trace*   GLUCUA 4+*   KETONESU Negative   BILIRUBINUA Negative   OCCULTUA Trace*   NITRITE Positive*   UROBILINOGEN 1.0   LEUKOCYTESUR Trace*   RBCUA 2   WBCUA 68*   BACTERIA Many*   SQUAMEPITHEL 8       Significant Imaging: CT head: 1. No acute intracranial process.  2. Involutional changes with chronic microvascular ischemic changes.    CXR:No acute process.  No significant change.      Assessment/Plan:     * Sepsis  This patient does have evidence of infective focus  My overall impression is sepsis.  Antibiotics given-   Antibiotics (From admission, onward)    Start     Stop Route Frequency Ordered    02/13/20 2230  cefTRIAXone (ROCEPHIN) 1 g in dextrose 5 % 50 mL IVPB      -- IV Every 24 hours (non-standard times) 02/13/20 0105          Latest lactate  reviewed, they are-  Recent Labs   Lab 02/12/20 2225   LACTATE 1.8       Organ dysfunction indicated by tachycardia and Encephalopathy  Source-   Source control Achieved by- IV abx        Acute cystitis without hematuria  IV Rocephin initiated.  Pharmacy consulted to dose IV vancomycin based on culture dated 01/10/2020.  Deescalate antibiotics as appropriate.  Follow current culture.      Encephalopathy, metabolic  Daughter reports increased confusion.  Patient with history of dementia.  CT head negative for anything acute.  Likely secondary to UTI.  Fall precautions.  Treat source.      Weakness  Increased weakness likely 2/2 infection.  Chronic debilitated state reported by daughter.  PT/OT consult.  Check TSH.        Controlled type 2 diabetes mellitus with diabetic neuropathy, without long-term current use of insulin  Chronic.  Last hemoglobin A1c on January 10, 2020 was 7.6%.  Hold chronic Glucophage.  Accu-Cheks AC and HS with sliding scale insulin coverage as needed.  Diabetic diet when appropriate.      GERD (gastroesophageal reflux disease)  Chronic.  Continue PPI.      Hypothyroid  Chronic.  Uncontrolled as of 1 month ago when TSH was 15.806.  Continue chronic levothyroxine.  Repeat TSH.      Hypertension associated with diabetes  Chronic.  Stable.  Continue chronic amlodipine.  Monitor blood pressure closely and treat as indicated for sustained control.        VTE Risk Mitigation (From admission, onward)         Ordered     enoxaparin injection 40 mg  Daily      02/13/20 0105     IP VTE HIGH RISK PATIENT  Once      02/13/20 0105     Place LEOBARDO hose  Until discontinued      02/13/20 0105     Place sequential compression device  Until discontinued      02/13/20 0105            Time spent seeing patient( greater than 1/2 spent in direct contact) : 40 minutes       EZEQUIEL Lang  Department of Hospital Medicine   [facility]

## 2020-02-13 NOTE — ED PROVIDER NOTES
Encounter Date: 2/12/2020    SCRIBE #1 NOTE: I, Shahnaz Nelson, am scribing for, and in the presence of, Hugo Thompson MD.       History     Chief Complaint   Patient presents with    Altered Mental Status     weakness and altered mental status beginning today    family states pt had UTI within past month and has not fully recovered        Time seen by provider: 9:03 PM on 02/12/2020    Nina Portillo is a 79 y.o. female with a PMHx of DM, dementia, alzheimer's and prior UTI who presents to the ED with an onset of confusion, decreased activity, increased incontinence, frequency and decreased PO intake for the last few days. Daughter reports that she notices these symptoms normally present when the patient has a UTI. She states she has not fully recovered after last UTI one month ago. She also reports patient complained of back pain yesterday. The patient denies vomiting, fever, abdominal pain or any other symptoms at this time. PSHx includes partial nephrectomy.    The history is provided by the patient and a relative.     Review of patient's allergies indicates:   Allergen Reactions    Phenergan [promethazine] Other (See Comments)     hallucinations    Prilosec [omeprazole magnesium] Hives and Rash     Past Medical History:   Diagnosis Date    Alzheimer disease 2012    Cancer     renal cell. right    Dementia     Diabetes mellitus     GERD (gastroesophageal reflux disease)     Hyperlipidemia     Hypothyroid     Memory loss     Movement disorder      Past Surgical History:   Procedure Laterality Date    HYSTERECTOMY      JOINT REPLACEMENT      right    KNEE ARTHROSCOPY W/ DEBRIDEMENT      left    PARTIAL NEPHRECTOMY      right.     TONSILLECTOMY       Family History   Problem Relation Age of Onset    Cancer Mother 90        breast     Social History     Tobacco Use    Smoking status: Never Smoker    Smokeless tobacco: Never Used   Substance Use Topics    Alcohol use: No    Drug use:  No     Review of Systems   Constitutional: Positive for activity change (decreased) and appetite change (decreased). Negative for fever.   HENT: Negative for sore throat.    Respiratory: Negative for shortness of breath.    Cardiovascular: Negative for chest pain.   Gastrointestinal: Negative for abdominal pain and nausea.   Genitourinary: Positive for enuresis and frequency. Negative for dysuria.   Musculoskeletal: Positive for back pain.   Skin: Negative for rash.   Neurological: Negative for weakness.   Hematological: Does not bruise/bleed easily.   Psychiatric/Behavioral: Positive for confusion.       Physical Exam     Initial Vitals [02/12/20 2044]   BP Pulse Resp Temp SpO2   (!) 148/64 107 19 99.6 °F (37.6 °C) 96 %      MAP       --         Physical Exam    Nursing note and vitals reviewed.  Constitutional: She appears well-developed and well-nourished. She is not diaphoretic. No distress.   HENT:   Head: Normocephalic and atraumatic.   Mouth/Throat: Oropharynx is clear and moist.   Eyes: Conjunctivae are normal.   Neck: Neck supple.   Cardiovascular: Normal rate, regular rhythm, normal heart sounds and intact distal pulses. Exam reveals no gallop and no friction rub.    No murmur heard.  Pulmonary/Chest: Breath sounds normal. She has no wheezes. She has no rhonchi. She has no rales.   Abdominal: Soft. She exhibits no distension. There is no tenderness.   Musculoskeletal: Normal range of motion.   Neurological: She is alert and oriented to person, place, and time.   Cranial nerves III through XII grossly intact. 5/5 motor strength to all 4 extremities. Sensation is intact. Finger-to-nose intact. Speech and cognition is normal. No focal neurologic deficit.   Skin: No rash noted. No erythema.   Psychiatric: Her speech is normal.         ED Course   Procedures  Labs Reviewed   CBC W/ AUTO DIFFERENTIAL - Abnormal; Notable for the following components:       Result Value    WBC 13.76 (*)     Mean Corpuscular  Hemoglobin 32.3 (*)     MPV 9.0 (*)     Gran # (ANC) 12.0 (*)     Immature Grans (Abs) 0.05 (*)     Lymph # 0.6 (*)     Mono # 1.1 (*)     Gran% 87.5 (*)     Lymph% 4.2 (*)     All other components within normal limits   BASIC METABOLIC PANEL - Abnormal; Notable for the following components:    Sodium 133 (*)     CO2 22 (*)     Glucose 373 (*)     eGFR if  49 (*)     eGFR if non  42 (*)     All other components within normal limits   URINALYSIS, REFLEX TO URINE CULTURE - Abnormal; Notable for the following components:    Appearance, UA Hazy (*)     Protein, UA Trace (*)     Glucose, UA 4+ (*)     Occult Blood UA Trace (*)     Nitrite, UA Positive (*)     Leukocytes, UA Trace (*)     All other components within normal limits    Narrative:     Preferred Collection Type->Urine, Clean Catch   URINALYSIS MICROSCOPIC - Abnormal; Notable for the following components:    WBC, UA 68 (*)     Bacteria Many (*)     All other components within normal limits    Narrative:     Preferred Collection Type->Urine, Clean Catch   CULTURE, BLOOD   CULTURE, BLOOD   CULTURE, URINE   LACTIC ACID, PLASMA          Imaging Results          CT Head Without Contrast (Final result)  Result time 02/12/20 22:03:17    Final result by Reji Denise MD (02/12/20 22:03:17)                 Impression:      1. No acute intracranial process.  2. Involutional changes with chronic microvascular ischemic changes.      Electronically signed by: Reji Denise  Date:    02/12/2020  Time:    22:03             Narrative:    EXAMINATION:  CT HEAD WITHOUT CONTRAST    CLINICAL HISTORY:  Confusion/delirium, altered LOC, unexplained;    TECHNIQUE:  Low dose axial CT images obtained throughout the head without intravenous contrast. Sagittal and coronal reconstructions were performed.    COMPARISON:  03/23/2017.    FINDINGS:  Intracranial compartment:    No evidence of midline shift or hydrocephalus.  No extra-axial blood or fluid  collections.    Moderate probable chronic microvascular ischemic change in the periventricular white matter.  Prominent involutional changes.  No parenchymal mass, hemorrhage, edema or major vascular distribution infarct.    Skull/extracranial contents (limited evaluation): No fracture. Mastoid air cells and paranasal sinuses are essentially clear.    No significant change.                            (radiology reading, visualized by me)       Medical Decision Making:   History:   Old Medical Records: I decided to obtain old medical records.  Independently Interpreted Test(s):   I have ordered and independently interpreted EKG Reading(s) - see prior notes  Clinical Tests:   Lab Tests: Ordered and Reviewed  Radiological Study: Ordered and Reviewed  Medical Tests: Ordered and Reviewed            Scribe Attestation:   Scribe #1: I performed the above scribed service and the documentation accurately describes the services I performed. I attest to the accuracy of the note.    I, Dr. Hugo Thompson, personally performed the services described in this documentation. All medical record entries made by the scribe were at my direction and in my presence.  I have reviewed the chart and agree that the record reflects my personal performance and is accurate and complete. Hugo Thompson MD.  10:43 PM 02/12/2020    Nina Portillo is a 79 y.o. female presenting with mild generalized weakness and decreased activity with confusion complicating ADLs consistent with urinary tract infection.  No sign of toxicity with low suspicion for meningitis or encephalitis.  I do not think lumbar puncture is indicated.  No focal deficits on exam with initial head CT showing no sign of other process such as intracranial hemorrhage.  Urinalysis is positive similar to prior presentations consistent with UTI with delirium.  No renal insult at present.  Tachycardia resolved on recheck.  Blood cultures have been sent.  I will admit to  Medicine for IV antibiotics and close following of mental status.  They will follow up lactic acid pending although low suspicion for sepsis with ceftriaxone given here.          ED Course as of Feb 12 2243 Wed Feb 12, 2020 2129 EKG:  Normal sinus rhythm at a rate of 86.  Normal intervals.  Normal axis.  No significant ST or T wave changes suggesting acute ischemia or infarction.      [MR]      ED Course User Index  [MR] Hugo Thompson MD                Clinical Impression:       ICD-10-CM ICD-9-CM   1. Confusion R41.0 298.9                             Hugo Thompson MD  02/12/20 3157

## 2020-02-13 NOTE — ASSESSMENT & PLAN NOTE
Patient's FSGs are not controlled on current hypoglycemics.   Last A1c reviewed-   Lab Results   Component Value Date    HGBA1C 7.6 (H) 01/10/2020     Most recent fingerstick glucose reviewed-   Recent Labs   Lab 02/13/20  0546 02/13/20  1114 02/13/20  1636   POCTGLUCOSE 272* 240* 219*     Current correctional scale  Medium  Increase anti-hyperglycemic dose as follows-   Antihyperglycemics (From admission, onward)    Start     Stop Route Frequency Ordered    02/13/20 2100  insulin detemir U-100 pen 4 Units      -- SubQ Nightly 02/13/20 1758    02/13/20 0816  insulin aspart U-100 pen 1-10 Units      -- SubQ Before meals & nightly PRN 02/13/20 0717

## 2020-02-13 NOTE — SUBJECTIVE & OBJECTIVE
Past Medical History:   Diagnosis Date    Alzheimer disease 2012    Cancer     renal cell. right    Dementia     Diabetes mellitus     GERD (gastroesophageal reflux disease)     Hyperlipidemia     Hypothyroid     Memory loss     Movement disorder        Past Surgical History:   Procedure Laterality Date    HYSTERECTOMY      JOINT REPLACEMENT      right    KNEE ARTHROSCOPY W/ DEBRIDEMENT      left    PARTIAL NEPHRECTOMY      right.     TONSILLECTOMY         Review of patient's allergies indicates:   Allergen Reactions    Phenergan [promethazine] Other (See Comments)     hallucinations    Prilosec [omeprazole magnesium] Hives and Rash       No current facility-administered medications on file prior to encounter.      Current Outpatient Medications on File Prior to Encounter   Medication Sig    amLODIPine (NORVASC) 10 MG tablet Take 10 mg by mouth once daily.    aspirin (ECOTRIN) 81 MG EC tablet Take 81 mg by mouth once daily.     levothyroxine (SYNTHROID) 100 MCG tablet 1 tab po qd (Patient taking differently: Take 100 mcg by mouth before breakfast. )    metFORMIN (GLUCOPHAGE-XR) 500 MG 24 hr tablet Take 2 tablets (1,000 mg total) by mouth after dinner.    mirtazapine (REMERON) 15 MG tablet Take 1 tablet (15 mg total) by mouth every evening.    pantoprazole (PROTONIX) 40 MG tablet Take 1 tablet (40 mg total) by mouth once daily.     Family History     Problem Relation (Age of Onset)    Cancer Mother (90)        Tobacco Use    Smoking status: Never Smoker    Smokeless tobacco: Never Used   Substance and Sexual Activity    Alcohol use: No    Drug use: No    Sexual activity: Never     Review of Systems   Unable to perform ROS: Mental status change   Constitutional: Positive for appetite change and fever. Negative for chills.   HENT: Negative for congestion and trouble swallowing.    Gastrointestinal: Positive for nausea.   Genitourinary: Positive for frequency.   Musculoskeletal: Positive for  back pain.   Psychiatric/Behavioral: Positive for confusion.   As reported by pt's daughter.  Objective:     Vital Signs (Most Recent):  Temp: (!) 103.7 °F (39.8 °C) (02/13/20 0156)  Pulse: (!) 123 (02/13/20 0113)  Resp: (!) 24 (02/13/20 0113)  BP: (!) 175/84 (02/13/20 0113)  SpO2: 96 % (02/13/20 0113) Vital Signs (24h Range):  Temp:  [99.6 °F (37.6 °C)-103.7 °F (39.8 °C)] 103.7 °F (39.8 °C)  Pulse:  [] 123  Resp:  [19-24] 24  SpO2:  [96 %-98 %] 96 %  BP: (148-175)/(64-84) 175/84     Weight: 63.3 kg (139 lb 8.8 oz)  Body mass index is 23.22 kg/m².    Physical Exam   Constitutional: She appears well-developed.   Thin     HENT:   Head: Normocephalic and atraumatic.   Neck: Normal range of motion. Neck supple. No JVD present.   Cardiovascular: Regular rhythm. Tachycardia present.   Pulmonary/Chest: Effort normal and breath sounds normal. No respiratory distress.   Abdominal: Soft. Bowel sounds are normal. She exhibits no distension. There is no tenderness.   Genitourinary:   Genitourinary Comments: deferred   Musculoskeletal: Normal range of motion. She exhibits no edema.   Neurological: She is alert. GCS eye subscore is 4. GCS verbal subscore is 4. GCS motor subscore is 6.   Skin: Skin is warm and dry. No rash noted.   Psychiatric: She has a normal mood and affect. Her behavior is normal.   Nursing note and vitals reviewed.          Significant Labs:   CBC:   Recent Labs   Lab 02/12/20 2153   WBC 13.76*   HGB 13.2   HCT 40.0        CMP:   Recent Labs   Lab 02/12/20 2153   *   K 5.0      CO2 22*   *   BUN 16   CREATININE 1.2   CALCIUM 10.4   ANIONGAP 11   EGFRNONAA 42*     Lactic Acid:   Recent Labs   Lab 02/12/20  2225   LACTATE 1.8     Urine Studies:   Recent Labs   Lab 02/12/20  2217   COLORU Yellow   APPEARANCEUA Hazy*   PHUR 6.0   SPECGRAV 1.025   PROTEINUA Trace*   GLUCUA 4+*   KETONESU Negative   BILIRUBINUA Negative   OCCULTUA Trace*   NITRITE Positive*   UROBILINOGEN 1.0    LEUKOCYTESUR Trace*   RBCUA 2   WBCUA 68*   BACTERIA Many*   SQUAMEPITHEL 8       Significant Imaging: CT head: 1. No acute intracranial process.  2. Involutional changes with chronic microvascular ischemic changes.    CXR:No acute process.  No significant change.

## 2020-02-13 NOTE — ASSESSMENT & PLAN NOTE
Chronic.  Uncontrolled as of 1 month ago when TSH was 15.806.  Continue chronic levothyroxine.  Repeat TSH.

## 2020-02-13 NOTE — PT/OT/SLP EVAL
Occupational Therapy   Evaluation    Name: Nina Portillo  MRN: [id number]  Admitting Diagnosis:  Sepsis      Recommendations:     Discharge Recommendations: nursing facility, skilled  Discharge Equipment Recommendations:  walker, rolling, 3-in-1 commode  Barriers to discharge:  Decreased caregiver support(Pt is alone during the day.)    Assessment:     Nina Portillo is a 79 y.o. female with a medical diagnosis of Sepsis.  She presents with a decline in functional status due to the listed impairments, impacting ADLs and functional mobility. Pt found asleep in bed and was lethargic and fatigued and oriented to person, place and situation. She followed all 1-2 steps commands. She needed contact guard assist for supine<>sit and sit<>stand from EOB with walker and cues for hand placement. She needed standby assist for seated ADL's at EOB. Pt needed some encouragement to actively participate and declined sitting up in chair, stating she just wanted to sleep. Recommend OT treatment to maximize endurance, safety & independence with ADL's & functional mobility.  Performance deficits affecting function: weakness, impaired self care skills, impaired endurance, impaired functional mobilty, gait instability, impaired cognition, decreased lower extremity function, decreased safety awareness, impaired balance.    Pt will benefit from further inpatient therapy to maximize return to prior level of function, due to pt currently needing increased assistance for ADLs and functional mobility.    Rehab Prognosis: Fair; patient would benefit from acute skilled OT services to address these deficits and reach maximum level of function.       Plan:     Patient to be seen 2 x/week to address the above listed problems via self-care/home management, therapeutic activities, therapeutic exercises  · Plan of Care Expires: 02/23/20  · Plan of Care Reviewed with: patient    Subjective     Chief Complaint: Fatigue  Patient/Family Comments/goals:  "" I just want to sleep."    Occupational Profile:  Living Environment: Pt lives with her daughter, son-in-law and granddaughter in a H with 0 TIKA and a walk-in shower with grab bars.  Previous level of function: Pt reports being Mod I with basic ADL's at home using a quad cane for mobility. She does not do any household tasks but can get herself a drink or prepared meal from the kitchen.  Roles and Routines:  She stated she is alone during the day while her family is at work or school.  Equipment Used at Home:  cane, quad, shower chair, grab bar  Assistance upon Discharge: Recommend 24 hour assistance and supervision for safety.    Pain/Comfort:  · Pain Rating 1: 0/10  · Pain Rating Post-Intervention 1: 0/10    Patients cultural, spiritual, Church conflicts given the current situation:      Objective:     Communicated with: [facility] nurse prior to session.  Patient found right sidelying with bed alarm, telemetry, peripheral IV upon OT entry to room.    General Precautions: Standard, fall   Orthopedic Precautions:N/A   Braces: N/A     Occupational Performance:    Bed Mobility:    · Patient completed Rolling/Turning to Left with  contact guard assistance and with side rail  · Patient completed Scooting/Bridging with contact guard assistance  · Patient completed Supine to Sit with contact guard assistance and with side rail  · Patient completed Sit to Supine with contact guard assistance and with side rail    Functional Mobility/Transfers:  · Patient completed Sit <> Stand Transfer with contact guard assistance and cues for hand placement  with  rolling walker   · Functional Mobility: Four steps forward and back and side steps at bedside with walker and CGA    Activities of Daily Living:  · Feeding:  set-up and SBA seated at EOB to sip from cup and straw    · Grooming: stand by assistance and set-up to wash face seated EOB    · Lower Body Dressing: contact guard assistance with increased effort and time seated EOBto " don/doff socks  · Toileting: pt wearing a brief with urinary incontinence at present due to UTI      Cognitive/Visual Perceptual:  Cognitive/Psychosocial Skills:     -       Oriented to: Person, Place and Situation   -       Follows Commands/attention:Follows one-step commands and Follows two-step commands  -       Communication: clear/fluent  -       Memory: Impaired STM and Poor immediate recall  -       Safety awareness/insight to disability: impaired   -       Mood/Affect/Coping skills/emotional control: Cooperative and Lethargic  Visual/Perceptual:      -Intact  acuity and visual field      Physical Exam:  Postural examination/scapula alignment:    -       Rounded shoulders  -       Forward head  Skin integrity: Visible skin intact  Edema:  None noted  Dominant hand:    -       right  Upper Extremity Range of Motion:     -       Right Upper Extremity: WFL  -       Left Upper Extremity: WFL  Upper Extremity Strength:    -       Right Upper Extremity: 4/5  -       Left Upper Extremity: 4/5   Strength:    -       Right Upper Extremity: 4/5  -       Left Upper Extremity: 4/5  Fine Motor Coordination:    -       Intact  Gross motor coordination:   WFL    AMPAC 6 Click ADL:  AMPAC Total Score: 16    Treatment & Education:  OT oriented pt to time, place & how to use call button and bed controls.  Pt verbalized understanding.  OT ed pt on bed mobility techniques to increase independence with task.  OT ed patient on safety with walker use for functional mobility with cues for hand placement & sequencing.   OT ed pton keeping HOB raised and sitting up in chair as pt will tolerate to counteract effects of bedrest. Pt declined sitting up in chair despite encouragement.  OT ed pt on fall risk and strongly advised pt to call for help for all OOB mobility.Education:    Patient left HOB elevated with all lines intact, call button in reach, bed alarm on and [facility] nurse notified    GOALS:   Multidisciplinary Problems      Occupational Therapy Goals        Problem: Occupational Therapy Goal    Goal Priority Disciplines Outcome Interventions   Occupational Therapy Goal     OT, PT/OT Ongoing, Progressing    Description:  Goals to be met by: 2/23/20     Patient will increase functional independence with ADLs by performing:    UE Dressing with Set-up Assistance and Supervision.  LE Dressing with Set-up Assistance, Stand-by Assistance and Assistive Devices as needed.  Grooming while standing at sink with Contact Guard Assistance and Assistive Devices as needed.  Toileting from toilet with Minimal Assistance and Assistive Devices as needed for hygiene and clothing management.   Toilet transfer to toilet with Stand-by Assistance.                      History:     Past Medical History:   Diagnosis Date    Alzheimer disease 2012    Cancer     renal cell. right    Dementia     Diabetes mellitus     GERD (gastroesophageal reflux disease)     Hyperlipidemia     Hypothyroid     Memory loss     Movement disorder        Past Surgical History:   Procedure Laterality Date    HYSTERECTOMY      JOINT REPLACEMENT      right    KNEE ARTHROSCOPY W/ DEBRIDEMENT      left    PARTIAL NEPHRECTOMY      right.     TONSILLECTOMY         Time Tracking:     OT Date of Treatment: 02/13/20  OT Start Time: 0957  OT Stop Time: 1031  OT Total Time (min): 34 min    Billable Minutes:Evaluation 10  Self Care/Home Management 24      ORTIZ Patel  2/13/2020

## 2020-02-13 NOTE — ASSESSMENT & PLAN NOTE
This patient does have evidence of infective focus  My overall impression is sepsis.  Antibiotics given-   Antibiotics (From admission, onward)    Start     Stop Route Frequency Ordered    02/13/20 2230  cefTRIAXone (ROCEPHIN) 1 g in dextrose 5 % 50 mL IVPB      -- IV Every 24 hours (non-standard times) 02/13/20 0105          Latest lactate reviewed, they are-  Recent Labs   Lab 02/12/20  2225   LACTATE 1.8       Organ dysfunction indicated by tachycardia and Encephalopathy  Source-   Source control Achieved by- IV abx

## 2020-02-13 NOTE — ASSESSMENT & PLAN NOTE
Increased weakness likely 2/2 infection.  Chronic debilitated state reported by daughter.  PT/OT consult.  Check TSH.

## 2020-02-13 NOTE — ASSESSMENT & PLAN NOTE
IV Rocephin initiated.  Pharmacy consulted to dose IV vancomycin based on culture dated 01/10/2020.  Deescalate antibiotics as appropriate.  Follow current culture.

## 2020-02-14 PROBLEM — R78.81 GRAM-NEGATIVE BACTEREMIA: Status: ACTIVE | Noted: 2020-02-14

## 2020-02-14 LAB
ALBUMIN SERPL BCP-MCNC: 2.7 G/DL (ref 3.5–5.2)
ALP SERPL-CCNC: 87 U/L (ref 55–135)
ALT SERPL W/O P-5'-P-CCNC: 11 U/L (ref 10–44)
ANION GAP SERPL CALC-SCNC: 10 MMOL/L (ref 8–16)
AST SERPL-CCNC: 16 U/L (ref 10–40)
BACTERIA UR CULT: ABNORMAL
BASOPHILS # BLD AUTO: 0.03 K/UL (ref 0–0.2)
BASOPHILS NFR BLD: 0.3 % (ref 0–1.9)
BILIRUB SERPL-MCNC: 0.5 MG/DL (ref 0.1–1)
BUN SERPL-MCNC: 8 MG/DL (ref 8–23)
CALCIUM SERPL-MCNC: 9.3 MG/DL (ref 8.7–10.5)
CHLORIDE SERPL-SCNC: 104 MMOL/L (ref 95–110)
CO2 SERPL-SCNC: 23 MMOL/L (ref 23–29)
CREAT SERPL-MCNC: 0.8 MG/DL (ref 0.5–1.4)
DIFFERENTIAL METHOD: ABNORMAL
EOSINOPHIL # BLD AUTO: 0.1 K/UL (ref 0–0.5)
EOSINOPHIL NFR BLD: 0.8 % (ref 0–8)
ERYTHROCYTE [DISTWIDTH] IN BLOOD BY AUTOMATED COUNT: 12.1 % (ref 11.5–14.5)
EST. GFR  (AFRICAN AMERICAN): >60 ML/MIN/1.73 M^2
EST. GFR  (NON AFRICAN AMERICAN): >60 ML/MIN/1.73 M^2
GLUCOSE SERPL-MCNC: 123 MG/DL (ref 70–110)
HCT VFR BLD AUTO: 36.1 % (ref 37–48.5)
HGB BLD-MCNC: 11.9 G/DL (ref 12–16)
IMM GRANULOCYTES # BLD AUTO: 0.03 K/UL (ref 0–0.04)
LYMPHOCYTES # BLD AUTO: 1.1 K/UL (ref 1–4.8)
LYMPHOCYTES NFR BLD: 11.8 % (ref 18–48)
MAGNESIUM SERPL-MCNC: 1.6 MG/DL (ref 1.6–2.6)
MCH RBC QN AUTO: 31.6 PG (ref 27–31)
MCHC RBC AUTO-ENTMCNC: 33 G/DL (ref 32–36)
MCV RBC AUTO: 96 FL (ref 82–98)
MONOCYTES # BLD AUTO: 1 K/UL (ref 0.3–1)
MONOCYTES NFR BLD: 9.9 % (ref 4–15)
NEUTROPHILS # BLD AUTO: 7.3 K/UL (ref 1.8–7.7)
NEUTROPHILS NFR BLD: 76.9 % (ref 38–73)
NRBC BLD-RTO: 0 /100 WBC
PHOSPHATE SERPL-MCNC: 1.6 MG/DL (ref 2.7–4.5)
PLATELET # BLD AUTO: 154 K/UL (ref 150–350)
PMV BLD AUTO: 9.6 FL (ref 9.2–12.9)
POCT GLUCOSE: 134 MG/DL (ref 70–110)
POCT GLUCOSE: 156 MG/DL (ref 70–110)
POCT GLUCOSE: 165 MG/DL (ref 70–110)
POCT GLUCOSE: 207 MG/DL (ref 70–110)
POTASSIUM SERPL-SCNC: 3.4 MMOL/L (ref 3.5–5.1)
PROT SERPL-MCNC: 6.6 G/DL (ref 6–8.4)
RBC # BLD AUTO: 3.76 M/UL (ref 4–5.4)
SODIUM SERPL-SCNC: 137 MMOL/L (ref 136–145)
VANCOMYCIN SERPL-MCNC: 9.4 UG/ML
WBC # BLD AUTO: 9.56 K/UL (ref 3.9–12.7)

## 2020-02-14 PROCEDURE — 36415 COLL VENOUS BLD VENIPUNCTURE: CPT

## 2020-02-14 PROCEDURE — 97802 MEDICAL NUTRITION INDIV IN: CPT

## 2020-02-14 PROCEDURE — 80202 ASSAY OF VANCOMYCIN: CPT

## 2020-02-14 PROCEDURE — 84100 ASSAY OF PHOSPHORUS: CPT

## 2020-02-14 PROCEDURE — 83735 ASSAY OF MAGNESIUM: CPT

## 2020-02-14 PROCEDURE — 63600175 PHARM REV CODE 636 W HCPCS: Performed by: NURSE PRACTITIONER

## 2020-02-14 PROCEDURE — 25000003 PHARM REV CODE 250: Performed by: NURSE PRACTITIONER

## 2020-02-14 PROCEDURE — 96376 TX/PRO/DX INJ SAME DRUG ADON: CPT

## 2020-02-14 PROCEDURE — 63600175 PHARM REV CODE 636 W HCPCS: Performed by: HOSPITALIST

## 2020-02-14 PROCEDURE — 96361 HYDRATE IV INFUSION ADD-ON: CPT

## 2020-02-14 PROCEDURE — 87040 BLOOD CULTURE FOR BACTERIA: CPT | Mod: 59

## 2020-02-14 PROCEDURE — 12000002 HC ACUTE/MED SURGE SEMI-PRIVATE ROOM

## 2020-02-14 PROCEDURE — 80053 COMPREHEN METABOLIC PANEL: CPT

## 2020-02-14 PROCEDURE — 97530 THERAPEUTIC ACTIVITIES: CPT

## 2020-02-14 PROCEDURE — 85025 COMPLETE CBC W/AUTO DIFF WBC: CPT

## 2020-02-14 RX ORDER — VANCOMYCIN HCL IN 5 % DEXTROSE 1G/250ML
1000 PLASTIC BAG, INJECTION (ML) INTRAVENOUS ONCE
Status: COMPLETED | OUTPATIENT
Start: 2020-02-14 | End: 2020-02-14

## 2020-02-14 RX ADMIN — POTASSIUM CHLORIDE 40 MEQ: 20 SOLUTION ORAL at 11:02

## 2020-02-14 RX ADMIN — POTASSIUM CHLORIDE 40 MEQ: 20 SOLUTION ORAL at 03:02

## 2020-02-14 RX ADMIN — ENOXAPARIN SODIUM 40 MG: 100 INJECTION SUBCUTANEOUS at 03:02

## 2020-02-14 RX ADMIN — ASPIRIN 81 MG: 81 TABLET, COATED ORAL at 10:02

## 2020-02-14 RX ADMIN — CEFTRIAXONE 1 G: 1 INJECTION, SOLUTION INTRAVENOUS at 09:02

## 2020-02-14 RX ADMIN — SODIUM CHLORIDE: 0.9 INJECTION, SOLUTION INTRAVENOUS at 06:02

## 2020-02-14 RX ADMIN — Medication 800 MG: at 11:02

## 2020-02-14 RX ADMIN — PANTOPRAZOLE SODIUM 40 MG: 40 TABLET, DELAYED RELEASE ORAL at 10:02

## 2020-02-14 RX ADMIN — Medication 800 MG: at 03:02

## 2020-02-14 RX ADMIN — INSULIN ASPART 3 UNITS: 100 INJECTION, SOLUTION INTRAVENOUS; SUBCUTANEOUS at 08:02

## 2020-02-14 RX ADMIN — AMLODIPINE BESYLATE 10 MG: 5 TABLET ORAL at 10:02

## 2020-02-14 RX ADMIN — Medication 800 MG: at 12:02

## 2020-02-14 RX ADMIN — VANCOMYCIN HYDROCHLORIDE 1000 MG: 1 INJECTION, POWDER, LYOPHILIZED, FOR SOLUTION INTRAVENOUS at 05:02

## 2020-02-14 RX ADMIN — LEVOTHYROXINE SODIUM 100 MCG: 100 TABLET ORAL at 05:02

## 2020-02-14 RX ADMIN — MIRTAZAPINE 15 MG: 15 TABLET, FILM COATED ORAL at 08:02

## 2020-02-14 NOTE — HOSPITAL COURSE
Patient was admitted to the hospital medicine service for altered mental status on top of baseline dementia secondary to UTI.  She was started on broad-spectrum IV antibiotics.  Blood cultures as well as urine cultures positive for gram-negative rods.  Gram-negative germán bacteremia secondary to UTI.  Bacteria E coli with pan sensitivity.  PT and OT were consulted and recommended skilled nursing facility placement.  Case management was consulted for skilled nursing facility patient did not get approved for transfer to skilled nursing facility because she was close to baseline.  Patient was afebrile.  White blood cell normalized.  She had KAL improved with IV fluids.  Home health with hospital bed has been ordered with repeat labs in a week's time.  Placed on insulin for better glucose control.  Patient medically cleared for discharge home with home health.

## 2020-02-14 NOTE — ASSESSMENT & PLAN NOTE
Patient has chronic hypothyroidism. TFTs reviewed-   Lab Results   Component Value Date    TSH 3.073 02/13/2020   . Will continue chronic levothyroxine and adjust for and clinical changes.

## 2020-02-14 NOTE — ASSESSMENT & PLAN NOTE
Chronic, controlled.  Latest blood pressure and vitals reviewed-   Temp:  [98.1 °F (36.7 °C)-99.7 °F (37.6 °C)]   Pulse:  [72-91]   Resp:  [14-18]   BP: (142-157)/(65-68)   SpO2:  [94 %-98 %] .   Home meds for hypertension were reviewed and noted below. Hospital anti-hypertensive changes were made as shown below.  Hypertension Medications             amLODIPine (NORVASC) 10 MG tablet Take 10 mg by mouth once daily.      Hospital Medications             amLODIPine tablet 10 mg 10 mg, Oral, Daily        Will utilize p.r.n. blood pressure medication only if patient's blood pressure greater than  180/110 and she develops symptoms such as worsening chest pain or shortness of breath.

## 2020-02-14 NOTE — ASSESSMENT & PLAN NOTE
Secondary to UTI.  Continue antibiotics, follow up blood cultures and deescalate antibiotics as appropriate

## 2020-02-14 NOTE — PLAN OF CARE
Intervention: fat/sodium modified diet, prescription medication-appetite stimulant, and nutrition supplement therapy     Recommendation:   1) change diet to DM diet 3-4 carb servings/meal   2) Add boost glucose control strawberry BID, continue appetite stimulant   3) Weigh pt weekly      Goals: PO intakes > 50% of meals and supplements at f/u   Nutrition Goal Status: new  Communication of RD Recs: (POC, sticky note, second sign )

## 2020-02-14 NOTE — ASSESSMENT & PLAN NOTE
IV Rocephin initiated.  Pharmacy consulted to dose IV vancomycin based on culture dated 01/10/2020.  Urine cultures right now growing gram-negative rods.  Deescalate antibiotics as appropriate.  Follow urine culture.

## 2020-02-14 NOTE — PT/OT/SLP PROGRESS
Physical Therapy Treatment    Patient Name:  Nina Portillo   MRN:  [id number]    Recommendations:     Discharge Recommendations:  nursing facility, skilled, home, home health PT(dependending on patient progress in hospital and level of assistance at home)   Discharge Equipment Recommendations: walker, rolling     Assessment:     Nina Portillo is a 79 y.o. female admitted with a medical diagnosis of Gram-negative bacteremia.  She presents with the following impairments/functional limitations:  weakness, gait instability, impaired endurance, impaired balance, impaired functional mobilty.  Gait distance limited today due to patient reports being tired.    Rehab Prognosis: Good; patient would benefit from acute skilled PT services to address these deficits and reach maximum level of function.    Recent Surgery: * No surgery found *      Plan:     During this hospitalization, patient to be seen daily to address the identified rehab impairments via gait training, therapeutic activities, therapeutic exercises and progress toward the following goals:    · Plan of Care Expires:  03/12/20    Subjective     Chief Complaint: none stated  Patient/Family Comments/goals: agrees to work with PT    Objective:     Communicated with RN (Libra) prior to session.  Patient found HOB elevated with peripheral IV, telemetry upon PT entry to room.     General Precautions: Standard, fall   Orthopedic Precautions:N/A   Braces: N/A     Functional Mobility training:  · Bed Mobility:     · Rolling Left:  minimum assistance  · Supine to Sit: minimum assistance  · Sit to Supine: stand by assistance  · Transfers:     · Sit to Stand:  minimum assistance with rolling walker  · Gait: 75' with RW with CGA and cues (assist for IV pole)      AM-PAC 6 CLICK MOBILITY  Turning over in bed (including adjusting bedclothes, sheets and blankets)?: 3  Sitting down on and standing up from a chair with arms (e.g., wheelchair, bedside commode, etc.): 3  Moving  from lying on back to sitting on the side of the bed?: 3  Moving to and from a bed to a chair (including a wheelchair)?: 3  Need to walk in hospital room?: 3  Climbing 3-5 steps with a railing?: 1  Basic Mobility Total Score: 16     Patient left HOB elevated with all lines intact, call button in reach and bed alarm on..    GOALS:   Multidisciplinary Problems     Physical Therapy Goals        Problem: Physical Therapy Goal    Goal Priority Disciplines Outcome Goal Variances Interventions   Physical Therapy Goal     PT, PT/OT Ongoing, Progressing     Description:  Goals to be met by: 2020    Patient will increase functional independence with mobility by performin. Supine to sit with Modified [place]  2. Sit to supine with Modified [place]  3. Sit to stand transfer with Modified [place]  4. Bed to chair transfer with Supervision using Rolling Walker  5. Gait  x 150 feet with Supervision using Single-point Cane .                       Time Tracking:     PT Received On: 20  PT Start Time: 0845     PT Stop Time: 0903  PT Total Time (min): 18 min     Billable Minutes: Therapeutic Activity 15    Treatment Type: Treatment  PT/PTA: PT     PTA Visit Number: 0     Vazquez Hinds, PT  2020

## 2020-02-14 NOTE — ASSESSMENT & PLAN NOTE
Increased weakness likely 2/2 infection.  Chronic debilitated state reported by daughter.  PT/OT consult.  Skilled nursing facility recommended.  Consult placed.

## 2020-02-14 NOTE — PLAN OF CARE
Patient alert and oriented to person and place. resting in bed. NAD. Denies pain or SOB. VSS. Brief for incontinence. Normal SR on monitor. Bed alarm active. Plan of care reviewed with patient. Verbalizes understanding.Call light in reach. Pt free from fall or injury. Will monitor.

## 2020-02-14 NOTE — ASSESSMENT & PLAN NOTE
Contributing Nutrition Diagnosis  Altered nutrition related lab values    Related to (etiology):   Inconsistent carbohydrate intakes ( skips meals)    Signs and Symptoms (as evidenced by):   Elevated glucose and A1C    Interventions/Recommendations (treatment strategy):  above    Nutrition Diagnosis Status:   continues

## 2020-02-14 NOTE — ASSESSMENT & PLAN NOTE
Patient's FSGs are not controlled on current hypoglycemics.   Last A1c reviewed-   Lab Results   Component Value Date    HGBA1C 7.6 (H) 01/10/2020     Most recent fingerstick glucose reviewed-   Recent Labs   Lab 02/13/20  1636 02/14/20  0613 02/14/20  1122   POCTGLUCOSE 219* 156* 207*     Current correctional scale  Medium  Increase anti-hyperglycemic dose as follows-   Antihyperglycemics (From admission, onward)    Start     Stop Route Frequency Ordered    02/14/20 2100  insulin detemir U-100 pen 5 Units      -- SubQ Nightly 02/14/20 1305    02/13/20 0816  insulin aspart U-100 pen 1-10 Units      -- SubQ Before meals & nightly PRN 02/13/20 0717

## 2020-02-14 NOTE — PLAN OF CARE
Date Status/Notes Created By   · 2/14/2020 3:02:08 PM Note: Daughter is scheduled Monday morning at 1030am for paperwork. Thanks so much  [email or web address]  · 2/14/2020 3:01:42 PM Accepted  [email or web address]   The pt is accepted at [facility] and they are completing paperwork Monday. I updated the pts AVS. CM will request an auth from [facility] once medically cleared. Linda Trejo, LCSW     02/14/20 1507   Post-Acute Status   Post-Acute Authorization Placement   Post-Acute Placement Status Referrals Sent

## 2020-02-14 NOTE — CONSULTS
Pharmacokinetic Assessment Follow Up: IV Vancomycin    Vancomycin serum concentration assessment(s):    The random level was drawn correctly and can be used to guide therapy at this time. The measurement is below the desired definitive target range of 15 to 20 mcg/mL.    Vancomycin Regimen Plan:    One time dose given of Vancomycin 1000 mg with next serum trough concentration measured at 0400 prior to next dose on 02/15     Drug levels (last 3 results):  Recent Labs   Lab Result Units 02/14/20  0259   Vancomycin, Random ug/mL 9.4       Pharmacy will continue to follow and monitor vancomycin.    Please contact pharmacy at extension [phone] for questions regarding this assessment.    Thank you for the consult,   Ruth Ann Velazquez       Patient brief summary:  Nina Portillo is a 79 y.o. female initiated on antimicrobial therapy with IV Vancomycin for treatment of sepsis      Drug Allergies:   Review of patient's allergies indicates:   Allergen Reactions    Phenergan [promethazine] Other (See Comments)     hallucinations    Prilosec [omeprazole magnesium] Hives and Rash       Actual Body Weight:   63.3 kg    Renal Function:   Estimated Creatinine Clearance: 49.6 mL/min (based on SCr of 0.8 mg/dL).,     Dialysis Method (if applicable):  N/A    CBC (last 72 hours):  Recent Labs   Lab Result Units 02/12/20 2153 02/13/20  0434   WBC K/uL 13.76* 13.57*   Hemoglobin g/dL 13.2 11.2*   Hematocrit % 40.0 33.9*   Platelets K/uL 181 152   Gran% % 87.5* 87.1*   Lymph% % 4.2* 2.9*   Mono% % 7.7 9.5   Eosinophil% % 0.1 0.0   Basophil% % 0.1 0.1   Differential Method  Automated Automated       Metabolic Panel (last 72 hours):  Recent Labs   Lab Result Units 02/12/20 2153 02/12/20 2217 02/13/20  0434   Sodium mmol/L 133*  --  136   Potassium mmol/L 5.0  --  3.5   Chloride mmol/L 100  --  106   CO2 mmol/L 22*  --  21*   Glucose mg/dL 373*  --  236*   Glucose, UA   --  4+*  --    BUN, Bld mg/dL 16  --  12   Creatinine mg/dL 1.2  --  0.8    Albumin g/dL  --   --  2.7*   Total Bilirubin mg/dL  --   --  0.6   Alkaline Phosphatase U/L  --   --  90   AST U/L  --   --  13   ALT U/L  --   --  10   Magnesium mg/dL  --   --  1.5*   Phosphorus mg/dL  --   --  1.3*       Vancomycin Administrations:  vancomycin given in the last 96 hours                     vancomycin in dextrose 5 % 1 gram/250 mL IVPB 1,000 mg (mg) 1,000 mg New Bag 02/14/20 0508    vancomycin 1.25 g in dextrose 5% 250 mL IVPB (ready to mix) (mg) 1,250 mg New Bag 02/13/20 0339                      Microbiologic Results:  Microbiology Results (last 7 days)       Procedure Component Value Units Date/Time    Blood culture [[id number]] Collected:  02/12/20 2233    Order Status:  Completed Specimen:  Blood Updated:  02/13/20 2205     Blood Culture, Routine Gram stain peds bottle: Gram negative rods      Results called to and read back by:Christen Haynes RN 02/13/2020  22:04    Blood culture [[id number]] Collected:  02/12/20 2225    Order Status:  Completed Specimen:  Blood Updated:  02/13/20 2204     Blood Culture, Routine Gram stain peds bottle: Gram negative rods      Results called to and read back by:Christen Haynes RN 02/13/2020  22:03    Urine culture [[id number]]  (Abnormal) Collected:  02/12/20 2217    Order Status:  Completed Specimen:  Urine Updated:  02/13/20 1910     Urine Culture, Routine GRAM NEGATIVE ZOHREH  >100,000 cfu/ml  Identification and susceptibility pending      Narrative:       Preferred Collection Type->Urine, Clean Catch

## 2020-02-14 NOTE — PROGRESS NOTES
[facility] Hospital Medicine  Progress Note    Patient Name: Nina Portillo  MRN: [id number]  Patient Class: IP- Inpatient   Admission Date: 2/12/2020  Length of Stay: 0 days  Attending Physician: Sadia Quintero MD  Primary Care Provider: Rush Rosa MD        Subjective:     Principal Problem:Gram-negative bacteremia        HPI:  Nina Portillo is a 78 y/o a. female with a past medical history significant for DM, dementia, and HTN who presented to the ED with increased confusion, decreased activity, increased incontinence, frequency and decreased PO intake for the last few days. Daughter reports that these symptoms are normally present when the patient has a UTI. She states she has not fully recovered after last UTI one month ago. The patient denies vomiting, fever, abdominal pain or any other symptoms at this time.  While in the emergency department patient was found to have a UTI and meet sepsis criteria.  She will be admitted to the service of hospital Medicine for further treatment.    Overview/Hospital Course:  Patient was admitted to the hospital medicine service for altered mental status on top of baseline dementia secondary to UTI.  She was started on broad-spectrum IV antibiotics.  Blood cultures as well as urine cultures positive for gram-negative rods.  Gram-negative germán bacteremia secondary to UTI.  PT and OT were consulted and recommended skilled nursing facility placement.  Case management was consulted for skilled nursing facility.  Patient was afebrile.  White blood cell normalized.  Placed on insulin for better glucose control.    Interval History:  Patient seen and examined.  Remains oriented to self and location.  No complaints.  Blood and urine cultures growing gram-negative rods.    Review of Systems   Unable to perform ROS: Dementia   All other systems reviewed and are negative.    Objective:     Vital Signs (Most Recent):  Temp: 98.6 °F (37 °C) (02/14/20  1118)  Pulse: 78 (02/14/20 1118)  Resp: 18 (02/14/20 1118)  BP: (!) 142/65 (02/14/20 1118)  SpO2: 96 % (02/14/20 1118) Vital Signs (24h Range):  Temp:  [98.1 °F (36.7 °C)-99.7 °F (37.6 °C)] 98.6 °F (37 °C)  Pulse:  [72-91] 78  Resp:  [14-18] 18  SpO2:  [94 %-98 %] 96 %  BP: (142-157)/(65-68) 142/65     Weight: 63.3 kg (139 lb 8.8 oz)  Body mass index is 23.22 kg/m².    Intake/Output Summary (Last 24 hours) at 2/14/2020 1247  Last data filed at 2/14/2020 1200  Gross per 24 hour   Intake 2220 ml   Output --   Net 2220 ml      Physical Exam   Constitutional: She appears well-developed.   Thin     HENT:   Head: Normocephalic and atraumatic.   Neck: Normal range of motion. Neck supple. No JVD present.   Cardiovascular: Normal rate and regular rhythm.   Pulmonary/Chest: Effort normal and breath sounds normal. No respiratory distress.   Abdominal: Soft. Bowel sounds are normal. She exhibits no distension. There is no tenderness.   Genitourinary:   Genitourinary Comments: deferred   Musculoskeletal: Normal range of motion. She exhibits no edema.   Neurological: She is alert.   Oriented to self and location   Skin: Skin is warm and dry. No rash noted.   Psychiatric: She has a normal mood and affect. Her behavior is normal.   Nursing note and vitals reviewed.      Significant Labs: All pertinent labs within the past 24 hours have been reviewed.    Significant Imaging: I have reviewed and interpreted all pertinent imaging results/findings within the past 24 hours.      Assessment/Plan:      * Gram-negative bacteremia  Secondary to UTI.  Continue antibiotics, follow up blood cultures and deescalate antibiotics as appropriate      Acute cystitis without hematuria  IV Rocephin initiated.  Pharmacy consulted to dose IV vancomycin based on culture dated 01/10/2020.  Urine cultures right now growing gram-negative rods.  Deescalate antibiotics as appropriate.  Follow urine culture.      Sepsis  This patient does have evidence of  infective focus  My overall impression is sepsis.  Antibiotics given-   Antibiotics (From admission, onward)    Start     Stop Route Frequency Ordered    02/13/20 2230  cefTRIAXone (ROCEPHIN) 1 g in dextrose 5 % 50 mL IVPB      -- IV Every 24 hours (non-standard times) 02/13/20 0105    02/13/20 0307  vancomycin - pharmacy to dose  (vancomycin IVPB)      -- IV pharmacy to manage frequency 02/13/20 0207          Latest lactate reviewed, they are-  Recent Labs   Lab 02/12/20  2225 02/13/20  0434   LACTATE 1.8 1.1       Organ dysfunction indicated by tachycardia and Encephalopathy  Source-   Source control Achieved by- IV abx        Weakness  Increased weakness likely 2/2 infection.  Chronic debilitated state reported by daughter.  PT/OT consult.  Skilled nursing facility recommended.  Consult placed.        Moderate malnutrition  Nutrition consulted. Body mass index is 23.22 kg/m².. Encourage maximal PO intake. Diet supplementation ordered per nutrition approval. Will encourage PO and monitor closely for weight changes.        Controlled type 2 diabetes mellitus with diabetic neuropathy, without long-term current use of insulin  Patient's FSGs are not controlled on current hypoglycemics.   Last A1c reviewed-   Lab Results   Component Value Date    HGBA1C 7.6 (H) 01/10/2020     Most recent fingerstick glucose reviewed-   Recent Labs   Lab 02/13/20  1636 02/14/20  0613 02/14/20  1122   POCTGLUCOSE 219* 156* 207*     Current correctional scale  Medium  Increase anti-hyperglycemic dose as follows-   Antihyperglycemics (From admission, onward)    Start     Stop Route Frequency Ordered    02/14/20 2100  insulin detemir U-100 pen 5 Units      -- SubQ Nightly 02/14/20 1305    02/13/20 0816  insulin aspart U-100 pen 1-10 Units      -- SubQ Before meals & nightly PRN 02/13/20 0717              Encephalopathy, metabolic  Daughter reports increased confusion.  Patient with history of dementia.  CT head negative for anything  acute.  Likely secondary to UTI with bacteremia.  Fall precautions.  Treat source.      GERD (gastroesophageal reflux disease)  Chronic.  Continue PPI.      Hypothyroid  Patient has chronic hypothyroidism. TFTs reviewed-   Lab Results   Component Value Date    TSH 3.073 02/13/2020   . Will continue chronic levothyroxine and adjust for and clinical changes.      Hypertension associated with diabetes  Chronic, controlled.  Latest blood pressure and vitals reviewed-   Temp:  [98.1 °F (36.7 °C)-99.7 °F (37.6 °C)]   Pulse:  [72-91]   Resp:  [14-18]   BP: (142-157)/(65-68)   SpO2:  [94 %-98 %] .   Home meds for hypertension were reviewed and noted below. Hospital anti-hypertensive changes were made as shown below.  Hypertension Medications             amLODIPine (NORVASC) 10 MG tablet Take 10 mg by mouth once daily.      Hospital Medications             amLODIPine tablet 10 mg 10 mg, Oral, Daily        Will utilize p.r.n. blood pressure medication only if patient's blood pressure greater than  180/110 and she develops symptoms such as worsening chest pain or shortness of breath.          VTE Risk Mitigation (From admission, onward)         Ordered     enoxaparin injection 40 mg  Daily      02/13/20 0105     IP VTE HIGH RISK PATIENT  Once      02/13/20 0105     Place LEOBARDO hose  Until discontinued      02/13/20 0105     Place sequential compression device  Until discontinued      02/13/20 0105                      Sadia Quintero MD  Department of Hospital Medicine   [facility]

## 2020-02-14 NOTE — ASSESSMENT & PLAN NOTE
Daughter reports increased confusion.  Patient with history of dementia.  CT head negative for anything acute.  Likely secondary to UTI with bacteremia.  Fall precautions.  Treat source.

## 2020-02-14 NOTE — SUBJECTIVE & OBJECTIVE
Interval History:  Patient seen and examined.  Remains oriented to self and location.  No complaints.  Blood and urine cultures growing gram-negative rods.    Review of Systems   Unable to perform ROS: Dementia   All other systems reviewed and are negative.    Objective:     Vital Signs (Most Recent):  Temp: 98.6 °F (37 °C) (02/14/20 1118)  Pulse: 78 (02/14/20 1118)  Resp: 18 (02/14/20 1118)  BP: (!) 142/65 (02/14/20 1118)  SpO2: 96 % (02/14/20 1118) Vital Signs (24h Range):  Temp:  [98.1 °F (36.7 °C)-99.7 °F (37.6 °C)] 98.6 °F (37 °C)  Pulse:  [72-91] 78  Resp:  [14-18] 18  SpO2:  [94 %-98 %] 96 %  BP: (142-157)/(65-68) 142/65     Weight: 63.3 kg (139 lb 8.8 oz)  Body mass index is 23.22 kg/m².    Intake/Output Summary (Last 24 hours) at 2/14/2020 1247  Last data filed at 2/14/2020 1200  Gross per 24 hour   Intake 2220 ml   Output --   Net 2220 ml      Physical Exam   Constitutional: She appears well-developed.   Thin     HENT:   Head: Normocephalic and atraumatic.   Neck: Normal range of motion. Neck supple. No JVD present.   Cardiovascular: Normal rate and regular rhythm.   Pulmonary/Chest: Effort normal and breath sounds normal. No respiratory distress.   Abdominal: Soft. Bowel sounds are normal. She exhibits no distension. There is no tenderness.   Genitourinary:   Genitourinary Comments: deferred   Musculoskeletal: Normal range of motion. She exhibits no edema.   Neurological: She is alert.   Oriented to self and location   Skin: Skin is warm and dry. No rash noted.   Psychiatric: She has a normal mood and affect. Her behavior is normal.   Nursing note and vitals reviewed.      Significant Labs: All pertinent labs within the past 24 hours have been reviewed.    Significant Imaging: I have reviewed and interpreted all pertinent imaging results/findings within the past 24 hours.

## 2020-02-14 NOTE — ASSESSMENT & PLAN NOTE
Moderate malnutrition  Contributing Nutrition Diagnosis  Moderate acute illness related malnutrition     Related to (etiology):   Decreased appetite     Signs and Symptoms (as evidenced by):   1) PO intakes < 50% x 5 days  2) mild-moderate muscle/fat wasting as charted bleow     Interventions/Recommendations (treatment strategy):  Above     Nutrition Diagnosis Status:   continues

## 2020-02-14 NOTE — PLAN OF CARE
I completed the pts locet assessment with Simran at [phone] opt 3. I faxed the signed and completed PASRR to [facility] at [phone]. CM will follow up to obtain pts emailed 142. Linda Trejo LCSW     10:56- I received the pts approved 142 via email and scanned the copy and PASRR to [facility] and sent to  and GB. Linda Trejo LCSW    12:56- The pt is clinically approved at [facility] and moving into financial review. Linda Trejo LCSW       02/14/20 0936   Post-Acute Status   Post-Acute Authorization Placement   Post-Acute Placement Status Referrals Sent

## 2020-02-14 NOTE — ASSESSMENT & PLAN NOTE
This patient does have evidence of infective focus  My overall impression is sepsis.  Antibiotics given-   Antibiotics (From admission, onward)    Start     Stop Route Frequency Ordered    02/13/20 2230  cefTRIAXone (ROCEPHIN) 1 g in dextrose 5 % 50 mL IVPB      -- IV Every 24 hours (non-standard times) 02/13/20 0105    02/13/20 0307  vancomycin - pharmacy to dose  (vancomycin IVPB)      -- IV pharmacy to manage frequency 02/13/20 0207          Latest lactate reviewed, they are-  Recent Labs   Lab 02/12/20  2225 02/13/20  0434   LACTATE 1.8 1.1       Organ dysfunction indicated by tachycardia and Encephalopathy  Source-   Source control Achieved by- IV abx

## 2020-02-14 NOTE — ASSESSMENT & PLAN NOTE
Nutrition consulted. Body mass index is 23.22 kg/m².. Encourage maximal PO intake. Diet supplementation ordered per nutrition approval. Will encourage PO and monitor closely for weight changes.

## 2020-02-14 NOTE — PLAN OF CARE
Problem: Physical Therapy Goal  Goal: Physical Therapy Goal  Description  Goals to be met by: 2020    Patient will increase functional independence with mobility by performin. Supine to sit with Modified [place]  2. Sit to supine with Modified [place]  3. Sit to stand transfer with Modified [place]  4. Bed to chair transfer with Supervision using Rolling Walker  5. Gait  x 150 feet with Supervision using Single-point Cane .      Outcome: Ongoing, Progressing

## 2020-02-14 NOTE — CONSULTS
" [facility]  Adult Nutrition  Consult Note    SUMMARY   Intervention: fat/sodium modified diet, prescription medication-appetite stimulant, and nutrition supplement therapy     Recommendation:   1) change diet to DM diet 3-4 carb servings/meal   2) Add boost glucose control strawberry BID, continue appetite stimulant   3) Weigh pt weekly      Goals: PO intakes > 50% of meals and supplements at f/u   Nutrition Goal Status: new  Communication of RD Recs: (POC, sticky note, second sign )     Reason for Assessment     Reason For Assessment: consult  Diagnosis: (sepsis)  Relevant Medical History: DM2, renal CA, Alzheimers, nephrectomy, HTN, UTI  Interdisciplinary Rounds: attended     General Information Comments: 78 y/o female with Alzheimers in with AMS and cystitis. Noted to have had a poor appetite in the past few days. Per pt usually eats 2 meals + 2-3 ice cream shakes daily (? reliablility of stated history). Not counting carbs, glucose elevated. Left diabetic instructions for care givers. Wt gain per chart review. Agreeable to Boost, on appetite stimualnt. NFPE done 2/14/20, mod-mild wasting seen.     Nutrition Discharge Planning: To be determined- DM diet 3-4 carb servings/meal + boost glucose control 2-3 x daily     Nutrition Risk Screen     Nutrition Risk Screen: no indicators present     Nutrition/Diet History     Patient Reported Diet/Restrictions/Preferences: regular- states she eats a lot of sugary foods  Spiritual, Cultural Beliefs, Restoration Practices, Values that Affect Care: no  Food Allergies: NKFA  Factors Affecting Nutritional Intake: decreased appetite, dislike of foods     Anthropometrics     Height Method: Measured(office 1/15/19)  Height: 5' 5"  Height (inches): 65 in  Weight Method: Bed Scale  Weight: 63.3 kg (2/13/20)  Weight (lb): 139 lb  Ideal Body Weight (IBW), Female: 125 lb  % Ideal Body Weight, Female (lb): 106.18 lb  BMI (Calculated): 23 kg/m2  BMI Grade: WNL  Weight " Loss: intentional  Usual Body Weight (UBW), k kg(1/15/19), 61 kg 1/10/20     Lab/Procedures/Meds     Pertinent Labs Reviewed: reviewed  BMP  Lab Results   Component Value Date     2020    K 3.4 (L) 2020     2020    CO2 23 2020    BUN 8 2020    CREATININE 0.8 2020    CALCIUM 9.3 2020    ANIONGAP 10 2020    ESTGFRAFRICA >60 2020    EGFRNONAA >60 2020     Lab Results   Component Value Date    CALCIUM 9.3 2020    PHOS 1.6 (L) 2020     Lab Results   Component Value Date    ALBUMIN 2.7 (L) 2020     Lab Results   Component Value Date    HGBA1C 7.6 (H) 01/10/2020     Recent Labs   Lab 20  1122   POCTGLUCOSE 207*   No results found for: IRON, TIBC, FERRITIN, SATURATEDIRO      Pertinent Medications Reviewed: reviewed  Zofran, insulin, KCl, mirtazapine, NS @ 100 ml/hr     Estimated/Assessed Needs    Weight Used For Calorie Calculations: 63 kg  Energy Calorie Requirements (kcal): Otero St Jeor ( x 1.3-1.4 wt maintenance ) = 2465-7101 kcal  Energy Need Method: Otero-St Jeor  Protein Requirements: 1.2-1.3 g protein/kg ( wasting/age) = 75-81 g protein  Weight Used For Protein Calculations: 63 kg  Fluid Requirements (mL): 1500 ml or per MD RDA  CHO Requirement: 160-178 g        Nutrition Prescription Ordered     Current Diet Order: Cardiac     Evaluation of Received Nutrient/Fluid Intake     Energy Calories Required: not meeting needs  Protein Required: not meeting needs  Fluid Required: not meeting needs  Tolerance: tolerating  % Intake of Estimated Energy Needs: 0-50%  % Meal Intake: 0-50%     Nutrition Risk     Level of Risk/Frequency of Follow-up: moderate 2 x weekly      Assessment and Plan     Moderate malnutrition  Contributing Nutrition Diagnosis  Moderate acute illness related malnutrition     Related to (etiology):   Decreased appetite     Signs and Symptoms (as evidenced by):   1) PO intakes < 50% x 5 days  2)  mild-moderate muscle/fat wasting as charted jabari     Interventions/Recommendations (treatment strategy):  Above     Nutrition Diagnosis Status:   New      Contributing Nutrition Diagnosis  Altered nutrition related lab values    Related to (etiology):   Inconsistent carbohydrate intakes ( skips meals)    Signs and Symptoms (as evidenced by):   Elevated glucose and A1C    Interventions/Recommendations (treatment strategy):  above    Nutrition Diagnosis Status:   New    Monitor and Evaluation     Food and Nutrient Intake: energy intake, food and beverage intake  Food and Nutrient Adminstration: diet order  Anthropometric Measurements: weight  Biochemical Data, Medical Tests and Procedures: electrolyte and renal panel, glucose/endocrine profile  Nutrition-Focused Physical Findings: overall appearance      Malnutrition Assessment  Malnutrition Type: acute illness or injury  Skin (Micronutrient): (Luis = 18)  Teeth (Micronutrient): (denies chewing trouble)   Micronutrient Evaluation: suspected deficiency  Micronutrient Evaluation Comments: K, Phos, check iron  Energy Intake (Malnutrition): less than or equal to 50% for greater than or equal to 5 days   Orbital Region (Subcutaneous Fat Loss): moderate depletion  Upper Arm Region (Subcutaneous Fat Loss): mild depletion  Thoracic and Lumbar Region: mild depletion   Buddhist Region (Muscle Loss): moderate depletion  Clavicle Bone Region (Muscle Loss): moderate depletion  Clavicle and Acromion Bone Region (Muscle Loss): moderate depletion  Scapular Bone Region (Muscle Loss): mild depletion  Dorsal Hand (Muscle Loss): mild depletion  Patellar Region (Muscle Loss): mild depletion  Anterior Thigh Region (Muscle Loss): mild depletion  Posterior Calf Region (Muscle Loss): mild depletion   Edema (Fluid Accumulation): note noted  Subcutaneous Fat Loss (Final Summary): moderate protein-calorie malnutrition  Muscle Loss Evaluation (Final Summary): moderate protein-calorie  malnutrition       Nutrition Follow-Up     RD Follow-up?: Yes

## 2020-02-14 NOTE — PROGRESS NOTES
[facility] Hospital Medicine  Progress Note    Patient Name: Nina Portillo  MRN: [id number]  Patient Class: OP- Observation   Admission Date: 2/12/2020  Length of Stay: 0 days  Attending Physician: Sadia Quintero MD  Primary Care Provider: Rush Rosa MD        Subjective:     Principal Problem:Sepsis        HPI:  Nina Portillo is a 80 y/o a. female with a past medical history significant for DM, dementia, and HTN who presented to the ED with increased confusion, decreased activity, increased incontinence, frequency and decreased PO intake for the last few days. Daughter reports that these symptoms are normally present when the patient has a UTI. She states she has not fully recovered after last UTI one month ago. The patient denies vomiting, fever, abdominal pain or any other symptoms at this time.  While in the emergency department patient was found to have a UTI and meet sepsis criteria.  She will be admitted to the service of hospital Medicine for further treatment.    Overview/Hospital Course:  No notes on file    Interval History:  Patient seen and examined.  Oriented to self and location.  Denies pain at this time.    Review of Systems   Unable to perform ROS: Dementia     Objective:     Vital Signs (Most Recent):  Temp: 96.2 °F (35.7 °C) (02/13/20 0741)  Pulse: 91 (02/13/20 1612)  Resp: 18 (02/13/20 1612)  BP: (!) 154/66 (02/13/20 1612)  SpO2: 98 % (02/13/20 1612) Vital Signs (24h Range):  Temp:  [96.2 °F (35.7 °C)-103.7 °F (39.8 °C)] 96.2 °F (35.7 °C)  Pulse:  [] 91  Resp:  [16-24] 18  SpO2:  [94 %-98 %] 98 %  BP: (135-175)/(63-84) 154/66     Weight: 63.3 kg (139 lb 8.8 oz)  Body mass index is 23.22 kg/m².    Intake/Output Summary (Last 24 hours) at 2/13/2020 1756  Last data filed at 2/13/2020 0600  Gross per 24 hour   Intake 1311.67 ml   Output --   Net 1311.67 ml      Physical Exam   Constitutional: She appears well-developed.   Thin     HENT:   Head: Normocephalic and  atraumatic.   Neck: Normal range of motion. Neck supple. No JVD present.   Cardiovascular: Normal rate and regular rhythm.   Pulmonary/Chest: Effort normal and breath sounds normal. No respiratory distress.   Abdominal: Soft. Bowel sounds are normal. She exhibits no distension. There is no tenderness.   Genitourinary:   Genitourinary Comments: deferred   Musculoskeletal: Normal range of motion. She exhibits no edema.   Neurological: She is alert.   Oriented to self and location   Skin: Skin is warm and dry. No rash noted.   Psychiatric: She has a normal mood and affect. Her behavior is normal.   Nursing note and vitals reviewed.      Significant Labs: All pertinent labs within the past 24 hours have been reviewed.    Significant Imaging: I have reviewed and interpreted all pertinent imaging results/findings within the past 24 hours.      Assessment/Plan:      * Sepsis  This patient does have evidence of infective focus  My overall impression is sepsis.  Antibiotics given-   Antibiotics (From admission, onward)    Start     Stop Route Frequency Ordered    02/13/20 2230  cefTRIAXone (ROCEPHIN) 1 g in dextrose 5 % 50 mL IVPB      -- IV Every 24 hours (non-standard times) 02/13/20 0105    02/13/20 0307  vancomycin - pharmacy to dose  (vancomycin IVPB)      -- IV pharmacy to manage frequency 02/13/20 0207          Latest lactate reviewed, they are-  Recent Labs   Lab 02/12/20  2225 02/13/20  0434   LACTATE 1.8 1.1       Organ dysfunction indicated by tachycardia and Encephalopathy  Source-   Source control Achieved by- IV abx        Acute cystitis without hematuria  IV Rocephin initiated.  Pharmacy consulted to dose IV vancomycin based on culture dated 01/10/2020.  Deescalate antibiotics as appropriate.  Follow urine culture.      Weakness  Increased weakness likely 2/2 infection.  Chronic debilitated state reported by daughter.  PT/OT consult.  Skilled nursing facility recommended.  Consult placed.        Controlled  type 2 diabetes mellitus with diabetic neuropathy, without long-term current use of insulin  Patient's FSGs are not controlled on current hypoglycemics.   Last A1c reviewed-   Lab Results   Component Value Date    HGBA1C 7.6 (H) 01/10/2020     Most recent fingerstick glucose reviewed-   Recent Labs   Lab 02/13/20  0546 02/13/20  1114 02/13/20  1636   POCTGLUCOSE 272* 240* 219*     Current correctional scale  Medium  Increase anti-hyperglycemic dose as follows-   Antihyperglycemics (From admission, onward)    Start     Stop Route Frequency Ordered    02/13/20 2100  insulin detemir U-100 pen 4 Units      -- SubQ Nightly 02/13/20 1758    02/13/20 0816  insulin aspart U-100 pen 1-10 Units      -- SubQ Before meals & nightly PRN 02/13/20 0717              Encephalopathy, metabolic  Daughter reports increased confusion.  Patient with history of dementia.  CT head negative for anything acute.  Likely secondary to UTI.  Fall precautions.  Treat source.      GERD (gastroesophageal reflux disease)  Chronic.  Continue PPI.      Hypothyroid  Patient has chronic hypothyroidism. TFTs reviewed-   Lab Results   Component Value Date    TSH 3.073 02/13/2020   . Will continue chronic levothyroxine and adjust for and clinical changes.      Hypertension associated with diabetes  Chronic, controlled.  Latest blood pressure and vitals reviewed-   Temp:  [96.2 °F (35.7 °C)-103.7 °F (39.8 °C)]   Pulse:  []   Resp:  [16-24]   BP: (135-175)/(63-84)   SpO2:  [94 %-98 %] .   Home meds for hypertension were reviewed and noted below. Hospital anti-hypertensive changes were made as shown below.  Hypertension Medications             amLODIPine (NORVASC) 10 MG tablet Take 10 mg by mouth once daily.      Hospital Medications             amLODIPine tablet 10 mg 10 mg, Oral, Daily        Will utilize p.r.n. blood pressure medication only if patient's blood pressure greater than  180/110 and she develops symptoms such as worsening chest pain or  shortness of breath.          VTE Risk Mitigation (From admission, onward)         Ordered     enoxaparin injection 40 mg  Daily      02/13/20 0105     IP VTE HIGH RISK PATIENT  Once      02/13/20 0105     Place LEOBARDO hose  Until discontinued      02/13/20 0105     Place sequential compression device  Until discontinued      02/13/20 0105                      Sadia Quintero MD  Department of Hospital Medicine   [facility]

## 2020-02-15 LAB
ALBUMIN SERPL BCP-MCNC: 2.9 G/DL (ref 3.5–5.2)
ALP SERPL-CCNC: 105 U/L (ref 55–135)
ALT SERPL W/O P-5'-P-CCNC: 18 U/L (ref 10–44)
ANION GAP SERPL CALC-SCNC: 10 MMOL/L (ref 8–16)
AST SERPL-CCNC: 19 U/L (ref 10–40)
BACTERIA BLD CULT: ABNORMAL
BASOPHILS # BLD AUTO: 0.03 K/UL (ref 0–0.2)
BASOPHILS NFR BLD: 0.4 % (ref 0–1.9)
BILIRUB SERPL-MCNC: 0.4 MG/DL (ref 0.1–1)
BUN SERPL-MCNC: 6 MG/DL (ref 8–23)
CALCIUM SERPL-MCNC: 9.9 MG/DL (ref 8.7–10.5)
CHLORIDE SERPL-SCNC: 105 MMOL/L (ref 95–110)
CO2 SERPL-SCNC: 22 MMOL/L (ref 23–29)
CREAT SERPL-MCNC: 0.7 MG/DL (ref 0.5–1.4)
DIFFERENTIAL METHOD: ABNORMAL
EOSINOPHIL # BLD AUTO: 0.2 K/UL (ref 0–0.5)
EOSINOPHIL NFR BLD: 2.2 % (ref 0–8)
ERYTHROCYTE [DISTWIDTH] IN BLOOD BY AUTOMATED COUNT: 11.9 % (ref 11.5–14.5)
EST. GFR  (AFRICAN AMERICAN): >60 ML/MIN/1.73 M^2
EST. GFR  (NON AFRICAN AMERICAN): >60 ML/MIN/1.73 M^2
GLUCOSE SERPL-MCNC: 146 MG/DL (ref 70–110)
HCT VFR BLD AUTO: 38.8 % (ref 37–48.5)
HGB BLD-MCNC: 13.1 G/DL (ref 12–16)
IMM GRANULOCYTES # BLD AUTO: 0.01 K/UL (ref 0–0.04)
LYMPHOCYTES # BLD AUTO: 1.3 K/UL (ref 1–4.8)
LYMPHOCYTES NFR BLD: 17.1 % (ref 18–48)
MAGNESIUM SERPL-MCNC: 1.8 MG/DL (ref 1.6–2.6)
MCH RBC QN AUTO: 32.5 PG (ref 27–31)
MCHC RBC AUTO-ENTMCNC: 33.8 G/DL (ref 32–36)
MCV RBC AUTO: 96 FL (ref 82–98)
MONOCYTES # BLD AUTO: 0.8 K/UL (ref 0.3–1)
MONOCYTES NFR BLD: 10.9 % (ref 4–15)
NEUTROPHILS # BLD AUTO: 5.1 K/UL (ref 1.8–7.7)
NEUTROPHILS NFR BLD: 69.3 % (ref 38–73)
NRBC BLD-RTO: 0 /100 WBC
PHOSPHATE SERPL-MCNC: 1.5 MG/DL (ref 2.7–4.5)
PLATELET # BLD AUTO: 158 K/UL (ref 150–350)
PMV BLD AUTO: 8.9 FL (ref 9.2–12.9)
POCT GLUCOSE: 132 MG/DL (ref 70–110)
POCT GLUCOSE: 179 MG/DL (ref 70–110)
POCT GLUCOSE: 202 MG/DL (ref 70–110)
POCT GLUCOSE: 264 MG/DL (ref 70–110)
POTASSIUM SERPL-SCNC: 4.1 MMOL/L (ref 3.5–5.1)
PROT SERPL-MCNC: 7.3 G/DL (ref 6–8.4)
RBC # BLD AUTO: 4.03 M/UL (ref 4–5.4)
SODIUM SERPL-SCNC: 137 MMOL/L (ref 136–145)
VANCOMYCIN SERPL-MCNC: 10.4 UG/ML
WBC # BLD AUTO: 7.41 K/UL (ref 3.9–12.7)

## 2020-02-15 PROCEDURE — 12000002 HC ACUTE/MED SURGE SEMI-PRIVATE ROOM

## 2020-02-15 PROCEDURE — 80202 ASSAY OF VANCOMYCIN: CPT

## 2020-02-15 PROCEDURE — 80053 COMPREHEN METABOLIC PANEL: CPT

## 2020-02-15 PROCEDURE — 83735 ASSAY OF MAGNESIUM: CPT

## 2020-02-15 PROCEDURE — 25000003 PHARM REV CODE 250: Performed by: NURSE PRACTITIONER

## 2020-02-15 PROCEDURE — 97530 THERAPEUTIC ACTIVITIES: CPT

## 2020-02-15 PROCEDURE — 63600175 PHARM REV CODE 636 W HCPCS: Performed by: NURSE PRACTITIONER

## 2020-02-15 PROCEDURE — 63600175 PHARM REV CODE 636 W HCPCS: Performed by: FAMILY MEDICINE

## 2020-02-15 PROCEDURE — 84100 ASSAY OF PHOSPHORUS: CPT

## 2020-02-15 PROCEDURE — 36415 COLL VENOUS BLD VENIPUNCTURE: CPT

## 2020-02-15 PROCEDURE — 85025 COMPLETE CBC W/AUTO DIFF WBC: CPT

## 2020-02-15 RX ORDER — VANCOMYCIN HCL IN 5 % DEXTROSE 1G/250ML
1000 PLASTIC BAG, INJECTION (ML) INTRAVENOUS ONCE
Status: COMPLETED | OUTPATIENT
Start: 2020-02-15 | End: 2020-02-15

## 2020-02-15 RX ADMIN — AMLODIPINE BESYLATE 10 MG: 5 TABLET ORAL at 09:02

## 2020-02-15 RX ADMIN — ASPIRIN 81 MG: 81 TABLET, COATED ORAL at 09:02

## 2020-02-15 RX ADMIN — LEVOTHYROXINE SODIUM 100 MCG: 100 TABLET ORAL at 06:02

## 2020-02-15 RX ADMIN — CEFTRIAXONE 1 G: 1 INJECTION, SOLUTION INTRAVENOUS at 09:02

## 2020-02-15 RX ADMIN — MIRTAZAPINE 15 MG: 15 TABLET, FILM COATED ORAL at 09:02

## 2020-02-15 RX ADMIN — INSULIN ASPART 4 UNITS: 100 INJECTION, SOLUTION INTRAVENOUS; SUBCUTANEOUS at 12:02

## 2020-02-15 RX ADMIN — ENOXAPARIN SODIUM 40 MG: 100 INJECTION SUBCUTANEOUS at 05:02

## 2020-02-15 RX ADMIN — VANCOMYCIN HYDROCHLORIDE 1000 MG: 1 INJECTION, POWDER, LYOPHILIZED, FOR SOLUTION INTRAVENOUS at 06:02

## 2020-02-15 RX ADMIN — PANTOPRAZOLE SODIUM 40 MG: 40 TABLET, DELAYED RELEASE ORAL at 09:02

## 2020-02-15 NOTE — CONSULTS
Pharmacokinetic Assessment Follow Up: IV Vancomycin    Vancomycin serum concentration assessment(s):    The random level was drawn correctly and can be used to guide therapy at this time. The measurement is below the desired definitive target range of 15 to 20 mcg/mL.    Vancomycin Regimen Plan:    One time dose for Vancomycin 1000 mg IV with next serum trough concentration measured at 0600 prior to next dose on 02/16     Drug levels (last 3 results):  Recent Labs   Lab Result Units 02/14/20  0259 02/15/20  0525   Vancomycin, Random ug/mL 9.4 10.4       Pharmacy will continue to follow and monitor vancomycin.    Please contact pharmacy at extension [phone] for questions regarding this assessment.    Thank you for the consult,   Ruht Ann Velazquez       Patient brief summary:  Nina Portillo is a 79 y.o. female initiated on antimicrobial therapy with IV Vancomycin for treatment of sepsis      Drug Allergies:   Review of patient's allergies indicates:   Allergen Reactions    Phenergan [promethazine] Other (See Comments)     hallucinations    Prilosec [omeprazole magnesium] Hives and Rash       Actual Body Weight:   62.8 kg    Renal Function:   Estimated Creatinine Clearance: 56.7 mL/min (based on SCr of 0.7 mg/dL).,     Dialysis Method (if applicable):  N/A    CBC (last 72 hours):  Recent Labs   Lab Result Units 02/12/20  2153 02/13/20  0434 02/14/20  0422 02/15/20  0525   WBC K/uL 13.76* 13.57* 9.56 7.41   Hemoglobin g/dL 13.2 11.2* 11.9* 13.1   Hematocrit % 40.0 33.9* 36.1* 38.8   Platelets K/uL 181 152 154 158   Gran% % 87.5* 87.1* 76.9* 69.3   Lymph% % 4.2* 2.9* 11.8* 17.1*   Mono% % 7.7 9.5 9.9 10.9   Eosinophil% % 0.1 0.0 0.8 2.2   Basophil% % 0.1 0.1 0.3 0.4   Differential Method  Automated Automated Automated Automated       Metabolic Panel (last 72 hours):  Recent Labs   Lab Result Units 02/12/20 2153 02/12/20 2217 02/13/20 0434 02/14/20  0422 02/15/20  0525   Sodium mmol/L 133*  --  136 137 137   Potassium  mmol/L 5.0  --  3.5 3.4* 4.1   Chloride mmol/L 100  --  106 104 105   CO2 mmol/L 22*  --  21* 23 22*   Glucose mg/dL 373*  --  236* 123* 146*   Glucose, UA   --  4+*  --   --   --    BUN, Bld mg/dL 16  --  12 8 6*   Creatinine mg/dL 1.2  --  0.8 0.8 0.7   Albumin g/dL  --   --  2.7* 2.7* 2.9*   Total Bilirubin mg/dL  --   --  0.6 0.5 0.4   Alkaline Phosphatase U/L  --   --  90 87 105   AST U/L  --   --  13 16 19   ALT U/L  --   --  10 11 18   Magnesium mg/dL  --   --  1.5* 1.6 1.8   Phosphorus mg/dL  --   --  1.3* 1.6* 1.5*       Vancomycin Administrations:  vancomycin given in the last 96 hours                     vancomycin in dextrose 5 % 1 gram/250 mL IVPB 1,000 mg (mg) 1,000 mg New Bag 02/14/20 0508                      Microbiologic Results:  Microbiology Results (last 7 days)       Procedure Component Value Units Date/Time    Blood culture [[id number]] Collected:  02/14/20 0756    Order Status:  Completed Specimen:  Blood Updated:  02/15/20 0315     Blood Culture, Routine No Growth to date    Blood culture [[id number]] Collected:  02/14/20 0752    Order Status:  Completed Specimen:  Blood from Antecubital, Left Updated:  02/15/20 0315     Blood Culture, Routine No Growth to date    Urine culture [[id number]]  (Abnormal)  (Susceptibility) Collected:  02/12/20 2217    Order Status:  Completed Specimen:  Urine Updated:  02/14/20 2339     Urine Culture, Routine ESCHERICHIA COLI  >100,000 cfu/ml      Narrative:       Preferred Collection Type->Urine, Clean Catch    Blood culture [[id number]]  (Abnormal) Collected:  02/12/20 2225    Order Status:  Completed Specimen:  Blood Updated:  02/14/20 0940     Blood Culture, Routine Gram stain peds bottle: Gram negative rods      Results called to and read back by:Christen Haynes RN 02/13/2020  22:03      GRAM NEGATIVE ZOHREH  Identification pending  For susceptibility see order #[id number]      Blood culture [[id number]]  (Abnormal) Collected:  02/12/20 2233    Order Status:   Completed Specimen:  Blood Updated:  02/14/20 0937     Blood Culture, Routine Gram stain peds bottle: Gram negative rods      Results called to and read back by:Christen Haynes RN 02/13/2020  22:04      GRAM NEGATIVE ZOHREH  Identification and susceptibility pending

## 2020-02-15 NOTE — PT/OT/SLP PROGRESS
Physical Therapy Treatment    Patient Name:  Nina Portillo   MRN:  [id number]    Recommendations:     Discharge Recommendations:  nursing facility, skilled, home, home health PT(dependending on patient progress in hospital and level of assistance at home)   Discharge Equipment Recommendations: walker, rolling     Assessment:     Nina Portillo is a 79 y.o. female admitted with a medical diagnosis of Gram-negative bacteremia.  She presents with the following impairments/functional limitations:  impaired endurance, impaired balance, gait instability, weakness, impaired functional mobilty  .    Rehab Prognosis: Fair; patient would benefit from acute skilled PT services to address these deficits and reach maximum level of function.    Recent Surgery: * No surgery found *      Plan:     During this hospitalization, patient to be seen daily to address the identified rehab impairments via gait training, therapeutic activities, therapeutic exercises and progress toward the following goals:    · Plan of Care Expires:  03/12/20      Objective:     Communicated with RN (Hannah) prior to session.  Patient found HOB elevated with peripheral IV, telemetry upon PT entry to room.     General Precautions: Standard, fall   Orthopedic Precautions:N/A   Braces: N/A     Functional Mobility training:  · Bed Mobility:     · Rolling Left:  minimum assistance and moderate assistance  · Supine to Sit: minimum assistance and moderate assistance  · Sit to Supine: minimum assistance and moderate assistance  · Transfers:     · Sit to Stand:  minimum assistance and moderate assistance with rolling walker  · Gait: 75' with RW with min assist and cues (and assist for IV pole)      AM-PAC 6 CLICK MOBILITY  Turning over in bed (including adjusting bedclothes, sheets and blankets)?: 3  Sitting down on and standing up from a chair with arms (e.g., wheelchair, bedside commode, etc.): 3  Moving from lying on back to sitting on the side of the bed?:  2  Moving to and from a bed to a chair (including a wheelchair)?: 3  Need to walk in hospital room?: 3  Climbing 3-5 steps with a railing?: 1  Basic Mobility Total Score: 15     Patient left HOB elevated with all lines intact and nurse present..    GOALS:   Multidisciplinary Problems     Physical Therapy Goals        Problem: Physical Therapy Goal    Goal Priority Disciplines Outcome Goal Variances Interventions   Physical Therapy Goal     PT, PT/OT Ongoing, Progressing     Description:  Goals to be met by: 2020    Patient will increase functional independence with mobility by performin. Supine to sit with Modified Pierce  2. Sit to supine with Modified Pierce  3. Sit to stand transfer with Modified Pierce  4. Bed to chair transfer with Supervision using Rolling Walker  5. Gait  x 150 feet with Supervision using Single-point Cane .                       Time Tracking:     PT Received On: 02/15/20  PT Start Time: 1035     PT Stop Time: 1055  PT Total Time (min): 20 min     Billable Minutes: Therapeutic Activity 15    Treatment Type: Treatment  PT/PTA: PT     PTA Visit Number: 0     Vazquez Hinds, PT  02/15/2020

## 2020-02-15 NOTE — PLAN OF CARE
Pt resting quietly at this time. Able to make needs known. Incont b/b. Denies pain. Bed low and locked. Call light in reach.

## 2020-02-15 NOTE — SUBJECTIVE & OBJECTIVE
Interval History: Patient resting in bed. Currently has no complaints.     Review of Systems   Constitutional: Negative for fever.   Respiratory: Negative for cough and shortness of breath.    Cardiovascular: Negative for chest pain.   Gastrointestinal: Negative for abdominal pain, diarrhea and vomiting.   Genitourinary: Negative for dysuria.   Musculoskeletal: Negative for back pain.   Skin: Negative for rash.   Neurological: Negative for headaches.     Objective:     Vital Signs (Most Recent):  Temp: 98.3 °F (36.8 °C) (02/15/20 1214)  Pulse: 91 (02/15/20 1214)  Resp: 18 (02/15/20 1214)  BP: (!) 121/59 (02/15/20 1214)  SpO2: 98 % (02/15/20 1214) Vital Signs (24h Range):  Temp:  [96.1 °F (35.6 °C)-99.3 °F (37.4 °C)] 98.3 °F (36.8 °C)  Pulse:  [85-92] 91  Resp:  [14-18] 18  SpO2:  [96 %-99 %] 98 %  BP: (121-150)/(59-83) 121/59     Weight: 62.8 kg (138 lb 7.2 oz)  Body mass index is 23.04 kg/m².    Intake/Output Summary (Last 24 hours) at 2/15/2020 1645  Last data filed at 2/14/2020 1800  Gross per 24 hour   Intake 1371 ml   Output --   Net 1371 ml      Physical Exam   Constitutional: She appears well-developed and well-nourished.   HENT:   Head: Normocephalic and atraumatic.   Eyes: Conjunctivae are normal.   Cardiovascular: Normal rate, regular rhythm and normal heart sounds.   Pulmonary/Chest: Effort normal and breath sounds normal.   Abdominal: Soft. Bowel sounds are normal.   Neurological: She is alert.   Skin: Skin is warm.   Psychiatric: She has a normal mood and affect. Her behavior is normal.   Vitals reviewed.      Significant Labs:   Recent Lab Results       02/15/20  1148   02/15/20  0525   02/14/20 2052        Albumin   2.9       Alkaline Phosphatase   105       ALT   18       Anion Gap   10       AST   19       Baso #   0.03       Basophil%   0.4       BILIRUBIN TOTAL   0.4  Comment:  For infants and newborns, interpretation of results should be based  on gestational age, weight and in agreement with  clinical  observations.  Premature Infant recommended reference ranges:  Up to 24 hours.............<8.0 mg/dL  Up to 48 hours............<12.0 mg/dL  3-5 days..................<15.0 mg/dL  6-29 days.................<15.0 mg/dL         BUN, Bld   6       Calcium   9.9       Chloride   105       CO2   22       Creatinine   0.7       Differential Method   Automated       eGFR if    >60       eGFR if non    >60  Comment:  Calculation used to obtain the estimated glomerular filtration  rate (eGFR) is the CKD-EPI equation.          Eos #   0.2       Eosinophil%   2.2       Glucose   146       Gran # (ANC)   5.1       Gran%   69.3       Hematocrit   38.8       Hemoglobin   13.1       Immature Grans (Abs)   0.01  Comment:  Mild elevation in immature granulocytes is non specific and   can be seen in a variety of conditions including stress response,   acute inflammation, trauma and pregnancy. Correlation with other   laboratory and clinical findings is essential.         Lymph #   1.3       Lymph%   17.1       Magnesium   1.8       MCH   32.5       MCHC   33.8       MCV   96       Mono #   0.8       Mono%   10.9       MPV   8.9       nRBC   0       Phosphorus   1.5       Platelets   158       POCT Glucose 202   264     Potassium   4.1       PROTEIN TOTAL   7.3       RBC   4.03       RDW   11.9       Sodium   137       Vancomycin, Random   10.4       WBC   7.41

## 2020-02-15 NOTE — PROGRESS NOTES
[facility] Medicine  Progress Note    Patient Name: Nina Portillo  MRN: [id number]  Patient Class: IP- Inpatient   Admission Date: 2/12/2020  Length of Stay: 1 days  Attending Physician: Yoana Majano MD  Primary Care Provider: Rush Rosa MD        Subjective:     Principal Problem:Gram-negative bacteremia        HPI:  Nina Portillo is a 80 y/o a. female with a past medical history significant for DM, dementia, and HTN who presented to the ED with increased confusion, decreased activity, increased incontinence, frequency and decreased PO intake for the last few days. Daughter reports that these symptoms are normally present when the patient has a UTI. She states she has not fully recovered after last UTI one month ago. The patient denies vomiting, fever, abdominal pain or any other symptoms at this time.  While in the emergency department patient was found to have a UTI and meet sepsis criteria.  She will be admitted to the service of hospital Medicine for further treatment.    Overview/Hospital Course:  Patient was admitted to the hospital medicine service for altered mental status on top of baseline dementia secondary to UTI.  She was started on broad-spectrum IV antibiotics.  Blood cultures as well as urine cultures positive for gram-negative rods.  Gram-negative germán bacteremia secondary to UTI.  PT and OT were consulted and recommended skilled nursing facility placement.  Case management was consulted for skilled nursing facility.  Patient was afebrile.  White blood cell normalized.  Placed on insulin for better glucose control.    Interval History: Patient resting in bed. Currently has no complaints.     Review of Systems   Constitutional: Negative for fever.   Respiratory: Negative for cough and shortness of breath.    Cardiovascular: Negative for chest pain.   Gastrointestinal: Negative for abdominal pain, diarrhea and vomiting.   Genitourinary: Negative for dysuria.    Musculoskeletal: Negative for back pain.   Skin: Negative for rash.   Neurological: Negative for headaches.     Objective:     Vital Signs (Most Recent):  Temp: 98.3 °F (36.8 °C) (02/15/20 1214)  Pulse: 91 (02/15/20 1214)  Resp: 18 (02/15/20 1214)  BP: (!) 121/59 (02/15/20 1214)  SpO2: 98 % (02/15/20 1214) Vital Signs (24h Range):  Temp:  [96.1 °F (35.6 °C)-99.3 °F (37.4 °C)] 98.3 °F (36.8 °C)  Pulse:  [85-92] 91  Resp:  [14-18] 18  SpO2:  [96 %-99 %] 98 %  BP: (121-150)/(59-83) 121/59     Weight: 62.8 kg (138 lb 7.2 oz)  Body mass index is 23.04 kg/m².    Intake/Output Summary (Last 24 hours) at 2/15/2020 1645  Last data filed at 2/14/2020 1800  Gross per 24 hour   Intake 1371 ml   Output --   Net 1371 ml      Physical Exam   Constitutional: She appears well-developed and well-nourished.   HENT:   Head: Normocephalic and atraumatic.   Eyes: Conjunctivae are normal.   Cardiovascular: Normal rate, regular rhythm and normal heart sounds.   Pulmonary/Chest: Effort normal and breath sounds normal.   Abdominal: Soft. Bowel sounds are normal.   Neurological: She is alert.   Skin: Skin is warm.   Psychiatric: She has a normal mood and affect. Her behavior is normal.   Vitals reviewed.      Significant Labs:   Recent Lab Results       02/15/20  1148   02/15/20  0525   02/14/20 2052        Albumin   2.9       Alkaline Phosphatase   105       ALT   18       Anion Gap   10       AST   19       Baso #   0.03       Basophil%   0.4       BILIRUBIN TOTAL   0.4  Comment:  For infants and newborns, interpretation of results should be based  on gestational age, weight and in agreement with clinical  observations.  Premature Infant recommended reference ranges:  Up to 24 hours.............<8.0 mg/dL  Up to 48 hours............<12.0 mg/dL  3-5 days..................<15.0 mg/dL  6-29 days.................<15.0 mg/dL         BUN, Bld   6       Calcium   9.9       Chloride   105       CO2   22       Creatinine   0.7       Differential  Method   Automated       eGFR if    >60       eGFR if non    >60  Comment:  Calculation used to obtain the estimated glomerular filtration  rate (eGFR) is the CKD-EPI equation.          Eos #   0.2       Eosinophil%   2.2       Glucose   146       Gran # (ANC)   5.1       Gran%   69.3       Hematocrit   38.8       Hemoglobin   13.1       Immature Grans (Abs)   0.01  Comment:  Mild elevation in immature granulocytes is non specific and   can be seen in a variety of conditions including stress response,   acute inflammation, trauma and pregnancy. Correlation with other   laboratory and clinical findings is essential.         Lymph #   1.3       Lymph%   17.1       Magnesium   1.8       MCH   32.5       MCHC   33.8       MCV   96       Mono #   0.8       Mono%   10.9       MPV   8.9       nRBC   0       Phosphorus   1.5       Platelets   158       POCT Glucose 202   264     Potassium   4.1       PROTEIN TOTAL   7.3       RBC   4.03       RDW   11.9       Sodium   137       Vancomycin, Random   10.4       WBC   7.41                   Assessment/Plan:      * Gram-negative bacteremia  Secondary to UTI.  Continue antibiotics, follow up blood cultures and deescalate antibiotics as appropriate      Acute cystitis without hematuria  IV Rocephin initiated.  Pharmacy consulted to dose IV vancomycin based on culture dated 01/10/2020.  Urine cultures right now growing gram-negative rods.  Deescalate antibiotics as appropriate.  Follow urine culture.      Sepsis  This patient does have evidence of infective focus  My overall impression is sepsis.  Antibiotics given-   Antibiotics (From admission, onward)    Start     Stop Route Frequency Ordered    02/13/20 2230  cefTRIAXone (ROCEPHIN) 1 g in dextrose 5 % 50 mL IVPB      -- IV Every 24 hours (non-standard times) 02/13/20 0105    02/13/20 0307  vancomycin - pharmacy to dose  (vancomycin IVPB)      -- IV pharmacy to manage frequency 02/13/20 0207           Latest lactate reviewed, they are-  Recent Labs   Lab 02/12/20  2225 02/13/20  0434   LACTATE 1.8 1.1       Organ dysfunction indicated by tachycardia and Encephalopathy  Source-   Source control Achieved by- IV abx        Weakness  Increased weakness likely 2/2 infection.  Chronic debilitated state reported by daughter.  PT/OT consult.  Skilled nursing facility recommended.  Consult placed.        Moderate malnutrition  Nutrition consulted. Body mass index is 23.22 kg/m².. Encourage maximal PO intake. Diet supplementation ordered per nutrition approval. Will encourage PO and monitor closely for weight changes.        Controlled type 2 diabetes mellitus with diabetic neuropathy, without long-term current use of insulin  Patient's FSGs are not controlled on current hypoglycemics.   Last A1c reviewed-   Lab Results   Component Value Date    HGBA1C 7.6 (H) 01/10/2020     Most recent fingerstick glucose reviewed-   Recent Labs   Lab 02/13/20  1636 02/14/20  0613 02/14/20  1122   POCTGLUCOSE 219* 156* 207*     Current correctional scale  Medium  Increase anti-hyperglycemic dose as follows-   Antihyperglycemics (From admission, onward)    Start     Stop Route Frequency Ordered    02/14/20 2100  insulin detemir U-100 pen 5 Units      -- SubQ Nightly 02/14/20 1305    02/13/20 0816  insulin aspart U-100 pen 1-10 Units      -- SubQ Before meals & nightly PRN 02/13/20 0717              Encephalopathy, metabolic  Daughter reports increased confusion.  Patient with history of dementia.  CT head negative for anything acute.  Likely secondary to UTI with bacteremia.  Fall precautions.  Treat source.      GERD (gastroesophageal reflux disease)  Chronic.  Continue PPI.      Hypothyroid  Patient has chronic hypothyroidism. TFTs reviewed-   Lab Results   Component Value Date    TSH 3.073 02/13/2020   . Will continue chronic levothyroxine and adjust for and clinical changes.      Hypertension associated with diabetes  Chronic,  controlled.  Latest blood pressure and vitals reviewed-   Temp:  [98.1 °F (36.7 °C)-99.7 °F (37.6 °C)]   Pulse:  [72-91]   Resp:  [14-18]   BP: (142-157)/(65-68)   SpO2:  [94 %-98 %] .   Home meds for hypertension were reviewed and noted below. Hospital anti-hypertensive changes were made as shown below.  Hypertension Medications             amLODIPine (NORVASC) 10 MG tablet Take 10 mg by mouth once daily.      Hospital Medications             amLODIPine tablet 10 mg 10 mg, Oral, Daily        Will utilize p.r.n. blood pressure medication only if patient's blood pressure greater than  180/110 and she develops symptoms such as worsening chest pain or shortness of breath.          VTE Risk Mitigation (From admission, onward)         Ordered     enoxaparin injection 40 mg  Daily      02/13/20 0105     IP VTE HIGH RISK PATIENT  Once      02/13/20 0105     Place LEOBARDO hose  Until discontinued      02/13/20 0105     Place sequential compression device  Until discontinued      02/13/20 0105                      Yoana Majano MD  Department of Hospital Medicine   [facility]

## 2020-02-16 LAB
ALBUMIN SERPL BCP-MCNC: 3 G/DL (ref 3.5–5.2)
ALP SERPL-CCNC: 118 U/L (ref 55–135)
ALT SERPL W/O P-5'-P-CCNC: 18 U/L (ref 10–44)
ANION GAP SERPL CALC-SCNC: 10 MMOL/L (ref 8–16)
AST SERPL-CCNC: 18 U/L (ref 10–40)
BASOPHILS # BLD AUTO: 0.01 K/UL (ref 0–0.2)
BASOPHILS NFR BLD: 0.1 % (ref 0–1.9)
BILIRUB SERPL-MCNC: 0.4 MG/DL (ref 0.1–1)
BUN SERPL-MCNC: 7 MG/DL (ref 8–23)
CALCIUM SERPL-MCNC: 10.4 MG/DL (ref 8.7–10.5)
CHLORIDE SERPL-SCNC: 104 MMOL/L (ref 95–110)
CO2 SERPL-SCNC: 23 MMOL/L (ref 23–29)
CREAT SERPL-MCNC: 0.8 MG/DL (ref 0.5–1.4)
DIFFERENTIAL METHOD: ABNORMAL
EOSINOPHIL # BLD AUTO: 0.2 K/UL (ref 0–0.5)
EOSINOPHIL NFR BLD: 2.8 % (ref 0–8)
ERYTHROCYTE [DISTWIDTH] IN BLOOD BY AUTOMATED COUNT: 11.8 % (ref 11.5–14.5)
EST. GFR  (AFRICAN AMERICAN): >60 ML/MIN/1.73 M^2
EST. GFR  (NON AFRICAN AMERICAN): >60 ML/MIN/1.73 M^2
GLUCOSE SERPL-MCNC: 159 MG/DL (ref 70–110)
HCT VFR BLD AUTO: 44.1 % (ref 37–48.5)
HGB BLD-MCNC: 14.5 G/DL (ref 12–16)
IMM GRANULOCYTES # BLD AUTO: 0.03 K/UL (ref 0–0.04)
LYMPHOCYTES # BLD AUTO: 1.3 K/UL (ref 1–4.8)
LYMPHOCYTES NFR BLD: 18.5 % (ref 18–48)
MAGNESIUM SERPL-MCNC: 2.1 MG/DL (ref 1.6–2.6)
MCH RBC QN AUTO: 31.4 PG (ref 27–31)
MCHC RBC AUTO-ENTMCNC: 32.9 G/DL (ref 32–36)
MCV RBC AUTO: 96 FL (ref 82–98)
MONOCYTES # BLD AUTO: 0.7 K/UL (ref 0.3–1)
MONOCYTES NFR BLD: 9.2 % (ref 4–15)
NEUTROPHILS # BLD AUTO: 4.9 K/UL (ref 1.8–7.7)
NEUTROPHILS NFR BLD: 69 % (ref 38–73)
NRBC BLD-RTO: 0 /100 WBC
PHOSPHATE SERPL-MCNC: 2.2 MG/DL (ref 2.7–4.5)
PLATELET # BLD AUTO: 219 K/UL (ref 150–350)
PLATELET BLD QL SMEAR: ABNORMAL
PMV BLD AUTO: 9.2 FL (ref 9.2–12.9)
POCT GLUCOSE: 207 MG/DL (ref 70–110)
POCT GLUCOSE: 246 MG/DL (ref 70–110)
POCT GLUCOSE: 267 MG/DL (ref 70–110)
POTASSIUM SERPL-SCNC: 4 MMOL/L (ref 3.5–5.1)
PROT SERPL-MCNC: 7.8 G/DL (ref 6–8.4)
RBC # BLD AUTO: 4.62 M/UL (ref 4–5.4)
SODIUM SERPL-SCNC: 137 MMOL/L (ref 136–145)
TB INDURATION 48 - 72 HR READ: 0 MM
VANCOMYCIN SERPL-MCNC: 12.3 UG/ML
WBC # BLD AUTO: 7.08 K/UL (ref 3.9–12.7)

## 2020-02-16 PROCEDURE — 97110 THERAPEUTIC EXERCISES: CPT

## 2020-02-16 PROCEDURE — 63600175 PHARM REV CODE 636 W HCPCS: Performed by: NURSE PRACTITIONER

## 2020-02-16 PROCEDURE — 36415 COLL VENOUS BLD VENIPUNCTURE: CPT

## 2020-02-16 PROCEDURE — 80202 ASSAY OF VANCOMYCIN: CPT

## 2020-02-16 PROCEDURE — 97116 GAIT TRAINING THERAPY: CPT

## 2020-02-16 PROCEDURE — 12000002 HC ACUTE/MED SURGE SEMI-PRIVATE ROOM

## 2020-02-16 PROCEDURE — 25000003 PHARM REV CODE 250: Performed by: NURSE PRACTITIONER

## 2020-02-16 PROCEDURE — 63600175 PHARM REV CODE 636 W HCPCS: Performed by: FAMILY MEDICINE

## 2020-02-16 PROCEDURE — 80053 COMPREHEN METABOLIC PANEL: CPT

## 2020-02-16 PROCEDURE — 84100 ASSAY OF PHOSPHORUS: CPT

## 2020-02-16 PROCEDURE — 85025 COMPLETE CBC W/AUTO DIFF WBC: CPT

## 2020-02-16 PROCEDURE — 83735 ASSAY OF MAGNESIUM: CPT

## 2020-02-16 RX ADMIN — VANCOMYCIN HYDROCHLORIDE 1250 MG: 1.25 INJECTION, POWDER, LYOPHILIZED, FOR SOLUTION INTRAVENOUS at 09:02

## 2020-02-16 RX ADMIN — MIRTAZAPINE 15 MG: 15 TABLET, FILM COATED ORAL at 09:02

## 2020-02-16 RX ADMIN — INSULIN ASPART 4 UNITS: 100 INJECTION, SOLUTION INTRAVENOUS; SUBCUTANEOUS at 04:02

## 2020-02-16 RX ADMIN — PANTOPRAZOLE SODIUM 40 MG: 40 TABLET, DELAYED RELEASE ORAL at 08:02

## 2020-02-16 RX ADMIN — INSULIN ASPART 2 UNITS: 100 INJECTION, SOLUTION INTRAVENOUS; SUBCUTANEOUS at 12:02

## 2020-02-16 RX ADMIN — CEFTRIAXONE 1 G: 1 INJECTION, SOLUTION INTRAVENOUS at 10:02

## 2020-02-16 RX ADMIN — LEVOTHYROXINE SODIUM 100 MCG: 100 TABLET ORAL at 07:02

## 2020-02-16 RX ADMIN — ASPIRIN 81 MG: 81 TABLET, COATED ORAL at 08:02

## 2020-02-16 RX ADMIN — AMLODIPINE BESYLATE 10 MG: 5 TABLET ORAL at 08:02

## 2020-02-16 RX ADMIN — ENOXAPARIN SODIUM 40 MG: 100 INJECTION SUBCUTANEOUS at 04:02

## 2020-02-16 NOTE — PLAN OF CARE
Pt resting quietly at this time. Able to make needs known. incont b/b. Joyce andrews.Bed low and locked. Call light in reach.

## 2020-02-16 NOTE — PT/OT/SLP PROGRESS
Physical Therapy Treatment    Patient Name:  Nina Portillo   MRN:  [id number]    Recommendations:     Discharge Recommendations:  nursing facility, skilled, home, home health PT(dependending on patient progress in hospital and level of assistance at home)   Discharge Equipment Recommendations: walker, rolling     Assessment:     Nina Portillo is a 79 y.o. female admitted with a medical diagnosis of Gram-negative bacteremia.  She presents with the following impairments/functional limitations:  weakness, impaired endurance, impaired functional mobilty, impaired balance, gait instability, decreased lower extremity function  .    Rehab Prognosis: Good; patient would benefit from acute skilled PT services to address these deficits and reach maximum level of function.    Recent Surgery: * No surgery found *      Plan:     During this hospitalization, patient to be seen daily to address the identified rehab impairments via gait training, therapeutic activities, therapeutic exercises and progress toward the following goals:    · Plan of Care Expires:  03/12/20    Subjective     Chief Complaint: SOB  Patient/Family Comments/goals: agrees to work with PT  Pain/Comfort:  ·        Objective:     Communicated with RN (Hannah) prior to session.  Patient found HOB elevated with peripheral IV, PureWick upon PT entry to room.     General Precautions: Standard, fall   Orthopedic Precautions:N/A   Braces: N/A     Functional Mobility training:  · Bed Mobility:     · Rolling Left:  contact guard assistance  · Supine to Sit: contact guard assistance  · Transfers:     · Sit to Stand:  contact guard assistance with rolling walker and  x 2 reps  · Gait: 60' x 1, 40' x 1 with RW with CGA (asist for IV pole)      AM-PAC 6 CLICK MOBILITY  Turning over in bed (including adjusting bedclothes, sheets and blankets)?: 3  Sitting down on and standing up from a chair with arms (e.g., wheelchair, bedside commode, etc.): 3  Moving from lying on back  to sitting on the side of the bed?: 3  Moving to and from a bed to a chair (including a wheelchair)?: 3  Need to walk in hospital room?: 3  Climbing 3-5 steps with a railing?: 1  Basic Mobility Total Score: 16       Therapeutic Activities and Exercises:   SUPINE: SLR, ankle DF, x 10 reps x 2 sets each  SEATED: LAQ, hip flexion, ankle DF/PF, (pt instructed to perform several times/day)    Patient left up in chair with all lines intact and CNA present to change linens.    GOALS:   Multidisciplinary Problems     Physical Therapy Goals        Problem: Physical Therapy Goal    Goal Priority Disciplines Outcome Goal Variances Interventions   Physical Therapy Goal     PT, PT/OT Ongoing, Progressing     Description:  Goals to be met by: 2020    Patient will increase functional independence with mobility by performin. Supine to sit with Modified [place]  2. Sit to supine with Modified [place]  3. Sit to stand transfer with Modified [place]  4. Bed to chair transfer with Supervision using Rolling Walker  5. Gait  x 150 feet with Supervision using Single-point Cane .                       Time Tracking:     PT Received On: 20  PT Start Time: 935     PT Stop Time: 1005  PT Total Time (min): 30 min     Billable Minutes: Gait Training 15 and Therapeutic Exercise 10    Treatment Type: Treatment  PT/PTA: PT     PTA Visit Number: 0     Vazquez Hinds, PT  2020

## 2020-02-16 NOTE — PLAN OF CARE
Problem: Physical Therapy Goal  Goal: Physical Therapy Goal  Description  Goals to be met by: 2020    Patient will increase functional independence with mobility by performin. Supine to sit with Modified [place]  2. Sit to supine with Modified [place]  3. Sit to stand transfer with Modified [place]  4. Bed to chair transfer with Supervision using Rolling Walker  5. Gait  x 150 feet with Supervision using Single-point Cane .      Outcome: Ongoing, Progressing

## 2020-02-16 NOTE — PROGRESS NOTES
Pharmacokinetic Assessment Follow Up: IV Vancomycin    Vancomycin serum concentration assessment(s):    The random level was drawn correctly and can be used to guide therapy at this time. The measurement is below the desired definitive target range of 15 to 20 mcg/mL.    Vancomycin Regimen Plan:    Patient's vancomycin level was 12.3 mcg/mL and below the therapeutic range of 15-20 mcg/mL.  Patient will be started on a  regimen of vancomycin 1250 mg IV every 24 hours.  A trough level will be drawn on 2/18/20 at ~ 0730, one hour prior to the 3rd dose.    Drug levels (last 3 results):  Recent Labs   Lab Result Units 02/14/20  0259 02/15/20  0525 02/16/20  0610   Vancomycin, Random ug/mL 9.4 10.4 12.3       Pharmacy will continue to follow and monitor vancomycin.    Please contact pharmacy at extension [phone] for questions regarding this assessment.    Thank you for the consult,   Steven Hernandez       Patient brief summary:  Nina Portillo is a 79 y.o. female initiated on antimicrobial therapy with IV Vancomycin for treatment of sepsis/UTI.     The patient was previously being dosed by level.     Drug Allergies:   Review of patient's allergies indicates:   Allergen Reactions    Phenergan [promethazine] Other (See Comments)     hallucinations    Prilosec [omeprazole magnesium] Hives and Rash     Actual Body Weight:   62.8 kg (138 lb 7.2 oz)    Renal Function:   Estimated Creatinine Clearance: 49.6 mL/min (based on SCr of 0.8 mg/dL).,     Dialysis Method (if applicable):  N/A    CBC (last 72 hours):  Recent Labs   Lab Result Units 02/14/20  0422 02/15/20  0525 02/16/20  0610   WBC K/uL 9.56 7.41 7.08   Hemoglobin g/dL 11.9* 13.1 14.5   Hematocrit % 36.1* 38.8 44.1   Platelets K/uL 154 158 219   Gran% % 76.9* 69.3 69.0   Lymph% % 11.8* 17.1* 18.5   Mono% % 9.9 10.9 9.2   Eosinophil% % 0.8 2.2 2.8   Basophil% % 0.3 0.4 0.1   Differential Method  Automated Automated Automated       Metabolic Panel (last 72 hours):  Recent  Labs   Lab Result Units 02/14/20  0422 02/15/20  0525 02/16/20  0610   Sodium mmol/L 137 137 137   Potassium mmol/L 3.4* 4.1 4.0   Chloride mmol/L 104 105 104   CO2 mmol/L 23 22* 23   Glucose mg/dL 123* 146* 159*   BUN, Bld mg/dL 8 6* 7*   Creatinine mg/dL 0.8 0.7 0.8   Albumin g/dL 2.7* 2.9* 3.0*   Total Bilirubin mg/dL 0.5 0.4 0.4   Alkaline Phosphatase U/L 87 105 118   AST U/L 16 19 18   ALT U/L 11 18 18   Magnesium mg/dL 1.6 1.8 2.1   Phosphorus mg/dL 1.6* 1.5* 2.2*       Vancomycin Administrations:  vancomycin given in the last 96 hours                     vancomycin in dextrose 5 % 1 gram/250 mL IVPB 1,000 mg (mg) 1,000 mg New Bag 02/15/20 0645                      Microbiologic Results:  Microbiology Results (last 7 days)       Procedure Component Value Units Date/Time    Blood culture [[id number]] Collected:  02/14/20 0756    Order Status:  Completed Specimen:  Blood Updated:  02/15/20 2212     Blood Culture, Routine No Growth to date      No Growth to date    Blood culture [[id number]] Collected:  02/14/20 0752    Order Status:  Completed Specimen:  Blood from Antecubital, Left Updated:  02/15/20 2212     Blood Culture, Routine No Growth to date      No Growth to date    Blood culture [[id number]]  (Abnormal) Collected:  02/12/20 2225    Order Status:  Completed Specimen:  Blood Updated:  02/15/20 0958     Blood Culture, Routine Gram stain peds bottle: Gram negative rods      Results called to and read back by:Christen Haynes RN 02/13/2020  22:03      ESCHERICHIA COLI  For susceptibility see order #[id number]      Blood culture [[id number]]  (Abnormal)  (Susceptibility) Collected:  02/12/20 2233    Order Status:  Completed Specimen:  Blood Updated:  02/15/20 0957     Blood Culture, Routine Gram stain peds bottle: Gram negative rods      Results called to and read back by:Christen Haynes RN 02/13/2020  22:04      ESCHERICHIA COLI    Urine culture [[id number]]  (Abnormal)  (Susceptibility) Collected:  02/12/20  2217    Order Status:  Completed Specimen:  Urine Updated:  02/14/20 2339     Urine Culture, Routine ESCHERICHIA COLI  >100,000 cfu/ml      Narrative:       Preferred Collection Type->Urine, Clean Catch

## 2020-02-17 LAB
ALBUMIN SERPL BCP-MCNC: 2.4 G/DL (ref 3.5–5.2)
ALP SERPL-CCNC: 87 U/L (ref 55–135)
ALT SERPL W/O P-5'-P-CCNC: 15 U/L (ref 10–44)
ANION GAP SERPL CALC-SCNC: 8 MMOL/L (ref 8–16)
AST SERPL-CCNC: 16 U/L (ref 10–40)
BASOPHILS # BLD AUTO: 0.02 K/UL (ref 0–0.2)
BASOPHILS NFR BLD: 0.3 % (ref 0–1.9)
BILIRUB SERPL-MCNC: 0.4 MG/DL (ref 0.1–1)
BUN SERPL-MCNC: 18 MG/DL (ref 8–23)
CALCIUM SERPL-MCNC: 9.7 MG/DL (ref 8.7–10.5)
CHLORIDE SERPL-SCNC: 106 MMOL/L (ref 95–110)
CO2 SERPL-SCNC: 22 MMOL/L (ref 23–29)
CREAT SERPL-MCNC: 1.8 MG/DL (ref 0.5–1.4)
DIFFERENTIAL METHOD: ABNORMAL
EOSINOPHIL # BLD AUTO: 0.3 K/UL (ref 0–0.5)
EOSINOPHIL NFR BLD: 4.1 % (ref 0–8)
ERYTHROCYTE [DISTWIDTH] IN BLOOD BY AUTOMATED COUNT: 12 % (ref 11.5–14.5)
EST. GFR  (AFRICAN AMERICAN): 30 ML/MIN/1.73 M^2
EST. GFR  (NON AFRICAN AMERICAN): 26 ML/MIN/1.73 M^2
GLUCOSE SERPL-MCNC: 167 MG/DL (ref 70–110)
HCT VFR BLD AUTO: 36.9 % (ref 37–48.5)
HGB BLD-MCNC: 12 G/DL (ref 12–16)
IMM GRANULOCYTES # BLD AUTO: 0.03 K/UL (ref 0–0.04)
LYMPHOCYTES # BLD AUTO: 1.7 K/UL (ref 1–4.8)
LYMPHOCYTES NFR BLD: 22.6 % (ref 18–48)
MAGNESIUM SERPL-MCNC: 2.1 MG/DL (ref 1.6–2.6)
MCH RBC QN AUTO: 30.9 PG (ref 27–31)
MCHC RBC AUTO-ENTMCNC: 32.5 G/DL (ref 32–36)
MCV RBC AUTO: 95 FL (ref 82–98)
MONOCYTES # BLD AUTO: 0.9 K/UL (ref 0.3–1)
MONOCYTES NFR BLD: 11.8 % (ref 4–15)
NEUTROPHILS # BLD AUTO: 4.7 K/UL (ref 1.8–7.7)
NEUTROPHILS NFR BLD: 60.8 % (ref 38–73)
NRBC BLD-RTO: 0 /100 WBC
PHOSPHATE SERPL-MCNC: 3.1 MG/DL (ref 2.7–4.5)
PLATELET # BLD AUTO: 182 K/UL (ref 150–350)
PMV BLD AUTO: 9.5 FL (ref 9.2–12.9)
POCT GLUCOSE: 162 MG/DL (ref 70–110)
POCT GLUCOSE: 164 MG/DL (ref 70–110)
POCT GLUCOSE: 236 MG/DL (ref 70–110)
POTASSIUM SERPL-SCNC: 4.1 MMOL/L (ref 3.5–5.1)
PROT SERPL-MCNC: 6.1 G/DL (ref 6–8.4)
RBC # BLD AUTO: 3.88 M/UL (ref 4–5.4)
SODIUM SERPL-SCNC: 136 MMOL/L (ref 136–145)
WBC # BLD AUTO: 7.65 K/UL (ref 3.9–12.7)

## 2020-02-17 PROCEDURE — 12000002 HC ACUTE/MED SURGE SEMI-PRIVATE ROOM

## 2020-02-17 PROCEDURE — 85025 COMPLETE CBC W/AUTO DIFF WBC: CPT

## 2020-02-17 PROCEDURE — 84100 ASSAY OF PHOSPHORUS: CPT

## 2020-02-17 PROCEDURE — 63600175 PHARM REV CODE 636 W HCPCS: Performed by: NURSE PRACTITIONER

## 2020-02-17 PROCEDURE — 25000003 PHARM REV CODE 250: Performed by: NURSE PRACTITIONER

## 2020-02-17 PROCEDURE — 83735 ASSAY OF MAGNESIUM: CPT

## 2020-02-17 PROCEDURE — 36415 COLL VENOUS BLD VENIPUNCTURE: CPT

## 2020-02-17 PROCEDURE — 80053 COMPREHEN METABOLIC PANEL: CPT

## 2020-02-17 PROCEDURE — 97110 THERAPEUTIC EXERCISES: CPT | Mod: CQ

## 2020-02-17 RX ADMIN — MIRTAZAPINE 15 MG: 15 TABLET, FILM COATED ORAL at 08:02

## 2020-02-17 RX ADMIN — AMLODIPINE BESYLATE 10 MG: 5 TABLET ORAL at 09:02

## 2020-02-17 RX ADMIN — SODIUM CHLORIDE: 0.9 INJECTION, SOLUTION INTRAVENOUS at 05:02

## 2020-02-17 RX ADMIN — INSULIN ASPART 2 UNITS: 100 INJECTION, SOLUTION INTRAVENOUS; SUBCUTANEOUS at 11:02

## 2020-02-17 RX ADMIN — ENOXAPARIN SODIUM 40 MG: 100 INJECTION SUBCUTANEOUS at 05:02

## 2020-02-17 RX ADMIN — PANTOPRAZOLE SODIUM 40 MG: 40 TABLET, DELAYED RELEASE ORAL at 09:02

## 2020-02-17 RX ADMIN — CEFTRIAXONE 1 G: 1 INJECTION, SOLUTION INTRAVENOUS at 09:02

## 2020-02-17 RX ADMIN — LEVOTHYROXINE SODIUM 100 MCG: 100 TABLET ORAL at 06:02

## 2020-02-17 RX ADMIN — ASPIRIN 81 MG: 81 TABLET, COATED ORAL at 09:02

## 2020-02-17 NOTE — SUBJECTIVE & OBJECTIVE
Interval History:   Pt seen/examin-not eating today per nursing /tired     Review of Systems   Unable to perform ROS: Other     Objective:     Vital Signs (Most Recent):  Temp: 97.6 °F (36.4 °C) (02/17/20 1554)  Pulse: 65 (02/17/20 1554)  Resp: 18 (02/17/20 1554)  BP: (!) 154/66 (02/17/20 1554)  SpO2: 98 % (02/17/20 1554) Vital Signs (24h Range):  Temp:  [96.7 °F (35.9 °C)-98 °F (36.7 °C)] 97.6 °F (36.4 °C)  Pulse:  [65-82] 65  Resp:  [16-20] 18  SpO2:  [95 %-98 %] 98 %  BP: (116-154)/(62-72) 154/66     Weight: 64.9 kg (143 lb 1.3 oz)  Body mass index is 23.81 kg/m².    Intake/Output Summary (Last 24 hours) at 2/17/2020 1600  Last data filed at 2/17/2020 0800  Gross per 24 hour   Intake 120 ml   Output 600 ml   Net -480 ml      Physical Exam   Constitutional: She appears well-developed and well-nourished.   HENT:   Head: Normocephalic and atraumatic.   Eyes: Conjunctivae are normal.   Cardiovascular: Normal rate, regular rhythm and normal heart sounds.   Pulmonary/Chest: Effort normal.   Abdominal: Soft. Bowel sounds are normal.   Neurological: She is alert.   Skin: Skin is warm.   Psychiatric: She has a normal mood and affect.   Vitals reviewed.      Significant Labs: All pertinent labs within the past 24 hours have been reviewed.    Significant Imaging: I have reviewed and interpreted all pertinent imaging results/findings within the past 24 hours.

## 2020-02-17 NOTE — ASSESSMENT & PLAN NOTE
Urine and blood culture positive for e.coli pan sensitive  F/u repeat blood culture -neg  On rocephin

## 2020-02-17 NOTE — CONSULTS
Nina Portillo [id number] is a 79 y.o. female who has been consulted for vancomycin dosing.    Pharmacy consult for vancomycin dosing in no longer required.  Vancomycin was discontinued.    Thank you for allowing us to participate in this patient's care.     Ruth Ann Velazquez, PharmD

## 2020-02-17 NOTE — PHYSICIAN QUERY
PT Name: Nina Portillo  MR #: [id number]    Physician Query Form - Cause and Effect Relationship Clarification      CDS/: Batsheva Galicia RN                 Contact information:[email or web address]    This form is a permanent document in the medical record.     Query Date: February 17, 2020    By submitting this query, we are merely seeking further clarification of documentation. Please utilize your independent clinical judgment when addressing the question(s) below.    The Medical record contains the following:  Supporting Clinical Findings   Location in record    Sepsis  This patient does have evidence of infective focus  My overall impression is sepsis.          Organ dysfunction indicated by tachycardia and Encephalopathy  Source-        Source control Achieved by- IV abx                                                                                                                     Gram-negative bacteremia  Secondary to UTI.  Continue antibiotics, follow up blood cultures and deescalate antibiotics as appropriate    Blood culture    ESCHERICHIA COLI     Urine culture  ESCHERICHIA COLI   >100,000 cfu/ml                              Hosp Med PN 2/15                        Lab 2/12-2/17         Provider, please clarify if there is any correlation between __sepsis__ and __E. coli__.           Are the conditions:      [  x] Due to or associated with each other   [  ] Unrelated to each other   [  ] Other (Please Specify): _________________________   [  ] Clinically Undetermined

## 2020-02-17 NOTE — ASSESSMENT & PLAN NOTE
Avoid non-essential nephrotoxins and  dose medications according to GFR  Keep MAP > 65  Avoid aceI, nephrotoxic and nsaids/hopper 2 Monitor I/O, daily weight  Likely 2/2 volume depletion-treat with ivf and trend  Consider  renal us  R/o obstruction -

## 2020-02-17 NOTE — PLAN OF CARE
Per Larissa with [facility], stated she will review pt for SNF auth again tomorrow after PT/OT work with pt again since pt is not clear for discharge today. CM following.        02/17/20 1512   Post-Acute Status   Post-Acute Authorization Placement   Post-Acute Placement Status Pending Payor Medical Review

## 2020-02-17 NOTE — ASSESSMENT & PLAN NOTE
Patient's FSGs are not controlled on current hypoglycemics.   Last A1c reviewed-   Lab Results   Component Value Date    HGBA1C 7.6 (H) 01/10/2020     Most recent fingerstick glucose reviewed-   Recent Labs   Lab 02/15/20  2200 02/16/20  0714 02/16/20  1147 02/16/20  1648   POCTGLUCOSE 179* 207* 267* 246*     Current correctional scale  Medium  Increase anti-hyperglycemic dose as follows-   Antihyperglycemics (From admission, onward)    Start     Stop Route Frequency Ordered    02/14/20 2100  insulin detemir U-100 pen 5 Units      -- SubQ Nightly 02/14/20 1305    02/13/20 0816  insulin aspart U-100 pen 1-10 Units      -- SubQ Before meals & nightly PRN 02/13/20 0717

## 2020-02-17 NOTE — ASSESSMENT & PLAN NOTE
Nutrition consulted. Body mass index is 23.04 kg/m².. Encourage maximal PO intake. Diet supplementation ordered per nutrition approval. Will encourage PO and monitor closely for weight changes.

## 2020-02-17 NOTE — PLAN OF CARE
Pt resting quietly at this time. Able to make needs known. Cont b/b. Denies pain. Bed low and locked. Call light in reach.

## 2020-02-17 NOTE — PROGRESS NOTES
[facility] Medicine  Progress Note    Patient Name: Nina Portillo  MRN: [id number]  Patient Class: IP- Inpatient   Admission Date: 2/12/2020  Length of Stay: 3 days  Attending Physician: Brinda Arnold MD  Primary Care Provider: Rush Rosa MD        Subjective:     Principal Problem:Gram-negative bacteremia        HPI:  Nina Portillo is a 78 y/o a. female with a past medical history significant for DM, dementia, and HTN who presented to the ED with increased confusion, decreased activity, increased incontinence, frequency and decreased PO intake for the last few days. Daughter reports that these symptoms are normally present when the patient has a UTI. She states she has not fully recovered after last UTI one month ago. The patient denies vomiting, fever, abdominal pain or any other symptoms at this time.  While in the emergency department patient was found to have a UTI and meet sepsis criteria.  She will be admitted to the service of hospital Medicine for further treatment.    Overview/Hospital Course:  Patient was admitted to the hospital medicine service for altered mental status on top of baseline dementia secondary to UTI.  She was started on broad-spectrum IV antibiotics.  Blood cultures as well as urine cultures positive for gram-negative rods.  Gram-negative germán bacteremia secondary to UTI.  PT and OT were consulted and recommended skilled nursing facility placement.  Case management was consulted for skilled nursing facility.  Patient was afebrile.  White blood cell normalized.  Placed on insulin for better glucose control.    Interval History:   Pt seen/examin-not eating today per nursing /tired     Review of Systems   Unable to perform ROS: Other     Objective:     Vital Signs (Most Recent):  Temp: 97.6 °F (36.4 °C) (02/17/20 1554)  Pulse: 65 (02/17/20 1554)  Resp: 18 (02/17/20 1554)  BP: (!) 154/66 (02/17/20 1554)  SpO2: 98 % (02/17/20 1554) Vital Signs (24h  Range):  Temp:  [96.7 °F (35.9 °C)-98 °F (36.7 °C)] 97.6 °F (36.4 °C)  Pulse:  [65-82] 65  Resp:  [16-20] 18  SpO2:  [95 %-98 %] 98 %  BP: (116-154)/(62-72) 154/66     Weight: 64.9 kg (143 lb 1.3 oz)  Body mass index is 23.81 kg/m².    Intake/Output Summary (Last 24 hours) at 2/17/2020 1600  Last data filed at 2/17/2020 0800  Gross per 24 hour   Intake 120 ml   Output 600 ml   Net -480 ml      Physical Exam   Constitutional: She appears well-developed and well-nourished.   HENT:   Head: Normocephalic and atraumatic.   Eyes: Conjunctivae are normal.   Cardiovascular: Normal rate, regular rhythm and normal heart sounds.   Pulmonary/Chest: Effort normal.   Abdominal: Soft. Bowel sounds are normal.   Neurological: She is alert.   Skin: Skin is warm.   Psychiatric: She has a normal mood and affect.   Vitals reviewed.      Significant Labs: All pertinent labs within the past 24 hours have been reviewed.    Significant Imaging: I have reviewed and interpreted all pertinent imaging results/findings within the past 24 hours.      Assessment/Plan:      * Gram-negative bacteremia  Secondary to UTI.      Acute cystitis without hematuria  Urine and blood culture positive for e.coli pan sensitive  F/u repeat blood culture -neg  On rocephin      KAL (acute kidney injury)  Avoid non-essential nephrotoxins and  dose medications according to GFR  Keep MAP > 65  Avoid aceI, nephrotoxic and nsaids/hopper 2 Monitor I/O, daily weight  Likely 2/2 volume depletion-treat with ivf and trend  Consider  renal us  R/o obstruction -          Sepsis  Sepsis 2/2 uti -e coli bactremia  -repeat blood cultures neg       Weakness  Increased weakness likely 2/2 infection.  Chronic debilitated state reported by daughter.  PT/OT consult.  Skilled nursing facility recommended.  Consult placed.        Moderate malnutrition  Nutrition consulted. Body mass index is 23.81 kg/m².. Encourage maximal PO intake. Diet supplementation ordered per nutrition approval.  Will encourage PO and monitor closely for weight changes.        Controlled type 2 diabetes mellitus with diabetic neuropathy, without long-term current use of insulin  Patient's FSGs are not controlled on current hypoglycemics.   Last A1c reviewed-   Lab Results   Component Value Date    HGBA1C 7.6 (H) 01/10/2020     Most recent fingerstick glucose reviewed-   Recent Labs   Lab 02/16/20  1648 02/17/20  1113   POCTGLUCOSE 246* 162*     Current correctional scale  Medium  Increase anti-hyperglycemic dose as follows-   Antihyperglycemics (From admission, onward)    Start     Stop Route Frequency Ordered    02/14/20 2100  insulin detemir U-100 pen 5 Units      -- SubQ Nightly 02/14/20 1305    02/13/20 0816  insulin aspart U-100 pen 1-10 Units      -- SubQ Before meals & nightly PRN 02/13/20 0717              Encephalopathy, metabolic  Improved.      GERD (gastroesophageal reflux disease)  Chronic.  Continue PPI.      Hypothyroid  Patient has chronic hypothyroidism. TFTs reviewed-   Lab Results   Component Value Date    TSH 3.073 02/13/2020   . Will continue chronic levothyroxine and adjust for and clinical changes.      Hypertension associated with diabetes  Chronic, controlled.  Latest blood pressure and vitals reviewed-   Temp:  [96.7 °F (35.9 °C)-98 °F (36.7 °C)]   Pulse:  [65-82]   Resp:  [16-20]   BP: (116-154)/(62-72)   SpO2:  [95 %-98 %] .   Home meds for hypertension were reviewed and noted below. Hospital anti-hypertensive changes were made as shown below.  Hypertension Medications             amLODIPine (NORVASC) 10 MG tablet Take 10 mg by mouth once daily.      Hospital Medications             amLODIPine tablet 10 mg 10 mg, Oral, Daily        Will utilize p.r.n. blood pressure medication only if patient's blood pressure greater than  180/110 and she develops symptoms such as worsening chest pain or shortness of breath.          VTE Risk Mitigation (From admission, onward)         Ordered     enoxaparin  injection 40 mg  Daily      02/13/20 0105     IP VTE HIGH RISK PATIENT  Once      02/13/20 0105     Place LEOBARDO hose  Until discontinued      02/13/20 0105     Place sequential compression device  Until discontinued      02/13/20 0105                      Brinda Arnold MD  Department of Hospital Medicine   [facility]

## 2020-02-17 NOTE — PROGRESS NOTES
[facility] Medicine  Progress Note    Patient Name: Nina Portillo  MRN: [id number]  Patient Class: IP- Inpatient   Admission Date: 2/12/2020  Length of Stay: 2 days  Attending Physician: Yoana Majano MD  Primary Care Provider: Rush Rosa MD        Subjective:     Principal Problem:Gram-negative bacteremia        HPI:  Nina Portillo is a 80 y/o a. female with a past medical history significant for DM, dementia, and HTN who presented to the ED with increased confusion, decreased activity, increased incontinence, frequency and decreased PO intake for the last few days. Daughter reports that these symptoms are normally present when the patient has a UTI. She states she has not fully recovered after last UTI one month ago. The patient denies vomiting, fever, abdominal pain or any other symptoms at this time.  While in the emergency department patient was found to have a UTI and meet sepsis criteria.  She will be admitted to the service of hospital Medicine for further treatment.    Overview/Hospital Course:  Patient was admitted to the hospital medicine service for altered mental status on top of baseline dementia secondary to UTI.  She was started on broad-spectrum IV antibiotics.  Blood cultures as well as urine cultures positive for gram-negative rods.  Gram-negative germán bacteremia secondary to UTI.  PT and OT were consulted and recommended skilled nursing facility placement.  Case management was consulted for skilled nursing facility.  Patient was afebrile.  White blood cell normalized.  Placed on insulin for better glucose control.    Interval History: Patient resting in bed. Currently has no complaints.     Review of Systems   Constitutional: Negative for chills and fever.   Respiratory: Negative for shortness of breath.    Cardiovascular: Negative for chest pain.   Gastrointestinal: Negative for abdominal pain, diarrhea, nausea and vomiting.   Genitourinary: Negative for  dysuria.   Skin: Negative for rash.   Neurological: Negative for headaches.     Objective:     Vital Signs (Most Recent):  Temp: 97.2 °F (36.2 °C) (02/16/20 1531)  Pulse: 91 (02/16/20 1531)  Resp: 16 (02/16/20 1531)  BP: 131/70 (02/16/20 1531)  SpO2: 98 % (02/16/20 1531) Vital Signs (24h Range):  Temp:  [96.4 °F (35.8 °C)-98.9 °F (37.2 °C)] 97.2 °F (36.2 °C)  Pulse:  [83-96] 91  Resp:  [16] 16  SpO2:  [95 %-98 %] 98 %  BP: (127-154)/(60-74) 131/70     Weight: 62.8 kg (138 lb 7.2 oz)  Body mass index is 23.04 kg/m².    Intake/Output Summary (Last 24 hours) at 2/16/2020 2005  Last data filed at 2/16/2020 0600  Gross per 24 hour   Intake --   Output 1850 ml   Net -1850 ml      Physical Exam   Constitutional: She appears well-developed and well-nourished.   HENT:   Head: Normocephalic and atraumatic.   Eyes: Conjunctivae are normal.   Cardiovascular: Normal rate, regular rhythm and normal heart sounds.   Pulmonary/Chest: Effort normal.   Abdominal: Soft. Bowel sounds are normal.   Neurological: She is alert.   Skin: Skin is warm.   Psychiatric: She has a normal mood and affect.   Vitals reviewed.      Significant Labs:   Recent Lab Results       02/16/20  1648   02/16/20  1147   02/16/20  0714   02/16/20  0610   02/15/20  2200        Albumin       3.0       Alkaline Phosphatase       118       ALT       18       Anion Gap       10       AST       18       Baso #       0.01       Basophil%       0.1       BILIRUBIN TOTAL       0.4  Comment:  For infants and newborns, interpretation of results should be based  on gestational age, weight and in agreement with clinical  observations.  Premature Infant recommended reference ranges:  Up to 24 hours.............<8.0 mg/dL  Up to 48 hours............<12.0 mg/dL  3-5 days..................<15.0 mg/dL  6-29 days.................<15.0 mg/dL         BUN, Bld       7       Calcium       10.4       Chloride       104       CO2       23       Creatinine       0.8       Differential  Method       Automated       eGFR if        >60       eGFR if non        >60  Comment:  Calculation used to obtain the estimated glomerular filtration  rate (eGFR) is the CKD-EPI equation.          Eos #       0.2       Eosinophil%       2.8       Glucose       159       Gran # (ANC)       4.9       Gran%       69.0       Hematocrit       44.1       Hemoglobin       14.5       Immature Grans (Abs)       0.03  Comment:  Mild elevation in immature granulocytes is non specific and   can be seen in a variety of conditions including stress response,   acute inflammation, trauma and pregnancy. Correlation with other   laboratory and clinical findings is essential.         Lymph #       1.3       Lymph%       18.5       Magnesium       2.1       MCH       31.4       MCHC       32.9       MCV       96       Mono #       0.7       Mono%       9.2       MPV       9.2       nRBC       0       Phosphorus       2.2       Platelet Estimate       Appears normal       Platelets       219       POCT Glucose 246 267 207   179     Potassium       4.0       PROTEIN TOTAL       7.8       RBC       4.62       RDW       11.8       Sodium       137       Vancomycin, Random       12.3       WBC       7.08                                Assessment/Plan:      * Gram-negative bacteremia  Secondary to UTI.      Acute cystitis without hematuria  Urine and blood culture positive for e.coli pan sensitive  F/u repeat blood culture  On rocephin      Sepsis  Improved    Weakness  Increased weakness likely 2/2 infection.  Chronic debilitated state reported by daughter.  PT/OT consult.  Skilled nursing facility recommended.  Consult placed.        Moderate malnutrition  Nutrition consulted. Body mass index is 23.04 kg/m².. Encourage maximal PO intake. Diet supplementation ordered per nutrition approval. Will encourage PO and monitor closely for weight changes.        Controlled type 2 diabetes mellitus with diabetic  neuropathy, without long-term current use of insulin  Patient's FSGs are not controlled on current hypoglycemics.   Last A1c reviewed-   Lab Results   Component Value Date    HGBA1C 7.6 (H) 01/10/2020     Most recent fingerstick glucose reviewed-   Recent Labs   Lab 02/15/20  2200 02/16/20  0714 02/16/20  1147 02/16/20  1648   POCTGLUCOSE 179* 207* 267* 246*     Current correctional scale  Medium  Increase anti-hyperglycemic dose as follows-   Antihyperglycemics (From admission, onward)    Start     Stop Route Frequency Ordered    02/14/20 2100  insulin detemir U-100 pen 5 Units      -- SubQ Nightly 02/14/20 1305    02/13/20 0816  insulin aspart U-100 pen 1-10 Units      -- SubQ Before meals & nightly PRN 02/13/20 0717              Encephalopathy, metabolic  Improved.      GERD (gastroesophageal reflux disease)  Chronic.  Continue PPI.      Hypothyroid  Patient has chronic hypothyroidism. TFTs reviewed-   Lab Results   Component Value Date    TSH 3.073 02/13/2020   . Will continue chronic levothyroxine and adjust for and clinical changes.      Hypertension associated with diabetes  Chronic, controlled.  Latest blood pressure and vitals reviewed-   Temp:  [96.4 °F (35.8 °C)-98.9 °F (37.2 °C)]   Pulse:  [83-96]   Resp:  [16]   BP: (127-154)/(60-74)   SpO2:  [95 %-98 %] .   Home meds for hypertension were reviewed and noted below. Hospital anti-hypertensive changes were made as shown below.  Hypertension Medications             amLODIPine (NORVASC) 10 MG tablet Take 10 mg by mouth once daily.      Hospital Medications             amLODIPine tablet 10 mg 10 mg, Oral, Daily        Will utilize p.r.n. blood pressure medication only if patient's blood pressure greater than  180/110 and she develops symptoms such as worsening chest pain or shortness of breath.          VTE Risk Mitigation (From admission, onward)         Ordered     enoxaparin injection 40 mg  Daily      02/13/20 0105     IP VTE HIGH RISK PATIENT  Once       02/13/20 0105     Place LEOBARDO hose  Until discontinued      02/13/20 0105     Place sequential compression device  Until discontinued      02/13/20 0105                      Yoana Majano MD  Department of Hospital Medicine   [facility]

## 2020-02-17 NOTE — ASSESSMENT & PLAN NOTE
Urine and blood culture positive for e.coli pan sensitive  F/u repeat blood culture  On rocephin

## 2020-02-17 NOTE — ASSESSMENT & PLAN NOTE
Nutrition consulted. Body mass index is 23.81 kg/m².. Encourage maximal PO intake. Diet supplementation ordered per nutrition approval. Will encourage PO and monitor closely for weight changes.

## 2020-02-17 NOTE — PHYSICIAN QUERY
PT Name: Nina Portillo  MR #: [id number]     Physician Query Form - Documentation Clarification      CDS/: Batsheva Galicia RN                 Contact information:[email or web address]    This form is a permanent document in the medical record.     Query Date: February 17, 2020    By submitting this query, we are merely seeking further clarification of documentation. Please utilize your independent clinical judgment when addressing the question(s) below.    The Medical record reflects the following:    Supporting Clinical Findings Location in Medical Record   Hypertension associated with diabetes  Chronic.  Stable.  Continue chronic amlodipine.  Monitor blood pressure closely and treat as indicated for sustained control.    Hypertension associated with diabetes  Chronic, controlled.  Latest blood pressure and vitals reviewed-   Temp:  [96.2 °F (35.7 °C)-103.7 °F (39.8 °C)]   Pulse:  []   Resp:  [16-24]   BP: (135-175)/(63-84)   SpO2:  [94 %-98 %] .   Home meds for hypertension were reviewed and noted below. Hospital anti-hypertensive changes were made as shown below.    Amlodipine 10 mg daily    Will utilize p.r.n. blood pressure medication only if patient's blood pressure greater than  180/110 and she develops symptoms such as worsening chest pain or shortness of breath. Hosp Med H&P 2/12          Hosp Med PN 2/13                                                                            Doctor, Please specify the hypertension diagnosis associated with above clinical findings.    Provider Use Only      __x_htn is a manifestation of DM (secondary htn)    ___htn is not a manifestation of DM (essential htn)    ___other___________________                                                                                                                   [  ] Clinically Undetermined

## 2020-02-17 NOTE — PLAN OF CARE
CM faxed request for SNF auth to [facility] [place] at [phone]       02/17/20 0834   Post-Acute Status   Post-Acute Authorization Placement   Post-Acute Placement Status Pending Payor Medical Review

## 2020-02-17 NOTE — SUBJECTIVE & OBJECTIVE
Interval History: Patient resting in bed. Currently has no complaints.     Review of Systems   Constitutional: Negative for chills and fever.   Respiratory: Negative for shortness of breath.    Cardiovascular: Negative for chest pain.   Gastrointestinal: Negative for abdominal pain, diarrhea, nausea and vomiting.   Genitourinary: Negative for dysuria.   Skin: Negative for rash.   Neurological: Negative for headaches.     Objective:     Vital Signs (Most Recent):  Temp: 97.2 °F (36.2 °C) (02/16/20 1531)  Pulse: 91 (02/16/20 1531)  Resp: 16 (02/16/20 1531)  BP: 131/70 (02/16/20 1531)  SpO2: 98 % (02/16/20 1531) Vital Signs (24h Range):  Temp:  [96.4 °F (35.8 °C)-98.9 °F (37.2 °C)] 97.2 °F (36.2 °C)  Pulse:  [83-96] 91  Resp:  [16] 16  SpO2:  [95 %-98 %] 98 %  BP: (127-154)/(60-74) 131/70     Weight: 62.8 kg (138 lb 7.2 oz)  Body mass index is 23.04 kg/m².    Intake/Output Summary (Last 24 hours) at 2/16/2020 2005  Last data filed at 2/16/2020 0600  Gross per 24 hour   Intake --   Output 1850 ml   Net -1850 ml      Physical Exam   Constitutional: She appears well-developed and well-nourished.   HENT:   Head: Normocephalic and atraumatic.   Eyes: Conjunctivae are normal.   Cardiovascular: Normal rate, regular rhythm and normal heart sounds.   Pulmonary/Chest: Effort normal.   Abdominal: Soft. Bowel sounds are normal.   Neurological: She is alert.   Skin: Skin is warm.   Psychiatric: She has a normal mood and affect.   Vitals reviewed.      Significant Labs:   Recent Lab Results       02/16/20  1648   02/16/20  1147   02/16/20  0714   02/16/20  0610   02/15/20  2200        Albumin       3.0       Alkaline Phosphatase       118       ALT       18       Anion Gap       10       AST       18       Baso #       0.01       Basophil%       0.1       BILIRUBIN TOTAL       0.4  Comment:  For infants and newborns, interpretation of results should be based  on gestational age, weight and in agreement with  clinical  observations.  Premature Infant recommended reference ranges:  Up to 24 hours.............<8.0 mg/dL  Up to 48 hours............<12.0 mg/dL  3-5 days..................<15.0 mg/dL  6-29 days.................<15.0 mg/dL         BUN, Bld       7       Calcium       10.4       Chloride       104       CO2       23       Creatinine       0.8       Differential Method       Automated       eGFR if        >60       eGFR if non        >60  Comment:  Calculation used to obtain the estimated glomerular filtration  rate (eGFR) is the CKD-EPI equation.          Eos #       0.2       Eosinophil%       2.8       Glucose       159       Gran # (ANC)       4.9       Gran%       69.0       Hematocrit       44.1       Hemoglobin       14.5       Immature Grans (Abs)       0.03  Comment:  Mild elevation in immature granulocytes is non specific and   can be seen in a variety of conditions including stress response,   acute inflammation, trauma and pregnancy. Correlation with other   laboratory and clinical findings is essential.         Lymph #       1.3       Lymph%       18.5       Magnesium       2.1       MCH       31.4       MCHC       32.9       MCV       96       Mono #       0.7       Mono%       9.2       MPV       9.2       nRBC       0       Phosphorus       2.2       Platelet Estimate       Appears normal       Platelets       219       POCT Glucose 246 267 207   179     Potassium       4.0       PROTEIN TOTAL       7.8       RBC       4.62       RDW       11.8       Sodium       137       Vancomycin, Random       12.3       WBC       7.08

## 2020-02-17 NOTE — ASSESSMENT & PLAN NOTE
Chronic, controlled.  Latest blood pressure and vitals reviewed-   Temp:  [96.7 °F (35.9 °C)-98 °F (36.7 °C)]   Pulse:  [65-82]   Resp:  [16-20]   BP: (116-154)/(62-72)   SpO2:  [95 %-98 %] .   Home meds for hypertension were reviewed and noted below. Hospital anti-hypertensive changes were made as shown below.  Hypertension Medications             amLODIPine (NORVASC) 10 MG tablet Take 10 mg by mouth once daily.      Hospital Medications             amLODIPine tablet 10 mg 10 mg, Oral, Daily        Will utilize p.r.n. blood pressure medication only if patient's blood pressure greater than  180/110 and she develops symptoms such as worsening chest pain or shortness of breath.

## 2020-02-17 NOTE — PLAN OF CARE
Problem: Physical Therapy Goal  Goal: Physical Therapy Goal  Description  Goals to be met by: 2020    Patient will increase functional independence with mobility by performin. Supine to sit with Modified [place]  2. Sit to supine with Modified [place]  3. Sit to stand transfer with Modified [place]  4. Bed to chair transfer with Supervision using Rolling Walker  5. Gait  x 150 feet with Supervision using Single-point Cane .      Outcome: Ongoing, Progressing

## 2020-02-17 NOTE — PT/OT/SLP PROGRESS
Physical Therapy Treatment    Patient Name:  Nina Portillo   MRN:  [id number]    Recommendations:     Discharge Recommendations:  nursing facility, skilled   Discharge Equipment Recommendations: walker, rolling   Barriers to discharge: None    Assessment:     Nina Portillo is a 79 y.o. female admitted with a medical diagnosis of Gram-negative bacteremia.  She presents with the following impairments/functional limitations:  weakness, impaired endurance, impaired functional mobilty, gait instability, impaired balance, decreased lower extremity function. Patient very tired today and refused OOB to chair but agreeable to exercise. SNF at [place]. Continue with PT and POC    Rehab Prognosis: Good; patient would benefit from acute skilled PT services to address these deficits and reach maximum level of function.    Recent Surgery: * No surgery found *      Plan:     During this hospitalization, patient to be seen daily to address the identified rehab impairments via gait training, therapeutic activities, therapeutic exercises and progress toward the following goals:    · Plan of Care Expires:  03/12/20    Subjective     Chief Complaint: Very tired and no energy  Patient/Family Comments/goals: to rest today and try to walk tomorrow  Pain/Comfort:  · Pain Rating 1: 0/10  · Pain Rating Post-Intervention 1: 0/10      Objective:     Communicated with LAURA Donovan who stated that pt did have a rough night prior to session.  Patient found HOB elevated with peripheral IV, PureWick upon PT entry to room.     General Precautions: Standard, fall   Orthopedic Precautions:N/A   Braces: N/A       Therapeutic Activities and Exercises:    Supine exercises 1 x 15 ea: AP, HS, QS, GS, hip ab/add, SLR, bridges, SAQ, SKTC.    Patient left HOB elevated with all lines intact, call button in reach, bed alarm on and LAURA Donovan notified..    GOALS:   Multidisciplinary Problems     Physical Therapy Goals        Problem: Physical Therapy Goal    Goal  Priority Disciplines Outcome Goal Variances Interventions   Physical Therapy Goal     PT, PT/OT Ongoing, Progressing     Description:  Goals to be met by: 2020    Patient will increase functional independence with mobility by performin. Supine to sit with Modified [place]  2. Sit to supine with Modified [place]  3. Sit to stand transfer with Modified [place]  4. Bed to chair transfer with Supervision using Rolling Walker  5. Gait  x 150 feet with Supervision using Single-point Cane .                       Time Tracking:     PT Received On: 20  PT Start Time: 930     PT Stop Time: 953  PT Total Time (min): 23 min     Billable Minutes: Therapeutic Exercise 23    Treatment Type: Treatment  PT/PTA: PTA     PTA Visit Number: 1     Chel Ramirez, PTA  2020

## 2020-02-17 NOTE — ASSESSMENT & PLAN NOTE
Patient's FSGs are not controlled on current hypoglycemics.   Last A1c reviewed-   Lab Results   Component Value Date    HGBA1C 7.6 (H) 01/10/2020     Most recent fingerstick glucose reviewed-   Recent Labs   Lab 02/16/20  1648 02/17/20  1113   POCTGLUCOSE 246* 162*     Current correctional scale  Medium  Increase anti-hyperglycemic dose as follows-   Antihyperglycemics (From admission, onward)    Start     Stop Route Frequency Ordered    02/14/20 2100  insulin detemir U-100 pen 5 Units      -- SubQ Nightly 02/14/20 1305    02/13/20 0816  insulin aspart U-100 pen 1-10 Units      -- SubQ Before meals & nightly PRN 02/13/20 0717

## 2020-02-17 NOTE — ASSESSMENT & PLAN NOTE
Chronic, controlled.  Latest blood pressure and vitals reviewed-   Temp:  [96.4 °F (35.8 °C)-98.9 °F (37.2 °C)]   Pulse:  [83-96]   Resp:  [16]   BP: (127-154)/(60-74)   SpO2:  [95 %-98 %] .   Home meds for hypertension were reviewed and noted below. Hospital anti-hypertensive changes were made as shown below.  Hypertension Medications             amLODIPine (NORVASC) 10 MG tablet Take 10 mg by mouth once daily.      Hospital Medications             amLODIPine tablet 10 mg 10 mg, Oral, Daily        Will utilize p.r.n. blood pressure medication only if patient's blood pressure greater than  180/110 and she develops symptoms such as worsening chest pain or shortness of breath.

## 2020-02-18 PROBLEM — A41.51 SEPSIS DUE TO ESCHERICHIA COLI: Status: ACTIVE | Noted: 2019-11-20

## 2020-02-18 LAB
ALBUMIN SERPL BCP-MCNC: 2.3 G/DL (ref 3.5–5.2)
ANION GAP SERPL CALC-SCNC: 6 MMOL/L (ref 8–16)
ANION GAP SERPL CALC-SCNC: 9 MMOL/L (ref 8–16)
BUN SERPL-MCNC: 18 MG/DL (ref 8–23)
BUN SERPL-MCNC: 18 MG/DL (ref 8–23)
CALCIUM SERPL-MCNC: 9 MG/DL (ref 8.7–10.5)
CALCIUM SERPL-MCNC: 9.4 MG/DL (ref 8.7–10.5)
CHLORIDE SERPL-SCNC: 109 MMOL/L (ref 95–110)
CHLORIDE SERPL-SCNC: 110 MMOL/L (ref 95–110)
CO2 SERPL-SCNC: 21 MMOL/L (ref 23–29)
CO2 SERPL-SCNC: 24 MMOL/L (ref 23–29)
CREAT SERPL-MCNC: 1.9 MG/DL (ref 0.5–1.4)
CREAT SERPL-MCNC: 2 MG/DL (ref 0.5–1.4)
EST. GFR  (AFRICAN AMERICAN): 26 ML/MIN/1.73 M^2
EST. GFR  (AFRICAN AMERICAN): 28 ML/MIN/1.73 M^2
EST. GFR  (NON AFRICAN AMERICAN): 23 ML/MIN/1.73 M^2
EST. GFR  (NON AFRICAN AMERICAN): 24 ML/MIN/1.73 M^2
GLUCOSE SERPL-MCNC: 132 MG/DL (ref 70–110)
GLUCOSE SERPL-MCNC: 228 MG/DL (ref 70–110)
MAGNESIUM SERPL-MCNC: 2.1 MG/DL (ref 1.6–2.6)
PHOSPHATE SERPL-MCNC: 3.2 MG/DL (ref 2.7–4.5)
POCT GLUCOSE: 153 MG/DL (ref 70–110)
POCT GLUCOSE: 168 MG/DL (ref 70–110)
POCT GLUCOSE: 282 MG/DL (ref 70–110)
POTASSIUM SERPL-SCNC: 3.9 MMOL/L (ref 3.5–5.1)
POTASSIUM SERPL-SCNC: 4.1 MMOL/L (ref 3.5–5.1)
SODIUM SERPL-SCNC: 139 MMOL/L (ref 136–145)
SODIUM SERPL-SCNC: 140 MMOL/L (ref 136–145)

## 2020-02-18 PROCEDURE — 25000003 PHARM REV CODE 250: Performed by: NURSE PRACTITIONER

## 2020-02-18 PROCEDURE — 97530 THERAPEUTIC ACTIVITIES: CPT | Mod: CQ

## 2020-02-18 PROCEDURE — 36415 COLL VENOUS BLD VENIPUNCTURE: CPT

## 2020-02-18 PROCEDURE — 63600175 PHARM REV CODE 636 W HCPCS: Performed by: NURSE PRACTITIONER

## 2020-02-18 PROCEDURE — 83735 ASSAY OF MAGNESIUM: CPT

## 2020-02-18 PROCEDURE — 80048 BASIC METABOLIC PNL TOTAL CA: CPT

## 2020-02-18 PROCEDURE — 80069 RENAL FUNCTION PANEL: CPT

## 2020-02-18 PROCEDURE — 12000002 HC ACUTE/MED SURGE SEMI-PRIVATE ROOM

## 2020-02-18 PROCEDURE — 97535 SELF CARE MNGMENT TRAINING: CPT | Mod: CO

## 2020-02-18 PROCEDURE — 97116 GAIT TRAINING THERAPY: CPT | Mod: CQ

## 2020-02-18 RX ADMIN — CEFTRIAXONE 1 G: 1 INJECTION, SOLUTION INTRAVENOUS at 10:02

## 2020-02-18 RX ADMIN — INSULIN ASPART 2 UNITS: 100 INJECTION, SOLUTION INTRAVENOUS; SUBCUTANEOUS at 05:02

## 2020-02-18 RX ADMIN — ASPIRIN 81 MG: 81 TABLET, COATED ORAL at 08:02

## 2020-02-18 RX ADMIN — AMLODIPINE BESYLATE 10 MG: 5 TABLET ORAL at 08:02

## 2020-02-18 RX ADMIN — INSULIN ASPART 6 UNITS: 100 INJECTION, SOLUTION INTRAVENOUS; SUBCUTANEOUS at 11:02

## 2020-02-18 RX ADMIN — SODIUM CHLORIDE: 0.9 INJECTION, SOLUTION INTRAVENOUS at 03:02

## 2020-02-18 RX ADMIN — LEVOTHYROXINE SODIUM 100 MCG: 100 TABLET ORAL at 06:02

## 2020-02-18 RX ADMIN — MIRTAZAPINE 15 MG: 15 TABLET, FILM COATED ORAL at 08:02

## 2020-02-18 RX ADMIN — PANTOPRAZOLE SODIUM 40 MG: 40 TABLET, DELAYED RELEASE ORAL at 08:02

## 2020-02-18 RX ADMIN — ENOXAPARIN SODIUM 40 MG: 100 INJECTION SUBCUTANEOUS at 05:02

## 2020-02-18 NOTE — PLAN OF CARE
Date Status/Notes Created By   · 2/18/2020 9:09:39 AM Note: I sent discharge orders for review. PHN is pending pt working with PT today before a decision is made. I will keep you updated. Thanks !  Linda Trejo    I sent discharge orders to [facility] for review however the pt is still pending a [facility] SNF auth. CM following. Linda Trejo LCSW     12:00 pm- Larissa with PHN updated that pts PT note is available. Per Larissa she is sending the SNf request off for medical review. CM following. Linda Trejo LCSW       02/18/20 0909   Post-Acute Status   Post-Acute Authorization Placement   Post-Acute Placement Status Pending Payor Review

## 2020-02-18 NOTE — SUBJECTIVE & OBJECTIVE
Interval History:  More alert, received ivf x 24 hours/ creatine stable but still elevated   Very flat affect.  Daughter requesting renal ultrasound to   renal cancer    Review of Systems   Unable to perform ROS: Other     Objective:     Vital Signs (Most Recent):  Temp: 97.6 °F (36.4 °C) (02/18/20 0725)  Pulse: 72 (02/18/20 0725)  Resp: 16 (02/18/20 0725)  BP: (!) 161/77 (02/18/20 0725)  SpO2: 95 % (02/18/20 0725) Vital Signs (24h Range):  Temp:  [96.8 °F (36 °C)-98.4 °F (36.9 °C)] 97.6 °F (36.4 °C)  Pulse:  [61-76] 72  Resp:  [16-18] 16  SpO2:  [90 %-98 %] 95 %  BP: (122-161)/(58-77) 161/77     Weight: 64.9 kg (143 lb 1.3 oz)  Body mass index is 23.81 kg/m².    Intake/Output Summary (Last 24 hours) at 2/18/2020 0739  Last data filed at 2/18/2020 0600  Gross per 24 hour   Intake 1610 ml   Output 300 ml   Net 1310 ml      Physical Exam   Constitutional: She is oriented to person, place, and time. She appears well-developed and well-nourished.   HENT:   Head: Normocephalic and atraumatic.   Nose: Nose normal.   Mouth/Throat: Oropharynx is clear and moist.   Eyes: Conjunctivae are normal.   Cardiovascular: Normal rate, regular rhythm and normal heart sounds.   Pulmonary/Chest: Effort normal and breath sounds normal.   Abdominal: Soft. Bowel sounds are normal.   Musculoskeletal: She exhibits no edema or tenderness.   Neurological: She is alert and oriented to person, place, and time.   Skin: Skin is warm.   Psychiatric: She has a normal mood and affect.   Nursing note and vitals reviewed.      Significant Labs:   BMP:   Recent Labs   Lab 02/18/20 0415 02/18/20  1026   * 228*    139   K 3.9 4.1    109   CO2 21* 24   BUN 18 18   CREATININE 1.9* 2.0*   CALCIUM 9.0 9.4   MG 2.1  --      CBC:   Recent Labs   Lab 02/17/20  0417   WBC 7.65   HGB 12.0   HCT 36.9*          Significant Imaging: I have reviewed and interpreted all pertinent imaging results/findings within the past 24 hours.

## 2020-02-18 NOTE — PLAN OF CARE
Intervention: Carbohydrate modified diet, prescription medication-appetite stimulant, and nutrition supplement therapy     Recommendation:   1) Continue wit Consistent Carbohydrate diet + Boost glucose control BID, encouraging PO intake   2) Continue appetite stimulant   3) Weigh pt weekly      Goals: PO intakes > 50% of meals and supplements at f/u   Nutrition Goal Status: progressing towards goal   Communication of RD Recs: (POC, sticky note, second sign )

## 2020-02-18 NOTE — PT/OT/SLP PROGRESS
Physical Therapy Treatment    Patient Name:  Nina Portillo   MRN:  [id number]    Recommendations:     Discharge Recommendations:  nursing facility, skilled   Discharge Equipment Recommendations: walker, rolling   Barriers to discharge: None    Assessment:     Nina Portillo is a 79 y.o. female admitted with a medical diagnosis of Gram-negative bacteremia.  She presents with the following impairments/functional limitations:  weakness, impaired endurance, impaired self care skills, impaired functional mobilty, impaired balance, gait instability, decreased safety awareness, decreased lower extremity function. Patient ambulated 100 with RW and CGA, no LOB and no rest breaks required. She has a slow but steady gt with decreased silvia. SNF at [place]. Continue with PT and POC    Rehab Prognosis: Good; patient would benefit from acute skilled PT services to address these deficits and reach maximum level of function.    Recent Surgery: * No surgery found *      Plan:     During this hospitalization, patient to be seen daily to address the identified rehab impairments via gait training, therapeutic activities, therapeutic exercises and progress toward the following goals:    · Plan of Care Expires:  03/12/20    Subjective     Chief Complaint: a little tired  Patient/Family Comments/goals: none stated  Pain/Comfort:  · Pain Rating 1: 0/10  · Pain Rating Post-Intervention 1: 0/10      Objective:     Communicated with LAURA Mays prior to session.  Patient found up in chair with telemetry upon PT entry to room.     General Precautions: Standard, fall   Orthopedic Precautions:N/A   Braces: N/A     Functional Mobility:  · Transfers:     · Sit to Stand:  contact guard assistance with rolling walker  · Gait: 100' RW/CGA      AM-PAC 6 CLICK MOBILITY          Therapeutic Activities and Exercises:   gait training and safety training with transfers and use of call light for assistance when needed    Patient left up in chair with all  lines intact, call button in reach, chair alarm on and RN MAURICIO notified..    GOALS:   Multidisciplinary Problems     Physical Therapy Goals        Problem: Physical Therapy Goal    Goal Priority Disciplines Outcome Goal Variances Interventions   Physical Therapy Goal     PT, PT/OT Ongoing, Progressing     Description:  Goals to be met by: 2020    Patient will increase functional independence with mobility by performin. Supine to sit with Modified [place]  2. Sit to supine with Modified [place]  3. Sit to stand transfer with Modified [place]  4. Bed to chair transfer with Supervision using Rolling Walker  5. Gait  x 150 feet with Supervision using Single-point Cane .                       Time Tracking:     PT Received On: 20  PT Start Time: 1100     PT Stop Time: 1115  PT Total Time (min): 15 min     Billable Minutes: Gait Training 15    Treatment Type: Treatment  PT/PTA: PTA     PTA Visit Number: 3     Chel Ramirez, PTA  2020

## 2020-02-18 NOTE — PT/OT/SLP PROGRESS
Occupational Therapy   Treatment    Name: Nina Portillo  MRN: [id number]  Admitting Diagnosis:  Gram-negative bacteremia       Recommendations:     Discharge Recommendations: nursing facility, skilled  Discharge Equipment Recommendations:  walker, rolling, 3-in-1 commode  Barriers to discharge:  Decreased caregiver support    Assessment:     Nina Portillo is a 79 y.o. female with a medical diagnosis of Gram-negative bacteremia.  She presents with increased activity tolerance and willingness to participate in therapy, supervision for bed mobility using bed rail,  Sit<>stand CGA and  Performing 8  mins  Of  Standing sink side  ADLs  w/o LOB and with wide ELY. Requiring constant CGA for the duration of the standing task.. Performance deficits affecting function are weakness, impaired endurance, impaired self care skills, impaired functional mobilty, gait instability, impaired balance.     Rehab Prognosis:  Good; patient would benefit from acute skilled OT services to address these deficits and reach maximum level of function.       Plan:     Patient to be seen 2 x/week to address the above listed problems via self-care/home management, therapeutic activities, therapeutic exercises  · Plan of Care Expires: 02/23/20  · Plan of Care Reviewed with: patient    Subjective     Pain/Comfort:  · Pain Rating 1: 0/10    Objective:     Communicated with: RNZion prior to session.  Patient found HOB elevated with PureWick, peripheral IV upon OT entry to room.    General Precautions: Standard, fall   Orthopedic Precautions:N/A   Braces: N/A     Occupational Performance:     Bed Mobility:    · Patient completed Rolling/Turning to Left with  Supervision  W/ bed  rail  · Patient completed Scooting/Bridging with supervision w/ bed rail  · Patient completed Supine to Sit with supervision w/ bed  rail     Functional Mobility/Transfers:  · Patient completed Sit <> Stand Transfer with contact guard assistance  with  rolling walker    · Functional Mobility: FAIR; pt shows no LOB  While ambulating to sink from bed side ~8ft and  Back to  Bed side  Chair  ~9ft. She displays increased strength of B  LE as well ad static stand.    Activities of Daily Living:  · Grooming: contact guard assistance standing  at sink to perform  oral hygiene and  hand washing.    Treatment & Education:  -Toileting and Toielt transfer is a goal;  TOBIAS attempted with pt, however pt wears a brief and stated that she is incontinent with urinating and  Does not  need to have a BM right now.  -pt requires encouragement to participate and states that she is tired of being  In the hospital. Looking forward to SNF placement.    Patient left up in chair with call button in reach, chair alarm on and RN Zion   notifiedEducation:      GOALS:   Multidisciplinary Problems     Occupational Therapy Goals        Problem: Occupational Therapy Goal    Goal Priority Disciplines Outcome Interventions   Occupational Therapy Goal     OT, PT/OT Ongoing, Progressing    Description:  Goals to be met by: 2/23/20     Patient will increase functional independence with ADLs by performing:    UE Dressing with Set-up Assistance and Supervision.  LE Dressing with Set-up Assistance, Stand-by Assistance and Assistive Devices as needed.  Grooming while standing at sink with Contact Guard Assistance and Assistive Devices as needed.  Toileting from toilet with Minimal Assistance and Assistive Devices as needed for hygiene and clothing management.   Toilet transfer to toilet with Stand-by Assistance.                      Time Tracking:     OT Date of Treatment: 02/18/20  OT Start Time: 0948  OT Stop Time: 1011  OT Total Time (min): 23 min    Billable Minutes:Self Care/Home Management 23 min    ARMAAN Luecro  2/18/2020

## 2020-02-18 NOTE — PLAN OF CARE
Problem: Physical Therapy Goal  Goal: Physical Therapy Goal  Description  Goals to be met by: 2020    Patient will increase functional independence with mobility by performin. Supine to sit with Modified [place]  2. Sit to supine with Modified [place]  3. Sit to stand transfer with Modified [place]  4. Bed to chair transfer with Supervision using Rolling Walker  5. Gait  x 150 feet with Supervision using Single-point Cane .      Outcome: Ongoing, Progressing

## 2020-02-18 NOTE — PROGRESS NOTES
[facility]  Adult Nutrition  Consult Note    SUMMARY   Intervention: Carbohydrate modified diet, prescription medication-appetite stimulant, and nutrition supplement therapy     Recommendation:   1) Continue wit Consistent Carbohydrate diet + Boost glucose control BID, encouraging PO intake   2) Continue appetite stimulant   3) Weigh pt weekly      Goals: PO intakes > 50% of meals and supplements at f/u   Nutrition Goal Status: progressing towards goal   Communication of RD Recs: (POC, sticky note, second sign )    1. Acute cystitis without hematuria    2. Confusion    3. Gram-negative bacteremia      Past Medical History:   Diagnosis Date    Alzheimer disease 2012    Cancer     renal cell. right    Dementia     Diabetes mellitus     GERD (gastroesophageal reflux disease)     Hyperlipidemia     Hypothyroid     Memory loss     Movement disorder           Reason for Assessment  Reason For Assessment: RD follow up   General Information Comments: 80 y/o female with Alzheimers in with AMS and cystitis. Noted to have had a poor appetite in the past few days. Per pt usually eats 2 meals + 2-3 ice cream shakes daily (? reliablility of stated history). Not counting carbs, glucose elevated. Left diabetic instructions for care givers. Wt gain per chart review. Agreeable to Boost, on appetite stimualnt. NFPE done 2/14/20, mod-mild wasting seen.  2/18/20: appetite seems to be improving. PO intake ~50% average last 3 days. Boost glucose control ordered BID. Discharge plan for SNF.      Nutrition Discharge Planning: to SNF ; DM diet 3-4 carb servings/meal + boost glucose control 2-3 x daily     Nutrition Risk Screen     Nutrition Risk Screen: no indicators present     Nutrition/Diet History     Patient Reported Diet/Restrictions/Preferences: regular- states she eats a lot of sugary foods  Spiritual, Cultural Beliefs, Mu-ism Practices, Values that Affect Care: no  Food Allergies: NKFA  Factors  "Affecting Nutritional Intake: decreased appetite, dislike of foods     Anthropometrics     Height Method: Measured(office 1/15/19)  Height: 5' 5"  Height (inches): 65 in  Weight Method: Bed Scale  Weight: 64.9 kg (20)  Weight (lb): 143 lb  Ideal Body Weight (IBW), Female: 125 lb  % Ideal Body Weight, Female (lb): 106.18 lb  BMI (Calculated): 23.81 kg/m2  BMI Grade: WNL  Weight Loss: intentional  Usual Body Weight (UBW), k kg(1/15/19), 61 kg 1/10/20     Lab/Procedures/Meds     Pertinent Labs Reviewed: reviewed  BMP  Lab Results   Component Value Date     2020    K 4.1 2020     2020    CO2 24 2020    BUN 18 2020    CREATININE 2.0 (H) 2020    CALCIUM 9.4 2020    ANIONGAP 6 (L) 2020    ESTGFRAFRICA 26 (A) 2020    EGFRNONAA 23 (A) 2020     Lab Results   Component Value Date    CALCIUM 9.4 2020    PHOS 3.2 2020     Lab Results   Component Value Date    ALBUMIN 2.3 (L) 2020     Lab Results   Component Value Date    HGBA1C 7.6 (H) 01/10/2020     Recent Labs   Lab 20  1140   POCTGLUCOSE 282*   No results found for: IRON, TIBC, FERRITIN, SATURATEDIRO      Pertinent Medications Reviewed: reviewed  Scheduled Meds:   amLODIPine  10 mg Oral Daily    aspirin  81 mg Oral Daily    cefTRIAXone (ROCEPHIN) IVPB  1 g Intravenous Q24H    enoxaparin  40 mg Subcutaneous Daily    insulin detemir U-100  5 Units Subcutaneous QHS    levothyroxine  100 mcg Oral Before breakfast    mirtazapine  15 mg Oral QHS    pantoprazole  40 mg Oral Daily     Continuous Infusions:   sodium chloride 0.9% 100 mL/hr at 20 1745        Estimated/Assessed Needs    Weight Used For Calorie Calculations: 64.9 kg  Energy Calorie Requirements (kcal): [place] Jeor ( x 1.3-1.4 wt maintenance ) = 5517-7801 kcal  Energy Need Method: [place] Jeor  Protein Requirements: 1.2-1.3 g protein/kg ( wasting/age) = 75-81 g protein  Weight Used For Protein " Calculations: 63 kg  Fluid Requirements (mL): 1500 ml or per MD RDA  CHO Requirement: 160-178 g        Nutrition Prescription Ordered     Current Diet Order: Consistent Carbohydrate 3-4 carb servings per meal  Boost glucose control BID      Evaluation of Received Nutrient/Fluid Intake     Energy Calories Required: not meeting needs  Protein Required: not meeting needs  Fluid Required: not meeting needs  Tolerance: tolerating  % Intake of Estimated Energy Needs: 25-50%  % Meal Intake: 25-50%     Nutrition Risk     Level of Risk/Frequency of Follow-up: moderate 2 x weekly      Assessment and Plan     Moderate malnutrition  Contributing Nutrition Diagnosis  Moderate acute illness related malnutrition     Related to (etiology):   Decreased appetite     Signs and Symptoms (as evidenced by):   1) PO intakes < 50% x 5 days  2) mild-moderate muscle/fat wasting as charted bleow     Interventions/Recommendations (treatment strategy):  Above     Nutrition Diagnosis Status:   Ongoing       Contributing Nutrition Diagnosis  Altered nutrition related lab values    Related to (etiology):   Inconsistent carbohydrate intakes ( skips meals)    Signs and Symptoms (as evidenced by):   Elevated glucose and A1C    Interventions/Recommendations (treatment strategy):  above    Nutrition Diagnosis Status:   Ongoing     Monitor and Evaluation     Food and Nutrient Intake: energy intake, food and beverage intake  Food and Nutrient Adminstration: diet order  Anthropometric Measurements: weight  Biochemical Data, Medical Tests and Procedures: electrolyte and renal panel, glucose/endocrine profile  Nutrition-Focused Physical Findings: overall appearance      Malnutrition Assessment  Malnutrition Type: acute illness or injury  Skin (Micronutrient): (Luis = 18)  Teeth (Micronutrient): (denies chewing trouble)   Micronutrient Evaluation: suspected deficiency  Micronutrient Evaluation Comments: K, Phos, check iron  Energy Intake (Malnutrition):  less than or equal to 50% for greater than or equal to 5 days   Orbital Region (Subcutaneous Fat Loss): moderate depletion  Upper Arm Region (Subcutaneous Fat Loss): mild depletion  Thoracic and Lumbar Region: mild depletion   [place] Region (Muscle Loss): moderate depletion  Clavicle Bone Region (Muscle Loss): moderate depletion  Clavicle and Acromion Bone Region (Muscle Loss): moderate depletion  Scapular Bone Region (Muscle Loss): mild depletion  Dorsal Hand (Muscle Loss): mild depletion  Patellar Region (Muscle Loss): mild depletion  Anterior Thigh Region (Muscle Loss): mild depletion  Posterior Calf Region (Muscle Loss): mild depletion   Edema (Fluid Accumulation): note noted  Subcutaneous Fat Loss (Final Summary): moderate protein-calorie malnutrition  Muscle Loss Evaluation (Final Summary): moderate protein-calorie malnutrition       Nutrition Follow-Up     RD Follow-up?: Yes

## 2020-02-18 NOTE — PLAN OF CARE
Problem: Occupational Therapy Goal  Goal: Occupational Therapy Goal  Description  Goals to be met by: 2/23/20     Patient will increase functional independence with ADLs by performing:    UE Dressing with Set-up Assistance and Supervision.  LE Dressing with Set-up Assistance, Stand-by Assistance and Assistive Devices as needed.  Grooming while standing at sink with Contact Guard Assistance and Assistive Devices as needed.  Toileting from toilet with Minimal Assistance and Assistive Devices as needed for hygiene and clothing management.   Toilet transfer to toilet with Stand-by Assistance.     Outcome: Ongoing, Progressing; grooming while standing  at sink w/ CGA met  today. Pt  stood for  8 mins  with no LOB to  perform oral hygiene  and hand washing.

## 2020-02-18 NOTE — PT/OT/SLP PROGRESS
Physical Therapy Treatment    Patient Name:  Nina Portillo   MRN:  [id number]    Recommendations:     Discharge Recommendations:  nursing facility, skilled   Discharge Equipment Recommendations: walker, rolling   Barriers to discharge: None    Assessment:     Nina Portillo is a 79 y.o. female admitted with a medical diagnosis of Gram-negative bacteremia.  She presents with the following impairments/functional limitations:  weakness, impaired endurance, impaired self care skills, impaired functional mobilty, decreased coordination, impaired cognition, impaired balance, gait instability, decreased safety awareness, decreased lower extremity function. Patient feeling stronger today and able to ambulate 60' with RW and CGA with 3 standing rest breaks. She is unsteady and tires easily from prolonged bed rest but is much improved and with exercise and continued therapy she should continue this progression. SNF at [place]. Continue with PT and POC>    Rehab Prognosis: Good; patient would benefit from acute skilled PT services to address these deficits and reach maximum level of function.    Recent Surgery: * No surgery found *      Plan:     During this hospitalization, patient to be seen daily to address the identified rehab impairments via gait training, therapeutic activities, therapeutic exercises and progress toward the following goals:    · Plan of Care Expires:  03/12/20    Subjective     Chief Complaint: none  Patient/Family Comments/goals: to try and walk a little so I can get out of the hospital  Pain/Comfort:  · Pain Rating 1: 0/10  · Pain Rating Post-Intervention 1: 0/10      Objective:     Communicated with LAURA Parry prior to session.  Patient found HOB elevated with peripheral IV, PureWick upon PT entry to room.     General Precautions: Standard, fall   Orthopedic Precautions:N/A   Braces:       Functional Mobility:  · Bed Mobility:     · Rolling Right: supervision  · Scooting: stand by assistance  · Supine to  Sit: minimum assistance  · Sit to Supine: minimum assistance  · Transfers:     · Sit to Stand:  contact guard assistance with rolling walker  · Gait: 60' with RW and CGA flexed posture,slow with 3 standing rest breaks      AM-PAC 6 CLICK MOBILITY          Therapeutic Activities and Exercises:   Changed gown and assisted RN with holding pt's arm from IV that came loose from pt's hand leaking blood. Gait training, bed mobility, scooting along EOB. Patient sat on the EOB to increase spinal orientation and proprioception.    Patient left HOB elevated with all lines intact, call button in reach and RN Brinda notified..    GOALS:   Multidisciplinary Problems     Physical Therapy Goals        Problem: Physical Therapy Goal    Goal Priority Disciplines Outcome Goal Variances Interventions   Physical Therapy Goal     PT, PT/OT Ongoing, Progressing     Description:  Goals to be met by: 2020    Patient will increase functional independence with mobility by performin. Supine to sit with Modified [place]  2. Sit to supine with Modified [place]  3. Sit to stand transfer with Modified [place]  4. Bed to chair transfer with Supervision using Rolling Walker  5. Gait  x 150 feet with Supervision using Single-point Cane .                       Time Tracking:     PT Received On: 20  PT Start Time: 955     PT Stop Time: 1020  PT Total Time (min): 25 min     Billable Minutes: Gait Training 15 and Therapeutic Activity 10    Treatment Type: Treatment  PT/PTA: PTA     PTA Visit Number: 2     Chel Ramirez, PTA  2020

## 2020-02-18 NOTE — PLAN OF CARE
Problem: Physical Therapy Goal  Goal: Physical Therapy Goal  Description  Goals to be met by: 2020    Patient will increase functional independence with mobility by performin. Supine to sit with Modified [place]  2. Sit to supine with Modified [place]  3. Sit to stand transfer with Modified [place]  4. Bed to chair transfer with Supervision using Rolling Walker  5. Gait  x 150 feet with Supervision using Single-point Cane .      2020 1114 by Chel Ramirez, PTA  Outcome: Ongoing, Progressing

## 2020-02-18 NOTE — PLAN OF CARE
CM received call from Larissa with PHN. Per Larissa, request for SNF has been sent to medical review for decision. Stated could take up to 48 hours before we receive an answer.      02/18/20 1237   Post-Acute Status   Post-Acute Authorization Placement   Post-Acute Placement Status Pending Payor Medical Review

## 2020-02-19 LAB
ALBUMIN SERPL BCP-MCNC: 2.5 G/DL (ref 3.5–5.2)
ALBUMIN SERPL BCP-MCNC: 2.6 G/DL (ref 3.5–5.2)
ANION GAP SERPL CALC-SCNC: 10 MMOL/L (ref 8–16)
ANION GAP SERPL CALC-SCNC: 11 MMOL/L (ref 8–16)
BACTERIA BLD CULT: NORMAL
BACTERIA BLD CULT: NORMAL
BASOPHILS # BLD AUTO: 0.03 K/UL (ref 0–0.2)
BASOPHILS NFR BLD: 0.4 % (ref 0–1.9)
BUN SERPL-MCNC: 18 MG/DL (ref 8–23)
BUN SERPL-MCNC: 19 MG/DL (ref 8–23)
CALCIUM SERPL-MCNC: 9.4 MG/DL (ref 8.7–10.5)
CALCIUM SERPL-MCNC: 9.5 MG/DL (ref 8.7–10.5)
CHLORIDE SERPL-SCNC: 106 MMOL/L (ref 95–110)
CHLORIDE SERPL-SCNC: 107 MMOL/L (ref 95–110)
CO2 SERPL-SCNC: 20 MMOL/L (ref 23–29)
CO2 SERPL-SCNC: 21 MMOL/L (ref 23–29)
CREAT SERPL-MCNC: 1.8 MG/DL (ref 0.5–1.4)
CREAT SERPL-MCNC: 1.9 MG/DL (ref 0.5–1.4)
DIFFERENTIAL METHOD: ABNORMAL
EOSINOPHIL # BLD AUTO: 0.2 K/UL (ref 0–0.5)
EOSINOPHIL NFR BLD: 3 % (ref 0–8)
ERYTHROCYTE [DISTWIDTH] IN BLOOD BY AUTOMATED COUNT: 11.9 % (ref 11.5–14.5)
EST. GFR  (AFRICAN AMERICAN): 28 ML/MIN/1.73 M^2
EST. GFR  (AFRICAN AMERICAN): 30 ML/MIN/1.73 M^2
EST. GFR  (NON AFRICAN AMERICAN): 24 ML/MIN/1.73 M^2
EST. GFR  (NON AFRICAN AMERICAN): 26 ML/MIN/1.73 M^2
GLUCOSE SERPL-MCNC: 179 MG/DL (ref 70–110)
GLUCOSE SERPL-MCNC: 230 MG/DL (ref 70–110)
HCT VFR BLD AUTO: 37 % (ref 37–48.5)
HGB BLD-MCNC: 12.5 G/DL (ref 12–16)
IMM GRANULOCYTES # BLD AUTO: 0.05 K/UL (ref 0–0.04)
LYMPHOCYTES # BLD AUTO: 1 K/UL (ref 1–4.8)
LYMPHOCYTES NFR BLD: 13.2 % (ref 18–48)
MAGNESIUM SERPL-MCNC: 1.9 MG/DL (ref 1.6–2.6)
MCH RBC QN AUTO: 32.2 PG (ref 27–31)
MCHC RBC AUTO-ENTMCNC: 33.8 G/DL (ref 32–36)
MCV RBC AUTO: 95 FL (ref 82–98)
MONOCYTES # BLD AUTO: 0.8 K/UL (ref 0.3–1)
MONOCYTES NFR BLD: 9.8 % (ref 4–15)
NEUTROPHILS # BLD AUTO: 5.7 K/UL (ref 1.8–7.7)
NEUTROPHILS NFR BLD: 73 % (ref 38–73)
NRBC BLD-RTO: 0 /100 WBC
PHOSPHATE SERPL-MCNC: 2.7 MG/DL (ref 2.7–4.5)
PHOSPHATE SERPL-MCNC: 2.7 MG/DL (ref 2.7–4.5)
PHOSPHATE SERPL-MCNC: 2.9 MG/DL (ref 2.7–4.5)
PLATELET # BLD AUTO: 199 K/UL (ref 150–350)
PMV BLD AUTO: 9 FL (ref 9.2–12.9)
POCT GLUCOSE: 145 MG/DL (ref 70–110)
POCT GLUCOSE: 151 MG/DL (ref 70–110)
POCT GLUCOSE: 154 MG/DL (ref 70–110)
POCT GLUCOSE: 232 MG/DL (ref 70–110)
POTASSIUM SERPL-SCNC: 3.8 MMOL/L (ref 3.5–5.1)
POTASSIUM SERPL-SCNC: 4 MMOL/L (ref 3.5–5.1)
RBC # BLD AUTO: 3.88 M/UL (ref 4–5.4)
SODIUM SERPL-SCNC: 137 MMOL/L (ref 136–145)
SODIUM SERPL-SCNC: 138 MMOL/L (ref 136–145)
WBC # BLD AUTO: 7.78 K/UL (ref 3.9–12.7)

## 2020-02-19 PROCEDURE — 63600175 PHARM REV CODE 636 W HCPCS: Performed by: NURSE PRACTITIONER

## 2020-02-19 PROCEDURE — 84100 ASSAY OF PHOSPHORUS: CPT

## 2020-02-19 PROCEDURE — 80069 RENAL FUNCTION PANEL: CPT

## 2020-02-19 PROCEDURE — 25000003 PHARM REV CODE 250: Performed by: NURSE PRACTITIONER

## 2020-02-19 PROCEDURE — 12000002 HC ACUTE/MED SURGE SEMI-PRIVATE ROOM

## 2020-02-19 PROCEDURE — 83735 ASSAY OF MAGNESIUM: CPT

## 2020-02-19 PROCEDURE — 36415 COLL VENOUS BLD VENIPUNCTURE: CPT

## 2020-02-19 PROCEDURE — 97116 GAIT TRAINING THERAPY: CPT | Mod: CQ

## 2020-02-19 PROCEDURE — 85025 COMPLETE CBC W/AUTO DIFF WBC: CPT

## 2020-02-19 RX ORDER — HYDRALAZINE HYDROCHLORIDE 25 MG/1
25 TABLET, FILM COATED ORAL EVERY 8 HOURS PRN
Status: DISCONTINUED | OUTPATIENT
Start: 2020-02-19 | End: 2020-02-20 | Stop reason: HOSPADM

## 2020-02-19 RX ORDER — METFORMIN HYDROCHLORIDE 500 MG/1
500 TABLET, EXTENDED RELEASE ORAL
Qty: 180 TABLET | Refills: 3
Start: 2020-02-19 | End: 2020-05-15 | Stop reason: SDUPTHER

## 2020-02-19 RX ADMIN — INSULIN ASPART 2 UNITS: 100 INJECTION, SOLUTION INTRAVENOUS; SUBCUTANEOUS at 08:02

## 2020-02-19 RX ADMIN — AMLODIPINE BESYLATE 10 MG: 5 TABLET ORAL at 08:02

## 2020-02-19 RX ADMIN — INSULIN ASPART 2 UNITS: 100 INJECTION, SOLUTION INTRAVENOUS; SUBCUTANEOUS at 06:02

## 2020-02-19 RX ADMIN — SODIUM CHLORIDE: 0.9 INJECTION, SOLUTION INTRAVENOUS at 12:02

## 2020-02-19 RX ADMIN — MIRTAZAPINE 15 MG: 15 TABLET, FILM COATED ORAL at 08:02

## 2020-02-19 RX ADMIN — ENOXAPARIN SODIUM 40 MG: 100 INJECTION SUBCUTANEOUS at 06:02

## 2020-02-19 RX ADMIN — ASPIRIN 81 MG: 81 TABLET, COATED ORAL at 08:02

## 2020-02-19 RX ADMIN — LEVOTHYROXINE SODIUM 100 MCG: 100 TABLET ORAL at 05:02

## 2020-02-19 RX ADMIN — PANTOPRAZOLE SODIUM 40 MG: 40 TABLET, DELAYED RELEASE ORAL at 08:02

## 2020-02-19 NOTE — ASSESSMENT & PLAN NOTE
Chronic, controlled.  Latest blood pressure and vitals reviewed-   Temp:  [96.8 °F (36 °C)-98 °F (36.7 °C)]   Pulse:  [61-80]   Resp:  [16-18]   BP: (122-161)/(58-92)   SpO2:  [90 %-98 %] .   Home meds for hypertension were reviewed and noted below. Hospital anti-hypertensive changes were made as shown below.  Hypertension Medications             amLODIPine (NORVASC) 10 MG tablet Take 10 mg by mouth once daily.      Hospital Medications             amLODIPine tablet 10 mg 10 mg, Oral, Daily        Will utilize p.r.n. blood pressure medication only if patient's blood pressure greater than  180/110 and she develops symptoms such as worsening chest pain or shortness of breath.

## 2020-02-19 NOTE — PLAN OF CARE
CM received update from Larissa with PHN. Per Larissa, she still has not heard anything from medical review regarding SNF. Stated she will update CM as soon as she gets an answer.      02/19/20 1030   Post-Acute Status   Post-Acute Authorization Placement   Post-Acute Placement Status Pending Payor Medical Review

## 2020-02-19 NOTE — PT/OT/SLP PROGRESS
Physical Therapy Treatment    Patient Name:  Nina Portillo   MRN:  [id number]    Recommendations:     Discharge Recommendations:  nursing facility, skilled   Discharge Equipment Recommendations: walker, rolling   Barriers to discharge: None    Assessment:     Nina Portillo is a 79 y.o. female admitted with a medical diagnosis of Gram-negative bacteremia.  She presents with the following impairments/functional limitations:  weakness, impaired endurance, impaired self care skills, impaired balance, gait instability, impaired functional mobilty, decreased lower extremity function, decreased safety awareness. Patient still c/o fatigue but agreeable to gait training and ambulated 80' with RW and CGA. She is very slow and became dizzy during gait training so was returned to room and RN notified. SNF at [place]. Continue with PT and POC    Rehab Prognosis: Good; patient would benefit from acute skilled PT services to address these deficits and reach maximum level of function.    Recent Surgery: * No surgery found *      Plan:     During this hospitalization, patient to be seen daily to address the identified rehab impairments via gait training, therapeutic activities, therapeutic exercises and progress toward the following goals:    · Plan of Care Expires:  03/12/20    Subjective     Chief Complaint: very tired and dizzy with walking and standing for too long  Patient/Family Comments/goals: doesn't have any; too tired and ill to think  Pain/Comfort:  · Pain Rating 1: 0/10  · Pain Addressed 1: Reposition  · Pain Rating Post-Intervention 1: 0/10      Objective:     Communicated with LAURA Davila prior to session.  Patient found HOB elevated with telemetry, and pure wick upon PT entry to room.     General Precautions: Standard, fall   Orthopedic Precautions:N/A   Braces: N/A     Functional Mobility:  · Bed Mobility:     · Rolling Right: contact guard assistance  · Supine to Sit: minimum assistance and moderate  assistance  · Transfers:     · Sit to Stand:  minimum assistance with rolling walker  · Bed to Chair: minimum assistance with  rolling walker  using  Stand Pivot  · Gait: 80' RW/CGA decreased silvia, slow unsteady, dizzy      AM-PAC 6 CLICK MOBILITY          Therapeutic Activities and Exercises:   transfer bed to chair, donned gown, gait training    Patient left up in chair with all lines intact, call button in reach, chair alarm on and RN aware..    GOALS:   Multidisciplinary Problems     Physical Therapy Goals        Problem: Physical Therapy Goal    Goal Priority Disciplines Outcome Goal Variances Interventions   Physical Therapy Goal     PT, PT/OT Ongoing, Progressing     Description:  Goals to be met by: 2020    Patient will increase functional independence with mobility by performin. Supine to sit with Modified [place]  2. Sit to supine with Modified [place]  3. Sit to stand transfer with Modified [place]  4. Bed to chair transfer with Supervision using Rolling Walker  5. Gait  x 150 feet with Supervision using Single-point Cane .                       Time Tracking:     PT Received On: 20  PT Start Time: 1104     PT Stop Time: 1121  PT Total Time (min): 17 min     Billable Minutes: Gait Training 17    Treatment Type: Treatment  PT/PTA: PTA     PTA Visit Number: 4     Chel Ramirez, PTA  2020

## 2020-02-19 NOTE — PLAN OF CARE
Pt alert and oriented. Incont of b and b. Was up in chair today and walked with therapy. Plan of care continued. Call light in reach.

## 2020-02-19 NOTE — PLAN OF CARE
Problem: Physical Therapy Goal  Goal: Physical Therapy Goal  Description  Goals to be met by: 2020    Patient will increase functional independence with mobility by performin. Supine to sit with Modified [place]  2. Sit to supine with Modified [place]  3. Sit to stand transfer with Modified [place]  4. Bed to chair transfer with Supervision using Rolling Walker  5. Gait  x 150 feet with Supervision using Single-point Cane .      Outcome: Ongoing, Progressing

## 2020-02-19 NOTE — PROGRESS NOTES
[facility] Medicine  Progress Note    Patient Name: Nina Portillo  MRN: [id number]  Patient Class: IP- Inpatient   Admission Date: 2/12/2020  Length of Stay: 4 days  Attending Physician: Brinda Arnold MD  Primary Care Provider: Rush Rosa MD        Subjective:     Principal Problem:Gram-negative bacteremia        HPI:  Nina Portillo is a 78 y/o a. female with a past medical history significant for DM, dementia, and HTN who presented to the ED with increased confusion, decreased activity, increased incontinence, frequency and decreased PO intake for the last few days. Daughter reports that these symptoms are normally present when the patient has a UTI. She states she has not fully recovered after last UTI one month ago. The patient denies vomiting, fever, abdominal pain or any other symptoms at this time.  While in the emergency department patient was found to have a UTI and meet sepsis criteria.  She will be admitted to the service of hospital Medicine for further treatment.    Overview/Hospital Course:  Patient was admitted to the hospital medicine service for altered mental status on top of baseline dementia secondary to UTI.  She was started on broad-spectrum IV antibiotics.  Blood cultures as well as urine cultures positive for gram-negative rods.  Gram-negative germán bacteremia secondary to UTI.  Bacteria E coli with pan sensitivity.  PT and OT were consulted and recommended skilled nursing facility placement.  Case management was consulted for skilled nursing facility.  Patient was afebrile.  White blood cell normalized.  She had Brittany treated with IV fluids.   Placed on insulin for better glucose control.    Interval History:  More alert, received ivf x 24 hours/ creatine stable but still elevated   Very flat affect.  Daughter requesting renal ultrasound to   renal cancer    Review of Systems   Unable to perform ROS: Other     Objective:     Vital Signs (Most  Recent):  Temp: 97.6 °F (36.4 °C) (02/18/20 0725)  Pulse: 72 (02/18/20 0725)  Resp: 16 (02/18/20 0725)  BP: (!) 161/77 (02/18/20 0725)  SpO2: 95 % (02/18/20 0725) Vital Signs (24h Range):  Temp:  [96.8 °F (36 °C)-98.4 °F (36.9 °C)] 97.6 °F (36.4 °C)  Pulse:  [61-76] 72  Resp:  [16-18] 16  SpO2:  [90 %-98 %] 95 %  BP: (122-161)/(58-77) 161/77     Weight: 64.9 kg (143 lb 1.3 oz)  Body mass index is 23.81 kg/m².    Intake/Output Summary (Last 24 hours) at 2/18/2020 0739  Last data filed at 2/18/2020 0600  Gross per 24 hour   Intake 1610 ml   Output 300 ml   Net 1310 ml      Physical Exam   Constitutional: She is oriented to person, place, and time. She appears well-developed and well-nourished.   HENT:   Head: Normocephalic and atraumatic.   Nose: Nose normal.   Mouth/Throat: Oropharynx is clear and moist.   Eyes: Conjunctivae are normal.   Cardiovascular: Normal rate, regular rhythm and normal heart sounds.   Pulmonary/Chest: Effort normal and breath sounds normal.   Abdominal: Soft. Bowel sounds are normal.   Musculoskeletal: She exhibits no edema or tenderness.   Neurological: She is alert and oriented to person, place, and time.   Skin: Skin is warm.   Psychiatric: She has a normal mood and affect.   Nursing note and vitals reviewed.      Significant Labs:   BMP:   Recent Labs   Lab 02/18/20  0415 02/18/20  1026   * 228*    139   K 3.9 4.1    109   CO2 21* 24   BUN 18 18   CREATININE 1.9* 2.0*   CALCIUM 9.0 9.4   MG 2.1  --      CBC:   Recent Labs   Lab 02/17/20  0417   WBC 7.65   HGB 12.0   HCT 36.9*          Significant Imaging: I have reviewed and interpreted all pertinent imaging results/findings within the past 24 hours.      Assessment/Plan:      * Gram-negative bacteremia  Secondary to UTI.   Complete Rocephin on 02/19.  Seven day course.  Patient afebrile.    Acute cystitis without hematuria  Urine and blood culture positive for e.coli pan sensitive  F/u repeat blood culture  -neg  On rocephin      KAL (acute kidney injury)  Avoid non-essential nephrotoxins and  dose medications according to GFR  Keep MAP > 65  Avoid aceI, nephrotoxic and nsaids/hopper 2 Monitor I/O, daily weight  Likely 2/2 volume depletion-treat with ivf and trend  Consider  renal us  R/o obstruction -          Sepsis due to Escherichia coli  Sepsis 2/2 uti -e coli bactremia  -repeat blood cultures neg       Weakness  Increased weakness likely 2/2 infection.  Chronic debilitated state reported by daughter.  PT/OT consult.  Skilled nursing facility recommended.  Consult placed.        Moderate malnutrition  Nutrition consulted. Body mass index is 23.81 kg/m².. Encourage maximal PO intake. Diet supplementation ordered per nutrition approval. Will encourage PO and monitor closely for weight changes.        Controlled type 2 diabetes mellitus with diabetic neuropathy, without long-term current use of insulin  Patient's FSGs are not controlled on current hypoglycemics.   Last A1c reviewed-   Lab Results   Component Value Date    HGBA1C 7.6 (H) 01/10/2020     Most recent fingerstick glucose reviewed-   Recent Labs   Lab 02/16/20  1648 02/17/20  1113   POCTGLUCOSE 246* 162*     Current correctional scale  Medium  Increase anti-hyperglycemic dose as follows-   Antihyperglycemics (From admission, onward)    Start     Stop Route Frequency Ordered    02/14/20 2100  insulin detemir U-100 pen 5 Units      -- SubQ Nightly 02/14/20 1305    02/13/20 0816  insulin aspart U-100 pen 1-10 Units      -- SubQ Before meals & nightly PRN 02/13/20 0717              Encephalopathy, metabolic  Acute, resolved due to UTI.      GERD (gastroesophageal reflux disease)  Chronic.  Continue PPI.      Hypothyroid  Patient has chronic hypothyroidism. TFTs reviewed-   Lab Results   Component Value Date    TSH 3.073 02/13/2020   . Will continue chronic levothyroxine and adjust for and clinical changes.      Hypertension associated with diabetes  Chronic,  controlled.  Latest blood pressure and vitals reviewed-   Temp:  [96.8 °F (36 °C)-98 °F (36.7 °C)]   Pulse:  [61-80]   Resp:  [16-18]   BP: (122-161)/(58-92)   SpO2:  [90 %-98 %] .   Home meds for hypertension were reviewed and noted below. Hospital anti-hypertensive changes were made as shown below.  Hypertension Medications             amLODIPine (NORVASC) 10 MG tablet Take 10 mg by mouth once daily.      Hospital Medications             amLODIPine tablet 10 mg 10 mg, Oral, Daily        Will utilize p.r.n. blood pressure medication only if patient's blood pressure greater than  180/110 and she develops symptoms such as worsening chest pain or shortness of breath.          VTE Risk Mitigation (From admission, onward)         Ordered     enoxaparin injection 40 mg  Daily      02/13/20 0105     IP VTE HIGH RISK PATIENT  Once      02/13/20 0105     Place LEOBARDO hose  Until discontinued      02/13/20 0105     Place sequential compression device  Until discontinued      02/13/20 0105                      Brinda Arnold MD  Department of Hospital Medicine   [facility]

## 2020-02-19 NOTE — PLAN OF CARE
Pt is calm and cooperative, pt agrees with plan of care, fall precautions maitnained, pt tolerated po intake well, pure wick in place with no signs of discomfort, tele monitor in place, pt is aaox3, pt has flat affect with hypoactivity, will continue to monitor.

## 2020-02-19 NOTE — PROGRESS NOTES
[facility] Hospital Medicine  Progress Note    Patient Name: Nina Portillo  MRN: [id number]  Patient Class: IP- Inpatient   Admission Date: 2/12/2020  Length of Stay: 4 days  Attending Physician: Brinda Arnold MD  Primary Care Provider: Rush Rosa MD        Subjective:     Principal Problem:Gram-negative bacteremia        HPI:  Nina Portillo is a 80 y/o a. female with a past medical history significant for DM, dementia, and HTN who presented to the ED with increased confusion, decreased activity, increased incontinence, frequency and decreased PO intake for the last few days. Daughter reports that these symptoms are normally present when the patient has a UTI. She states she has not fully recovered after last UTI one month ago. The patient denies vomiting, fever, abdominal pain or any other symptoms at this time.  While in the emergency department patient was found to have a UTI and meet sepsis criteria.  She will be admitted to the service of hospital Medicine for further treatment.    Overview/Hospital Course:  Patient was admitted to the hospital medicine service for altered mental status on top of baseline dementia secondary to UTI.  She was started on broad-spectrum IV antibiotics.  Blood cultures as well as urine cultures positive for gram-negative rods.  Gram-negative germán bacteremia secondary to UTI.  Bacteria E coli with pan sensitivity.  PT and OT were consulted and recommended skilled nursing facility placement.  Case management was consulted for skilled nursing facility.  Patient was afebrile.  White blood cell normalized.  She had Brittany treated with IV fluids.   Placed on insulin for better glucose control.    No new subjective & objective note has been filed under this hospital service since the last note was generated.      Assessment/Plan:      * Gram-negative bacteremia  Secondary to UTI.   Complete Rocephin on 02/19.  Seven day course.  Patient afebrile.    Acute cystitis  without hematuria  Urine and blood culture positive for e.coli pan sensitive  F/u repeat blood culture -neg  On rocephin      KAL (acute kidney injury)  Avoid non-essential nephrotoxins and  dose medications according to GFR  Keep MAP > 65  Avoid aceI, nephrotoxic and nsaids/hopper 2 Monitor I/O, daily weight  Likely 2/2 volume depletion-treat with ivf and trend  Consider  renal us  R/o obstruction -          Sepsis due to Escherichia coli  Sepsis 2/2 uti -e coli bactremia  -repeat blood cultures neg       Weakness  Increased weakness likely 2/2 infection.  Chronic debilitated state reported by daughter.  PT/OT consult.  Skilled nursing facility recommended.  Consult placed.        Moderate malnutrition  Nutrition consulted. Body mass index is 23.81 kg/m².. Encourage maximal PO intake. Diet supplementation ordered per nutrition approval. Will encourage PO and monitor closely for weight changes.        Controlled type 2 diabetes mellitus with diabetic neuropathy, without long-term current use of insulin  Patient's FSGs are not controlled on current hypoglycemics.   Last A1c reviewed-   Lab Results   Component Value Date    HGBA1C 7.6 (H) 01/10/2020     Most recent fingerstick glucose reviewed-   Recent Labs   Lab 02/16/20  1648 02/17/20  1113   POCTGLUCOSE 246* 162*     Current correctional scale  Medium  Increase anti-hyperglycemic dose as follows-   Antihyperglycemics (From admission, onward)    Start     Stop Route Frequency Ordered    02/14/20 2100  insulin detemir U-100 pen 5 Units      -- SubQ Nightly 02/14/20 1305    02/13/20 0816  insulin aspart U-100 pen 1-10 Units      -- SubQ Before meals & nightly PRN 02/13/20 0717              Encephalopathy, metabolic  Acute, resolved due to UTI.      GERD (gastroesophageal reflux disease)  Chronic.  Continue PPI.      Hypothyroid  Patient has chronic hypothyroidism. TFTs reviewed-   Lab Results   Component Value Date    TSH 3.073 02/13/2020   . Will continue chronic  levothyroxine and adjust for and clinical changes.      Hypertension associated with diabetes  Chronic, controlled.  Latest blood pressure and vitals reviewed-   Temp:  [96.8 °F (36 °C)-98 °F (36.7 °C)]   Pulse:  [61-80]   Resp:  [16-18]   BP: (122-161)/(58-92)   SpO2:  [90 %-98 %] .   Home meds for hypertension were reviewed and noted below. Hospital anti-hypertensive changes were made as shown below.  Hypertension Medications             amLODIPine (NORVASC) 10 MG tablet Take 10 mg by mouth once daily.      Hospital Medications             amLODIPine tablet 10 mg 10 mg, Oral, Daily        Will utilize p.r.n. blood pressure medication only if patient's blood pressure greater than  180/110 and she develops symptoms such as worsening chest pain or shortness of breath.          VTE Risk Mitigation (From admission, onward)         Ordered     enoxaparin injection 40 mg  Daily      02/13/20 0105     IP VTE HIGH RISK PATIENT  Once      02/13/20 0105     Place LEOBARDO hose  Until discontinued      02/13/20 0105     Place sequential compression device  Until discontinued      02/13/20 0105                      Brinda Arnold MD  Department of Hospital Medicine   [facility]

## 2020-02-19 NOTE — PLAN OF CARE
Received call from Larissa with [facility]. SNF request has been denied. CM requested peer to peer. Per Larissa, I will have to call post decision peer to peer team at [phone].     I called [facility]'s peer to peer team to schedule peer to peer. Left  requesting peer to peer to be scheduled.        02/19/20 1605   Post-Acute Status   Post-Acute Authorization Placement   Post-Acute Placement Status Insurance Denial

## 2020-02-20 VITALS
DIASTOLIC BLOOD PRESSURE: 72 MMHG | OXYGEN SATURATION: 96 % | HEIGHT: 65 IN | RESPIRATION RATE: 18 BRPM | HEART RATE: 85 BPM | WEIGHT: 145 LBS | BODY MASS INDEX: 24.16 KG/M2 | TEMPERATURE: 97 F | SYSTOLIC BLOOD PRESSURE: 161 MMHG

## 2020-02-20 PROBLEM — A41.51 SEPSIS DUE TO ESCHERICHIA COLI: Status: RESOLVED | Noted: 2019-11-20 | Resolved: 2020-02-20

## 2020-02-20 PROBLEM — R78.81 GRAM-NEGATIVE BACTEREMIA: Status: RESOLVED | Noted: 2020-02-14 | Resolved: 2020-02-20

## 2020-02-20 PROBLEM — G93.41 ENCEPHALOPATHY, METABOLIC: Status: RESOLVED | Noted: 2017-03-23 | Resolved: 2020-02-20

## 2020-02-20 PROBLEM — N30.00 ACUTE CYSTITIS WITHOUT HEMATURIA: Status: RESOLVED | Noted: 2020-02-12 | Resolved: 2020-02-20

## 2020-02-20 LAB
ALBUMIN SERPL BCP-MCNC: 2.7 G/DL (ref 3.5–5.2)
ALBUMIN SERPL BCP-MCNC: 2.7 G/DL (ref 3.5–5.2)
ANION GAP SERPL CALC-SCNC: 13 MMOL/L (ref 8–16)
ANION GAP SERPL CALC-SCNC: 13 MMOL/L (ref 8–16)
BASOPHILS # BLD AUTO: 0.04 K/UL (ref 0–0.2)
BASOPHILS NFR BLD: 0.6 % (ref 0–1.9)
BUN SERPL-MCNC: 17 MG/DL (ref 8–23)
BUN SERPL-MCNC: 17 MG/DL (ref 8–23)
CALCIUM SERPL-MCNC: 9.7 MG/DL (ref 8.7–10.5)
CALCIUM SERPL-MCNC: 9.7 MG/DL (ref 8.7–10.5)
CHLORIDE SERPL-SCNC: 103 MMOL/L (ref 95–110)
CHLORIDE SERPL-SCNC: 103 MMOL/L (ref 95–110)
CO2 SERPL-SCNC: 21 MMOL/L (ref 23–29)
CO2 SERPL-SCNC: 21 MMOL/L (ref 23–29)
CREAT SERPL-MCNC: 1.7 MG/DL (ref 0.5–1.4)
CREAT SERPL-MCNC: 1.7 MG/DL (ref 0.5–1.4)
DIFFERENTIAL METHOD: ABNORMAL
EOSINOPHIL # BLD AUTO: 0.2 K/UL (ref 0–0.5)
EOSINOPHIL NFR BLD: 3.2 % (ref 0–8)
ERYTHROCYTE [DISTWIDTH] IN BLOOD BY AUTOMATED COUNT: 11.8 % (ref 11.5–14.5)
EST. GFR  (AFRICAN AMERICAN): 32 ML/MIN/1.73 M^2
EST. GFR  (AFRICAN AMERICAN): 32 ML/MIN/1.73 M^2
EST. GFR  (NON AFRICAN AMERICAN): 28 ML/MIN/1.73 M^2
EST. GFR  (NON AFRICAN AMERICAN): 28 ML/MIN/1.73 M^2
GLUCOSE SERPL-MCNC: 137 MG/DL (ref 70–110)
GLUCOSE SERPL-MCNC: 137 MG/DL (ref 70–110)
HCT VFR BLD AUTO: 37.6 % (ref 37–48.5)
HGB BLD-MCNC: 12.4 G/DL (ref 12–16)
IMM GRANULOCYTES # BLD AUTO: 0.05 K/UL (ref 0–0.04)
LYMPHOCYTES # BLD AUTO: 1.5 K/UL (ref 1–4.8)
LYMPHOCYTES NFR BLD: 20.5 % (ref 18–48)
MAGNESIUM SERPL-MCNC: 1.9 MG/DL (ref 1.6–2.6)
MCH RBC QN AUTO: 31.3 PG (ref 27–31)
MCHC RBC AUTO-ENTMCNC: 33 G/DL (ref 32–36)
MCV RBC AUTO: 95 FL (ref 82–98)
MONOCYTES # BLD AUTO: 0.7 K/UL (ref 0.3–1)
MONOCYTES NFR BLD: 9.3 % (ref 4–15)
NEUTROPHILS # BLD AUTO: 4.7 K/UL (ref 1.8–7.7)
NEUTROPHILS NFR BLD: 65.7 % (ref 38–73)
NRBC BLD-RTO: 0 /100 WBC
PHOSPHATE SERPL-MCNC: 2.9 MG/DL (ref 2.7–4.5)
PHOSPHATE SERPL-MCNC: 2.9 MG/DL (ref 2.7–4.5)
PLATELET # BLD AUTO: 256 K/UL (ref 150–350)
PMV BLD AUTO: 9.1 FL (ref 9.2–12.9)
POCT GLUCOSE: 136 MG/DL (ref 70–110)
POCT GLUCOSE: 192 MG/DL (ref 70–110)
POCT GLUCOSE: 239 MG/DL (ref 70–110)
POTASSIUM SERPL-SCNC: 3.8 MMOL/L (ref 3.5–5.1)
POTASSIUM SERPL-SCNC: 3.8 MMOL/L (ref 3.5–5.1)
RBC # BLD AUTO: 3.96 M/UL (ref 4–5.4)
SODIUM SERPL-SCNC: 137 MMOL/L (ref 136–145)
SODIUM SERPL-SCNC: 137 MMOL/L (ref 136–145)
WBC # BLD AUTO: 7.21 K/UL (ref 3.9–12.7)

## 2020-02-20 PROCEDURE — 63600175 PHARM REV CODE 636 W HCPCS: Performed by: INTERNAL MEDICINE

## 2020-02-20 PROCEDURE — 80069 RENAL FUNCTION PANEL: CPT

## 2020-02-20 PROCEDURE — 25000003 PHARM REV CODE 250: Performed by: NURSE PRACTITIONER

## 2020-02-20 PROCEDURE — 83735 ASSAY OF MAGNESIUM: CPT

## 2020-02-20 PROCEDURE — 85025 COMPLETE CBC W/AUTO DIFF WBC: CPT

## 2020-02-20 PROCEDURE — 97803 MED NUTRITION INDIV SUBSEQ: CPT

## 2020-02-20 PROCEDURE — 36415 COLL VENOUS BLD VENIPUNCTURE: CPT

## 2020-02-20 RX ORDER — ENOXAPARIN SODIUM 100 MG/ML
30 INJECTION SUBCUTANEOUS EVERY 24 HOURS
Status: DISCONTINUED | OUTPATIENT
Start: 2020-02-20 | End: 2020-02-20 | Stop reason: HOSPADM

## 2020-02-20 RX ADMIN — ENOXAPARIN SODIUM 30 MG: 100 INJECTION SUBCUTANEOUS at 04:02

## 2020-02-20 RX ADMIN — PANTOPRAZOLE SODIUM 40 MG: 40 TABLET, DELAYED RELEASE ORAL at 08:02

## 2020-02-20 RX ADMIN — ASPIRIN 81 MG: 81 TABLET, COATED ORAL at 08:02

## 2020-02-20 RX ADMIN — SODIUM CHLORIDE 500 ML: 0.9 INJECTION, SOLUTION INTRAVENOUS at 10:02

## 2020-02-20 RX ADMIN — LEVOTHYROXINE SODIUM 100 MCG: 100 TABLET ORAL at 05:02

## 2020-02-20 RX ADMIN — AMLODIPINE BESYLATE 10 MG: 5 TABLET ORAL at 08:02

## 2020-02-20 RX ADMIN — INSULIN ASPART 4 UNITS: 100 INJECTION, SOLUTION INTRAVENOUS; SUBCUTANEOUS at 04:02

## 2020-02-20 NOTE — PLAN OF CARE
Intervention: Carbohydrate modified diet, prescription medication-appetite stimulant, and nutrition supplement therapy     Recommendation:   1) Continue with Consistent Carbohydrate diet + Boost glucose control BID, encouraging PO intake   2) Continue appetite stimulant   3) Weigh pt weekly      Goals: PO intakes > 50% of meals and supplements at f/u   Nutrition Goal Status: sometimes meeting-Continues  Communication of RD Recs: (POC, sticky note )

## 2020-02-20 NOTE — PLAN OF CARE
I sent the pts HH orders and HH packet [facility] CM following. Linda Trejo LCSW     I updated Danyelle with N that the pt does not want to resume with [facility].  Linda Trejo LCSW       02/20/20 1045   Post-Acute Status   Post-Acute Authorization Home Health/Hospice   Home Health/Hospice Status Referrals Sent

## 2020-02-20 NOTE — DISCHARGE INSTRUCTIONS
Thank you for choosing [facility] for your medical care. The primary doctor who is taking care of you at the time of your discharge is Amanda Thompson MD.     You were admitted to the hospital with Gram-negative bacteremia.     Please note your discharge instructions, including diet/activity restrictions, follow-up appointments, and medication changes.  If you have any questions about your medical issues, prescriptions, or any other questions, please feel free to contact the [facility] Medicine Dept at [phone] and we will help.    If you are previously with Home health, outpatient PT/OT or under a therapy program, you are cleared to return to those programs.    Please direct all long term medication refills and follow up to your primary care provider, Rush Rosa MD. Thank you again for letting us take care of your health care needs.    Please note the following discharge instructions per your discharging physician-  You will need to continue checking your blood pressure twice a day for a week atleast and blood glucose before meals and at bedtime. Keep a dairy for blood pressure and blood glucose and take it to your primary care doctor

## 2020-02-20 NOTE — PLAN OF CARE
CM spoke with pt's daughter, Soledad. CM updated her of SNF denial and the dc plan of home with home health. Soledad voiced her unhappiness with decision, but voiced understanding. CM asked what pt needs for home for a smooth safe transition. Per Soledad, only equip that could possibly help at home is a hospital bed. Stated they already have walkers and WC. CM asked if she would be interested in [facility] for a NP to do home visit for hospital follow up- Soledad agreed. CM placed nursing order for it. Per Soledad, she is okay with home health but does not want concerned care. Pt disclosure form completed. CM updated Dr. Thompson of above and requested hospital bed. Plan is for pt to discharge today.        02/20/20 1007   Discharge Assessment   Assessment Type Discharge Planning Reassessment

## 2020-02-20 NOTE — SUBJECTIVE & OBJECTIVE
Interval History:  Sitting up in no complaints.  Very flat affect.  Per physical therapy ambulated 80 ft and became dizzy.      Review of Systems   Unable to perform ROS: Other   Constitutional: Negative for chills and fever.   Respiratory: Negative for cough and shortness of breath.    Genitourinary: Negative for difficulty urinating and dysuria.   Neurological: Positive for dizziness and weakness.   Psychiatric/Behavioral: Positive for decreased concentration, dysphoric mood and suicidal ideas.     Objective:     Vital Signs (Most Recent):  Temp: 96.4 °F (35.8 °C) (02/19/20 1923)  Pulse: 93 (02/19/20 1923)  Resp: 17 (02/19/20 1923)  BP: (!) 145/62 (02/19/20 1923)  SpO2: 99 % (02/19/20 1923) Vital Signs (24h Range):  Temp:  [96.4 °F (35.8 °C)-98.5 °F (36.9 °C)] 96.4 °F (35.8 °C)  Pulse:  [71-93] 93  Resp:  [16-18] 17  SpO2:  [95 %-100 %] 99 %  BP: (143-183)/(62-86) 145/62     Weight: 65.4 kg (144 lb 3.2 oz)  Body mass index is 24 kg/m².    Intake/Output Summary (Last 24 hours) at 2/19/2020 1931  Last data filed at 2/19/2020 0550  Gross per 24 hour   Intake 1410 ml   Output 2225 ml   Net -815 ml      Physical Exam   Constitutional: She is oriented to person, place, and time. She appears well-developed and well-nourished.   HENT:   Head: Normocephalic and atraumatic.   Nose: Nose normal.   Mouth/Throat: Oropharynx is clear and moist.   Eyes: Conjunctivae are normal.   Cardiovascular: Normal rate, regular rhythm and normal heart sounds.   Pulmonary/Chest: Effort normal and breath sounds normal.   Abdominal: Soft. Bowel sounds are normal.   Musculoskeletal: She exhibits no edema or tenderness.   Neurological: She is alert and oriented to person, place, and time.   Skin: Skin is warm.   Psychiatric: Her affect is blunt. Her speech is delayed. She is slowed. She exhibits a depressed mood.   Nursing note and vitals reviewed.      Significant Labs:   BMP:   Recent Labs   Lab 02/19/20  0530 02/19/20  0621   * 230*     137   K 3.8 4.0    106   CO2 20* 21*   BUN 19 18   CREATININE 1.8* 1.9*   CALCIUM 9.5 9.4   MG 1.9  --      CBC:   Recent Labs   Lab 02/19/20  0530   WBC 7.78   HGB 12.5   HCT 37.0          Significant Imaging: I have reviewed and interpreted all pertinent imaging results/findings within the past 24 hours.

## 2020-02-20 NOTE — NURSING
Attempted to call pt's daughter to get update on deliver of hospital bed to home. No answer from daughter.

## 2020-02-20 NOTE — PLAN OF CARE
SIOBHAN received hospital bed auth from [facility] with PHN. auth # is [id number]. SIOBHAN updated Gem with [facility]. Per Gem, she will call daughter to set up delivery.        02/20/20 1411   Post-Acute Status   Post-Acute Authorization [facility] Status Pending Delivery Home

## 2020-02-20 NOTE — PLAN OF CARE
CM left  for pt's daughter, Soledad Kahn [phone], requesting call back to discuss discharge plans.        02/20/20 0906   Discharge Assessment   Assessment Type Discharge Planning Reassessment

## 2020-02-20 NOTE — PROGRESS NOTES
[facility] Medicine  Progress Note    Patient Name: Nina Portillo  MRN: [id number]  Patient Class: IP- Inpatient   Admission Date: 2/12/2020  Length of Stay: 5 days  Attending Physician: Brinda Arnold MD  Primary Care Provider: Rush Rosa MD        Subjective:     Principal Problem:Gram-negative bacteremia        HPI:  Nina Portillo is a 78 y/o a. female with a past medical history significant for DM, dementia, and HTN who presented to the ED with increased confusion, decreased activity, increased incontinence, frequency and decreased PO intake for the last few days. Daughter reports that these symptoms are normally present when the patient has a UTI. She states she has not fully recovered after last UTI one month ago. The patient denies vomiting, fever, abdominal pain or any other symptoms at this time.  While in the emergency department patient was found to have a UTI and meet sepsis criteria.  She will be admitted to the service of hospital Medicine for further treatment.    Overview/Hospital Course:  Patient was admitted to the hospital medicine service for altered mental status on top of baseline dementia secondary to UTI.  She was started on broad-spectrum IV antibiotics.  Blood cultures as well as urine cultures positive for gram-negative rods.  Gram-negative germán bacteremia secondary to UTI.  Bacteria E coli with pan sensitivity.  PT and OT were consulted and recommended skilled nursing facility placement.  Case management was consulted for skilled nursing facility.  Patient was afebrile.  White blood cell normalized.  She had Brittany treated with IV fluids.   Placed on insulin for better glucose control.    Interval History:  Sitting up in no complaints.  Very flat affect.  Per physical therapy ambulated 80 ft and became dizzy.      Review of Systems   Unable to perform ROS: Other   Constitutional: Negative for chills and fever.   Respiratory: Negative for cough and  shortness of breath.    Genitourinary: Negative for difficulty urinating and dysuria.   Neurological: Positive for dizziness and weakness.   Psychiatric/Behavioral: Positive for decreased concentration, dysphoric mood and suicidal ideas.     Objective:     Vital Signs (Most Recent):  Temp: 96.4 °F (35.8 °C) (02/19/20 1923)  Pulse: 93 (02/19/20 1923)  Resp: 17 (02/19/20 1923)  BP: (!) 145/62 (02/19/20 1923)  SpO2: 99 % (02/19/20 1923) Vital Signs (24h Range):  Temp:  [96.4 °F (35.8 °C)-98.5 °F (36.9 °C)] 96.4 °F (35.8 °C)  Pulse:  [71-93] 93  Resp:  [16-18] 17  SpO2:  [95 %-100 %] 99 %  BP: (143-183)/(62-86) 145/62     Weight: 65.4 kg (144 lb 3.2 oz)  Body mass index is 24 kg/m².    Intake/Output Summary (Last 24 hours) at 2/19/2020 1931  Last data filed at 2/19/2020 0550  Gross per 24 hour   Intake 1410 ml   Output 2225 ml   Net -815 ml      Physical Exam   Constitutional: She is oriented to person, place, and time. She appears well-developed and well-nourished.   HENT:   Head: Normocephalic and atraumatic.   Nose: Nose normal.   Mouth/Throat: Oropharynx is clear and moist.   Eyes: Conjunctivae are normal.   Cardiovascular: Normal rate, regular rhythm and normal heart sounds.   Pulmonary/Chest: Effort normal and breath sounds normal.   Abdominal: Soft. Bowel sounds are normal.   Musculoskeletal: She exhibits no edema or tenderness.   Neurological: She is alert and oriented to person, place, and time.   Skin: Skin is warm.   Psychiatric: Her affect is blunt. Her speech is delayed. She is slowed. She exhibits a depressed mood.   Nursing note and vitals reviewed.      Significant Labs:   BMP:   Recent Labs   Lab 02/19/20  0530 02/19/20  0621   * 230*    137   K 3.8 4.0    106   CO2 20* 21*   BUN 19 18   CREATININE 1.8* 1.9*   CALCIUM 9.5 9.4   MG 1.9  --      CBC:   Recent Labs   Lab 02/19/20  0530   WBC 7.78   HGB 12.5   HCT 37.0          Significant Imaging: I have reviewed and interpreted  all pertinent imaging results/findings within the past 24 hours.      Assessment/Plan:      * Gram-negative bacteremia  Secondary to UTI.   Complete Rocephin on 02/19.  Seven day course.  P-see above     Acute cystitis without hematuria  Urine and blood culture positive for e.coli pan sensitive  F/u repeat blood culture -neg  On rocephin      KAL (acute kidney injury)  Acute 2/2 ATN-creat peaked at 2 -now trending down  Avoid non-essential nephrotoxins and  dose medications according to GFR  Keep MAP > 65  Avoid aceI, nephrotoxic and nsaids/hopper 2 Monitor I/O, daily weight    renal us  R/o obstruction -/hx renal ca -wnl          Sepsis due to Escherichia coli  Sepsis 2/2 uti -e coli bactremia-treated x 7 days iv rocephin in house  -repeat blood cultures neg       Weakness  Increased weakness likely 2/2 infection.  Chronic debilitated state reported by daughter.  PT/OT consult.  Skilled nursing facility recommended.  Consult placed.        Moderate malnutrition  Nutrition consulted. Body mass index is 23.81 kg/m².. Encourage maximal PO intake. Diet supplementation ordered per nutrition approval. Will encourage PO and monitor closely for weight changes.        Controlled type 2 diabetes mellitus with diabetic neuropathy, without long-term current use of insulin  Patient's FSGs are not controlled on current hypoglycemics.   Last A1c reviewed-   Lab Results   Component Value Date    HGBA1C 7.6 (H) 01/10/2020     Most recent fingerstick glucose reviewed-   Recent Labs   Lab 02/16/20  1648 02/17/20  1113   POCTGLUCOSE 246* 162*     Current correctional scale  Medium  Increase anti-hyperglycemic dose as follows-   Antihyperglycemics (From admission, onward)    Start     Stop Route Frequency Ordered    02/14/20 2100  insulin detemir U-100 pen 5 Units      -- SubQ Nightly 02/14/20 1305    02/13/20 0816  insulin aspart U-100 pen 1-10 Units      -- SubQ Before meals & nightly PRN 02/13/20 0717              Encephalopathy,  metabolic  Acute, resolved due to UTI.-flat affect --likely 2/2 depression      GERD (gastroesophageal reflux disease)  Chronic.  Continue PPI.      Hypothyroid  Patient has chronic hypothyroidism. TFTs reviewed-   Lab Results   Component Value Date    TSH 3.073 02/13/2020   . Will continue chronic levothyroxine and adjust for and clinical changes.      Hypertension associated with diabetes  Chronic, controlled.  Latest blood pressure and vitals reviewed-   Temp:  [96.4 °F (35.8 °C)-98.5 °F (36.9 °C)]   Pulse:  [71-93]   Resp:  [16-18]   BP: (143-183)/(62-86)   SpO2:  [95 %-100 %] .   Home meds for hypertension were reviewed and noted below. Hospital anti-hypertensive changes were made as shown below.  Hypertension Medications             amLODIPine (NORVASC) 10 MG tablet Take 10 mg by mouth once daily.      Hospital Medications             amLODIPine tablet 10 mg 10 mg, Oral, Daily        Will utilize p.r.n. blood pressure medication only if patient's blood pressure greater than  180/110 and she develops symptoms such as worsening chest pain or shortness of breath.  Trending up-prn hydralazine and dc ivf now that samia improving         VTE Risk Mitigation (From admission, onward)         Ordered     enoxaparin injection 40 mg  Daily      02/13/20 0105     IP VTE HIGH RISK PATIENT  Once      02/13/20 0105     Place LEOBARDO hose  Until discontinued      02/13/20 0105     Place sequential compression device  Until discontinued      02/13/20 0105             Discussed with daughter-per PT notes recommend skilled   Later in day -insurance denied-  Await peer to peer-?consider keren psych          Brinda Arnold MD  Department of Hospital Medicine   [facility]

## 2020-02-20 NOTE — PLAN OF CARE
Per Gem with [facility], request sent to [facility] for hospital bed auth.        02/20/20 1111   Post-Acute Status   Post-Acute Authorization HME   Post-Acute Placement Status Pending Payor Medical Review

## 2020-02-20 NOTE — PROGRESS NOTES
[facility]  Adult Nutrition  Progress Note    SUMMARY   Intervention: Carbohydrate modified diet, prescription medication-appetite stimulant, and nutrition supplement therapy     Recommendation:   1) Continue with Consistent Carbohydrate diet + Boost glucose control BID, encouraging PO intake   2) Continue appetite stimulant   3) Weigh pt weekly      Goals: PO intakes > 50% of meals and supplements at f/u   Nutrition Goal Status: sometimes meeting-Continues  Communication of RD Recs: (POC, sticky note )     1. Acute cystitis without hematuria    2. Confusion    3. Gram-negative bacteremia            Past Medical History:   Diagnosis Date    Alzheimer disease 2012    Cancer       renal cell. right    Dementia      Diabetes mellitus      GERD (gastroesophageal reflux disease)      Hyperlipidemia      Hypothyroid      Memory loss      Movement disorder           Reason for Assessment  Reason For Assessment: RD follow up   Rounds: attended    General Information Comments: 78 y/o female with Alzheimers in with AMS and cystitis. Noted to have had a poor appetite in the past few days. Per pt usually eats 2 meals + 2-3 ice cream shakes daily (? reliablility of stated history). Not counting carbs, glucose elevated. Left diabetic instructions for care givers. Wt gain per chart review. Agreeable to Boost, on appetite stimualnt. NFPE done 2/14/20, mod-mild wasting seen.  2/18/20: appetite seems to be improving. PO intake ~50% average last 3 days. Boost glucose control ordered BID. Discharge plan for SNF.   2/20/20 Appetite slightly improved drinking 2 shakes daily. Ate toast and coffee at breakfast and 1  + garlic toast from [employer] last night and  Sprite that family brought in ( ~ 350 kcal). Discouraged foods from outside, but pt leaving today. Denies GI symptoms.      Nutrition Discharge Planning: to SNF:  DM diet 3-4 carb servings/meal + boost glucose control 2-3 x  "daily     Nutrition Risk Screen     Nutrition Risk Screen: no indicators present     Nutrition/Diet History     Patient Reported Diet/Restrictions/Preferences: regular- states she eats a lot of sugary foods  Spiritual, Cultural Beliefs, [employer] Practices, Values that Affect Care: no  Food Allergies: NKFA  Factors Affecting Nutritional Intake: decreased appetite, dislike of foods     Anthropometrics     Height Method: Measured(office 1/15/19)  Height: 5' 5"  Height (inches): 65 in  Weight Method: Bed Scale  Weight: 65.8 kg 20  Weight (lb): 145 lb ( wt gain)  Ideal Body Weight (IBW), Female: 125 lb  % Ideal Body Weight, Female (lb): 106.18 lb  BMI (Calculated): 24 kg/m2  BMI Grade: WNL  Weight Loss: intentional  Usual Body Weight (UBW), k kg(1/15/19), 61 kg 1/10/20, 64.9 kg (20)     Lab/Procedures/Meds     Pertinent Labs Reviewed: reviewed  BMP  Lab Results   Component Value Date     2020     2020    K 3.8 2020    K 3.8 2020     2020     2020    CO2 21 (L) 2020    CO2 21 (L) 2020    BUN 17 2020    BUN 17 2020    CREATININE 1.7 (H) 2020    CREATININE 1.7 (H) 2020    CALCIUM 9.7 2020    CALCIUM 9.7 2020    ANIONGAP 13 2020    ANIONGAP 13 2020    ESTGFRAFRICA 32 (A) 2020    ESTGFRAFRICA 32 (A) 2020    EGFRNONAA 28 (A) 2020    EGFRNONAA 28 (A) 2020     POCT Glucose   Date Value Ref Range Status   2020 192 (H) 70 - 110 mg/dL Final   2020 136 (H) 70 - 110 mg/dL Final   2020 232 (H) 70 - 110 mg/dL Final   2020 154 (H) 70 - 110 mg/dL Final   2020 151 (H) 70 - 110 mg/dL Final   2020 145 (H) 70 - 110 mg/dL Final   2020 153 (H) 70 - 110 mg/dL Final   2020 168 (H) 70 - 110 mg/dL Final   2020 282 (H) 70 - 110 mg/dL Final   2020 236 (H) 70 - 110 mg/dL Final   2020 164 (H) 70 - 110 mg/dL Final     No results " found for: IRON, TIBC, FERRITIN, SATURATEDIRO  Lab Results   Component Value Date    ALBUMIN 2.7 (L) 02/20/2020    ALBUMIN 2.7 (L) 02/20/2020      Pertinent Medications Reviewed: reviewed  Insulin, mirtazapine, zofran, KCl    Estimated/Assessed Needs   admission  Weight Used For Calorie Calculations: 64.9 kg  Energy Calorie Requirements (kcal): [place] St Jeor ( x 1.3-1.4 wt maintenance ) = 8090-5498 kcal  Energy Need Method: [place]-St Jeor  Protein Requirements: 1.2-1.3 g protein/kg ( wasting/age) = 75-81 g protein  Weight Used For Protein Calculations: 63 kg  Fluid Requirements (mL): 1500 ml or per MD RDA  CHO Requirement: 160-178 g        Nutrition Prescription Ordered     Current Diet Order: Consistent Carbohydrate 3-4 carb servings per meal  Boost glucose control BID      Evaluation of Received Nutrient/Fluid Intake     Energy Calories Required: sometimes meeting needs  Protein Required: sometimes  meeting needs  Fluid Required: meeting needs  Tolerance: tolerating  % Intake of Estimated Energy Needs: 50-75%  % Meal Intake: 25-50% + boost 2 x daily     Nutrition Risk     Level of Risk/Frequency of Follow-up: moderate 2 x weekly      Assessment and Plan   Moderate malnutrition  Moderate malnutrition  Contributing Nutrition Diagnosis  Moderate acute illness related malnutrition     Related to (etiology):   Decreased appetite     Signs and Symptoms (as evidenced by):   1) PO intakes < 50% x 5 days  2) mild-moderate muscle/fat wasting as charted jabari     Interventions/Recommendations (treatment strategy):  Above     Nutrition Diagnosis Status:   continues      Controlled type 2 diabetes mellitus with diabetic neuropathy, without long-term current use of insulin  Contributing Nutrition Diagnosis  Altered nutrition related lab values    Related to (etiology):   Inconsistent carbohydrate intakes ( skips meals)    Signs and Symptoms (as evidenced by):   Elevated glucose and A1C    Interventions/Recommendations  (treatment strategy):  above    Nutrition Diagnosis Status:   continues      Monitor and Evaluation     Food and Nutrient Intake: energy intake, food and beverage intake  Food and Nutrient Adminstration: diet order  Anthropometric Measurements: weight  Biochemical Data, Medical Tests and Procedures: electrolyte and renal panel, glucose/endocrine profile  Nutrition-Focused Physical Findings: overall appearance      Malnutrition Assessment  Malnutrition Type: acute illness or injury  Skin (Micronutrient): (Luis = 16)  Teeth (Micronutrient): (denies chewing trouble)   Micronutrient Evaluation: suspected deficiency  Micronutrient Evaluation Comments: K, Phos, check iron  Energy Intake (Malnutrition): less than or equal to 50% for greater than or equal to 5 days   Orbital Region (Subcutaneous Fat Loss): moderate depletion  Upper Arm Region (Subcutaneous Fat Loss): mild depletion  Thoracic and Lumbar Region: mild depletion   [place] Region (Muscle Loss): moderate depletion  Clavicle Bone Region (Muscle Loss): moderate depletion  Clavicle and Acromion Bone Region (Muscle Loss): moderate depletion  Scapular Bone Region (Muscle Loss): mild depletion  Dorsal Hand (Muscle Loss): mild depletion  Patellar Region (Muscle Loss): mild depletion  Anterior Thigh Region (Muscle Loss): mild depletion  Posterior Calf Region (Muscle Loss): mild depletion   Edema (Fluid Accumulation): note noted  Subcutaneous Fat Loss (Final Summary): moderate protein-calorie malnutrition  Muscle Loss Evaluation (Final Summary): moderate protein-calorie malnutrition       Nutrition Follow-Up     RD Follow-up?: Yes

## 2020-02-20 NOTE — ASSESSMENT & PLAN NOTE
Acute 2/2 ATN-creat peaked at 2 -now trending down  Avoid non-essential nephrotoxins and  dose medications according to GFR  Keep MAP > 65  Avoid aceI, nephrotoxic and nsaids/hopper 2 Monitor I/O, daily weight    renal us  R/o obstruction -/hx renal ca -wnl

## 2020-02-20 NOTE — PLAN OF CARE
CM received call from Binh Joyce with [facility] peer to peer team ([phone]). Per Binh, the peer to peer discussion will not change a decision will just give reasoning for denial. Stated case has already been closed and only option left would be appeal. CM asked why a peer to peer wasn't done prior to decision being made- per Binh, when case was with their outside reviewer and a peer to peer was attempted our provider did not answer, so review moved forward with their decision. CM asked for denial letter to be faxed to me at [phone]       02/20/20 6240   Post-Acute Status   Post-Acute Authorization Placement   Post-Acute Placement Status Insurance Denial

## 2020-02-20 NOTE — PROGRESS NOTES
The enoxaparin dose has been adjusted for Nina Portillo [id number] according to the pharmacy practice protocol.      Based on the patient's Estimated Creatinine Clearance: 23.4 mL/min (A) (based on SCr of 1.7 mg/dL (H))., Body mass index is 24.13 kg/m²., and weight= 65.8 kg (145 lb), the enoxaparin dose has been adjusted 30 mg every 24 hours for CrCl <30.    Thank you,  Apollo Hyman

## 2020-02-20 NOTE — ASSESSMENT & PLAN NOTE
Sepsis 2/2 uti -e coli bactremia-treated x 7 days iv rocephin in house  -repeat blood cultures neg

## 2020-02-20 NOTE — PLAN OF CARE
02/20/20 1405   Final Note   Assessment Type Final Discharge Note   Anticipated Discharge Disposition Home-Health

## 2020-02-20 NOTE — ASSESSMENT & PLAN NOTE
Chronic, controlled.  Latest blood pressure and vitals reviewed-   Temp:  [96.4 °F (35.8 °C)-98.5 °F (36.9 °C)]   Pulse:  [71-93]   Resp:  [16-18]   BP: (143-183)/(62-86)   SpO2:  [95 %-100 %] .   Home meds for hypertension were reviewed and noted below. Hospital anti-hypertensive changes were made as shown below.  Hypertension Medications             amLODIPine (NORVASC) 10 MG tablet Take 10 mg by mouth once daily.      Hospital Medications             amLODIPine tablet 10 mg 10 mg, Oral, Daily        Will utilize p.r.n. blood pressure medication only if patient's blood pressure greater than  180/110 and she develops symptoms such as worsening chest pain or shortness of breath.  Trending up-prn hydralazine and dc ivf now that samia improving

## 2020-02-20 NOTE — PLAN OF CARE
Pt is calm and cooperative, pt activity still hypoactive but more active erbaly in  her care through shift, pt is aaox3, pt agrees with plan of  care, pt did not rest well she says, tele monitor in place, pure wick in place and changed, pt voided as needed, po intake tolerated well, family brought pt dinner which she ate most of, fall precautions maintained, will continue to monitor.

## 2020-02-20 NOTE — DISCHARGE SUMMARY
[facility]  Discharge Summary      Patient Name: Nina Portillo  MRN: [id number]  Admission Date: 2/12/2020  Hospital Length of Stay: 6 days  Discharge Date and Time:  02/20/2020 10:41 AM  Attending Physician: Amanda Thompson MD   Discharging Provider: Amanda Thompson MD  Primary Care Provider: Rush Rosa MD      HPI:   Nina Portillo is a 80 y/o a. female with a past medical history significant for DM, dementia, and HTN who presented to the ED with increased confusion, decreased activity, increased incontinence, frequency and decreased PO intake for the last few days. Daughter reports that these symptoms are normally present when the patient has a UTI. She states she has not fully recovered after last UTI one month ago. The patient denies vomiting, fever, abdominal pain or any other symptoms at this time.  While in the emergency department patient was found to have a UTI and meet sepsis criteria.  She will be admitted to the service of hospital Medicine for further treatment.    * No surgery found *      Hospital Course:   Patient was admitted to the hospital medicine service for altered mental status on top of baseline dementia secondary to UTI.  She was started on broad-spectrum IV antibiotics.  Blood cultures as well as urine cultures positive for gram-negative rods.  Gram-negative germán bacteremia secondary to UTI.  Bacteria E coli with pan sensitivity.  PT and OT were consulted and recommended skilled nursing facility placement.  Case management was consulted for skilled nursing facility patient did not get approved for transfer to skilled nursing facility because she was close to baseline.  Patient was afebrile.  White blood cell normalized.  She had KAL improved with IV fluids.  Home health with hospital bed has been ordered with repeat labs in a week's time.  Placed on insulin for better glucose control.  Patient medically cleared for discharge home with  home health.      Consults:   Consults (From admission, onward)        Status Ordering Provider     Case Management/  Once     Provider:  (Not yet assigned)    Completed CHAYA KOTHARI     Inpatient consult to Registered Dietitian/Nutritionist  Once     Provider:  (Not yet assigned)    Completed CHAYA KOTHARI     Inpatient consult to Social Work/Case Management  Once     Provider:  (Not yet assigned)    Completed MISA VINSON          No new Assessment & Plan notes have been filed under this hospital service since the last note was generated.  Service: Hospital Medicine    Final Active Diagnoses:    Diagnosis Date Noted POA    KAL (acute kidney injury) [N17.9] 11/20/2019 Yes    Weakness [R53.1] 11/19/2019 Yes    Moderate malnutrition [E44.0] 02/18/2019 Yes    Controlled type 2 diabetes mellitus with diabetic neuropathy, without long-term current use of insulin [E11.40] 02/16/2019 Yes    GERD (gastroesophageal reflux disease) [K21.9] 05/31/2012 Yes    Hypertension associated with diabetes [E11.59, I10] 05/31/2012 Yes    Hypothyroid [E03.9] 05/31/2012 Yes      Problems Resolved During this Admission:    Diagnosis Date Noted Date Resolved POA    PRINCIPAL PROBLEM:  Gram-negative bacteremia [R78.81] 02/14/2020 02/20/2020 Yes    Acute cystitis without hematuria [N30.00] 02/12/2020 02/20/2020 Yes    Sepsis due to Escherichia coli [A41.51] 11/20/2019 02/20/2020 Yes    Encephalopathy, metabolic [G93.41] 03/23/2017 02/20/2020 Yes       Discharged Condition: stable    Disposition: Home or Self Care    Follow Up:  Follow-up Information     [facility].    Specialties:  Nursing Home Agency, SNF Agency  Why:  Nursing Home, SNF  Contact information:  [address] [address]  [phone]             Rush Rosa MD In 1 week.    Specialties:  Family Medicine, Internal Medicine  Contact information:  [address] [address]  [phone]        "          Patient Instructions:      HOSPITAL BED FOR HOME USE     Order Specific Question Answer Comments   Type: Semi-electric    Length of need (1-99 months): 99    Does patient have medical equipment at home? cane, quad    Does patient have medical equipment at home? shower chair    Does patient have medical equipment at home? wheelchair    Does patient have medical equipment at home? walker, rolling    Height: 5' 5" (1.651 m)    Weight: 65.8 kg (145 lb)    Please check all that apply: Patient requires a bed height different than a fixed height hospital bed to permit transfers to chair, wheelchair, or standing.    Please check all that apply: Patient requires positioning of the body in ways not feasible in an ordinary bed due to a medical condition which is expected to last at least one month.      Ambulatory referral/consult to [facility]   Standing Status: Future   Referral Priority: Routine Referral Type: Consultation   Referral Reason: Specialty Services Required   Number of Visits Requested: 1     Referral to Home health   Referral Priority: Routine Referral Type: Home Health   Referral Reason: Specialty Services Required   Requested Specialty: Home Health Services   Number of Visits Requested: 1     Notify your health care provider if you experience any of the following:  temperature >100.4     Notify your health care provider if you experience any of the following:  severe uncontrolled pain     Activity as tolerated       Significant Diagnostic Studies: Labs:   BMP:   Recent Labs   Lab 02/19/20  0530 02/19/20  0621 02/20/20  0523   * 230* 137*  137*    137 137  137   K 3.8 4.0 3.8  3.8    106 103  103   CO2 20* 21* 21*  21*   BUN 19 18 17  17   CREATININE 1.8* 1.9* 1.7*  1.7*   CALCIUM 9.5 9.4 9.7  9.7   MG 1.9  --  1.9    and CBC   Recent Labs   Lab 02/19/20  0530 02/20/20  0523   WBC 7.78 7.21   HGB 12.5 12.4   HCT 37.0 37.6    256     Microbiology: "   Blood Culture   Lab Results   Component Value Date    LABBLOO No growth after 5 days. 02/14/2020    and Urine Culture    Lab Results   Component Value Date    LABURIN ESCHERICHIA COLI  >100,000 cfu/ml   (A) 02/12/2020       Pending Diagnostic Studies:     None         Medications:  Reconciled Home Medications:      Medication List      START taking these medications    insulin detemir U-100 100 unit/mL (3 mL) Inpn pen  Commonly known as:  LEVEMIR FLEXTOUCH  Inject 4 Units into the skin every evening.        CHANGE how you take these medications    levothyroxine 100 MCG tablet  Commonly known as:  SYNTHROID  1 tab po qd  What changed:    · how much to take  · how to take this  · when to take this  · additional instructions     metFORMIN 500 MG XR 24hr tablet  Commonly known as:  GLUCOPHAGE-XR  Take 1 tablet (500 mg total) by mouth after dinner.  What changed:  how much to take        CONTINUE taking these medications    amLODIPine 10 MG tablet  Commonly known as:  NORVASC  Take 10 mg by mouth once daily.     aspirin 81 MG EC tablet  Commonly known as:  ECOTRIN  Take 81 mg by mouth once daily.     mirtazapine 15 MG tablet  Commonly known as:  REMERON  Take 1 tablet (15 mg total) by mouth every evening.     pantoprazole 40 MG tablet  Commonly known as:  PROTONIX  Take 1 tablet (40 mg total) by mouth once daily.            Indwelling Lines/Drains at time of discharge:   Lines/Drains/Airways     Drain            Female External Urinary Catheter 02/19/20 1955 less than 1 day                Time spent on the discharge of patient: 60 minutes  Patient was seen and examined on the date of discharge and determined to be suitable for discharge.         Amanda Thompson MD  Department of Hospital Medicine  [facility]

## 2020-02-20 NOTE — PLAN OF CARE
Per Gem with [facility], still waiting on [facility] for hospital bed.        02/20/20 1300   Post-Acute Status   Post-Acute Authorization HME   HME Status Pending Medical Review

## 2020-02-20 NOTE — PLAN OF CARE
Per Isabel at [facility] they have set the pt up with [facility].   Sent [facility] referral via [facility]......LAURA Jimenez CM      02/20/20 1236   Post-Acute Status   Post-Acute Authorization Home Health/Hospice   Post-Acute Placement Status Set-up Complete     1404- Spoke with Kianna at [facility], she confirmed that they received the referral and can accept the pt....LAURA Jimenez CM

## 2020-02-20 NOTE — PT/OT/SLP PROGRESS
Physical Therapy      Patient Name:  Nina Portillo   MRN:  [id number]    Patient not seen today secondary to patient refused at 9:25.      Vazquez Hinds, PT

## 2020-02-20 NOTE — PLAN OF CARE
CM left another  for [facility] peer to peer team ([phone]). Updating them of provider change and still requesting peer to peer for SNF denial.      02/20/20 0904   Post-Acute Status   Post-Acute Authorization Placement   Post-Acute Placement Status Insurance Denial

## 2020-02-21 ENCOUNTER — TELEPHONE (OUTPATIENT)
Dept: MEDSURG UNIT | Facility: HOSPITAL | Age: 80
End: 2020-02-21

## 2020-02-21 NOTE — NURSING
D/C instructions provided and explained to pt and pt's daughter, understanding of D/C instructions verbalized. IV removed, catheter intact. Tolerated well. Telemetry monitor removed and returned to monitor room. VSS. Pt denies complaints. Transported off unit via wheelchair.

## 2020-03-26 ENCOUNTER — OFFICE VISIT (OUTPATIENT)
Dept: FAMILY MEDICINE | Facility: CLINIC | Age: 80
End: 2020-03-26
Payer: MEDICARE

## 2020-03-26 DIAGNOSIS — R30.0 DYSURIA: Primary | ICD-10-CM

## 2020-03-26 PROCEDURE — 99213 OFFICE O/P EST LOW 20 MIN: CPT | Mod: 95,,, | Performed by: FAMILY MEDICINE

## 2020-03-26 PROCEDURE — 1159F PR MEDICATION LIST DOCUMENTED IN MEDICAL RECORD: ICD-10-PCS | Mod: 95,,, | Performed by: FAMILY MEDICINE

## 2020-03-26 PROCEDURE — 1101F PR PT FALLS ASSESS DOC 0-1 FALLS W/OUT INJ PAST YR: ICD-10-PCS | Mod: CPTII,95,, | Performed by: FAMILY MEDICINE

## 2020-03-26 PROCEDURE — 99213 PR OFFICE/OUTPT VISIT, EST, LEVL III, 20-29 MIN: ICD-10-PCS | Mod: 95,,, | Performed by: FAMILY MEDICINE

## 2020-03-26 PROCEDURE — 1101F PT FALLS ASSESS-DOCD LE1/YR: CPT | Mod: CPTII,95,, | Performed by: FAMILY MEDICINE

## 2020-03-26 PROCEDURE — 1159F MED LIST DOCD IN RCRD: CPT | Mod: 95,,, | Performed by: FAMILY MEDICINE

## 2020-03-26 RX ORDER — CEPHALEXIN 500 MG/1
500 CAPSULE ORAL EVERY 12 HOURS
Qty: 14 CAPSULE | Refills: 0 | Status: SHIPPED | OUTPATIENT
Start: 2020-03-26 | End: 2020-04-02

## 2020-03-26 NOTE — PROGRESS NOTES
Subjective:       Patient ID: Nina Portillo is a 79 y.o. female.    Chief Complaint: Follow-up    HPI    Visit completed through telemedicine. Daughter is with patient at her home.     Daughter has been concerned about the smell of patient's urine. Started this week. Urinary incontinence has increased. Denies any fever or chills. This is usually what happens when she has a UTI. Would bring her in but is afraid of the pandemic.     Recorded vitals at home and results were below.   Temp: 98.1  Pulse: 60  BP: 140/80        Past Medical History:   Diagnosis Date    Alzheimer disease 2012    Cancer     renal cell. right    Dementia     Diabetes mellitus     GERD (gastroesophageal reflux disease)     Hyperlipidemia     Hypothyroid     Memory loss     Movement disorder        Past Surgical History:   Procedure Laterality Date    HYSTERECTOMY      JOINT REPLACEMENT      right    KNEE ARTHROSCOPY W/ DEBRIDEMENT      left    PARTIAL NEPHRECTOMY      right.     TONSILLECTOMY         Family History   Problem Relation Age of Onset    Cancer Mother 90        breast       Social History     Tobacco Use    Smoking status: Never Smoker    Smokeless tobacco: Never Used   Substance Use Topics    Alcohol use: No    Drug use: No       Social History     Substance and Sexual Activity   Sexual Activity Never          Current Outpatient Medications:     amLODIPine (NORVASC) 10 MG tablet, Take 10 mg by mouth once daily., Disp: , Rfl:     aspirin (ECOTRIN) 81 MG EC tablet, Take 81 mg by mouth once daily. , Disp: , Rfl:     cephALEXin (KEFLEX) 500 MG capsule, Take 1 capsule (500 mg total) by mouth every 12 (twelve) hours. for 7 days, Disp: 14 capsule, Rfl: 0    insulin detemir U-100 (LEVEMIR FLEXTOUCH) 100 unit/mL (3 mL) SubQ InPn pen, Inject 4 Units into the skin every evening., Disp: , Rfl: 0    levothyroxine (SYNTHROID) 100 MCG tablet, 1 tab po qd (Patient taking differently: Take 100 mcg by mouth before  breakfast. ), Disp: 90 tablet, Rfl: 3    metFORMIN (GLUCOPHAGE-XR) 500 MG XR 24hr tablet, Take 1 tablet (500 mg total) by mouth after dinner., Disp: 180 tablet, Rfl: 3    mirtazapine (REMERON) 15 MG tablet, Take 1 tablet (15 mg total) by mouth every evening., Disp: 30 tablet, Rfl: 11    pantoprazole (PROTONIX) 40 MG tablet, Take 1 tablet (40 mg total) by mouth once daily., Disp: 90 tablet, Rfl: 3     Review of patient's allergies indicates:   Allergen Reactions    Phenergan [promethazine] Other (See Comments)     hallucinations    Prilosec [omeprazole magnesium] Hives and Rash            Review of Systems   Unable to perform ROS: Dementia           Objective:          There were no vitals filed for this visit.    Physical Exam   Constitutional: She appears well-developed and well-nourished.   HENT:   Head: Normocephalic and atraumatic.   Eyes: Conjunctivae are normal.   Pulmonary/Chest: Effort normal.   Neurological: She is alert.   Skin: Skin is warm.   Psychiatric: She has a normal mood and affect. Her behavior is normal.   Vitals reviewed.              Assessment/Plan     Diagnoses and all orders for this visit:    Dysuria  -     Asked daughter (who is a nurse) to get a urine sample at home. Urinalysis, Reflex to Urine Culture Urine, Clean Catch; Future  -     cephALEXin (KEFLEX) 500 MG capsule; Take 1 capsule (500 mg total) by mouth every 12 (twelve) hours. for 7 days    Follow up if symptoms worsen or fail to improve.        Yoana Majano MD  [facility]

## 2020-04-08 ENCOUNTER — PATIENT MESSAGE (OUTPATIENT)
Dept: FAMILY MEDICINE | Facility: CLINIC | Age: 80
End: 2020-04-08

## 2020-04-09 DIAGNOSIS — K21.9 GASTROESOPHAGEAL REFLUX DISEASE, ESOPHAGITIS PRESENCE NOT SPECIFIED: ICD-10-CM

## 2020-04-09 DIAGNOSIS — E11.42 CONTROLLED TYPE 2 DIABETES MELLITUS WITH DIABETIC POLYNEUROPATHY, WITHOUT LONG-TERM CURRENT USE OF INSULIN: ICD-10-CM

## 2020-04-09 DIAGNOSIS — E03.9 ACQUIRED HYPOTHYROIDISM: ICD-10-CM

## 2020-04-09 DIAGNOSIS — F51.01 PRIMARY INSOMNIA: ICD-10-CM

## 2020-04-09 RX ORDER — MIRTAZAPINE 15 MG/1
15 TABLET, FILM COATED ORAL NIGHTLY
Qty: 90 TABLET | Refills: 1 | Status: SHIPPED | OUTPATIENT
Start: 2020-04-09 | End: 2021-04-09

## 2020-04-09 RX ORDER — METFORMIN HYDROCHLORIDE 500 MG/1
500 TABLET, EXTENDED RELEASE ORAL
Qty: 180 TABLET | Refills: 3 | OUTPATIENT
Start: 2020-04-09

## 2020-04-09 RX ORDER — AMLODIPINE BESYLATE 10 MG/1
10 TABLET ORAL DAILY
Qty: 90 TABLET | Refills: 1 | Status: SHIPPED | OUTPATIENT
Start: 2020-04-09

## 2020-04-09 RX ORDER — PANTOPRAZOLE SODIUM 40 MG/1
40 TABLET, DELAYED RELEASE ORAL DAILY
Qty: 90 TABLET | Refills: 3 | Status: SHIPPED | OUTPATIENT
Start: 2020-04-09

## 2020-04-09 RX ORDER — LEVOTHYROXINE SODIUM 100 UG/1
100 TABLET ORAL
Qty: 90 TABLET | Refills: 1 | Status: SHIPPED | OUTPATIENT
Start: 2020-04-09

## 2020-05-15 ENCOUNTER — PATIENT MESSAGE (OUTPATIENT)
Dept: FAMILY MEDICINE | Facility: CLINIC | Age: 80
End: 2020-05-15

## 2020-05-15 DIAGNOSIS — E11.42 CONTROLLED TYPE 2 DIABETES MELLITUS WITH DIABETIC POLYNEUROPATHY, WITHOUT LONG-TERM CURRENT USE OF INSULIN: ICD-10-CM

## 2020-05-17 RX ORDER — METFORMIN HYDROCHLORIDE 500 MG/1
500 TABLET, EXTENDED RELEASE ORAL
Qty: 90 TABLET | Refills: 1
Start: 2020-05-17 | End: 2020-05-20 | Stop reason: SDUPTHER

## 2020-05-20 ENCOUNTER — OFFICE VISIT (OUTPATIENT)
Dept: FAMILY MEDICINE | Facility: CLINIC | Age: 80
End: 2020-05-20
Payer: MEDICARE

## 2020-05-20 VITALS
HEIGHT: 65 IN | DIASTOLIC BLOOD PRESSURE: 95 MMHG | SYSTOLIC BLOOD PRESSURE: 155 MMHG | HEART RATE: 95 BPM | BODY MASS INDEX: 24.13 KG/M2

## 2020-05-20 DIAGNOSIS — E11.59 HYPERTENSION COMPLICATING DIABETES: ICD-10-CM

## 2020-05-20 DIAGNOSIS — E11.42 CONTROLLED TYPE 2 DIABETES MELLITUS WITH DIABETIC POLYNEUROPATHY, WITHOUT LONG-TERM CURRENT USE OF INSULIN: ICD-10-CM

## 2020-05-20 DIAGNOSIS — E11.69 HYPERLIPIDEMIA ASSOCIATED WITH TYPE 2 DIABETES MELLITUS: ICD-10-CM

## 2020-05-20 DIAGNOSIS — I15.2 HYPERTENSION COMPLICATING DIABETES: ICD-10-CM

## 2020-05-20 DIAGNOSIS — F02.80 ALZHEIMER'S DEMENTIA WITHOUT BEHAVIORAL DISTURBANCE, UNSPECIFIED TIMING OF DEMENTIA ONSET: ICD-10-CM

## 2020-05-20 DIAGNOSIS — E78.5 HYPERLIPIDEMIA ASSOCIATED WITH TYPE 2 DIABETES MELLITUS: ICD-10-CM

## 2020-05-20 DIAGNOSIS — G30.9 ALZHEIMER'S DEMENTIA WITHOUT BEHAVIORAL DISTURBANCE, UNSPECIFIED TIMING OF DEMENTIA ONSET: ICD-10-CM

## 2020-05-20 PROBLEM — N17.9 AKI (ACUTE KIDNEY INJURY): Status: RESOLVED | Noted: 2019-11-20 | Resolved: 2020-05-20

## 2020-05-20 PROCEDURE — 99214 PR OFFICE/OUTPT VISIT, EST, LEVL IV, 30-39 MIN: ICD-10-PCS | Mod: 95,,, | Performed by: NURSE PRACTITIONER

## 2020-05-20 PROCEDURE — 3051F PR MOST RECENT HEMOGLOBIN A1C LEVEL 7.0 - < 8.0%: ICD-10-PCS | Mod: CPTII,95,, | Performed by: NURSE PRACTITIONER

## 2020-05-20 PROCEDURE — 99214 OFFICE O/P EST MOD 30 MIN: CPT | Mod: 95,,, | Performed by: NURSE PRACTITIONER

## 2020-05-20 PROCEDURE — 3051F HG A1C>EQUAL 7.0%<8.0%: CPT | Mod: CPTII,95,, | Performed by: NURSE PRACTITIONER

## 2020-05-20 RX ORDER — METFORMIN HYDROCHLORIDE 500 MG/1
500 TABLET, EXTENDED RELEASE ORAL
Qty: 90 TABLET | Refills: 3 | Status: SHIPPED | OUTPATIENT
Start: 2020-05-20

## 2020-05-20 RX ORDER — DEXTROSE 4 G
1 TABLET,CHEWABLE ORAL DAILY
Qty: 1 EACH | Refills: 0 | Status: SHIPPED | OUTPATIENT
Start: 2020-05-20 | End: 2021-05-20

## 2020-05-20 RX ORDER — LANCETS 33 GAUGE
1 EACH MISCELLANEOUS DAILY
Qty: 100 EACH | Refills: 3 | Status: SHIPPED | OUTPATIENT
Start: 2020-05-20

## 2020-05-20 NOTE — PATIENT INSTRUCTIONS
If you are asked to answer a survey from [facility], please do so and let them know how I did at your visit today.

## 2020-05-20 NOTE — PROGRESS NOTES
Subjective:       Patient ID: Nina Portillo is a 79 y.o. female.    Chief Complaint: Diabetes and Dementia  The patient location is: Home in [place]  The chief complaint leading to consultation is: Diabetes and dementia    Visit type: audiovisual    Face to Face time with patient: 15   32 minutes of total time spent on the encounter, which includes face to face time and non-face to face time preparing to see the patient (eg, review of tests), Obtaining and/or reviewing separately obtained history, Documenting clinical information in the electronic or other health record, Independently interpreting results (not separately reported) and communicating results to the patient/family/caregiver, or Care coordination (not separately reported).         Each patient to whom he or she provides medical services by telemedicine is:  (1) informed of the relationship between the physician and patient and the respective role of any other health care provider with respect to management of the patient; and (2) notified that he or she may decline to receive medical services by telemedicine and may withdraw from such care at any time.    Notes: Established patient    Diabetes   She presents for her follow-up diabetic visit. She has type 2 diabetes mellitus. Disease course: unknown, last A1C was in January and diabetes was worsening.  There are no hypoglycemic associated symptoms. Pertinent negatives for diabetes include no chest pain. Risk factors for coronary artery disease include diabetes mellitus, dyslipidemia, hypertension, post-menopausal and sedentary lifestyle. Current diabetic treatments: was on metformin once a day, ran out and prescription was sent to the wrong provider, so patient has not been taking anything.  She is compliant with treatment some of the time. Her weight is fluctuating minimally. She is following a generally unhealthy (Patient's appetite is poor, she only likes to pick at her food and eat sweets. )  diet. She never participates in exercise. Home blood sugar record trend: Meter broke, patient has not been checking blood sugar. An ACE inhibitor/angiotensin II receptor blocker is not being taken. Eye exam is not current.   Hypertension   This is a chronic problem. The current episode started more than 1 year ago. The problem has been gradually worsening since onset. The problem is uncontrolled. Pertinent negatives include no chest pain, peripheral edema or shortness of breath. Agents associated with hypertension include thyroid hormones. Risk factors for coronary artery disease include diabetes mellitus, dyslipidemia, post-menopausal state and sedentary lifestyle. Past treatments include calcium channel blockers. The current treatment provides mild improvement. Compliance problems include diet and exercise.  Identifiable causes of hypertension include a thyroid problem.     Patient has dementia, is worsening, needs assistance with all ADLs. Cannot be left alone as she is too confused. Has good and bad days, today is a good day. Is able to state where she is and recognizes her daughter, Soledad, today, but cannot always do that.     Patient's daughter, Soledad, needs forms filled out for work as she is the patient's primary care giver and patient has been ill enough to be hospitalized several times in the past year.   Review of Systems   Respiratory: Negative for shortness of breath.    Cardiovascular: Negative for chest pain.       Objective:     There is no physical exam except for those things that patient could perform under my direction, or that I could see, as this was a virtual visit. All medications were reviewed with the patient. All VS were supplied by the patient's daughter (who is a nurse)    Physical Exam   Constitutional: She is oriented to person, place, and time. She appears well-developed and well-nourished. No distress.   HENT:   Head: Normocephalic and atraumatic.   Pulmonary/Chest: No respiratory  distress.   Neurological: She is alert and oriented to person, place, and time.   Skin: She is not diaphoretic.   Psychiatric: Her behavior is normal.   Affect is very flat when patient is speaking.        Assessment:          This office visit cannot be billed incident to as it does not meet the needed criteria.     1. Type 2 diabetes mellitus, uncontrolled, with renal complications    2. Controlled type 2 diabetes mellitus with diabetic polyneuropathy, without long-term current use of insulin    3. Hyperlipidemia associated with type 2 diabetes mellitus    4. Hypertension complicating diabetes    5. Alzheimer's dementia without behavioral disturbance, unspecified timing of dementia onset        Plan:       Type 2 diabetes mellitus, uncontrolled, with renal complications  -     Comprehensive metabolic panel; Future; Expected date: 05/20/2020  -     Hemoglobin A1C; Future; Expected date: 05/20/2020  -     Microalbumin/creatinine urine ratio; Future; Expected date: 05/20/2020  -     blood sugar diagnostic Strp; 1 each by Misc.(Non-Drug; Combo Route) route once daily.  Dispense: 100 each; Refill: 3  -     lancets 33 gauge Misc; 1 lancet by Misc.(Non-Drug; Combo Route) route once daily.  Dispense: 100 each; Refill: 3  -     blood-glucose meter Misc; 1 kit by Misc.(Non-Drug; Combo Route) route once daily.  Dispense: 1 each; Refill: 0    Controlled type 2 diabetes mellitus with diabetic polyneuropathy, without long-term current use of insulin  -     metFORMIN (GLUCOPHAGE-XR) 500 MG XR 24hr tablet; Take 1 tablet (500 mg total) by mouth after dinner.  Dispense: 90 tablet; Refill: 3    Hyperlipidemia associated with type 2 diabetes mellitus  -     Lipid Panel; Future; Expected date: 05/20/2020    Hypertension complicating diabetes  -     CBC auto differential; Future; Expected date: 05/20/2020  -     TSH; Future; Expected date: 05/20/2020    Alzheimer's dementia without behavioral disturbance, unspecified timing of dementia  onset         All paperwork completed for patient's care giver and faxed per Soledad's request.

## 2020-05-21 ENCOUNTER — DOCUMENT SCAN (OUTPATIENT)
Dept: HOME HEALTH SERVICES | Facility: HOSPITAL | Age: 80
End: 2020-05-21
Payer: MEDICARE

## 2020-07-02 ENCOUNTER — HOSPITAL ENCOUNTER (INPATIENT)
Facility: HOSPITAL | Age: 80
LOS: 5 days | Discharge: HOME HEALTH | DRG: 853 | End: 2020-07-07
Attending: EMERGENCY MEDICINE | Admitting: HOSPITALIST
Payer: MEDICARE

## 2020-07-02 DIAGNOSIS — K85.90 ACUTE PANCREATITIS, UNSPECIFIED COMPLICATION STATUS, UNSPECIFIED PANCREATITIS TYPE: ICD-10-CM

## 2020-07-02 DIAGNOSIS — R41.0 DELIRIUM: ICD-10-CM

## 2020-07-02 DIAGNOSIS — K80.20 CALCULUS OF GALLBLADDER WITHOUT CHOLECYSTITIS: ICD-10-CM

## 2020-07-02 DIAGNOSIS — R74.01 TRANSAMINITIS: ICD-10-CM

## 2020-07-02 DIAGNOSIS — B99.9 INFECTIOUS ENCEPHALOPATHY: ICD-10-CM

## 2020-07-02 DIAGNOSIS — R31.9 URINARY TRACT INFECTION WITH HEMATURIA, SITE UNSPECIFIED: ICD-10-CM

## 2020-07-02 DIAGNOSIS — N39.0 URINARY TRACT INFECTION WITH HEMATURIA, SITE UNSPECIFIED: ICD-10-CM

## 2020-07-02 DIAGNOSIS — A41.9 SEPSIS, DUE TO UNSPECIFIED ORGANISM, UNSPECIFIED WHETHER ACUTE ORGAN DYSFUNCTION PRESENT: Primary | ICD-10-CM

## 2020-07-02 DIAGNOSIS — G93.49 INFECTIOUS ENCEPHALOPATHY: ICD-10-CM

## 2020-07-02 DIAGNOSIS — E86.0 DEHYDRATION: ICD-10-CM

## 2020-07-02 LAB
ALBUMIN SERPL BCP-MCNC: 3.1 G/DL (ref 3.5–5.2)
ALP SERPL-CCNC: 202 U/L (ref 55–135)
ALT SERPL W/O P-5'-P-CCNC: 162 U/L (ref 10–44)
ANION GAP SERPL CALC-SCNC: 11 MMOL/L (ref 8–16)
AST SERPL-CCNC: 204 U/L (ref 10–40)
BACTERIA #/AREA URNS HPF: ABNORMAL /HPF
BASOPHILS # BLD AUTO: 0.02 K/UL (ref 0–0.2)
BASOPHILS NFR BLD: 0.2 % (ref 0–1.9)
BILIRUB SERPL-MCNC: 5.2 MG/DL (ref 0.1–1)
BILIRUB UR QL STRIP: ABNORMAL
BUN SERPL-MCNC: 10 MG/DL (ref 8–23)
CALCIUM SERPL-MCNC: 9.8 MG/DL (ref 8.7–10.5)
CHLORIDE SERPL-SCNC: 103 MMOL/L (ref 95–110)
CHOLEST SERPL-MCNC: 137 MG/DL (ref 120–199)
CHOLEST/HDLC SERPL: 2.7 {RATIO} (ref 2–5)
CLARITY UR: ABNORMAL
CO2 SERPL-SCNC: 18 MMOL/L (ref 23–29)
COLOR UR: ABNORMAL
CREAT SERPL-MCNC: 0.9 MG/DL (ref 0.5–1.4)
DIFFERENTIAL METHOD: ABNORMAL
EOSINOPHIL # BLD AUTO: 0 K/UL (ref 0–0.5)
EOSINOPHIL NFR BLD: 0.2 % (ref 0–8)
ERYTHROCYTE [DISTWIDTH] IN BLOOD BY AUTOMATED COUNT: 12 % (ref 11.5–14.5)
EST. GFR  (AFRICAN AMERICAN): >60 ML/MIN/1.73 M^2
EST. GFR  (NON AFRICAN AMERICAN): >60 ML/MIN/1.73 M^2
GLUCOSE SERPL-MCNC: 187 MG/DL (ref 70–110)
GLUCOSE UR QL STRIP: NEGATIVE
HCT VFR BLD AUTO: 42 % (ref 37–48.5)
HDLC SERPL-MCNC: 50 MG/DL (ref 40–75)
HDLC SERPL: 36.5 % (ref 20–50)
HGB BLD-MCNC: 13.8 G/DL (ref 12–16)
HGB UR QL STRIP: NEGATIVE
HYALINE CASTS #/AREA URNS LPF: 0 /LPF
IMM GRANULOCYTES # BLD AUTO: 0.05 K/UL (ref 0–0.04)
IMM GRANULOCYTES NFR BLD AUTO: 0.4 % (ref 0–0.5)
KETONES UR QL STRIP: ABNORMAL
LACTATE SERPL-SCNC: 2.5 MMOL/L (ref 0.5–2.2)
LDLC SERPL CALC-MCNC: 66 MG/DL (ref 63–159)
LEUKOCYTE ESTERASE UR QL STRIP: ABNORMAL
LIPASE SERPL-CCNC: >1000 U/L (ref 4–60)
LYMPHOCYTES # BLD AUTO: 0.8 K/UL (ref 1–4.8)
LYMPHOCYTES NFR BLD: 6 % (ref 18–48)
MCH RBC QN AUTO: 32.1 PG (ref 27–31)
MCHC RBC AUTO-ENTMCNC: 32.9 G/DL (ref 32–36)
MCV RBC AUTO: 98 FL (ref 82–98)
MICROSCOPIC COMMENT: ABNORMAL
MONOCYTES # BLD AUTO: 0.9 K/UL (ref 0.3–1)
MONOCYTES NFR BLD: 7.2 % (ref 4–15)
NEUTROPHILS # BLD AUTO: 11.1 K/UL (ref 1.8–7.7)
NEUTROPHILS NFR BLD: 86 % (ref 38–73)
NITRITE UR QL STRIP: POSITIVE
NON-SQ EPI CELLS #/AREA URNS HPF: 4 /HPF
NONHDLC SERPL-MCNC: 87 MG/DL
NRBC BLD-RTO: 0 /100 WBC
PH UR STRIP: 7 [PH] (ref 5–8)
PLATELET # BLD AUTO: 205 K/UL (ref 150–350)
PMV BLD AUTO: 8.9 FL (ref 9.2–12.9)
POTASSIUM SERPL-SCNC: 4.8 MMOL/L (ref 3.5–5.1)
PROT SERPL-MCNC: 7.4 G/DL (ref 6–8.4)
PROT UR QL STRIP: ABNORMAL
RBC # BLD AUTO: 4.3 M/UL (ref 4–5.4)
RBC #/AREA URNS HPF: 0 /HPF (ref 0–4)
SARS-COV-2 RDRP RESP QL NAA+PROBE: NEGATIVE
SODIUM SERPL-SCNC: 132 MMOL/L (ref 136–145)
SP GR UR STRIP: 1.02 (ref 1–1.03)
SQUAMOUS #/AREA URNS HPF: 2 /HPF
TRIGL SERPL-MCNC: 105 MG/DL (ref 30–150)
URN SPEC COLLECT METH UR: ABNORMAL
UROBILINOGEN UR STRIP-ACNC: >=8 EU/DL
WBC # BLD AUTO: 12.94 K/UL (ref 3.9–12.7)
WBC #/AREA URNS HPF: 50 /HPF (ref 0–5)

## 2020-07-02 PROCEDURE — 87088 URINE BACTERIA CULTURE: CPT

## 2020-07-02 PROCEDURE — 25000003 PHARM REV CODE 250: Performed by: SURGERY

## 2020-07-02 PROCEDURE — 83036 HEMOGLOBIN GLYCOSYLATED A1C: CPT

## 2020-07-02 PROCEDURE — G0378 HOSPITAL OBSERVATION PER HR: HCPCS

## 2020-07-02 PROCEDURE — 63600175 PHARM REV CODE 636 W HCPCS: Performed by: HOSPITALIST

## 2020-07-02 PROCEDURE — 87086 URINE CULTURE/COLONY COUNT: CPT

## 2020-07-02 PROCEDURE — 87040 BLOOD CULTURE FOR BACTERIA: CPT

## 2020-07-02 PROCEDURE — 83605 ASSAY OF LACTIC ACID: CPT

## 2020-07-02 PROCEDURE — 85025 COMPLETE CBC W/AUTO DIFF WBC: CPT

## 2020-07-02 PROCEDURE — 96372 THER/PROPH/DIAG INJ SC/IM: CPT

## 2020-07-02 PROCEDURE — 12000002 HC ACUTE/MED SURGE SEMI-PRIVATE ROOM

## 2020-07-02 PROCEDURE — 25000003 PHARM REV CODE 250: Performed by: HOSPITALIST

## 2020-07-02 PROCEDURE — 80061 LIPID PANEL: CPT

## 2020-07-02 PROCEDURE — 25000003 PHARM REV CODE 250: Performed by: EMERGENCY MEDICINE

## 2020-07-02 PROCEDURE — U0002 COVID-19 LAB TEST NON-CDC: HCPCS

## 2020-07-02 PROCEDURE — 96361 HYDRATE IV INFUSION ADD-ON: CPT

## 2020-07-02 PROCEDURE — 63600175 PHARM REV CODE 636 W HCPCS: Performed by: EMERGENCY MEDICINE

## 2020-07-02 PROCEDURE — 96365 THER/PROPH/DIAG IV INF INIT: CPT

## 2020-07-02 PROCEDURE — 83690 ASSAY OF LIPASE: CPT

## 2020-07-02 PROCEDURE — 87186 SC STD MICRODIL/AGAR DIL: CPT

## 2020-07-02 PROCEDURE — 87077 CULTURE AEROBIC IDENTIFY: CPT

## 2020-07-02 PROCEDURE — 80053 COMPREHEN METABOLIC PANEL: CPT

## 2020-07-02 PROCEDURE — 99291 CRITICAL CARE FIRST HOUR: CPT | Mod: 25

## 2020-07-02 PROCEDURE — 36415 COLL VENOUS BLD VENIPUNCTURE: CPT

## 2020-07-02 PROCEDURE — 81000 URINALYSIS NONAUTO W/SCOPE: CPT

## 2020-07-02 RX ORDER — ENOXAPARIN SODIUM 100 MG/ML
40 INJECTION SUBCUTANEOUS EVERY 24 HOURS
Status: DISCONTINUED | OUTPATIENT
Start: 2020-07-02 | End: 2020-07-03

## 2020-07-02 RX ORDER — IBUPROFEN 200 MG
24 TABLET ORAL
Status: DISCONTINUED | OUTPATIENT
Start: 2020-07-02 | End: 2020-07-07 | Stop reason: HOSPADM

## 2020-07-02 RX ORDER — PANTOPRAZOLE SODIUM 40 MG/1
40 TABLET, DELAYED RELEASE ORAL DAILY
Status: DISCONTINUED | OUTPATIENT
Start: 2020-07-03 | End: 2020-07-07 | Stop reason: HOSPADM

## 2020-07-02 RX ORDER — GLUCAGON 1 MG
1 KIT INJECTION
Status: DISCONTINUED | OUTPATIENT
Start: 2020-07-02 | End: 2020-07-07 | Stop reason: HOSPADM

## 2020-07-02 RX ORDER — PROCHLORPERAZINE MALEATE 10 MG
10 TABLET ORAL EVERY 6 HOURS PRN
Status: DISCONTINUED | OUTPATIENT
Start: 2020-07-02 | End: 2020-07-07 | Stop reason: HOSPADM

## 2020-07-02 RX ORDER — AMLODIPINE BESYLATE 5 MG/1
10 TABLET ORAL DAILY
Status: DISCONTINUED | OUTPATIENT
Start: 2020-07-03 | End: 2020-07-07 | Stop reason: HOSPADM

## 2020-07-02 RX ORDER — ACETAMINOPHEN 500 MG
1000 TABLET ORAL EVERY 6 HOURS PRN
Status: DISCONTINUED | OUTPATIENT
Start: 2020-07-02 | End: 2020-07-07 | Stop reason: HOSPADM

## 2020-07-02 RX ORDER — IBUPROFEN 200 MG
16 TABLET ORAL
Status: DISCONTINUED | OUTPATIENT
Start: 2020-07-02 | End: 2020-07-07 | Stop reason: HOSPADM

## 2020-07-02 RX ORDER — MIRTAZAPINE 15 MG/1
15 TABLET, FILM COATED ORAL NIGHTLY
Status: DISCONTINUED | OUTPATIENT
Start: 2020-07-02 | End: 2020-07-07 | Stop reason: HOSPADM

## 2020-07-02 RX ORDER — ONDANSETRON 4 MG/1
8 TABLET, ORALLY DISINTEGRATING ORAL EVERY 8 HOURS PRN
Status: DISCONTINUED | OUTPATIENT
Start: 2020-07-02 | End: 2020-07-07 | Stop reason: HOSPADM

## 2020-07-02 RX ORDER — LEVOTHYROXINE SODIUM 100 UG/1
100 TABLET ORAL
Status: DISCONTINUED | OUTPATIENT
Start: 2020-07-03 | End: 2020-07-07 | Stop reason: HOSPADM

## 2020-07-02 RX ORDER — OXYCODONE HYDROCHLORIDE 5 MG/1
5 TABLET ORAL EVERY 6 HOURS PRN
Status: DISCONTINUED | OUTPATIENT
Start: 2020-07-02 | End: 2020-07-07 | Stop reason: HOSPADM

## 2020-07-02 RX ORDER — INSULIN ASPART 100 [IU]/ML
0-5 INJECTION, SOLUTION INTRAVENOUS; SUBCUTANEOUS
Status: DISCONTINUED | OUTPATIENT
Start: 2020-07-02 | End: 2020-07-07 | Stop reason: HOSPADM

## 2020-07-02 RX ORDER — ASPIRIN 81 MG/1
81 TABLET ORAL DAILY
Status: DISCONTINUED | OUTPATIENT
Start: 2020-07-03 | End: 2020-07-03

## 2020-07-02 RX ORDER — SODIUM CHLORIDE 9 MG/ML
INJECTION, SOLUTION INTRAVENOUS CONTINUOUS
Status: DISCONTINUED | OUTPATIENT
Start: 2020-07-02 | End: 2020-07-03

## 2020-07-02 RX ADMIN — SODIUM CHLORIDE 1000 ML: 0.9 INJECTION, SOLUTION INTRAVENOUS at 06:07

## 2020-07-02 RX ADMIN — ENOXAPARIN SODIUM 40 MG: 40 INJECTION SUBCUTANEOUS at 09:07

## 2020-07-02 RX ADMIN — SODIUM CHLORIDE: 0.9 INJECTION, SOLUTION INTRAVENOUS at 09:07

## 2020-07-02 RX ADMIN — MIRTAZAPINE 15 MG: 15 TABLET, FILM COATED ORAL at 09:07

## 2020-07-02 RX ADMIN — SODIUM CHLORIDE 500 ML: 0.9 INJECTION, SOLUTION INTRAVENOUS at 04:07

## 2020-07-02 RX ADMIN — CEFTRIAXONE 1 G: 1 INJECTION, SOLUTION INTRAVENOUS at 05:07

## 2020-07-02 NOTE — ED PROVIDER NOTES
"Encounter Date: 7/2/2020    SCRIBE #1 NOTE: I, Deirdre Perry , am scribing for, and in the presence of, Dr. Fabian.       History     Chief Complaint   Patient presents with    Fatigue    Altered Mental Status     Hx. of UTI, daughter states "urine looks like orange soda" increased confusion 3 days ago    Emesis       Time seen by provider: 4:09 PM on 07/02/2020    Nina Portillo is a 79 y.o. female with DM, renal cell cancer, and alzheimer disease who presents to the ED for the evaluation of an AMS. The daughter reports increased confusion over the past few days with orange colored, foul smelling, urine today. She reports a prior hx of UTIs requiring hospital admission. No cough, fever, CP, SOB, gross hematuria, dysuria, or abdominal pain. PSHx includes partial nephrectomy.     The history is provided by the patient and a relative.     Review of patient's allergies indicates:   Allergen Reactions    Phenergan [promethazine] Other (See Comments)     hallucinations    Prilosec [omeprazole magnesium] Hives and Rash     Past Medical History:   Diagnosis Date    Alzheimer disease 2012    Cancer     renal cell. right    Dementia     Diabetes mellitus     GERD (gastroesophageal reflux disease)     Hyperlipidemia     Hypothyroid     Memory loss     Movement disorder      Past Surgical History:   Procedure Laterality Date    HYSTERECTOMY      JOINT REPLACEMENT      right    KNEE ARTHROSCOPY W/ DEBRIDEMENT      left    PARTIAL NEPHRECTOMY      right.     TONSILLECTOMY       Family History   Problem Relation Age of Onset    Cancer Mother 90        breast     Social History     Tobacco Use    Smoking status: Never Smoker    Smokeless tobacco: Never Used   Substance Use Topics    Alcohol use: No    Drug use: No     Review of Systems   Constitutional: Negative for activity change, diaphoresis and fever.   HENT: Negative for ear pain, rhinorrhea, sore throat and trouble swallowing.    Eyes: Negative " for pain and visual disturbance.   Respiratory: Negative for cough, shortness of breath and stridor.    Cardiovascular: Negative for chest pain.   Gastrointestinal: Negative for abdominal pain, blood in stool, diarrhea, nausea and vomiting.   Genitourinary: Negative for dysuria, hematuria, vaginal bleeding and vaginal discharge.        + discolored urine, + foul smelling urine.    Musculoskeletal: Negative for gait problem.   Skin: Negative for rash and wound.   Neurological: Negative for seizures and headaches.   Psychiatric/Behavioral: Positive for confusion. Negative for hallucinations and suicidal ideas.       Physical Exam     Initial Vitals [07/02/20 1557]   BP Pulse Resp Temp SpO2   (!) 155/77 100 18 98.2 °F (36.8 °C) 97 %      MAP       --         Physical Exam    Nursing note and vitals reviewed.  Constitutional: She is not diaphoretic. She appears ill. No distress.   Elderly and ill appearing.     HENT:   Head: Normocephalic and atraumatic.   Nose: Nose normal.   Eyes: EOM are normal. No scleral icterus.   Neck: Normal range of motion. Neck supple.   Cardiovascular: Normal rate, regular rhythm, normal heart sounds and intact distal pulses. Exam reveals no gallop and no friction rub.    No murmur heard.  Pulmonary/Chest: Breath sounds normal. No stridor. No respiratory distress. She has no wheezes. She has no rhonchi. She has no rales.   Abdominal: Soft. Bowel sounds are normal. There is no abdominal tenderness.   Musculoskeletal: Normal range of motion.   Neurological: She is alert. She has normal strength. No cranial nerve deficit.   Cranial nerves III through XII grossly intact. 5/5 strength with intact sensation to BUE's and BLE's. Patient is alter and oriented to person and place but confused otherwise.      Skin: Skin is warm and dry. Capillary refill takes less than 2 seconds. No rash noted.   Psychiatric: She has a normal mood and affect.         ED Course   Critical Care    Date/Time: 7/3/2020  12:10 AM  Performed by: Wilbert Fabian MD  Authorized by: Miguel Portillo MD   Total critical care time (exclusive of procedural time) : 30 minutes  Critical care time was exclusive of separately billable procedures and treating other patients.  Critical care was necessary to treat or prevent imminent or life-threatening deterioration of the following conditions: sepsis.  Critical care was time spent personally by me on the following activities: examination of patient, pulse oximetry, re-evaluation of patient's condition, review of old charts, ordering and performing treatments and interventions and ordering and review of laboratory studies.  Subsequent provider of critical care: I assumed direction of critical care for this patient from another provider of my specialty.        Labs Reviewed   CBC W/ AUTO DIFFERENTIAL - Abnormal; Notable for the following components:       Result Value    WBC 12.94 (*)     Mean Corpuscular Hemoglobin 32.1 (*)     MPV 8.9 (*)     Gran # (ANC) 11.1 (*)     Immature Grans (Abs) 0.05 (*)     Lymph # 0.8 (*)     Gran% 86.0 (*)     Lymph% 6.0 (*)     All other components within normal limits   COMPREHENSIVE METABOLIC PANEL - Abnormal; Notable for the following components:    Sodium 132 (*)     CO2 18 (*)     Glucose 187 (*)     Albumin 3.1 (*)     Total Bilirubin 5.2 (*)     Alkaline Phosphatase 202 (*)      (*)      (*)     All other components within normal limits   URINALYSIS, REFLEX TO URINE CULTURE - Abnormal; Notable for the following components:    Color, UA Orange (*)     Appearance, UA Hazy (*)     Protein, UA 1+ (*)     Ketones, UA Trace (*)     Bilirubin (UA) 3+ (*)     Nitrite, UA Positive (*)     Urobilinogen, UA >=8.0 (*)     Leukocytes, UA 2+ (*)     All other components within normal limits    Narrative:     Specimen Source->Urine   LIPASE - Abnormal; Notable for the following components:    Lipase >1000 (*)     All other components within normal limits    LACTIC ACID, PLASMA - Abnormal; Notable for the following components:    Lactate (Lactic Acid) 2.5 (*)     All other components within normal limits   URINALYSIS MICROSCOPIC - Abnormal; Notable for the following components:    WBC, UA 50 (*)     Bacteria Many (*)     Non-Squam Epith 4 (*)     All other components within normal limits    Narrative:     Specimen Source->Urine   SARS-COV-2 RNA AMPLIFICATION, QUAL   LIPID PANEL          Imaging Results          US Abdomen Limited (Final result)  Result time 07/02/20 19:58:42    Final result by Reji Denise MD (07/02/20 19:58:42)                 Impression:      Cholelithiasis with contracted stone filled gallbladder.  Limited visualization.      Electronically signed by: Reji Denise  Date:    07/02/2020  Time:    19:58             Narrative:    EXAMINATION:  US ABDOMEN LIMITED    CLINICAL HISTORY:  Abdominal Pain - Gallbladder;    TECHNIQUE:  Limited ultrasound of the right upper quadrant of the abdomen (including pancreas, liver, gallbladder, common bile duct, and spleen) was performed.    COMPARISON:  None.    FINDINGS:  Liver: Normal in size, measuring 11.6 cm. Echotexture is coarsened.  No focal hepatic lesions.    Gallbladder: The gallbladder is contracted and stone filled with posterior shadowing.  Limited visualization.  No pericholecystic fluid.  Negative sonographic Vega sign.  Largest stone measures approximately 4 mm.  No hypervascularity.    Biliary system: The common duct is not dilated, measuring 3.5 mm.  No intrahepatic ductal dilatation.    Miscellaneous: No upper abdominal ascites.    No focal abnormality the pancreas.  Portions are obscured by gas.    Hepatopetal flow in the main portal vein.    Right kidney measures 8.7 x 5.0 by 4.2 cm.  No stone, mass or hydronephrosis.                               CT Head Without Contrast (Final result)  Result time 07/02/20 17:58:50    Final result by Apollo Woods MD (07/02/20 17:58:50)                  Impression:      No evidence of acute intracranial findings and no significant change relative to February 12, 2020      Electronically signed by: Apollo Woods MD  Date:    07/02/2020  Time:    17:58             Narrative:    EXAMINATION:  CT HEAD WITHOUT CONTRAST    CLINICAL HISTORY:  Altered mental status;    TECHNIQUE:  Low dose axial images were obtained through the head.  Coronal and sagittal reformations were also performed. Contrast was not administered.    COMPARISON:  February 12, 2020    FINDINGS:  Moderate diffuse cerebral atrophy is present.  There is periventricular white matter low density suggesting microvascular changes.  There is evidence of intracranial atherosclerosis.  There is soft tissue density in the left external auditory canal.  This is chronic.  I do not see evidence of acute hemorrhage or abnormal collection.                                 Medical Decision Making:   History:   Old Medical Records: I decided to obtain old medical records.  Differential Diagnosis:   Will admit for urosepsis on rocephin and further workup and management of transaminitis and elevated lipase. RUQ US pending at time of admission but on my read shows multiple gallstones but no evidence of cholecystitis.   Clinical Tests:   Lab Tests: Ordered and Reviewed  Radiological Study: Reviewed and Ordered            Scribe Attestation:   Scribe #1: I performed the above scribed service and the documentation accurately describes the services I performed. I attest to the accuracy of the note.            ED Course as of Jul 04 0823   Thu Jul 02, 2020   1756 80 y/o a. female with a past medical history significant for DM, dementia, and HTN who presented to the ED with increased confusion, decreased activity, increased incontinence, frequency and foul smelling urine.     [BD]      ED Course User Index  [BD] Wilbert Fabian MD           Attending Attestation:     Physician Attestation for Scribe:    I, Dr. Wilbert Fabian,  personally performed the services described in this documentation.   All medical record entries made by the scribe were at my direction and in my presence.   I have reviewed the chart and agree that the record is accurate and complete.   Wilbert Fabian MD  11:09 PM 07/04/2020     DISCLAIMER: This note was prepared with [employer] voice recognition transcription software. Garbled syntax, mangled pronouns, and other bizarre constructions may be attributed to that software system.          Clinical Impression:       ICD-10-CM ICD-9-CM   1. Sepsis, due to unspecified organism, unspecified whether acute organ dysfunction present  A41.9 038.9     995.91   2. Urinary tract infection with hematuria, site unspecified  N39.0 599.0    R31.9 599.70   3. Delirium  R41.0 780.09   4. Dehydration  E86.0 276.51   5. Transaminitis  R74.0 790.4   6. Acute pancreatitis, unspecified complication status, unspecified pancreatitis type  K85.90 577.0   7. Infectious encephalopathy  G93.49 348.39    B99.9 136.9   8. Calculus of gallbladder without cholecystitis  K80.20 574.20         Disposition:   Disposition: Admitted     ED Disposition Condition    Observation                           Wilbert Fabian MD  07/04/20 0823

## 2020-07-03 PROBLEM — R31.9 URINARY TRACT INFECTION WITH HEMATURIA: Status: ACTIVE | Noted: 2020-07-03

## 2020-07-03 PROBLEM — E87.1 HYPONATREMIA: Status: ACTIVE | Noted: 2020-07-03

## 2020-07-03 PROBLEM — K85.90 ACUTE PANCREATITIS: Status: ACTIVE | Noted: 2020-07-03

## 2020-07-03 PROBLEM — R74.01 TRANSAMINITIS: Status: ACTIVE | Noted: 2020-07-03

## 2020-07-03 PROBLEM — N39.0 URINARY TRACT INFECTION WITHOUT HEMATURIA: Status: ACTIVE | Noted: 2020-07-03

## 2020-07-03 PROBLEM — K85.10 ACUTE BILIARY PANCREATITIS WITHOUT INFECTION OR NECROSIS: Status: ACTIVE | Noted: 2020-07-03

## 2020-07-03 PROBLEM — K80.20 CALCULUS OF GALLBLADDER WITHOUT CHOLECYSTITIS: Status: ACTIVE | Noted: 2020-07-03

## 2020-07-03 LAB
ALBUMIN SERPL BCP-MCNC: 2.1 G/DL (ref 3.5–5.2)
ALP SERPL-CCNC: 148 U/L (ref 55–135)
ALT SERPL W/O P-5'-P-CCNC: 95 U/L (ref 10–44)
ANION GAP SERPL CALC-SCNC: 9 MMOL/L (ref 8–16)
AST SERPL-CCNC: 86 U/L (ref 10–40)
BASOPHILS # BLD AUTO: 0.02 K/UL (ref 0–0.2)
BASOPHILS NFR BLD: 0.2 % (ref 0–1.9)
BILIRUB DIRECT SERPL-MCNC: 2.7 MG/DL (ref 0.1–0.3)
BILIRUB SERPL-MCNC: 3.3 MG/DL (ref 0.1–1)
BUN SERPL-MCNC: 6 MG/DL (ref 8–23)
CALCIUM SERPL-MCNC: 6.9 MG/DL (ref 8.7–10.5)
CHLORIDE SERPL-SCNC: 110 MMOL/L (ref 95–110)
CO2 SERPL-SCNC: 17 MMOL/L (ref 23–29)
CREAT SERPL-MCNC: 0.6 MG/DL (ref 0.5–1.4)
DIFFERENTIAL METHOD: ABNORMAL
EOSINOPHIL # BLD AUTO: 0.2 K/UL (ref 0–0.5)
EOSINOPHIL NFR BLD: 1.3 % (ref 0–8)
ERYTHROCYTE [DISTWIDTH] IN BLOOD BY AUTOMATED COUNT: 12.1 % (ref 11.5–14.5)
EST. GFR  (AFRICAN AMERICAN): >60 ML/MIN/1.73 M^2
EST. GFR  (NON AFRICAN AMERICAN): >60 ML/MIN/1.73 M^2
ESTIMATED AVG GLUCOSE: 160 MG/DL (ref 68–131)
GLUCOSE SERPL-MCNC: 105 MG/DL (ref 70–110)
HBA1C MFR BLD HPLC: 7.2 % (ref 4–5.6)
HCT VFR BLD AUTO: 40.8 % (ref 37–48.5)
HGB BLD-MCNC: 13.3 G/DL (ref 12–16)
IMM GRANULOCYTES # BLD AUTO: 0.05 K/UL (ref 0–0.04)
IMM GRANULOCYTES NFR BLD AUTO: 0.4 % (ref 0–0.5)
LACTATE SERPL-SCNC: 1.9 MMOL/L (ref 0.5–2.2)
LYMPHOCYTES # BLD AUTO: 0.8 K/UL (ref 1–4.8)
LYMPHOCYTES NFR BLD: 6.3 % (ref 18–48)
MAGNESIUM SERPL-MCNC: 1.3 MG/DL (ref 1.6–2.6)
MCH RBC QN AUTO: 31.8 PG (ref 27–31)
MCHC RBC AUTO-ENTMCNC: 32.6 G/DL (ref 32–36)
MCV RBC AUTO: 98 FL (ref 82–98)
MONOCYTES # BLD AUTO: 0.9 K/UL (ref 0.3–1)
MONOCYTES NFR BLD: 7.5 % (ref 4–15)
NEUTROPHILS # BLD AUTO: 10.4 K/UL (ref 1.8–7.7)
NEUTROPHILS NFR BLD: 84.3 % (ref 38–73)
NRBC BLD-RTO: 0 /100 WBC
PHOSPHATE SERPL-MCNC: 1.5 MG/DL (ref 2.7–4.5)
PLATELET # BLD AUTO: 194 K/UL (ref 150–350)
PMV BLD AUTO: 9.1 FL (ref 9.2–12.9)
POCT GLUCOSE: 129 MG/DL (ref 70–110)
POCT GLUCOSE: 130 MG/DL (ref 70–110)
POCT GLUCOSE: 136 MG/DL (ref 70–110)
POCT GLUCOSE: 147 MG/DL (ref 70–110)
POCT GLUCOSE: 165 MG/DL (ref 70–110)
POTASSIUM SERPL-SCNC: 2.7 MMOL/L (ref 3.5–5.1)
PROT SERPL-MCNC: 5.4 G/DL (ref 6–8.4)
RBC # BLD AUTO: 4.18 M/UL (ref 4–5.4)
SODIUM SERPL-SCNC: 136 MMOL/L (ref 136–145)
TSH SERPL DL<=0.005 MIU/L-ACNC: 1.15 UIU/ML (ref 0.4–4)
WBC # BLD AUTO: 12.32 K/UL (ref 3.9–12.7)

## 2020-07-03 PROCEDURE — 83735 ASSAY OF MAGNESIUM: CPT

## 2020-07-03 PROCEDURE — 80053 COMPREHEN METABOLIC PANEL: CPT

## 2020-07-03 PROCEDURE — 99223 1ST HOSP IP/OBS HIGH 75: CPT | Mod: ,,, | Performed by: INTERNAL MEDICINE

## 2020-07-03 PROCEDURE — 63600175 PHARM REV CODE 636 W HCPCS: Performed by: HOSPITALIST

## 2020-07-03 PROCEDURE — 96361 HYDRATE IV INFUSION ADD-ON: CPT

## 2020-07-03 PROCEDURE — 82248 BILIRUBIN DIRECT: CPT

## 2020-07-03 PROCEDURE — 12000002 HC ACUTE/MED SURGE SEMI-PRIVATE ROOM

## 2020-07-03 PROCEDURE — 99223 PR INITIAL HOSPITAL CARE,LEVL III: ICD-10-PCS | Mod: ,,, | Performed by: INTERNAL MEDICINE

## 2020-07-03 PROCEDURE — 83605 ASSAY OF LACTIC ACID: CPT

## 2020-07-03 PROCEDURE — 25000003 PHARM REV CODE 250: Performed by: NURSE PRACTITIONER

## 2020-07-03 PROCEDURE — 85025 COMPLETE CBC W/AUTO DIFF WBC: CPT

## 2020-07-03 PROCEDURE — 36415 COLL VENOUS BLD VENIPUNCTURE: CPT

## 2020-07-03 PROCEDURE — 25000003 PHARM REV CODE 250: Performed by: HOSPITALIST

## 2020-07-03 PROCEDURE — 84100 ASSAY OF PHOSPHORUS: CPT

## 2020-07-03 PROCEDURE — 25000003 PHARM REV CODE 250: Performed by: SURGERY

## 2020-07-03 PROCEDURE — 84443 ASSAY THYROID STIM HORMONE: CPT

## 2020-07-03 RX ORDER — POTASSIUM CHLORIDE 7.45 MG/ML
80 INJECTION INTRAVENOUS
Status: DISCONTINUED | OUTPATIENT
Start: 2020-07-03 | End: 2020-07-07 | Stop reason: HOSPADM

## 2020-07-03 RX ORDER — SODIUM CHLORIDE 9 MG/ML
INJECTION, SOLUTION INTRAVENOUS CONTINUOUS
Status: ACTIVE | OUTPATIENT
Start: 2020-07-03 | End: 2020-07-03

## 2020-07-03 RX ORDER — MAGNESIUM SULFATE HEPTAHYDRATE 40 MG/ML
4 INJECTION, SOLUTION INTRAVENOUS
Status: DISCONTINUED | OUTPATIENT
Start: 2020-07-03 | End: 2020-07-07 | Stop reason: HOSPADM

## 2020-07-03 RX ORDER — MAGNESIUM SULFATE HEPTAHYDRATE 40 MG/ML
2 INJECTION, SOLUTION INTRAVENOUS
Status: DISCONTINUED | OUTPATIENT
Start: 2020-07-03 | End: 2020-07-07 | Stop reason: HOSPADM

## 2020-07-03 RX ORDER — POTASSIUM CHLORIDE 7.45 MG/ML
60 INJECTION INTRAVENOUS
Status: DISCONTINUED | OUTPATIENT
Start: 2020-07-03 | End: 2020-07-07 | Stop reason: HOSPADM

## 2020-07-03 RX ORDER — POTASSIUM CHLORIDE 7.45 MG/ML
40 INJECTION INTRAVENOUS
Status: DISCONTINUED | OUTPATIENT
Start: 2020-07-03 | End: 2020-07-07 | Stop reason: HOSPADM

## 2020-07-03 RX ADMIN — ENOXAPARIN SODIUM 40 MG: 40 INJECTION SUBCUTANEOUS at 05:07

## 2020-07-03 RX ADMIN — LEVOTHYROXINE SODIUM 100 MCG: 100 TABLET ORAL at 05:07

## 2020-07-03 RX ADMIN — POTASSIUM CHLORIDE 50 MEQ: 7.46 INJECTION, SOLUTION INTRAVENOUS at 01:07

## 2020-07-03 RX ADMIN — CEFTRIAXONE 1 G: 1 INJECTION, SOLUTION INTRAVENOUS at 05:07

## 2020-07-03 RX ADMIN — AMLODIPINE BESYLATE 10 MG: 5 TABLET ORAL at 08:07

## 2020-07-03 RX ADMIN — MAGNESIUM SULFATE IN WATER 4 G: 40 INJECTION, SOLUTION INTRAVENOUS at 12:07

## 2020-07-03 RX ADMIN — SODIUM CHLORIDE: 0.9 INJECTION, SOLUTION INTRAVENOUS at 01:07

## 2020-07-03 RX ADMIN — SODIUM PHOSPHATE, MONOBASIC, MONOHYDRATE 30 MMOL: 276; 142 INJECTION, SOLUTION INTRAVENOUS at 03:07

## 2020-07-03 RX ADMIN — PANTOPRAZOLE SODIUM 40 MG: 40 TABLET, DELAYED RELEASE ORAL at 08:07

## 2020-07-03 RX ADMIN — MIRTAZAPINE 15 MG: 15 TABLET, FILM COATED ORAL at 09:07

## 2020-07-03 RX ADMIN — ASPIRIN 81 MG: 81 TABLET, COATED ORAL at 08:07

## 2020-07-03 NOTE — ASSESSMENT & PLAN NOTE
Patient's FSGs are controlled on current hypoglycemics.   Last A1c reviewed-   Lab Results   Component Value Date    HGBA1C 7.6 (H) 01/10/2020     Most recent fingerstick glucose reviewed- No results for input(s): POCTGLUCOSE in the last 24 hours.  Current correctional scale  Low  Maintain anti-hyperglycemic dose as follows-   Antihyperglycemics (From admission, onward)    Start     Stop Route Frequency Ordered    07/02/20 2036  insulin aspart U-100 pen 0-5 Units      -- SubQ Before meals & nightly PRN 07/02/20 2036        Accu-Cheks a.c./HS.  Diabetic diet when able.

## 2020-07-03 NOTE — PLAN OF CARE
NAD noted. POC reviewed w pt and they verbalized understanding.   Pt free from falls.  Pt ambulated in room.  Bed in low position, side rails up X2, bed alarm on, wheels locked, call light in reach. Will continue to monitor.

## 2020-07-03 NOTE — CONSULTS
"CC: pancreatitis    HPI 79 y.o. female with history of Alzheimer's disease, HTN, GERD, RCC,  DMII and recurrent UTIs is admitted with acute onset painful biliary pancreatitis with associated altered mental status and emesis for several days prior to admission. She had been feeling fatigued over the past several days. No fevers. History is difficult to obtain from patient due to mental status. Most of history was taken from medical records.    Patient noted to have leukocytosis on admission with WBC 12.94. , , , Lipase >1000.     US Abd showed cholelithiasis, contracted gallbladder, multiple stones, normal CBD without obstruction.    Medical records reviewed. Additional history supplemented by nursing.     Past Medical History:   Diagnosis Date    Alzheimer disease 2012    Cancer     renal cell. right    Dementia     Diabetes mellitus     GERD (gastroesophageal reflux disease)     Hyperlipidemia     Hypothyroid     Memory loss     Movement disorder      Past Surgical History:   Procedure Laterality Date    HYSTERECTOMY      JOINT REPLACEMENT      right    KNEE ARTHROSCOPY W/ DEBRIDEMENT      left    PARTIAL NEPHRECTOMY      right.     TONSILLECTOMY       Social History  Social History     Tobacco Use    Smoking status: Never Smoker    Smokeless tobacco: Never Used   Substance Use Topics    Alcohol use: No    Drug use: No     Family History   Problem Relation Age of Onset    Cancer Mother 90        breast       Review of Systems  Unable to obtain due to mental status    Physical Examination  BP (!) 173/74 (BP Location: Left arm, Patient Position: Lying)   Pulse 86   Temp 98.6 °F (37 °C) (Oral)   Resp 14   Ht 5' 5" (1.651 m)   Wt 57.3 kg (126 lb 6.4 oz)   SpO2 95%   Breastfeeding No   BMI 21.03 kg/m²   General appearance: confused, lethargic,lying in bed  HENT: Normocephalic, atraumatic, neck symmetrical, no nasal discharge   Eyes: conjunctivae/corneas clear, PERRL, EOM's " intact  Lungs: clear to auscultation bilaterally, symmetric chest wall expansion bilaterally  Heart: regular rate and rhythm without rub; no displacement of the PMI   Abdomen: soft, tender to palpation in epigastrium, bowel sounds normoactive; no organomegaly  Extremities: extremities symmetric; no clubbing, cyanosis, or edema  Integument: Skin color, texture, turgor normal; no rashes; hair distrubution normal  Neurologic: awake, but not answering questions   Psychiatric: no pressured speech; normal affect, evidence of confusion    Labs:  Lab Results   Component Value Date    WBC 12.32 07/03/2020    HGB 13.3 07/03/2020    HCT 40.8 07/03/2020    MCV 98 07/03/2020     07/03/2020       CMP  Sodium   Date Value Ref Range Status   07/03/2020 136 136 - 145 mmol/L Final     Potassium   Date Value Ref Range Status   07/03/2020 2.7 (LL) 3.5 - 5.1 mmol/L Final     Comment:     Potassium & Calcium critical result(s) called and verbal readback   obtained from Rin Giordano RN. by echoBaseD 07/03/2020 12:02       Chloride   Date Value Ref Range Status   07/03/2020 110 95 - 110 mmol/L Final     CO2   Date Value Ref Range Status   07/03/2020 17 (L) 23 - 29 mmol/L Final     Glucose   Date Value Ref Range Status   07/03/2020 105 70 - 110 mg/dL Final     BUN, Bld   Date Value Ref Range Status   07/03/2020 6 (L) 8 - 23 mg/dL Final     Creatinine   Date Value Ref Range Status   07/03/2020 0.6 0.5 - 1.4 mg/dL Final     Calcium   Date Value Ref Range Status   07/03/2020 6.9 (LL) 8.7 - 10.5 mg/dL Final     Comment:     Potassium & Calcium critical result(s) called and verbal readback   obtained from Rin Giordano RN. by echoBaseD 07/03/2020 12:02       Total Protein   Date Value Ref Range Status   07/03/2020 5.4 (L) 6.0 - 8.4 g/dL Final     Albumin   Date Value Ref Range Status   07/03/2020 2.1 (L) 3.5 - 5.2 g/dL Final     Total Bilirubin   Date Value Ref Range Status   07/03/2020 3.3 (H) 0.1 - 1.0 mg/dL Final     Comment:     For  infants and newborns, interpretation of results should be based  on gestational age, weight and in agreement with clinical  observations.  Premature Infant recommended reference ranges:  Up to 24 hours.............<8.0 mg/dL  Up to 48 hours............<12.0 mg/dL  3-5 days..................<15.0 mg/dL  6-29 days.................<15.0 mg/dL       Alkaline Phosphatase   Date Value Ref Range Status   07/03/2020 148 (H) 55 - 135 U/L Final     AST   Date Value Ref Range Status   07/03/2020 86 (H) 10 - 40 U/L Final     ALT   Date Value Ref Range Status   07/03/2020 95 (H) 10 - 44 U/L Final     Anion Gap   Date Value Ref Range Status   07/03/2020 9 8 - 16 mmol/L Final     eGFR if    Date Value Ref Range Status   07/03/2020 >60 >60 mL/min/1.73 m^2 Final     eGFR if non    Date Value Ref Range Status   07/03/2020 >60 >60 mL/min/1.73 m^2 Final     Comment:     Calculation used to obtain the estimated glomerular filtration  rate (eGFR) is the CKD-EPI equation.          Imaging:  US Abd: Cholelithiasis with contracted stone filled gallbladder.  Limited visualization.     Independently reviewed    Assessment:   1. Acute biliary pancreatitis    Plan:   -Clear liquid diet for now then advance as able to tolerate  -LR for IVF resuscitation  -Electrolyte repletion  -No evidence of biliary obstruction on US Abd, recommend surgery consultation for cholecystectomy prior to discharge  -Anti-emetics and pain control as needed for now    Shai Green MD  [facility]  [address] [place] [address]  Office: [phone]  Fax: [phone]

## 2020-07-03 NOTE — ASSESSMENT & PLAN NOTE
Chronic, controlled.  Latest blood pressure and vitals reviewed-   Temp:  [98.2 °F (36.8 °C)-99.3 °F (37.4 °C)]   Pulse:  []   Resp:  [16-18]   BP: (132-169)/(57-81)   SpO2:  [96 %-98 %] .   Home meds for hypertension were reviewed and noted below. Hospital anti-hypertensive changes were made as shown below.  Hypertension Medications             amLODIPine (NORVASC) 10 MG tablet Take 1 tablet (10 mg total) by mouth once daily.      Hospital Medications             amLODIPine tablet 10 mg 10 mg, Oral, Daily        Will utilize p.r.n. blood pressure medication only if patient's blood pressure greater than  180/110 and she develops symptoms such as worsening chest pain or shortness of breath.

## 2020-07-03 NOTE — ASSESSMENT & PLAN NOTE
Dementia is controlled currently. Continue home dementia meds and non-pharmacologic interventions to prevent delirium (Activity during day, opening blinds, providing glasses/hearing aids, and up in chair during daytime). Use PRN anti-psychotics to prevent behavior of self harm during sundowning, and avoid narcotics and benzos unless absolutely necessary. PRN anti-psychotics prescribed to avoid self harm behaviors.

## 2020-07-03 NOTE — PLAN OF CARE
Plan of care reviewed with pt. Alert throughout shift. Clear liquid diet for dinner.  Continue to Monitor Labs. Replaced electrolytes per MAR. VSS. Safety maintained. Will continue to monitor. No complaints at this time.

## 2020-07-03 NOTE — ASSESSMENT & PLAN NOTE
Likely 2/2 gallstone pancreatitis. Will continue to trend.   RUQ US reviewed.   Acute hepatitis panel.  GI consulted.

## 2020-07-03 NOTE — UM SECONDARY REVIEW
Physician Advisor External    Level of Care Issue     to ehr for admit review. inpt determination for 7/2/2020.  
Baby noted to be intermittently grunting on admission

## 2020-07-03 NOTE — HPI
"Nina Portillo is a 79 y.o. female with a PMHx of hypertension, GERD, type 2 diabetes, renal cell carcinoma, Alzheimer's, and recurrent UTIs presents to the ED for evaluation altered mental status, fatigue, and emesis x2-3 days. Per the ED note the "the daughter reports increased confusion over the past few days with orange colored, foul smelling, urine today. She reports a prior hx of UTIs requiring hospital admission. No cough, fever, CP, SOB, gross hematuria, dysuria, or abdominal pain." The patient reports nausea and vomiting earlier this evening. The patient denies any abdominal pain, chest pain, shortness of breath, diarrhea, headache, dizziness, dysuria, or hematuria.  HPI and ROS limited secondary to patient's mental status.  Her ED workup is significant for tachycardia, leukocytosis, hyperbilirubinemia, elevated lipase, transaminitis, UTI, hyponatremia, and right upper quadrant ultrasound revealing cholelithiasis with contracted stone filled gallbladder. Patient received 1.5 L of normal saline and Rocephin while in the ED.  The patient will be placed in observation under Hospital Medicine for further workup and evaluation.  "

## 2020-07-03 NOTE — ASSESSMENT & PLAN NOTE
Patient has acute metabolic encephalopathy that likely secondary to UTI.  Treatment for underlying cause is underway. Will monitor neuro status carefully, avoid narcotics and benzos that will exacerbate agitation, and use PRN anti-psychotics for controls of behavior for self harm.  CT head negative for acute process.

## 2020-07-03 NOTE — ASSESSMENT & PLAN NOTE
Likely 2/2 to gallstones.  Lipase >1,000, continue to trend.  Lipid panel.  RUQ US reviewed.  Continue IVF.  Pain and nausea control, currently denies any abdominal pain.  GI consulted.  NPO.

## 2020-07-03 NOTE — SUBJECTIVE & OBJECTIVE
Past Medical History:   Diagnosis Date    Alzheimer disease 2012    Cancer     renal cell. right    Dementia     Diabetes mellitus     GERD (gastroesophageal reflux disease)     Hyperlipidemia     Hypothyroid     Memory loss     Movement disorder        Past Surgical History:   Procedure Laterality Date    HYSTERECTOMY      JOINT REPLACEMENT      right    KNEE ARTHROSCOPY W/ DEBRIDEMENT      left    PARTIAL NEPHRECTOMY      right.     TONSILLECTOMY         Review of patient's allergies indicates:   Allergen Reactions    Phenergan [promethazine] Other (See Comments)     hallucinations    Prilosec [omeprazole magnesium] Hives and Rash       No current facility-administered medications on file prior to encounter.      Current Outpatient Medications on File Prior to Encounter   Medication Sig    amLODIPine (NORVASC) 10 MG tablet Take 1 tablet (10 mg total) by mouth once daily.    aspirin (ECOTRIN) 81 MG EC tablet Take 81 mg by mouth once daily.     blood sugar diagnostic Strp 1 each by Misc.(Non-Drug; Combo Route) route once daily.    blood-glucose meter Misc 1 kit by Misc.(Non-Drug; Combo Route) route once daily.    lancets 33 gauge Misc 1 lancet by Misc.(Non-Drug; Combo Route) route once daily.    levothyroxine (SYNTHROID) 100 MCG tablet Take 1 tablet (100 mcg total) by mouth before breakfast.    metFORMIN (GLUCOPHAGE-XR) 500 MG XR 24hr tablet Take 1 tablet (500 mg total) by mouth after dinner.    mirtazapine (REMERON) 15 MG tablet Take 1 tablet (15 mg total) by mouth every evening.    pantoprazole (PROTONIX) 40 MG tablet Take 1 tablet (40 mg total) by mouth once daily.     Family History     Problem Relation (Age of Onset)    Cancer Mother (90)        Tobacco Use    Smoking status: Never Smoker    Smokeless tobacco: Never Used   Substance and Sexual Activity    Alcohol use: No    Drug use: No    Sexual activity: Never     Review of Systems   Unable to perform ROS: Dementia    Constitutional: Positive for fatigue. Negative for fever.   Respiratory: Negative for cough and shortness of breath.    Cardiovascular: Negative for chest pain.   Gastrointestinal: Positive for nausea and vomiting. Negative for abdominal distention, abdominal pain and diarrhea.   Genitourinary: Negative for dysuria and hematuria.        Foul smelling urine   Neurological: Negative for dizziness and headaches.   Psychiatric/Behavioral: Positive for confusion.     Objective:     Vital Signs (Most Recent):  Temp: 99 °F (37.2 °C) (07/02/20 2358)  Pulse: 86 (07/02/20 2358)  Resp: 18 (07/02/20 2358)  BP: (!) 141/63 (07/02/20 2358)  SpO2: 96 % (07/02/20 2358) Vital Signs (24h Range):  Temp:  [98.2 °F (36.8 °C)-99.3 °F (37.4 °C)] 99 °F (37.2 °C)  Pulse:  [] 86  Resp:  [16-18] 18  SpO2:  [96 %-98 %] 96 %  BP: (132-169)/(57-81) 141/63     Weight: 56.7 kg (125 lb)  Body mass index is 20.8 kg/m².    Physical Exam  Vitals signs and nursing note reviewed.   Constitutional:       General: She is not in acute distress.     Appearance: She is well-developed. She is not toxic-appearing or diaphoretic.   HENT:      Head: Normocephalic and atraumatic.      Right Ear: External ear normal.      Left Ear: External ear normal.      Mouth/Throat:      Mouth: Mucous membranes are dry.   Eyes:      Pupils: Pupils are equal, round, and reactive to light.   Neck:      Musculoskeletal: Normal range of motion.      Vascular: No JVD.   Cardiovascular:      Rate and Rhythm: Normal rate and regular rhythm.      Heart sounds: No murmur.   Pulmonary:      Effort: Pulmonary effort is normal. No respiratory distress.      Breath sounds: Normal breath sounds. No wheezing or rales.      Comments: Lungs CTA bilaterally; currently on room air  Abdominal:      General: Bowel sounds are normal. There is no distension.      Palpations: Abdomen is soft.      Tenderness: There is no abdominal tenderness. There is no guarding.   Genitourinary:     Vagina:  Normal.      Comments: Not examined  Musculoskeletal: Normal range of motion.         General: No swelling, tenderness or deformity.   Skin:     General: Skin is warm and dry.      Capillary Refill: Capillary refill takes less than 2 seconds.   Neurological:      General: No focal deficit present.      Mental Status: She is alert. She is disoriented.      Comments: Oriented to person and place only. Following commands appropriately.   Psychiatric:         Mood and Affect: Affect is blunt.         Speech: Speech is delayed.         Behavior: Behavior is withdrawn. Behavior is cooperative.         Cognition and Memory: Memory is impaired.           CRANIAL NERVES     CN III, IV, VI   Pupils are equal, round, and reactive to light.       Significant Labs:   CBC:   Recent Labs   Lab 07/02/20  1735   WBC 12.94*   HGB 13.8   HCT 42.0        CMP:   Recent Labs   Lab 07/02/20  1735   *   K 4.8      CO2 18*   *   BUN 10   CREATININE 0.9   CALCIUM 9.8   PROT 7.4   ALBUMIN 3.1*   BILITOT 5.2*   ALKPHOS 202*   *   *   ANIONGAP 11   EGFRNONAA >60     Lactic Acid:   Recent Labs   Lab 07/02/20  1735   LACTATE 2.5*     Urine Studies:   Recent Labs   Lab 07/02/20  1628   COLORU [place]*   APPEARANCEUA Hazy*   PHUR 7.0   SPECGRAV 1.020   PROTEINUA 1+*   GLUCUA Negative   KETONESU Trace*   BILIRUBINUA 3+*   OCCULTUA Negative   NITRITE Positive*   UROBILINOGEN >=8.0*   LEUKOCYTESUR 2+*   RBCUA 0   WBCUA 50*   BACTERIA Many*   SQUAMEPITHEL 2   HYALINECASTS 0     All pertinent labs within the past 24 hours have been reviewed.    Significant Imaging: I have reviewed and interpreted all pertinent imaging results/findings within the past 24 hours.

## 2020-07-03 NOTE — H&P
"[facility] Hospital Medicine  History & Physical    Patient Name: Nina Portillo  MRN: [id number]  Admission Date: 7/2/2020  Attending Physician: Miguel Portillo MD   Primary Care Provider: Rush Rosa MD         Patient information was obtained from patient, past medical records and ER records.     Subjective:     Principal Problem:Infectious encephalopathy    Chief Complaint:   Chief Complaint   Patient presents with    Fatigue    Altered Mental Status     Hx. of UTI, daughter states "urine looks like orange soda" increased confusion 3 days ago    Emesis        HPI: Nina Portillo is a 79 y.o. female with a PMHx of hypertension, GERD, type 2 diabetes, renal cell carcinoma, Alzheimer's, and recurrent UTIs presents to the ED for evaluation altered mental status, fatigue, and emesis x2-3 days. Per the ED note the "the daughter reports increased confusion over the past few days with orange colored, foul smelling, urine today. She reports a prior hx of UTIs requiring hospital admission. No cough, fever, CP, SOB, gross hematuria, dysuria, or abdominal pain." The patient reports nausea and vomiting earlier this evening. The patient denies any abdominal pain, chest pain, shortness of breath, diarrhea, headache, dizziness, dysuria, or hematuria.  HPI and ROS limited secondary to patient's mental status.  Her ED workup is significant for tachycardia, leukocytosis, hyperbilirubinemia, elevated lipase, transaminitis, UTI, hyponatremia, and right upper quadrant ultrasound revealing cholelithiasis with contracted stone filled gallbladder. Patient received 1.5 L of normal saline and Rocephin while in the ED.  The patient will be placed in observation under Hospital Medicine for further workup and evaluation.    Past Medical History:   Diagnosis Date    Alzheimer disease 2012    Cancer     renal cell. right    Dementia     Diabetes mellitus     GERD (gastroesophageal reflux disease)     " Hyperlipidemia     Hypothyroid     Memory loss     Movement disorder        Past Surgical History:   Procedure Laterality Date    HYSTERECTOMY      JOINT REPLACEMENT      right    KNEE ARTHROSCOPY W/ DEBRIDEMENT      left    PARTIAL NEPHRECTOMY      right.     TONSILLECTOMY         Review of patient's allergies indicates:   Allergen Reactions    Phenergan [promethazine] Other (See Comments)     hallucinations    Prilosec [omeprazole magnesium] Hives and Rash       No current facility-administered medications on file prior to encounter.      Current Outpatient Medications on File Prior to Encounter   Medication Sig    amLODIPine (NORVASC) 10 MG tablet Take 1 tablet (10 mg total) by mouth once daily.    aspirin (ECOTRIN) 81 MG EC tablet Take 81 mg by mouth once daily.     blood sugar diagnostic Strp 1 each by Misc.(Non-Drug; Combo Route) route once daily.    blood-glucose meter Misc 1 kit by Misc.(Non-Drug; Combo Route) route once daily.    lancets 33 gauge Misc 1 lancet by Misc.(Non-Drug; Combo Route) route once daily.    levothyroxine (SYNTHROID) 100 MCG tablet Take 1 tablet (100 mcg total) by mouth before breakfast.    metFORMIN (GLUCOPHAGE-XR) 500 MG XR 24hr tablet Take 1 tablet (500 mg total) by mouth after dinner.    mirtazapine (REMERON) 15 MG tablet Take 1 tablet (15 mg total) by mouth every evening.    pantoprazole (PROTONIX) 40 MG tablet Take 1 tablet (40 mg total) by mouth once daily.     Family History     Problem Relation (Age of Onset)    Cancer Mother (90)        Tobacco Use    Smoking status: Never Smoker    Smokeless tobacco: Never Used   Substance and Sexual Activity    Alcohol use: No    Drug use: No    Sexual activity: Never     Review of Systems   Unable to perform ROS: Dementia   Constitutional: Positive for fatigue. Negative for fever.   Respiratory: Negative for cough and shortness of breath.    Cardiovascular: Negative for chest pain.   Gastrointestinal: Positive for  nausea and vomiting. Negative for abdominal distention, abdominal pain and diarrhea.   Genitourinary: Negative for dysuria and hematuria.        Foul smelling urine   Neurological: Negative for dizziness and headaches.   Psychiatric/Behavioral: Positive for confusion.     Objective:     Vital Signs (Most Recent):  Temp: 99 °F (37.2 °C) (07/02/20 2358)  Pulse: 86 (07/02/20 2358)  Resp: 18 (07/02/20 2358)  BP: (!) 141/63 (07/02/20 2358)  SpO2: 96 % (07/02/20 2358) Vital Signs (24h Range):  Temp:  [98.2 °F (36.8 °C)-99.3 °F (37.4 °C)] 99 °F (37.2 °C)  Pulse:  [] 86  Resp:  [16-18] 18  SpO2:  [96 %-98 %] 96 %  BP: (132-169)/(57-81) 141/63     Weight: 56.7 kg (125 lb)  Body mass index is 20.8 kg/m².    Physical Exam  Vitals signs and nursing note reviewed.   Constitutional:       General: She is not in acute distress.     Appearance: She is well-developed. She is not toxic-appearing or diaphoretic.   HENT:      Head: Normocephalic and atraumatic.      Right Ear: External ear normal.      Left Ear: External ear normal.      Mouth/Throat:      Mouth: Mucous membranes are dry.   Eyes:      Pupils: Pupils are equal, round, and reactive to light.   Neck:      Musculoskeletal: Normal range of motion.      Vascular: No JVD.   Cardiovascular:      Rate and Rhythm: Normal rate and regular rhythm.      Heart sounds: No murmur.   Pulmonary:      Effort: Pulmonary effort is normal. No respiratory distress.      Breath sounds: Normal breath sounds. No wheezing or rales.      Comments: Lungs CTA bilaterally; currently on room air  Abdominal:      General: Bowel sounds are normal. There is no distension.      Palpations: Abdomen is soft.      Tenderness: There is no abdominal tenderness. There is no guarding.   Genitourinary:     Vagina: Normal.      Comments: Not examined  Musculoskeletal: Normal range of motion.         General: No swelling, tenderness or deformity.   Skin:     General: Skin is warm and dry.      Capillary  Refill: Capillary refill takes less than 2 seconds.   Neurological:      General: No focal deficit present.      Mental Status: She is alert. She is disoriented.      Comments: Oriented to person and place only. Following commands appropriately.   Psychiatric:         Mood and Affect: Affect is blunt.         Speech: Speech is delayed.         Behavior: Behavior is withdrawn. Behavior is cooperative.         Cognition and Memory: Memory is impaired.           CRANIAL NERVES     CN III, IV, VI   Pupils are equal, round, and reactive to light.       Significant Labs:   CBC:   Recent Labs   Lab 07/02/20  1735   WBC 12.94*   HGB 13.8   HCT 42.0        CMP:   Recent Labs   Lab 07/02/20  1735   *   K 4.8      CO2 18*   *   BUN 10   CREATININE 0.9   CALCIUM 9.8   PROT 7.4   ALBUMIN 3.1*   BILITOT 5.2*   ALKPHOS 202*   *   *   ANIONGAP 11   EGFRNONAA >60     Lactic Acid:   Recent Labs   Lab 07/02/20  1735   LACTATE 2.5*     Urine Studies:   Recent Labs   Lab 07/02/20  1628   COLORU [place]*   APPEARANCEUA Hazy*   PHUR 7.0   SPECGRAV 1.020   PROTEINUA 1+*   GLUCUA Negative   KETONESU Trace*   BILIRUBINUA 3+*   OCCULTUA Negative   NITRITE Positive*   UROBILINOGEN >=8.0*   LEUKOCYTESUR 2+*   RBCUA 0   WBCUA 50*   BACTERIA Many*   SQUAMEPITHEL 2   HYALINECASTS 0     All pertinent labs within the past 24 hours have been reviewed.    Significant Imaging: I have reviewed and interpreted all pertinent imaging results/findings within the past 24 hours.    Assessment/Plan:     * Infectious encephalopathy  Patient has acute metabolic encephalopathy that likely secondary to UTI.  Treatment for underlying cause is underway. Will monitor neuro status carefully, avoid narcotics and benzos that will exacerbate agitation, and use PRN anti-psychotics for controls of behavior for self harm.  CT head negative for acute process.    Calculus of gallbladder without cholecystitis  RUQ US reviewed:  Cholelithiasis with contracted stone filled gallbladder.  Limited visualization.    Acute pancreatitis  Likely 2/2 to gallstones.  Lipase >1,000, continue to trend.  Lipid panel.  RUQ US reviewed.  Continue IVF.  Pain and nausea control, currently denies any abdominal pain.  GI consulted.  NPO.    Transaminitis  Likely 2/2 gallstone pancreatitis. Will continue to trend.   RUQ US reviewed.   Acute hepatitis panel.  GI consulted.    Urinary tract infection without hematuria  Urinalysis reviewed.  Follow urine culture.  Patient started on Rocephin while in the ED, will continue for now.  Previous urine culture from 2/12/20 positive for E coli and pan sensitive.    Hyponatremia  Acute,  Continue IV fluids.  Urinalysis.  TSH  Trend levels    Controlled type 2 diabetes mellitus with diabetic neuropathy, without long-term current use of insulin  Patient's FSGs are controlled on current hypoglycemics.   Last A1c reviewed-   Lab Results   Component Value Date    HGBA1C 7.6 (H) 01/10/2020     Most recent fingerstick glucose reviewed- No results for input(s): POCTGLUCOSE in the last 24 hours.  Current correctional scale  Low  Maintain anti-hyperglycemic dose as follows-   Antihyperglycemics (From admission, onward)    Start     Stop Route Frequency Ordered    07/02/20 2036  insulin aspart U-100 pen 0-5 Units      -- SubQ Before meals & nightly PRN 07/02/20 2036        Accu-Cheks a.c./HS.  Diabetic diet when able.    Sepsis  This patient does have evidence of infective focus  My overall impression is sepsis.  Antibiotics given-   Antibiotics (From admission, onward)    Start     Stop Route Frequency Ordered    07/03/20 1800  cefTRIAXone (ROCEPHIN) 1 g/50 mL D5W IVPB      -- IV Every 24 hours (non-standard times) 07/02/20 2036          Latest lactate reviewed, they are-  Recent Labs   Lab 07/02/20  1735   LACTATE 2.5*       Organ dysfunction indicated by Encephalopathy and Acute liver injury  Source- UTI  Source control Achieved  by- Rocephin  Blood cultures obtained.  Urine culture pending.  Chest x-ray reviewed.  Repeat lactic ordered.    History of renal cell carcinoma  Noted.    Alzheimer's disease  Dementia is controlled currently. Continue home dementia meds and non-pharmacologic interventions to prevent delirium (Activity during day, opening blinds, providing glasses/hearing aids, and up in chair during daytime). Use PRN anti-psychotics to prevent behavior of self harm during sundowning, and avoid narcotics and benzos unless absolutely necessary. PRN anti-psychotics prescribed to avoid self harm behaviors.    GERD (gastroesophageal reflux disease)  Chronic, stable. Continue PPI.    Hypothyroid  Patient has chronic hypothyroidism. TFTs reviewed-   Lab Results   Component Value Date    TSH 3.073 02/13/2020   . Will continue chronic levothyroxine and adjust for and clinical changes.    Hypertension associated with diabetes  Chronic, controlled.  Latest blood pressure and vitals reviewed-   Temp:  [98.2 °F (36.8 °C)-99.3 °F (37.4 °C)]   Pulse:  []   Resp:  [16-18]   BP: (132-169)/(57-81)   SpO2:  [96 %-98 %] .   Home meds for hypertension were reviewed and noted below. Hospital anti-hypertensive changes were made as shown below.  Hypertension Medications             amLODIPine (NORVASC) 10 MG tablet Take 1 tablet (10 mg total) by mouth once daily.      Hospital Medications             amLODIPine tablet 10 mg 10 mg, Oral, Daily        Will utilize p.r.n. blood pressure medication only if patient's blood pressure greater than  180/110 and she develops symptoms such as worsening chest pain or shortness of breath.      VTE Risk Mitigation (From admission, onward)         Ordered     enoxaparin injection 40 mg  Every 24 hours      07/02/20 2036     IP VTE HIGH RISK PATIENT  Once      07/02/20 2036     Place sequential compression device  Until discontinued      07/02/20 2036                   Krista Ott NP  Department of Hospital  Medicine   [facility]

## 2020-07-03 NOTE — NURSING
Attempted to contact Dr. Mane via secure chat then by phone concerning consult for cholecystectomy since tomorrow is a Saturday and things would need to be planned out. No answer, if unable to reach charge nurse will call in AM.     1834  Dr. Mane called back and stated he will call house supervisor and make arrangement for surgery in the AM.

## 2020-07-03 NOTE — ASSESSMENT & PLAN NOTE
This patient does have evidence of infective focus  My overall impression is sepsis.  Antibiotics given-   Antibiotics (From admission, onward)    Start     Stop Route Frequency Ordered    07/03/20 1800  cefTRIAXone (ROCEPHIN) 1 g/50 mL D5W IVPB      -- IV Every 24 hours (non-standard times) 07/02/20 2036          Latest lactate reviewed, they are-  Recent Labs   Lab 07/02/20  1735   LACTATE 2.5*       Organ dysfunction indicated by Encephalopathy and Acute liver injury  Source- UTI  Source control Achieved by- Rocephin  Blood cultures obtained.  Urine culture pending.  Chest x-ray reviewed.  Repeat lactic ordered.

## 2020-07-03 NOTE — ASSESSMENT & PLAN NOTE
Urinalysis reviewed.  Follow urine culture.  Patient started on Rocephin while in the ED, will continue for now.  Previous urine culture from 2/12/20 positive for E coli and pan sensitive.

## 2020-07-04 ENCOUNTER — ANESTHESIA (OUTPATIENT)
Dept: SURGERY | Facility: HOSPITAL | Age: 80
DRG: 853 | End: 2020-07-04
Payer: MEDICARE

## 2020-07-04 ENCOUNTER — ANESTHESIA EVENT (OUTPATIENT)
Dept: SURGERY | Facility: HOSPITAL | Age: 80
DRG: 853 | End: 2020-07-04
Payer: MEDICARE

## 2020-07-04 LAB
ALBUMIN SERPL BCP-MCNC: 2.8 G/DL (ref 3.5–5.2)
ALP SERPL-CCNC: 215 U/L (ref 55–135)
ALT SERPL W/O P-5'-P-CCNC: 101 U/L (ref 10–44)
ANION GAP SERPL CALC-SCNC: 11 MMOL/L (ref 8–16)
AST SERPL-CCNC: 68 U/L (ref 10–40)
BILIRUB SERPL-MCNC: 3.1 MG/DL (ref 0.1–1)
BUN SERPL-MCNC: 8 MG/DL (ref 8–23)
CALCIUM SERPL-MCNC: 9 MG/DL (ref 8.7–10.5)
CHLORIDE SERPL-SCNC: 100 MMOL/L (ref 95–110)
CO2 SERPL-SCNC: 17 MMOL/L (ref 23–29)
CREAT SERPL-MCNC: 0.7 MG/DL (ref 0.5–1.4)
EST. GFR  (AFRICAN AMERICAN): >60 ML/MIN/1.73 M^2
EST. GFR  (NON AFRICAN AMERICAN): >60 ML/MIN/1.73 M^2
GLUCOSE SERPL-MCNC: 106 MG/DL (ref 70–110)
LIPASE SERPL-CCNC: 278 U/L (ref 4–60)
MAGNESIUM SERPL-MCNC: 2.3 MG/DL (ref 1.6–2.6)
POCT GLUCOSE: 124 MG/DL (ref 70–110)
POCT GLUCOSE: 136 MG/DL (ref 70–110)
POCT GLUCOSE: 183 MG/DL (ref 70–110)
POTASSIUM SERPL-SCNC: 4.3 MMOL/L (ref 3.5–5.1)
PROT SERPL-MCNC: 7.5 G/DL (ref 6–8.4)
SODIUM SERPL-SCNC: 128 MMOL/L (ref 136–145)

## 2020-07-04 PROCEDURE — 63600175 PHARM REV CODE 636 W HCPCS: Performed by: SURGERY

## 2020-07-04 PROCEDURE — 36000709 HC OR TIME LEV III EA ADD 15 MIN: Performed by: SURGERY

## 2020-07-04 PROCEDURE — 47562 PR LAP,CHOLECYSTECTOMY: ICD-10-PCS | Mod: ,,, | Performed by: SURGERY

## 2020-07-04 PROCEDURE — 27201423 OPTIME MED/SURG SUP & DEVICES STERILE SUPPLY: Performed by: SURGERY

## 2020-07-04 PROCEDURE — 25000003 PHARM REV CODE 250: Performed by: SURGERY

## 2020-07-04 PROCEDURE — 63600175 PHARM REV CODE 636 W HCPCS: Performed by: NURSE ANESTHETIST, CERTIFIED REGISTERED

## 2020-07-04 PROCEDURE — 99232 PR SUBSEQUENT HOSPITAL CARE,LEVL II: ICD-10-PCS | Mod: ,,, | Performed by: INTERNAL MEDICINE

## 2020-07-04 PROCEDURE — 25000003 PHARM REV CODE 250: Performed by: HOSPITALIST

## 2020-07-04 PROCEDURE — 25000003 PHARM REV CODE 250: Performed by: NURSE ANESTHETIST, CERTIFIED REGISTERED

## 2020-07-04 PROCEDURE — 94799 UNLISTED PULMONARY SVC/PX: CPT

## 2020-07-04 PROCEDURE — 83735 ASSAY OF MAGNESIUM: CPT

## 2020-07-04 PROCEDURE — D9220A PRA ANESTHESIA: ICD-10-PCS | Mod: CRNA,,, | Performed by: NURSE ANESTHETIST, CERTIFIED REGISTERED

## 2020-07-04 PROCEDURE — 80053 COMPREHEN METABOLIC PANEL: CPT

## 2020-07-04 PROCEDURE — 99223 PR INITIAL HOSPITAL CARE,LEVL III: ICD-10-PCS | Mod: ,,, | Performed by: SURGERY

## 2020-07-04 PROCEDURE — 36415 COLL VENOUS BLD VENIPUNCTURE: CPT

## 2020-07-04 PROCEDURE — D9220A PRA ANESTHESIA: Mod: ANES,,, | Performed by: ANESTHESIOLOGY

## 2020-07-04 PROCEDURE — 37000008 HC ANESTHESIA 1ST 15 MINUTES: Performed by: SURGERY

## 2020-07-04 PROCEDURE — 88304 TISSUE EXAM BY PATHOLOGIST: CPT | Mod: 26,,, | Performed by: PATHOLOGY

## 2020-07-04 PROCEDURE — 88304 TISSUE EXAM BY PATHOLOGIST: CPT | Performed by: PATHOLOGY

## 2020-07-04 PROCEDURE — 12000002 HC ACUTE/MED SURGE SEMI-PRIVATE ROOM

## 2020-07-04 PROCEDURE — 37000009 HC ANESTHESIA EA ADD 15 MINS: Performed by: SURGERY

## 2020-07-04 PROCEDURE — 83690 ASSAY OF LIPASE: CPT

## 2020-07-04 PROCEDURE — 47562 LAPAROSCOPIC CHOLECYSTECTOMY: CPT | Mod: ,,, | Performed by: SURGERY

## 2020-07-04 PROCEDURE — 36000708 HC OR TIME LEV III 1ST 15 MIN: Performed by: SURGERY

## 2020-07-04 PROCEDURE — 99223 1ST HOSP IP/OBS HIGH 75: CPT | Mod: ,,, | Performed by: SURGERY

## 2020-07-04 PROCEDURE — 88304 PR  SURG PATH,LEVEL III: ICD-10-PCS | Mod: 26,,, | Performed by: PATHOLOGY

## 2020-07-04 PROCEDURE — D9220A PRA ANESTHESIA: ICD-10-PCS | Mod: ANES,,, | Performed by: ANESTHESIOLOGY

## 2020-07-04 PROCEDURE — 71000033 HC RECOVERY, INTIAL HOUR: Performed by: SURGERY

## 2020-07-04 PROCEDURE — D9220A PRA ANESTHESIA: Mod: CRNA,,, | Performed by: NURSE ANESTHETIST, CERTIFIED REGISTERED

## 2020-07-04 PROCEDURE — 99232 SBSQ HOSP IP/OBS MODERATE 35: CPT | Mod: ,,, | Performed by: INTERNAL MEDICINE

## 2020-07-04 RX ORDER — ONDANSETRON HYDROCHLORIDE 2 MG/ML
INJECTION, SOLUTION INTRAMUSCULAR; INTRAVENOUS
Status: DISCONTINUED | OUTPATIENT
Start: 2020-07-04 | End: 2020-07-04

## 2020-07-04 RX ORDER — ROCURONIUM BROMIDE 10 MG/ML
INJECTION, SOLUTION INTRAVENOUS
Status: DISCONTINUED | OUTPATIENT
Start: 2020-07-04 | End: 2020-07-04

## 2020-07-04 RX ORDER — PROPOFOL 10 MG/ML
VIAL (ML) INTRAVENOUS
Status: DISCONTINUED | OUTPATIENT
Start: 2020-07-04 | End: 2020-07-04

## 2020-07-04 RX ORDER — FENTANYL CITRATE 50 UG/ML
25 INJECTION, SOLUTION INTRAMUSCULAR; INTRAVENOUS EVERY 5 MIN PRN
Status: DISCONTINUED | OUTPATIENT
Start: 2020-07-04 | End: 2020-07-04

## 2020-07-04 RX ORDER — OXYCODONE HYDROCHLORIDE 5 MG/1
5 TABLET ORAL
Status: DISCONTINUED | OUTPATIENT
Start: 2020-07-04 | End: 2020-07-04

## 2020-07-04 RX ORDER — GLYCOPYRROLATE 0.2 MG/ML
INJECTION INTRAMUSCULAR; INTRAVENOUS
Status: DISCONTINUED | OUTPATIENT
Start: 2020-07-04 | End: 2020-07-04

## 2020-07-04 RX ORDER — BUPIVACAINE HYDROCHLORIDE AND EPINEPHRINE 2.5; 5 MG/ML; UG/ML
INJECTION, SOLUTION EPIDURAL; INFILTRATION; INTRACAUDAL; PERINEURAL
Status: DISCONTINUED | OUTPATIENT
Start: 2020-07-04 | End: 2020-07-04 | Stop reason: HOSPADM

## 2020-07-04 RX ORDER — LIDOCAINE HCL/PF 100 MG/5ML
SYRINGE (ML) INTRAVENOUS
Status: DISCONTINUED | OUTPATIENT
Start: 2020-07-04 | End: 2020-07-04

## 2020-07-04 RX ORDER — FENTANYL CITRATE 50 UG/ML
INJECTION, SOLUTION INTRAMUSCULAR; INTRAVENOUS
Status: DISCONTINUED | OUTPATIENT
Start: 2020-07-04 | End: 2020-07-04

## 2020-07-04 RX ORDER — PHENYLEPHRINE HYDROCHLORIDE 10 MG/ML
INJECTION INTRAVENOUS
Status: DISCONTINUED | OUTPATIENT
Start: 2020-07-04 | End: 2020-07-04

## 2020-07-04 RX ORDER — NEOSTIGMINE METHYLSULFATE 1 MG/ML
INJECTION, SOLUTION INTRAVENOUS
Status: DISCONTINUED | OUTPATIENT
Start: 2020-07-04 | End: 2020-07-04

## 2020-07-04 RX ORDER — SUCCINYLCHOLINE CHLORIDE 20 MG/ML
INJECTION INTRAMUSCULAR; INTRAVENOUS
Status: DISCONTINUED | OUTPATIENT
Start: 2020-07-04 | End: 2020-07-04

## 2020-07-04 RX ADMIN — NEOSTIGMINE METHYLSULFATE 3.5 MG: 1 INJECTION INTRAVENOUS at 10:07

## 2020-07-04 RX ADMIN — ROCURONIUM BROMIDE 15 MG: 10 INJECTION, SOLUTION INTRAVENOUS at 09:07

## 2020-07-04 RX ADMIN — ROCURONIUM BROMIDE 5 MG: 10 INJECTION, SOLUTION INTRAVENOUS at 09:07

## 2020-07-04 RX ADMIN — SODIUM CHLORIDE, SODIUM GLUCONATE, SODIUM ACETATE, POTASSIUM CHLORIDE, MAGNESIUM CHLORIDE, SODIUM PHOSPHATE, DIBASIC, AND POTASSIUM PHOSPHATE: .53; .5; .37; .037; .03; .012; .00082 INJECTION, SOLUTION INTRAVENOUS at 10:07

## 2020-07-04 RX ADMIN — SODIUM CHLORIDE, SODIUM GLUCONATE, SODIUM ACETATE, POTASSIUM CHLORIDE, MAGNESIUM CHLORIDE, SODIUM PHOSPHATE, DIBASIC, AND POTASSIUM PHOSPHATE: .53; .5; .37; .037; .03; .012; .00082 INJECTION, SOLUTION INTRAVENOUS at 09:07

## 2020-07-04 RX ADMIN — FENTANYL CITRATE 25 MCG: 50 INJECTION, SOLUTION INTRAMUSCULAR; INTRAVENOUS at 09:07

## 2020-07-04 RX ADMIN — FENTANYL CITRATE 50 MCG: 50 INJECTION, SOLUTION INTRAMUSCULAR; INTRAVENOUS at 09:07

## 2020-07-04 RX ADMIN — PHENYLEPHRINE HYDROCHLORIDE 200 MCG: 10 INJECTION INTRAVENOUS at 09:07

## 2020-07-04 RX ADMIN — SUCCINYLCHOLINE CHLORIDE 120 MG: 20 INJECTION, SOLUTION INTRAMUSCULAR; INTRAVENOUS; PARENTERAL at 09:07

## 2020-07-04 RX ADMIN — FENTANYL CITRATE 25 MCG: 50 INJECTION, SOLUTION INTRAMUSCULAR; INTRAVENOUS at 10:07

## 2020-07-04 RX ADMIN — MIRTAZAPINE 15 MG: 15 TABLET, FILM COATED ORAL at 08:07

## 2020-07-04 RX ADMIN — PHENYLEPHRINE HYDROCHLORIDE 100 MCG: 10 INJECTION INTRAVENOUS at 09:07

## 2020-07-04 RX ADMIN — LEVOTHYROXINE SODIUM 100 MCG: 100 TABLET ORAL at 06:07

## 2020-07-04 RX ADMIN — ONDANSETRON 4 MG: 2 INJECTION, SOLUTION INTRAMUSCULAR; INTRAVENOUS at 09:07

## 2020-07-04 RX ADMIN — CEFTRIAXONE 1 G: 1 INJECTION, SOLUTION INTRAVENOUS at 04:07

## 2020-07-04 RX ADMIN — LIDOCAINE HYDROCHLORIDE 75 MG: 20 INJECTION, SOLUTION INTRAVENOUS at 09:07

## 2020-07-04 RX ADMIN — GLYCOPYRROLATE 0.4 MG: 0.2 INJECTION, SOLUTION INTRAMUSCULAR; INTRAVENOUS at 10:07

## 2020-07-04 RX ADMIN — PROPOFOL 120 MG: 10 INJECTION, EMULSION INTRAVENOUS at 09:07

## 2020-07-04 RX ADMIN — OXYCODONE 5 MG: 5 TABLET ORAL at 12:07

## 2020-07-04 NOTE — SUBJECTIVE & OBJECTIVE
No current facility-administered medications on file prior to encounter.      Current Outpatient Medications on File Prior to Encounter   Medication Sig    amLODIPine (NORVASC) 10 MG tablet Take 1 tablet (10 mg total) by mouth once daily.    aspirin (ECOTRIN) 81 MG EC tablet Take 81 mg by mouth once daily.     blood sugar diagnostic Strp 1 each by Misc.(Non-Drug; Combo Route) route once daily.    blood-glucose meter Misc 1 kit by Misc.(Non-Drug; Combo Route) route once daily.    lancets 33 gauge Misc 1 lancet by Misc.(Non-Drug; Combo Route) route once daily.    levothyroxine (SYNTHROID) 100 MCG tablet Take 1 tablet (100 mcg total) by mouth before breakfast.    metFORMIN (GLUCOPHAGE-XR) 500 MG XR 24hr tablet Take 1 tablet (500 mg total) by mouth after dinner.    mirtazapine (REMERON) 15 MG tablet Take 1 tablet (15 mg total) by mouth every evening.    pantoprazole (PROTONIX) 40 MG tablet Take 1 tablet (40 mg total) by mouth once daily.       Review of patient's allergies indicates:   Allergen Reactions    Phenergan [promethazine] Other (See Comments)     hallucinations    Prilosec [omeprazole magnesium] Hives and Rash       Past Medical History:   Diagnosis Date    Alzheimer disease 2012    Cancer     renal cell. right    Dementia     Diabetes mellitus     GERD (gastroesophageal reflux disease)     Hyperlipidemia     Hypothyroid     Memory loss     Movement disorder      Past Surgical History:   Procedure Laterality Date    HYSTERECTOMY      JOINT REPLACEMENT      right    KNEE ARTHROSCOPY W/ DEBRIDEMENT      left    PARTIAL NEPHRECTOMY      right.     TONSILLECTOMY       Family History     Problem Relation (Age of Onset)    Cancer Mother (90)        Tobacco Use    Smoking status: Never Smoker    Smokeless tobacco: Never Used   Substance and Sexual Activity    Alcohol use: No    Drug use: No    Sexual activity: Never     Review of Systems   Unable to perform ROS: Dementia     Objective:      Vital Signs (Most Recent):  Temp: 98 °F (36.7 °C) (07/04/20 0745)  Pulse: 82 (07/04/20 0745)  Resp: 18 (07/04/20 0745)  BP: 121/62 (07/04/20 0745)  SpO2: 98 % (07/04/20 0745) Vital Signs (24h Range):  Temp:  [97.6 °F (36.4 °C)-99.6 °F (37.6 °C)] 98 °F (36.7 °C)  Pulse:  [78-92] 82  Resp:  [14-19] 18  SpO2:  [95 %-98 %] 98 %  BP: (121-173)/(60-77) 121/62     Weight: 57.3 kg (126 lb 6.4 oz)  Body mass index is 21.03 kg/m².    Physical Exam  Vitals signs reviewed.   Constitutional:       General: She is in acute distress.   HENT:      Head: Normocephalic and atraumatic.      Mouth/Throat:      Mouth: Mucous membranes are moist.      Pharynx: No oropharyngeal exudate or posterior oropharyngeal erythema.   Eyes:      General: Scleral icterus present.         Right eye: No discharge.         Left eye: No discharge.   Cardiovascular:      Rate and Rhythm: Normal rate and regular rhythm.      Pulses: Normal pulses.   Pulmonary:      Effort: Pulmonary effort is normal.   Abdominal:      General: Abdomen is flat. Bowel sounds are normal. There is no distension.      Tenderness: There is abdominal tenderness.   Musculoskeletal: Normal range of motion.   Skin:     General: Skin is warm.      Coloration: Skin is jaundiced.   Psychiatric:         Mood and Affect: Mood normal.         Significant Labs:  CBC:   Recent Labs   Lab 07/03/20  0751   WBC 12.32   RBC 4.18   HGB 13.3   HCT 40.8      MCV 98   MCH 31.8*   MCHC 32.6     CMP:   Recent Labs   Lab 07/04/20  0451      CALCIUM 9.0   ALBUMIN 2.8*   PROT 7.5   *   K 4.3   CO2 17*      BUN 8   CREATININE 0.7   ALKPHOS 215*   *   AST 68*   BILITOT 3.1*       Significant Diagnostics:  US reviewed.  Stones noted.  Normal cbd

## 2020-07-04 NOTE — BRIEF OP NOTE
[facility]  Brief Operative Note    Surgery Date: 7/4/2020     Surgeon(s) and Role:     * Raffi Mane MD - Primary      Pre-op Diagnosis:  Calculus of gallbladder without cholecystitis [K80.20]    Post-op Diagnosis:  Post-Op Diagnosis Codes:     * Calculus of gallbladder without cholecystitis [K80.20]    Procedure(s) (LRB):  CHOLECYSTECTOMY, LAPAROSCOPIC (N/A)    Anesthesia: Choice    Description of the findings of the procedure(s): Distended gallbladder.  Dilated cystic duct.  Lap erick without event.  Endoloop placed without event. Liver appeared cirrhotic.        Estimated Blood Loss: 25 cc's       Specimens:   Specimen (12h ago, onward)    None

## 2020-07-04 NOTE — ANESTHESIA PREPROCEDURE EVALUATION
07/04/2020  Nina Portillo is a 79 y.o., female.    Anesthesia Evaluation          Review of Systems  Anesthesia Hx:  No problems with previous Anesthesia   Cardiovascular:   Exercise tolerance: good Hypertension    Pulmonary:  Pulmonary Normal    Renal/:  Renal/ Normal     Hepatic/GI:   GERD Acute cholecystitis   Neurological:  Neurology Normal    Endocrine:   Diabetes, type 2 Hypothyroidism    Psych:   Psychiatric History depression          Physical Exam  General:  Well nourished    Airway/Jaw/Neck:  Airway Findings: Mouth Opening: Normal Tongue: Normal  General Airway Assessment: Adult  Mallampati: II  TM Distance: Normal, at least 6 cm       Chest/Lungs:  Chest/Lungs Clear    Heart/Vascular:  Heart Findings: Normal            Anesthesia Plan  Type of Anesthesia, risks & benefits discussed:  Anesthesia Type:  general  Patient's Preference:   Intra-op Monitoring Plan: standard ASA monitors  Intra-op Monitoring Plan Comments:   Post Op Pain Control Plan: multimodal analgesia and IV/PO Opioids PRN  Post Op Pain Control Plan Comments:   Induction:   IV  Beta Blocker:  Patient is not currently on a Beta-Blocker (No further documentation required).       Informed Consent: Patient understands risks and agrees with Anesthesia plan.  Questions answered. Anesthesia consent signed with patient.  ASA Score: 3     Day of Surgery Review of History & Physical:    H&P update referred to the surgeon.     Anesthesia Plan Notes: I have personally evaluated the patient and discussed risk/benefits/alternatives of general anesthesia.        Ready For Surgery From Anesthesia Perspective.

## 2020-07-04 NOTE — ASSESSMENT & PLAN NOTE
RUQ US reviewed: Cholelithiasis with contracted stone filled gallbladder.    Cholecystectomy on 7/3/2020

## 2020-07-04 NOTE — ASSESSMENT & PLAN NOTE
Likely 2/2 gallstone pancreatitis. Will continue to trend.   There is no dilation of CBD on US.    AST   Date Value Ref Range Status   07/03/2020 86 (H) 10 - 40 U/L Final     ALT   Date Value Ref Range Status   07/03/2020 95 (H) 10 - 44 U/L Final

## 2020-07-04 NOTE — PROGRESS NOTES
"[facility] Medicine  Progress Note    Patient Name: Nina Portillo  MRN: [id number]  Patient Class: IP- Inpatient   Admission Date: 7/2/2020  Length of Stay: 1 days  Attending Physician: Hannah Medina MD  Primary Care Provider: Rush Rosa MD        Subjective:     Principal Problem:Infectious encephalopathy        HPI:  Nina Portillo is a 79 y.o. female with a PMHx of hypertension, GERD, type 2 diabetes, renal cell carcinoma, Alzheimer's, and recurrent UTIs presents to the ED for evaluation altered mental status, fatigue, and emesis x2-3 days. Per the ED note the "the daughter reports increased confusion over the past few days with orange colored, foul smelling, urine today. She reports a prior hx of UTIs requiring hospital admission. No cough, fever, CP, SOB, gross hematuria, dysuria, or abdominal pain." The patient reports nausea and vomiting earlier this evening. The patient denies any abdominal pain, chest pain, shortness of breath, diarrhea, headache, dizziness, dysuria, or hematuria.  HPI and ROS limited secondary to patient's mental status.  Her ED workup is significant for tachycardia, leukocytosis, hyperbilirubinemia, elevated lipase, transaminitis, UTI, hyponatremia, and right upper quadrant ultrasound revealing cholelithiasis with contracted stone filled gallbladder. Patient received 1.5 L of normal saline and Rocephin while in the ED.  The patient will be placed in observation under Hospital Medicine for further workup and evaluation.    Overview/Hospital Course:  No notes on file    Interval History: Having some abdominal pain especially with cough or deep breathing. Denies right shoulder pain or fever. No nausea or vomiting. Denies eating food but is drinking sips of water.    Review of Systems   Constitutional: Positive for fatigue. Negative for fever.   HENT: Negative.    Eyes: Negative.    Respiratory: Negative.  Negative for cough and shortness of breath.  "   Cardiovascular: Negative.  Negative for chest pain and leg swelling.   Gastrointestinal: Positive for abdominal pain. Negative for abdominal distention, nausea and vomiting.   Genitourinary: Negative.  Negative for difficulty urinating.   Musculoskeletal: Negative.  Negative for back pain.   Neurological: Negative.  Negative for tremors and weakness.   Psychiatric/Behavioral: Negative.  Negative for behavioral problems and confusion.   All other systems reviewed and are negative.    Objective:     Vital Signs (Most Recent):  Temp: 98 °F (36.7 °C) (07/04/20 0745)  Pulse: 82 (07/04/20 0745)  Resp: 18 (07/04/20 0745)  BP: 121/62 (07/04/20 0745)  SpO2: 98 % (07/04/20 0745) Vital Signs (24h Range):  Temp:  [97.6 °F (36.4 °C)-99.6 °F (37.6 °C)] 98 °F (36.7 °C)  Pulse:  [78-92] 82  Resp:  [14-19] 18  SpO2:  [95 %-98 %] 98 %  BP: (121-173)/(60-77) 121/62     Weight: 57.3 kg (126 lb 6.4 oz)  Body mass index is 21.03 kg/m².    Intake/Output Summary (Last 24 hours) at 7/4/2020 0851  Last data filed at 7/3/2020 1702  Gross per 24 hour   Intake 0 ml   Output --   Net 0 ml      Physical Exam  Vitals signs reviewed.   Constitutional:       General: She is not in acute distress.     Appearance: Normal appearance.   HENT:      Head: Normocephalic and atraumatic.      Nose: No rhinorrhea.      Mouth/Throat:      Mouth: Mucous membranes are dry.   Eyes:      General: No scleral icterus.     Extraocular Movements: Extraocular movements intact.      Pupils: Pupils are equal, round, and reactive to light.   Neck:      Musculoskeletal: Normal range of motion and neck supple.   Cardiovascular:      Rate and Rhythm: Normal rate and regular rhythm.      Pulses: Normal pulses.      Heart sounds: Normal heart sounds.   Pulmonary:      Effort: Pulmonary effort is normal.      Breath sounds: Normal breath sounds. No wheezing.   Abdominal:      General: There is no distension.      Palpations: Abdomen is soft.      Tenderness: There is  abdominal tenderness.   Musculoskeletal:         General: No tenderness.      Right lower leg: No edema.      Left lower leg: No edema.   Skin:     General: Skin is warm and dry.      Coloration: Skin is not jaundiced.   Neurological:      General: No focal deficit present.      Mental Status: She is alert and oriented to person, place, and time.   Psychiatric:         Mood and Affect: Mood normal.         Behavior: Behavior normal.         Significant Labs:   Na 128  K 4.3  TB 3.1    AST 68      Significant Imaging:   Nothing new for review      Assessment/Plan:      * Infectious encephalopathy  Metabolic encephalopathy likely due to biliary pancreatitis. Resolving.    Calculus of gallbladder without cholecystitis  RUQ US reviewed: Cholelithiasis with contracted stone filled gallbladder.    Cholecystectomy on 7/3/2020    Acute biliary pancreatitis without infection or necrosis  Lipase was ~1000  S/p cholecystectomy on 7/3/2020.  Analgesics and anti-emetics.  Monitor pain.      Transaminitis  Gallstone pancreatitis s/p cholecystectomy. Stable today. Expect improvement.    AST   Date Value Ref Range Status   07/04/2020 68 (H) 10 - 40 U/L Final     ALT   Date Value Ref Range Status   07/04/2020 101 (H) 10 - 44 U/L Final       Urinary tract infection without hematuria  Growing Ecoli pending sensitivities.  Continue antibiotic.    Antibiotics (From admission, onward)    Start     Stop Route Frequency Ordered    07/03/20 1800  cefTRIAXone (ROCEPHIN) 1 g/50 mL D5W IVPB      -- IV Every 24 hours (non-standard times) 07/02/20 2036          Hyponatremia  Acute drop today after fluid resuscitation. Continue to monitor.    Sodium   Date Value Ref Range Status   07/04/2020 128 (L) 136 - 145 mmol/L Final       Controlled type 2 diabetes mellitus with diabetic neuropathy, without long-term current use of insulin  Patient's FSGs are controlled on current hypoglycemics.   Last A1c reviewed-   Lab Results   Component  Value Date    HGBA1C 7.2 (H) 07/02/2020     Most recent fingerstick glucose reviewed-   Recent Labs   Lab 07/02/20  2203 07/03/20  0540 07/03/20  1149 07/03/20  1710   POCTGLUCOSE 165* 129* 130* 136*     Current correctional scale  Low  Maintain anti-hyperglycemic dose as follows-   Antihyperglycemics (From admission, onward)    Start     Stop Route Frequency Ordered    07/02/20 2036  insulin aspart U-100 pen 0-5 Units      -- SubQ Before meals & nightly PRN 07/02/20 2036          Severe sepsis  This patient does have evidence of infective focus  My overall impression is severe sepsis.  Antibiotics given-   Antibiotics (From admission, onward)    Start     Stop Route Frequency Ordered    07/03/20 1800  cefTRIAXone (ROCEPHIN) 1 g/50 mL D5W IVPB      -- IV Every 24 hours (non-standard times) 07/02/20 2036          Latest lactate reviewed, they are-  Recent Labs   Lab 07/02/20  1735 07/03/20  0751   LACTATE 2.5* 1.9       Organ dysfunction indicated by Encephalopathy and Acute liver injury  Source- UTI  Source control Achieved by- Rocephin    History of renal cell carcinoma  Noted.    Alzheimer's disease  Dementia is controlled currently. Continue home dementia meds and non-pharmacologic interventions to prevent delirium (Activity during day, opening blinds, providing glasses/hearing aids, and up in chair during daytime). Use PRN anti-psychotics to prevent behavior of self harm during sundowning, and avoid narcotics and benzos unless absolutely necessary. PRN anti-psychotics prescribed to avoid self harm behaviors.    GERD (gastroesophageal reflux disease)  Chronic, stable. Continue PPI.    Hypothyroid  Patient has chronic hypothyroidism. TFTs reviewed-   Lab Results   Component Value Date    TSH 1.147 07/03/2020   . Will continue chronic levothyroxine and adjust for and clinical changes.    Hypertension associated with diabetes  Chronic, controlled.  Latest blood pressure and vitals reviewed-   Temp:  [97.6 °F (36.4  °C)-99.6 °F (37.6 °C)]   Pulse:  [78-86]   Resp:  [14-18]   BP: (134-173)/(60-96)   SpO2:  [95 %-97 %] .     Hypertension Medications at home            amLODIPine (NORVASC) 10 MG tablet Take 1 tablet (10 mg total) by mouth once daily.      Hospital Medications             amLODIPine tablet 10 mg 10 mg, Oral, Daily            VTE Risk Mitigation (From admission, onward)         Ordered     IP VTE HIGH RISK PATIENT  Once      07/02/20 2036     Place sequential compression device  Until discontinued      07/02/20 2036                  Hannah Medina MD  Department of Hospital Medicine   [facility]

## 2020-07-04 NOTE — PLAN OF CARE
VSS, denies pain, lap site dressings x 4 to abdomen cdi, tolerated ice chips without N/V, candido SCDs on. Ok per Dr. Live to transfer the pt back to the floor. Pt transported via bed back to room 205. Pt's daughter at the bedside. Call light within reach. Report given to LAURA Lira.

## 2020-07-04 NOTE — CONSULTS
[facility]  General Surgery  Consult Note    Patient Name: Nina Portillo  MRN: [id number]  Code Status: Full Code  Admission Date: 7/2/2020  Hospital Length of Stay: 1 days  Attending Physician: Hannah Medina MD  Primary Care Provider: Rush Rosa MD    Patient information was obtained from patient and ER records.     Inpatient consult to General Surgery  Consult performed by: Raffi Mane MD  Consult ordered by: Miguel Portillo MD        Subjective:     Principal Problem: Infectious encephalopathy    History of Present Illness: 80 yo F wth history of Alzheimer's disease, HTN, GERD, RCC,  DMII and recurrent UTIs was  admitted with acute onset epigastric abdominal pain.  Pt found to have biliary pancreatitis with associated altered mental status and emesis for several days prior to admission.  Over the last two days since admission her pain has improved and her lipase and bilirubin have trended downward.   Pt  with history of renal cell cancer and has reportedly had a partial nephrectomy.    Patient noted to have leukocytosis on admission with WBC 12.94. , , , Lipase >1000, and t. Bili of 5.2       US Abd showed cholelithiasis, contracted gallbladder, multiple stones, normal CBD without obstruction.     GI has seen the pt and have recommended lap erick.  NO plans for ERCP given no obstruction on US and trend towards normalization of lipase and t. bili    No current facility-administered medications on file prior to encounter.      Current Outpatient Medications on File Prior to Encounter   Medication Sig    amLODIPine (NORVASC) 10 MG tablet Take 1 tablet (10 mg total) by mouth once daily.    aspirin (ECOTRIN) 81 MG EC tablet Take 81 mg by mouth once daily.     blood sugar diagnostic Strp 1 each by Misc.(Non-Drug; Combo Route) route once daily.    blood-glucose meter Misc 1 kit by Misc.(Non-Drug; Combo Route) route once daily.    lancets 33 gauge Misc 1  lancet by Misc.(Non-Drug; Combo Route) route once daily.    levothyroxine (SYNTHROID) 100 MCG tablet Take 1 tablet (100 mcg total) by mouth before breakfast.    metFORMIN (GLUCOPHAGE-XR) 500 MG XR 24hr tablet Take 1 tablet (500 mg total) by mouth after dinner.    mirtazapine (REMERON) 15 MG tablet Take 1 tablet (15 mg total) by mouth every evening.    pantoprazole (PROTONIX) 40 MG tablet Take 1 tablet (40 mg total) by mouth once daily.       Review of patient's allergies indicates:   Allergen Reactions    Phenergan [promethazine] Other (See Comments)     hallucinations    Prilosec [omeprazole magnesium] Hives and Rash       Past Medical History:   Diagnosis Date    Alzheimer disease 2012    Cancer     renal cell. right    Dementia     Diabetes mellitus     GERD (gastroesophageal reflux disease)     Hyperlipidemia     Hypothyroid     Memory loss     Movement disorder      Past Surgical History:   Procedure Laterality Date    HYSTERECTOMY      JOINT REPLACEMENT      right    KNEE ARTHROSCOPY W/ DEBRIDEMENT      left    PARTIAL NEPHRECTOMY      right.     TONSILLECTOMY       Family History     Problem Relation (Age of Onset)    Cancer Mother (90)        Tobacco Use    Smoking status: Never Smoker    Smokeless tobacco: Never Used   Substance and Sexual Activity    Alcohol use: No    Drug use: No    Sexual activity: Never     Review of Systems   Unable to perform ROS: Dementia     Objective:     Vital Signs (Most Recent):  Temp: 98 °F (36.7 °C) (07/04/20 0745)  Pulse: 82 (07/04/20 0745)  Resp: 18 (07/04/20 0745)  BP: 121/62 (07/04/20 0745)  SpO2: 98 % (07/04/20 0745) Vital Signs (24h Range):  Temp:  [97.6 °F (36.4 °C)-99.6 °F (37.6 °C)] 98 °F (36.7 °C)  Pulse:  [78-92] 82  Resp:  [14-19] 18  SpO2:  [95 %-98 %] 98 %  BP: (121-173)/(60-77) 121/62     Weight: 57.3 kg (126 lb 6.4 oz)  Body mass index is 21.03 kg/m².    Physical Exam  Vitals signs reviewed.   Constitutional:       General: She is in  acute distress.   HENT:      Head: Normocephalic and atraumatic.      Mouth/Throat:      Mouth: Mucous membranes are moist.      Pharynx: No oropharyngeal exudate or posterior oropharyngeal erythema.   Eyes:      General: Scleral icterus present.         Right eye: No discharge.         Left eye: No discharge.   Cardiovascular:      Rate and Rhythm: Normal rate and regular rhythm.      Pulses: Normal pulses.   Pulmonary:      Effort: Pulmonary effort is normal.   Abdominal:      General: Abdomen is flat. Bowel sounds are normal. There is no distension.      Tenderness: There is abdominal tenderness.   Musculoskeletal: Normal range of motion.   Skin:     General: Skin is warm.      Coloration: Skin is jaundiced.   Psychiatric:         Mood and Affect: Mood normal.         Significant Labs:  CBC:   Recent Labs   Lab 07/03/20  0751   WBC 12.32   RBC 4.18   HGB 13.3   HCT 40.8      MCV 98   MCH 31.8*   MCHC 32.6     CMP:   Recent Labs   Lab 07/04/20  0451      CALCIUM 9.0   ALBUMIN 2.8*   PROT 7.5   *   K 4.3   CO2 17*      BUN 8   CREATININE 0.7   ALKPHOS 215*   *   AST 68*   BILITOT 3.1*       Significant Diagnostics:  US reviewed.  Stones noted.  Normal cbd    Assessment/Plan:     Acute biliary pancreatitis without infection or necrosis  LFts and lipase trending downward.  Agree with recommendation of cholecytesectomy prior to discharge.  Will proceed with surgery today.      VTE Risk Mitigation (From admission, onward)         Ordered     IP VTE HIGH RISK PATIENT  Once      07/02/20 2036     Place sequential compression device  Until discontinued      07/02/20 2036                Thank you for your consult. I will follow-up with patient. Please contact us if you have any additional questions.    Raffi Mane MD  General Surgery  [facility]

## 2020-07-04 NOTE — PLAN OF CARE
Pt in bed resting in bed with eyes closed. Pt alert and oriented to self and place. Able to make needs and wants known. No complaints or distress noted at this time. Pt denies any abdominal pain or abdominal discomfort at this time. No respiratory distress noted. Bed low, call light in reach, free of falls, safety maintained, VSS, will continue to monitor.

## 2020-07-04 NOTE — OP NOTE
Surgeon:  Dr. Raffi Mane    Preoperative diagnosis:  Biliary pancreatitis    Postoperative diagnosis:  The same    Procedure:  Laparoscopic cholecystectomy    Anesthesia:  General endotracheal anesthesia    Indication for procedure:  79-year-old female who was admitted to the hospital with biliary pancreatitis.  Patient's LFTs and lipase were trending downward and no evidence of common bile duct dilation on ultrasound.  Therefore recommendation to proceed with cholecystectomy.    Description of procedure:  Following signing informed consent patient in the operating room placed supine position.  General tracheal anesthesia was administered without event.  The abdomen is prepped and draped in standard fashion and appropriate time-out procedure was then performed.  I next make a  small transverse incision above the umbilicus carried on the deep dermal tissue I entered the abdominal cavity with a  Veress needle establish pneumoperitoneum to 15 mmHg.  I then enter  with an Optiview trocar direct visualization.  Patient placed in reverse Trendelenburg and tilted towards the left side.  I then placed a 5 mm epigastric trocar 2 in the right subcostal margin.  All trocars were placed under direct visualization and after injection of quarter percent Marcaine with epi.  The fundus was grasped and held with the patient's liver.  Liver does appear cirrhotic and pics are taken to document.  This exposed the infundibulum which was grasped and held towards the patient's feet.  I next dissected the triangle of Calot identifying the cystic duct and cystic artery in a usual anatomic locations. THe duct does appear to be dilated. No stones appreciated I placed 2 clips distally on the cystic duct 1 clip proximally.  I also placed an endoloop around the stump.   I placed 2 clips on the cystic artery.  I cut between clips and use of cautery to with the gallbladder from the hepatic fossa.  I then removed the gallbladder from the  abdominal cavity with assistance of an Endo-Catch bag.  Having removed the gallbladder I evaluated a padded fossa once again sure adequate hemostasis.  I next remove all trocars under direct visualization.  I closed the umbilical fascia with 0 Vicryl suture.  Skin incision closed with 4-0 Monocryl.  Patient is extubated taken recovery room stable condition.  Blood loss was 10 cc's

## 2020-07-04 NOTE — PROGRESS NOTES
"CC: pancreatitis     HPI 79 y.o. female with history of Alzheimer's disease, HTN, GERD, RCC,  DMII and recurrent UTIs is admitted with acute onset painful biliary pancreatitis with associated altered mental status and emesis for several days prior to admission. She had been feeling fatigued over the past several days. No fevers. History is difficult to obtain from patient due to mental status. Most of history was taken from medical records.     Patient noted to have leukocytosis on admission with WBC 12.94. , , , Lipase >1000.      US Abd showed cholelithiasis, contracted gallbladder, multiple stones, normal CBD without obstruction.    Interval hx:  Reports abdominal pain is stable as day prior. Mental status appears mildly improved today. No other complaints. Surgery consulted and Dr. Mane planning for cholecystectomy today.    Past Medical History:   Diagnosis Date    Alzheimer disease 2012    Cancer     renal cell. right    Dementia     Diabetes mellitus     GERD (gastroesophageal reflux disease)     Hyperlipidemia     Hypothyroid     Memory loss     Movement disorder      Review of Systems  General ROS: negative for chills, fever or weight loss  Cardiovascular ROS: no chest pain or dyspnea on exertion  Gastrointestinal ROS: +abdominal pain, change in bowel habits, or black/ bloody stools    Physical Examination  BP (!) 144/77   Pulse 80   Temp 97.9 °F (36.6 °C)   Resp 17   Ht 5' 5" (1.651 m)   Wt 57.3 kg (126 lb 6.4 oz)   SpO2 97%   Breastfeeding No   BMI 21.03 kg/m²   General appearance: lethargic,lying in bed  HENT: Normocephalic, atraumatic, neck symmetrical, no nasal discharge   Eyes: conjunctivae/corneas clear, PERRL, EOM's intact  Lungs: clear to auscultation bilaterally, symmetric chest wall expansion bilaterally  Heart: regular rate and rhythm without rub; no displacement of the PMI   Abdomen: soft, tender to palpation in epigastrium, bowel sounds normoactive; no " organomegaly  Extremities: extremities symmetric; no clubbing, cyanosis, or edema  Integument: Skin color, texture, turgor normal; no rashes; hair distrubution normal  Neurologic: awake, answering some questions today   Psychiatric: no pressured speech; normal affect, evidence of intermittent confusion    Labs:  Lab Results   Component Value Date    WBC 12.32 07/03/2020    HGB 13.3 07/03/2020    HCT 40.8 07/03/2020    MCV 98 07/03/2020     07/03/2020     Imaging:  US Abd: Cholelithiasis with contracted stone filled gallbladder.     Independently reviewed     Assessment:   1. Acute biliary pancreatitis     Plan:   -Surgery consulted and planning CCY today  -LR for IVF resuscitation  -Electrolyte repletion  -Anti-emetics and pain control as needed    Shai Green MD  [facility]  [address] [place] [address]  Office: [phone]  Fax: [phone]

## 2020-07-04 NOTE — ANESTHESIA PROCEDURE NOTES
Intubation  Performed by: Selvin Loyd CRNA  Authorized by: Pramod Live MD     Intubation:     Induction:  Intravenous    Intubated:  Postinduction    Mask Ventilation:  Easy mask    Attempts:  1    Attempted By:  CRNA    Method of Intubation:  Direct    Blade:  Fisher 2    Laryngeal View Grade: Grade I - full view of chords      Difficult Airway Encountered?: No      Airway Device:  Oral endotracheal tube    Airway Device Size:  7.0    Style/Cuff Inflation:  Cuffed    Secured at:  The lips    Placement Verified By:  Capnometry    Complicating Factors:  None    Findings Post-Intubation:  BS equal bilateral

## 2020-07-04 NOTE — ASSESSMENT & PLAN NOTE
Monitor level while getting IVF.    Sodium   Date Value Ref Range Status   07/03/2020 136 136 - 145 mmol/L Final

## 2020-07-04 NOTE — ASSESSMENT & PLAN NOTE
Patient's FSGs are controlled on current hypoglycemics.   Last A1c reviewed-   Lab Results   Component Value Date    HGBA1C 7.2 (H) 07/02/2020     Most recent fingerstick glucose reviewed-   Recent Labs   Lab 07/02/20  2203 07/03/20  0540 07/03/20  1149 07/03/20  1710   POCTGLUCOSE 165* 129* 130* 136*     Current correctional scale  Low  Maintain anti-hyperglycemic dose as follows-   Antihyperglycemics (From admission, onward)    Start     Stop Route Frequency Ordered    07/02/20 2036  insulin aspart U-100 pen 0-5 Units      -- SubQ Before meals & nightly PRN 07/02/20 2036

## 2020-07-04 NOTE — TRANSFER OF CARE
"Anesthesia Transfer of Care Note    Patient: Nina Portillo    Procedure(s) Performed: Procedure(s) (LRB):  CHOLECYSTECTOMY, LAPAROSCOPIC (N/A)    Patient location: PACU    Anesthesia Type: general    Transport from OR: Transported from OR on 2-3 L/min O2 by NC with adequate spontaneous ventilation    Post pain: adequate analgesia    Post assessment: no apparent anesthetic complications    Post vital signs: stable    Level of consciousness: awake    Nausea/Vomiting: no nausea/vomiting    Complications: none    Transfer of care protocol was followed      Last vitals:   Visit Vitals  BP (!) 146/76   Pulse 104   Temp 36.4 °C (97.5 °F) (Skin)   Resp 16   Ht 5' 5" (1.651 m)   Wt 57.3 kg (126 lb 6.4 oz)   SpO2 100%   Breastfeeding No   BMI 21.03 kg/m²     "

## 2020-07-04 NOTE — ASSESSMENT & PLAN NOTE
Chronic, controlled.  Latest blood pressure and vitals reviewed-   Temp:  [97.6 °F (36.4 °C)-99.6 °F (37.6 °C)]   Pulse:  [78-86]   Resp:  [14-18]   BP: (134-173)/(60-96)   SpO2:  [95 %-97 %] .     Hypertension Medications at home            amLODIPine (NORVASC) 10 MG tablet Take 1 tablet (10 mg total) by mouth once daily.      Hospital Medications             amLODIPine tablet 10 mg 10 mg, Oral, Daily

## 2020-07-04 NOTE — ASSESSMENT & PLAN NOTE
RUQ US reviewed: Cholelithiasis with contracted stone filled gallbladder.    Will need cholecystectomy.  Surgeon consulted.  NPO after MN tonight.

## 2020-07-04 NOTE — ASSESSMENT & PLAN NOTE
Growing Ecoli pending sensitivities.  Continue antibiotic.    Antibiotics (From admission, onward)    Start     Stop Route Frequency Ordered    07/03/20 1800  cefTRIAXone (ROCEPHIN) 1 g/50 mL D5W IVPB      -- IV Every 24 hours (non-standard times) 07/02/20 2036

## 2020-07-04 NOTE — NURSING
Pt complaining of 10/10 generalized pain. Pt playing board game with daughter at bedside. Pt also requesting nurse to warm full plate of food daughter brought, while requesting PRN phenergan for severe nausea. Educated pt on intake of food when nauseated can increase nausea. Pt verbalized understanding and now requesting phenergan in one hour.

## 2020-07-04 NOTE — PLAN OF CARE
Phone consent received from pt's daughter, Soledad for pt's surgery due to pt having Alzhemier's and Dementia. Pt's daughter spoke to Dr. Mane and Dr. Live via telephone. Pt's daughter en route to hospital. Pt to the OR with NICOLE Montalvo and LAURA Merritt and Dr. Live.

## 2020-07-04 NOTE — SUBJECTIVE & OBJECTIVE
Interval History:  Seen by GI doctor.  Recommended surgical consultation for cholecystectomy.  Vitals stable.    Review of Systems   Unable to perform ROS: Dementia     Objective:     Vital Signs (Most Recent):  Temp: 99.6 °F (37.6 °C) (07/03/20 1953)  Pulse: 78 (07/03/20 1953)  Resp: 17 (07/03/20 1953)  BP: (!) 145/71 (07/03/20 1953)  SpO2: 95 % (07/03/20 1953) Vital Signs (24h Range):  Temp:  [97.6 °F (36.4 °C)-99.6 °F (37.6 °C)] 99.6 °F (37.6 °C)  Pulse:  [78-86] 78  Resp:  [14-18] 17  SpO2:  [95 %-97 %] 95 %  BP: (134-173)/(60-96) 145/71     Weight: 57.3 kg (126 lb 6.4 oz)  Body mass index is 21.03 kg/m².    Intake/Output Summary (Last 24 hours) at 7/3/2020 2113  Last data filed at 7/3/2020 1702  Gross per 24 hour   Intake 776.67 ml   Output --   Net 776.67 ml      Physical Exam  Vitals signs reviewed.   Constitutional:       General: She is not in acute distress.     Appearance: Normal appearance.   Eyes:      General:         Right eye: No discharge.         Left eye: No discharge.   Neck:      Vascular: No JVD.   Cardiovascular:      Rate and Rhythm: Normal rate and regular rhythm.   Pulmonary:      Effort: Pulmonary effort is normal.      Breath sounds: Normal breath sounds.   Abdominal:      General: Bowel sounds are normal. There is no distension.      Palpations: Abdomen is soft.      Tenderness: There is no abdominal tenderness.   Skin:     General: Skin is warm.      Findings: No rash.   Neurological:      Mental Status: She is alert. She is disoriented and confused.         Significant Labs: All pertinent labs within the past 24 hours have been reviewed.    Significant Imaging: I have reviewed all pertinent imaging results/findings within the past 24 hours.

## 2020-07-04 NOTE — PROGRESS NOTES
"[facility]  Progress Note    Patient Name: Nina Portillo  MRN: [id number]  Patient Class: IP- Inpatient   Admission Date: 7/2/2020  Length of Stay: 0 days  Attending Physician: Miguel Portillo MD  Primary Care Provider: Rush Rosa MD        Subjective:     Principal Problem:Infectious encephalopathy        HPI:  Nina Portillo is a 79 y.o. female with a PMHx of hypertension, GERD, type 2 diabetes, renal cell carcinoma, Alzheimer's, and recurrent UTIs presents to the ED for evaluation altered mental status, fatigue, and emesis x2-3 days. Per the ED note the "the daughter reports increased confusion over the past few days with orange colored, foul smelling, urine today. She reports a prior hx of UTIs requiring hospital admission. No cough, fever, CP, SOB, gross hematuria, dysuria, or abdominal pain." The patient reports nausea and vomiting earlier this evening. The patient denies any abdominal pain, chest pain, shortness of breath, diarrhea, headache, dizziness, dysuria, or hematuria.  HPI and ROS limited secondary to patient's mental status.  Her ED workup is significant for tachycardia, leukocytosis, hyperbilirubinemia, elevated lipase, transaminitis, UTI, hyponatremia, and right upper quadrant ultrasound revealing cholelithiasis with contracted stone filled gallbladder. Patient received 1.5 L of normal saline and Rocephin while in the ED.  The patient will be placed in observation under Hospital Medicine for further workup and evaluation.    Overview/Hospital Course:  No notes on file    Interval History:  Seen by GI doctor.  Recommended surgical consultation for cholecystectomy.  Vitals stable.    Review of Systems   Unable to perform ROS: Dementia     Objective:     Vital Signs (Most Recent):  Temp: 99.6 °F (37.6 °C) (07/03/20 1953)  Pulse: 78 (07/03/20 1953)  Resp: 17 (07/03/20 1953)  BP: (!) 145/71 (07/03/20 1953)  SpO2: 95 % (07/03/20 1953) Vital Signs (24h " Range):  Temp:  [97.6 °F (36.4 °C)-99.6 °F (37.6 °C)] 99.6 °F (37.6 °C)  Pulse:  [78-86] 78  Resp:  [14-18] 17  SpO2:  [95 %-97 %] 95 %  BP: (134-173)/(60-96) 145/71     Weight: 57.3 kg (126 lb 6.4 oz)  Body mass index is 21.03 kg/m².    Intake/Output Summary (Last 24 hours) at 7/3/2020 2113  Last data filed at 7/3/2020 1702  Gross per 24 hour   Intake 776.67 ml   Output --   Net 776.67 ml      Physical Exam  Vitals signs reviewed.   Constitutional:       General: She is not in acute distress.     Appearance: Normal appearance.   Eyes:      General:         Right eye: No discharge.         Left eye: No discharge.   Neck:      Vascular: No JVD.   Cardiovascular:      Rate and Rhythm: Normal rate and regular rhythm.   Pulmonary:      Effort: Pulmonary effort is normal.      Breath sounds: Normal breath sounds.   Abdominal:      General: Bowel sounds are normal. There is no distension.      Palpations: Abdomen is soft.      Tenderness: There is no abdominal tenderness.   Skin:     General: Skin is warm.      Findings: No rash.   Neurological:      Mental Status: She is alert. She is disoriented and confused.         Significant Labs: All pertinent labs within the past 24 hours have been reviewed.    Significant Imaging: I have reviewed all pertinent imaging results/findings within the past 24 hours.      Assessment/Plan:      * Infectious encephalopathy  Patient has acute metabolic encephalopathy that likely secondary to UTI.    Monitor mental acuity while getting antibiotic.    Calculus of gallbladder without cholecystitis  RUQ US reviewed: Cholelithiasis with contracted stone filled gallbladder.    Will need cholecystectomy.  Surgeon consulted.  NPO after MN tonight.    Acute biliary pancreatitis without infection or necrosis  Clear liquid diet.  Analgesics and anti-emetics.  Monitor pain.    Lab Results   Component Value Date    LIPASE >1000 (H) 07/02/2020       Transaminitis  Likely 2/2 gallstone pancreatitis. Will  continue to trend.   There is no dilation of CBD on US.    AST   Date Value Ref Range Status   07/03/2020 86 (H) 10 - 40 U/L Final     ALT   Date Value Ref Range Status   07/03/2020 95 (H) 10 - 44 U/L Final       Urinary tract infection without hematuria  See what urine grows.  Continue antibiotic.    Antibiotics (From admission, onward)    Start     Stop Route Frequency Ordered    07/03/20 1800  cefTRIAXone (ROCEPHIN) 1 g/50 mL D5W IVPB      -- IV Every 24 hours (non-standard times) 07/02/20 2036          Hyponatremia  Monitor level while getting IVF.    Sodium   Date Value Ref Range Status   07/03/2020 136 136 - 145 mmol/L Final       Controlled type 2 diabetes mellitus with diabetic neuropathy, without long-term current use of insulin  Patient's FSGs are controlled on current hypoglycemics.   Last A1c reviewed-   Lab Results   Component Value Date    HGBA1C 7.2 (H) 07/02/2020     Most recent fingerstick glucose reviewed-   Recent Labs   Lab 07/02/20  2203 07/03/20  0540 07/03/20  1149 07/03/20  1710   POCTGLUCOSE 165* 129* 130* 136*     Current correctional scale  Low  Maintain anti-hyperglycemic dose as follows-   Antihyperglycemics (From admission, onward)    Start     Stop Route Frequency Ordered    07/02/20 2036  insulin aspart U-100 pen 0-5 Units      -- SubQ Before meals & nightly PRN 07/02/20 2036          Severe sepsis  This patient does have evidence of infective focus  My overall impression is severe sepsis.  Antibiotics given-   Antibiotics (From admission, onward)    Start     Stop Route Frequency Ordered    07/03/20 1800  cefTRIAXone (ROCEPHIN) 1 g/50 mL D5W IVPB      -- IV Every 24 hours (non-standard times) 07/02/20 2036          Latest lactate reviewed, they are-  Recent Labs   Lab 07/02/20  1735 07/03/20  0751   LACTATE 2.5* 1.9       Organ dysfunction indicated by Encephalopathy and Acute liver injury  Source- UTI  Source control Achieved by- Rocephin    History of renal cell  carcinoma  Noted.    Alzheimer's disease  Dementia is controlled currently. Continue home dementia meds and non-pharmacologic interventions to prevent delirium (Activity during day, opening blinds, providing glasses/hearing aids, and up in chair during daytime). Use PRN anti-psychotics to prevent behavior of self harm during sundowning, and avoid narcotics and benzos unless absolutely necessary. PRN anti-psychotics prescribed to avoid self harm behaviors.    GERD (gastroesophageal reflux disease)  Chronic, stable. Continue PPI.    Hypothyroid  Patient has chronic hypothyroidism. TFTs reviewed-   Lab Results   Component Value Date    TSH 1.147 07/03/2020   . Will continue chronic levothyroxine and adjust for and clinical changes.    Hypertension associated with diabetes  Chronic, controlled.  Latest blood pressure and vitals reviewed-   Temp:  [97.6 °F (36.4 °C)-99.6 °F (37.6 °C)]   Pulse:  [78-86]   Resp:  [14-18]   BP: (134-173)/(60-96)   SpO2:  [95 %-97 %] .     Hypertension Medications at home            amLODIPine (NORVASC) 10 MG tablet Take 1 tablet (10 mg total) by mouth once daily.      Hospital Medications             amLODIPine tablet 10 mg 10 mg, Oral, Daily            VTE Risk Mitigation (From admission, onward)         Ordered     IP VTE HIGH RISK PATIENT  Once      07/02/20 2036     Place sequential compression device  Until discontinued      07/02/20 2036                      Miguel Portillo MD  Department of Hospital Medicine   [facility]

## 2020-07-04 NOTE — PLAN OF CARE
POC reviewed with pt and daughter, understanding verbalized. PRN pain meds given for abd pain- full relief maintained. Pt tolerating clear liquids. Incontinence care. Blood glucose monitoring. Safety maintained. Will monitor.

## 2020-07-04 NOTE — ASSESSMENT & PLAN NOTE
Acute drop today after fluid resuscitation. Continue to monitor.    Sodium   Date Value Ref Range Status   07/04/2020 128 (L) 136 - 145 mmol/L Final

## 2020-07-04 NOTE — HPI
80 yo F [facility] history of Alzheimer's disease, HTN, GERD, RCC,  DMII and recurrent UTIs was  admitted with acute onset epigastric abdominal pain.  Pt found to have biliary pancreatitis with associated altered mental status and emesis for several days prior to admission.  Over the last two days since admission her pain has improved and her lipase and bilirubin have trended downward.   Pt  with history of renal cell cancer and has reportedly had a partial nephrectomy.    Patient noted to have leukocytosis on admission with WBC 12.94. , , , Lipase >1000, and t. Bili of 5.2       US Abd showed cholelithiasis, contracted gallbladder, multiple stones, normal CBD without obstruction.     GI has seen the pt and have recommended lap erick.  NO plans for ERCP given no obstruction on US and trend towards normalization of lipase and t. bili

## 2020-07-04 NOTE — ASSESSMENT & PLAN NOTE
Gallstone pancreatitis s/p cholecystectomy. Stable today. Expect improvement.    AST   Date Value Ref Range Status   07/04/2020 68 (H) 10 - 40 U/L Final     ALT   Date Value Ref Range Status   07/04/2020 101 (H) 10 - 44 U/L Final

## 2020-07-04 NOTE — ASSESSMENT & PLAN NOTE
This patient does have evidence of infective focus  My overall impression is severe sepsis.  Antibiotics given-   Antibiotics (From admission, onward)    Start     Stop Route Frequency Ordered    07/03/20 1800  cefTRIAXone (ROCEPHIN) 1 g/50 mL D5W IVPB      -- IV Every 24 hours (non-standard times) 07/02/20 2036          Latest lactate reviewed, they are-  Recent Labs   Lab 07/02/20  1735 07/03/20  0751   LACTATE 2.5* 1.9       Organ dysfunction indicated by Encephalopathy and Acute liver injury  Source- UTI  Source control Achieved by- Rocephin

## 2020-07-04 NOTE — ASSESSMENT & PLAN NOTE
LFts and lipase trending downward.  Agree with recommendation of cholecytesectomy prior to discharge.  Will proceed with surgery today.

## 2020-07-04 NOTE — PLAN OF CARE
SW spoke with patient to complete a discharge planning assessment. Patient presents as alert and oriented x 4, pleasant, good eye contact, well groomed, recall good, concentration/judgement good, average intelligence, calm, communicative, cooperative, and asking and answering questions appropriately. SW verified all information on demographic sheet is correct. Patient reports living with daughter and son-in-law  and that she is independent with ADLs at this time and does drive. Patient reports daughter will transport pt home.    Patient reports does not have home health. Patient reports that she is not receiving outside resources. Patient reports having no medical equipment. Patient reports Rush Rosa MD as pt's PCP and has [employer] as their insurance. Patient reports receiving medications from [facility] and reports that she is able to afford medications at this time and at time of discharge. Patient reports that she is not on dialysis at this time. Patient reports that she is not receiving services with the coumadin clinic. Patient reports has not been admitted to the hospital within the last 30 days.    Patient reports does not have a Living Will or Healthcare Power of . Patient denies mental health issues.    Patient expressed no other needs at this time. SW remains available.         07/04/20 1554   Discharge Assessment   Assessment Type Discharge Planning Assessment   Confirmed/corrected address and phone number on facesheet? Yes   Assessment information obtained from? Patient   Communicated expected length of stay with patient/caregiver no   Prior to hospitilization cognitive status: Alert/Oriented   Prior to hospitalization functional status: Independent   Current cognitive status: Alert/Oriented   Current Functional Status: Independent   Facility Arrived From: Home   Lives With child(dianna), adult   Able to Return to Prior Arrangements yes   Is patient able to care for self after  discharge? Yes   Who are your caregiver(s) and their phone number(s)? Soledad Kahn (daughter) [phone]   Patient's perception of discharge disposition home or selfcare   Readmission Within the Last 30 Days no previous admission in last 30 days   Patient currently being followed by outpatient case management? No   Patient currently receives any other outside agency services? No   Equipment Currently Used at Home none   Part D Coverage Pt has Managed Medicare   Do you have any problems affording any of your prescribed medications? No   Is the patient taking medications as prescribed? yes   Does the patient have transportation home? Yes   Transportation Anticipated car, drives self;family or friend will provide   Dialysis Name and Scheduled days N/A   Does the patient receive services at the Coumadin Clinic? No   Discharge Plan A Home;Home with family   Discharge Plan B Home;Home with family   DME Needed Upon Discharge  none   Patient/Family in Agreement with Plan yes

## 2020-07-04 NOTE — SUBJECTIVE & OBJECTIVE
Interval History: Having some abdominal pain especially with cough or deep breathing. Denies right shoulder pain or fever. No nausea or vomiting.     Review of Systems   Constitutional: Positive for fatigue. Negative for fever.   HENT: Negative.    Eyes: Negative.    Respiratory: Negative.  Negative for cough and shortness of breath.    Cardiovascular: Negative.  Negative for chest pain and leg swelling.   Gastrointestinal: Positive for abdominal pain. Negative for abdominal distention, nausea and vomiting.   Genitourinary: Negative.  Negative for difficulty urinating.   Musculoskeletal: Negative.  Negative for back pain.   Neurological: Negative.  Negative for tremors and weakness.   Psychiatric/Behavioral: Negative.  Negative for behavioral problems and confusion.   All other systems reviewed and are negative.    Objective:     Vital Signs (Most Recent):  Temp: 98 °F (36.7 °C) (07/04/20 0745)  Pulse: 82 (07/04/20 0745)  Resp: 18 (07/04/20 0745)  BP: 121/62 (07/04/20 0745)  SpO2: 98 % (07/04/20 0745) Vital Signs (24h Range):  Temp:  [97.6 °F (36.4 °C)-99.6 °F (37.6 °C)] 98 °F (36.7 °C)  Pulse:  [78-92] 82  Resp:  [14-19] 18  SpO2:  [95 %-98 %] 98 %  BP: (121-173)/(60-77) 121/62     Weight: 57.3 kg (126 lb 6.4 oz)  Body mass index is 21.03 kg/m².    Intake/Output Summary (Last 24 hours) at 7/4/2020 0851  Last data filed at 7/3/2020 1702  Gross per 24 hour   Intake 0 ml   Output --   Net 0 ml      Physical Exam  Vitals signs reviewed.   Constitutional:       General: She is not in acute distress.     Appearance: Normal appearance.   HENT:      Head: Normocephalic and atraumatic.      Nose: No rhinorrhea.      Mouth/Throat:      Mouth: Mucous membranes are dry.   Eyes:      General: No scleral icterus.     Extraocular Movements: Extraocular movements intact.      Pupils: Pupils are equal, round, and reactive to light.   Neck:      Musculoskeletal: Normal range of motion and neck supple.   Cardiovascular:      Rate and  Rhythm: Normal rate and regular rhythm.      Pulses: Normal pulses.      Heart sounds: Normal heart sounds.   Pulmonary:      Effort: Pulmonary effort is normal.      Breath sounds: Normal breath sounds. No wheezing.   Abdominal:      General: There is no distension.      Palpations: Abdomen is soft.      Tenderness: There is abdominal tenderness.   Musculoskeletal:         General: No tenderness.      Right lower leg: No edema.      Left lower leg: No edema.   Skin:     General: Skin is warm and dry.      Coloration: Skin is not jaundiced.   Neurological:      General: No focal deficit present.      Mental Status: She is alert and oriented to person, place, and time.   Psychiatric:         Mood and Affect: Mood normal.         Behavior: Behavior normal.         Significant Labs:   Na 128  K 4.3  TB 3.1    AST 68      Significant Imaging:   Nothing new for review

## 2020-07-04 NOTE — ASSESSMENT & PLAN NOTE
Clear liquid diet.  Analgesics and anti-emetics.  Monitor pain.    Lab Results   Component Value Date    LIPASE >1000 (H) 07/02/2020

## 2020-07-04 NOTE — ASSESSMENT & PLAN NOTE
Patient has chronic hypothyroidism. TFTs reviewed-   Lab Results   Component Value Date    TSH 1.147 07/03/2020   . Will continue chronic levothyroxine and adjust for and clinical changes.

## 2020-07-04 NOTE — ASSESSMENT & PLAN NOTE
Patient has acute metabolic encephalopathy that likely secondary to UTI.    Monitor mental acuity while getting antibiotic.

## 2020-07-04 NOTE — ASSESSMENT & PLAN NOTE
See what urine grows.  Continue antibiotic.    Antibiotics (From admission, onward)    Start     Stop Route Frequency Ordered    07/03/20 1800  cefTRIAXone (ROCEPHIN) 1 g/50 mL D5W IVPB      -- IV Every 24 hours (non-standard times) 07/02/20 2036

## 2020-07-05 LAB
ALBUMIN SERPL BCP-MCNC: 2.4 G/DL (ref 3.5–5.2)
ALP SERPL-CCNC: 184 U/L (ref 55–135)
ALT SERPL W/O P-5'-P-CCNC: 70 U/L (ref 10–44)
ANION GAP SERPL CALC-SCNC: 12 MMOL/L (ref 8–16)
AST SERPL-CCNC: 43 U/L (ref 10–40)
BACTERIA UR CULT: ABNORMAL
BASOPHILS # BLD AUTO: 0.02 K/UL (ref 0–0.2)
BASOPHILS NFR BLD: 0.2 % (ref 0–1.9)
BILIRUB SERPL-MCNC: 2.1 MG/DL (ref 0.1–1)
BUN SERPL-MCNC: 12 MG/DL (ref 8–23)
CALCIUM SERPL-MCNC: 9.1 MG/DL (ref 8.7–10.5)
CHLORIDE SERPL-SCNC: 98 MMOL/L (ref 95–110)
CO2 SERPL-SCNC: 19 MMOL/L (ref 23–29)
CREAT SERPL-MCNC: 0.7 MG/DL (ref 0.5–1.4)
DIFFERENTIAL METHOD: ABNORMAL
EOSINOPHIL # BLD AUTO: 0 K/UL (ref 0–0.5)
EOSINOPHIL NFR BLD: 0.3 % (ref 0–8)
ERYTHROCYTE [DISTWIDTH] IN BLOOD BY AUTOMATED COUNT: 12.4 % (ref 11.5–14.5)
EST. GFR  (AFRICAN AMERICAN): >60 ML/MIN/1.73 M^2
EST. GFR  (NON AFRICAN AMERICAN): >60 ML/MIN/1.73 M^2
GLUCOSE SERPL-MCNC: 129 MG/DL (ref 70–110)
HCT VFR BLD AUTO: 37.3 % (ref 37–48.5)
HGB BLD-MCNC: 12.3 G/DL (ref 12–16)
IMM GRANULOCYTES # BLD AUTO: 0.08 K/UL (ref 0–0.04)
IMM GRANULOCYTES NFR BLD AUTO: 0.6 % (ref 0–0.5)
LYMPHOCYTES # BLD AUTO: 0.5 K/UL (ref 1–4.8)
LYMPHOCYTES NFR BLD: 4 % (ref 18–48)
MCH RBC QN AUTO: 31.1 PG (ref 27–31)
MCHC RBC AUTO-ENTMCNC: 33 G/DL (ref 32–36)
MCV RBC AUTO: 94 FL (ref 82–98)
MONOCYTES # BLD AUTO: 1 K/UL (ref 0.3–1)
MONOCYTES NFR BLD: 7.5 % (ref 4–15)
NEUTROPHILS # BLD AUTO: 11.5 K/UL (ref 1.8–7.7)
NEUTROPHILS NFR BLD: 87.4 % (ref 38–73)
NRBC BLD-RTO: 0 /100 WBC
PLATELET # BLD AUTO: 241 K/UL (ref 150–350)
PMV BLD AUTO: 9.1 FL (ref 9.2–12.9)
POCT GLUCOSE: 128 MG/DL (ref 70–110)
POCT GLUCOSE: 134 MG/DL (ref 70–110)
POCT GLUCOSE: 154 MG/DL (ref 70–110)
POCT GLUCOSE: 160 MG/DL (ref 70–110)
POTASSIUM SERPL-SCNC: 4 MMOL/L (ref 3.5–5.1)
PROT SERPL-MCNC: 6.8 G/DL (ref 6–8.4)
RBC # BLD AUTO: 3.95 M/UL (ref 4–5.4)
SODIUM SERPL-SCNC: 129 MMOL/L (ref 136–145)
WBC # BLD AUTO: 13.13 K/UL (ref 3.9–12.7)

## 2020-07-05 PROCEDURE — 25000003 PHARM REV CODE 250: Performed by: SURGERY

## 2020-07-05 PROCEDURE — 12000002 HC ACUTE/MED SURGE SEMI-PRIVATE ROOM

## 2020-07-05 PROCEDURE — 99900035 HC TECH TIME PER 15 MIN (STAT)

## 2020-07-05 PROCEDURE — 99232 SBSQ HOSP IP/OBS MODERATE 35: CPT | Mod: ,,, | Performed by: INTERNAL MEDICINE

## 2020-07-05 PROCEDURE — 63600175 PHARM REV CODE 636 W HCPCS: Performed by: SURGERY

## 2020-07-05 PROCEDURE — 80053 COMPREHEN METABOLIC PANEL: CPT

## 2020-07-05 PROCEDURE — 99232 PR SUBSEQUENT HOSPITAL CARE,LEVL II: ICD-10-PCS | Mod: ,,, | Performed by: INTERNAL MEDICINE

## 2020-07-05 PROCEDURE — 94799 UNLISTED PULMONARY SVC/PX: CPT

## 2020-07-05 PROCEDURE — 85025 COMPLETE CBC W/AUTO DIFF WBC: CPT

## 2020-07-05 PROCEDURE — 25000003 PHARM REV CODE 250: Performed by: STUDENT IN AN ORGANIZED HEALTH CARE EDUCATION/TRAINING PROGRAM

## 2020-07-05 PROCEDURE — 94761 N-INVAS EAR/PLS OXIMETRY MLT: CPT

## 2020-07-05 PROCEDURE — 36415 COLL VENOUS BLD VENIPUNCTURE: CPT

## 2020-07-05 RX ORDER — SODIUM CHLORIDE 9 MG/ML
INJECTION, SOLUTION INTRAVENOUS CONTINUOUS
Status: DISCONTINUED | OUTPATIENT
Start: 2020-07-05 | End: 2020-07-06

## 2020-07-05 RX ADMIN — MIRTAZAPINE 15 MG: 15 TABLET, FILM COATED ORAL at 08:07

## 2020-07-05 RX ADMIN — LEVOTHYROXINE SODIUM 100 MCG: 100 TABLET ORAL at 05:07

## 2020-07-05 RX ADMIN — OXYCODONE 5 MG: 5 TABLET ORAL at 12:07

## 2020-07-05 RX ADMIN — CEFTRIAXONE 1 G: 1 INJECTION, SOLUTION INTRAVENOUS at 05:07

## 2020-07-05 RX ADMIN — OXYCODONE 5 MG: 5 TABLET ORAL at 03:07

## 2020-07-05 RX ADMIN — AMLODIPINE BESYLATE 10 MG: 5 TABLET ORAL at 07:07

## 2020-07-05 RX ADMIN — SODIUM CHLORIDE: 0.9 INJECTION, SOLUTION INTRAVENOUS at 05:07

## 2020-07-05 RX ADMIN — PANTOPRAZOLE SODIUM 40 MG: 40 TABLET, DELAYED RELEASE ORAL at 07:07

## 2020-07-05 RX ADMIN — OXYCODONE 5 MG: 5 TABLET ORAL at 07:07

## 2020-07-05 NOTE — ASSESSMENT & PLAN NOTE
Lipase was ~1000 initially  S/p cholecystectomy on 7/3/2020  Analgesics and anti-emetics  Needs to ambulate and use IS

## 2020-07-05 NOTE — PLAN OF CARE
Pt is calm and cooperative, pt agrees with plan of care, fall precautions maintained, pt assisted to turn in bed, pure wick in place with no signs of discomfort, prn pain medications used for pain control, po intake tolerated well, bowel sounds present in all quadrants, abd dressings are clean dry with no drainage, will continue to monitor.,

## 2020-07-05 NOTE — PROGRESS NOTES
"CC: pancreatitis     HPI: 79 y.o. female with history of Alzheimer's disease, HTN, GERD, RCC, DMII and recurrent UTIs is admitted with acute onset painful biliary pancreatitis with associated altered mental status and emesis for several days prior to admission. History is difficult to obtain from patient due to mental status. Most of history was taken from medical records. Patient noted to have leukocytosis on admission with WBC 12.94. , , , Lipase >1000. US Abd showed cholelithiasis, contracted gallbladder, multiple stones, normal CBD without obstruction.    Interval hx:  S/P CCY yesterday. Doing well post surgery. Without complaints. Remains somewhat confused.    Past Medical History:   Diagnosis Date    Alzheimer disease 2012    Cancer     renal cell. right    Dementia     Diabetes mellitus     GERD (gastroesophageal reflux disease)     Hyperlipidemia     Hypothyroid     Memory loss     Movement disorder      Review of Systems  General ROS: negative for chills, fever or weight loss  Cardiovascular ROS: no chest pain or dyspnea on exertion  Gastrointestinal ROS: mild abdominal pain, change in bowel habits, or black/ bloody stools    Physical Examination  /67 (BP Location: Left arm, Patient Position: Lying)   Pulse 68   Temp 97.2 °F (36.2 °C) (Oral)   Resp 18   Ht 5' 5" (1.651 m)   Wt 57.3 kg (126 lb 6.4 oz)   SpO2 (!) 94%   Breastfeeding No   BMI 21.03 kg/m²   General appearance: lethargic, lying in bed  HENT: Normocephalic, atraumatic, neck symmetrical, no nasal discharge   Eyes: conjunctivae/corneas clear, PERRL, EOM's intact  Lungs: clear to auscultation bilaterally, symmetric chest wall expansion bilaterally  Heart: regular rate and rhythm without rub; no displacement of the PMI   Abdomen: soft, tender to palpation in epigastrium, bowel sounds normoactive; no organomegaly  Extremities: extremities symmetric; no clubbing, cyanosis, or edema  Integument: Skin color, " texture, turgor normal; no rashes; hair distrubution normal  Neurologic: awake, answering some questions today   Psychiatric: no pressured speech; normal affect, evidence of intermittent confusion    Labs:  Lab Results   Component Value Date    WBC 13.13 (H) 07/05/2020    HGB 12.3 07/05/2020    HCT 37.3 07/05/2020    MCV 94 07/05/2020     07/05/2020     Imaging:  US Abd: Cholelithiasis with contracted stone filled gallbladder.    Independently reviewed    Assessment:   1. Acute biliary pancreatitis s/p cholecystectomy     Plan:   -Trend LFTs, appear to be improving  -Diet per surgery  -GI to sign off, please call if any questions/concerns    Shai Green MD  [facility]  [address] [place] [address]  Office: [phone]  Fax: [phone]

## 2020-07-05 NOTE — ANESTHESIA POSTPROCEDURE EVALUATION
Anesthesia Post Evaluation    Patient: Nina Portillo    Procedure(s) Performed: Procedure(s) (LRB):  CHOLECYSTECTOMY, LAPAROSCOPIC (N/A)    Final Anesthesia Type: general    Patient location during evaluation: PACU  Patient participation: Yes- Able to Participate  Level of consciousness: awake and alert and oriented  Post-procedure vital signs: reviewed and stable  Pain management: adequate  Airway patency: patent  ERICK mitigation strategies: Multimodal analgesia, Extubation while patient is awake and Verification of full reversal of neuromuscular block  PONV status at discharge: No PONV  Anesthetic complications: no      Cardiovascular status: blood pressure returned to baseline  Respiratory status: unassisted, spontaneous ventilation and room air  Hydration status: euvolemic  Follow-up not needed.          Vitals Value Taken Time   /67 07/05/20 1102   Temp 36.2 °C (97.2 °F) 07/05/20 1102   Pulse 68 07/05/20 1102   Resp 18 07/05/20 1102   SpO2 94 % 07/05/20 1102         Event Time   Out of Recovery 07/04/2020 11:05:00         Pain/Keven Score: Pain Rating Prior to Med Admin: 6 (7/5/2020  7:53 AM)  Pain Rating Post Med Admin: 0 (7/5/2020  8:52 AM)  Keven Score: 10 (7/4/2020 11:00 AM)

## 2020-07-05 NOTE — HOSPITAL COURSE
Ms. Portillo was admitted with gallstone pancreatitis and had cholecystectomy. She was slow to ambulate and had a lot of post-operative pain. She was treated for UTI with rocephin. E coli grew out of the urine culture and it was pan-sensitive.  Evaluated by GI and surgery cleared the patient for discharge and with outpatient follow-up  Evaluated By physical therapy who recommended skilled nursing facility for the patient.  The family wanted take the patient back home with home health.  Patient medically cleared for discharge

## 2020-07-05 NOTE — SUBJECTIVE & OBJECTIVE
Interval History: Tolerated some jello without vomiting. Abdominal pain is the same. She is not ambulating.     Review of Systems   Constitutional: Positive for fatigue. Negative for fever.   HENT: Negative.    Eyes: Negative.    Respiratory: Negative.  Negative for cough and shortness of breath.    Cardiovascular: Negative.  Negative for chest pain and leg swelling.   Gastrointestinal: Positive for abdominal pain. Negative for abdominal distention, nausea and vomiting.   Genitourinary: Negative.  Negative for difficulty urinating.   Musculoskeletal: Negative.  Negative for back pain.   Neurological: Negative.  Negative for tremors and weakness.   Psychiatric/Behavioral: Negative.  Negative for behavioral problems and confusion.   All other systems reviewed and are negative.    Objective:     Vital Signs (Most Recent):  Temp: 97.2 °F (36.2 °C) (07/05/20 1102)  Pulse: 68 (07/05/20 1102)  Resp: 18 (07/05/20 1102)  BP: 132/67 (07/05/20 1102)  SpO2: (!) 94 % (07/05/20 1102) Vital Signs (24h Range):  Temp:  [96.4 °F (35.8 °C)-98.2 °F (36.8 °C)] 97.2 °F (36.2 °C)  Pulse:  [68-91] 68  Resp:  [16-19] 18  SpO2:  [94 %-96 %] 94 %  BP: (128-181)/(62-86) 132/67     Weight: 57.3 kg (126 lb 6.4 oz)  Body mass index is 21.03 kg/m².    Intake/Output Summary (Last 24 hours) at 7/5/2020 1153  Last data filed at 7/5/2020 0500  Gross per 24 hour   Intake 820 ml   Output 400 ml   Net 420 ml      Physical Exam  Vitals signs reviewed.   Constitutional:       General: She is not in acute distress.     Appearance: Normal appearance.   HENT:      Head: Normocephalic and atraumatic.      Nose: No rhinorrhea.      Mouth/Throat:      Mouth: Mucous membranes are dry.   Eyes:      General: No scleral icterus.     Extraocular Movements: Extraocular movements intact.      Pupils: Pupils are equal, round, and reactive to light.   Neck:      Musculoskeletal: Normal range of motion and neck supple.   Cardiovascular:      Rate and Rhythm: Normal rate  and regular rhythm.      Pulses: Normal pulses.      Heart sounds: Normal heart sounds.   Pulmonary:      Effort: Pulmonary effort is normal.      Breath sounds: Normal breath sounds. No wheezing.   Abdominal:      General: There is no distension.      Palpations: Abdomen is soft.      Tenderness: There is abdominal tenderness.   Musculoskeletal:         General: No tenderness.      Right lower leg: No edema.      Left lower leg: No edema.   Skin:     General: Skin is warm and dry.      Coloration: Skin is not jaundiced.   Neurological:      General: No focal deficit present.      Mental Status: She is alert and oriented to person, place, and time.   Psychiatric:         Mood and Affect: Mood normal.         Behavior: Behavior normal.         Significant Labs:   BMP:   Recent Labs   Lab 07/04/20  0451 07/05/20  0432    129*   * 129*   K 4.3 4.0    98   CO2 17* 19*   BUN 8 12   CREATININE 0.7 0.7   CALCIUM 9.0 9.1   MG 2.3  --      CBC:   Recent Labs   Lab 07/05/20 0432   WBC 13.13*   HGB 12.3   HCT 37.3        CMP:   Recent Labs   Lab 07/04/20  0451 07/05/20  0432   * 129*   K 4.3 4.0    98   CO2 17* 19*    129*   BUN 8 12   CREATININE 0.7 0.7   CALCIUM 9.0 9.1   PROT 7.5 6.8   ALBUMIN 2.8* 2.4*   BILITOT 3.1* 2.1*   ALKPHOS 215* 184*   AST 68* 43*   * 70*   ANIONGAP 11 12   EGFRNONAA >60 >60       Significant Imaging:   Nothing new for review

## 2020-07-05 NOTE — PROGRESS NOTES
POD 1 s/p lap erick  Pt resting comfortablyh.  No pain.  Remains somewhat confused    Wt Readings from Last 3 Encounters:   07/03/20 57.3 kg (126 lb 6.4 oz)   02/20/20 65.8 kg (145 lb)   01/10/20 61.1 kg (134 lb 11.2 oz)     Temp Readings from Last 3 Encounters:   07/05/20 97.2 °F (36.2 °C) (Oral)   02/20/20 97.4 °F (36.3 °C)   01/10/20 98 °F (36.7 °C)     BP Readings from Last 3 Encounters:   07/05/20 132/67   05/20/20 (!) 155/95   02/20/20 (!) 161/72     Pulse Readings from Last 3 Encounters:   07/05/20 68   05/20/20 95   02/20/20 85     Awaks  CTA  Sinus  Soft/nd/appt ttp    Lab Results   Component Value Date    WBC 13.13 (H) 07/05/2020    HGB 12.3 07/05/2020    HCT 37.3 07/05/2020    MCV 94 07/05/2020     07/05/2020       BMP  Lab Results   Component Value Date     (L) 07/05/2020    K 4.0 07/05/2020    CL 98 07/05/2020    CO2 19 (L) 07/05/2020    BUN 12 07/05/2020    CREATININE 0.7 07/05/2020    CALCIUM 9.1 07/05/2020    ANIONGAP 12 07/05/2020    ESTGFRAFRICA >60 07/05/2020    EGFRNONAA >60 07/05/2020     CMP  Sodium   Date Value Ref Range Status   07/05/2020 129 (L) 136 - 145 mmol/L Final     Potassium   Date Value Ref Range Status   07/05/2020 4.0 3.5 - 5.1 mmol/L Final     Chloride   Date Value Ref Range Status   07/05/2020 98 95 - 110 mmol/L Final     CO2   Date Value Ref Range Status   07/05/2020 19 (L) 23 - 29 mmol/L Final     Glucose   Date Value Ref Range Status   07/05/2020 129 (H) 70 - 110 mg/dL Final     BUN, Bld   Date Value Ref Range Status   07/05/2020 12 8 - 23 mg/dL Final     Creatinine   Date Value Ref Range Status   07/05/2020 0.7 0.5 - 1.4 mg/dL Final     Calcium   Date Value Ref Range Status   07/05/2020 9.1 8.7 - 10.5 mg/dL Final     Total Protein   Date Value Ref Range Status   07/05/2020 6.8 6.0 - 8.4 g/dL Final     Albumin   Date Value Ref Range Status   07/05/2020 2.4 (L) 3.5 - 5.2 g/dL Final     Total Bilirubin   Date Value Ref Range Status   07/05/2020 2.1 (H) 0.1 - 1.0  mg/dL Final     Comment:     For infants and newborns, interpretation of results should be based  on gestational age, weight and in agreement with clinical  observations.  Premature Infant recommended reference ranges:  Up to 24 hours.............<8.0 mg/dL  Up to 48 hours............<12.0 mg/dL  3-5 days..................<15.0 mg/dL  6-29 days.................<15.0 mg/dL       Alkaline Phosphatase   Date Value Ref Range Status   07/05/2020 184 (H) 55 - 135 U/L Final     AST   Date Value Ref Range Status   07/05/2020 43 (H) 10 - 40 U/L Final     ALT   Date Value Ref Range Status   07/05/2020 70 (H) 10 - 44 U/L Final     Anion Gap   Date Value Ref Range Status   07/05/2020 12 8 - 16 mmol/L Final     eGFR if    Date Value Ref Range Status   07/05/2020 >60 >60 mL/min/1.73 m^2 Final     eGFR if non    Date Value Ref Range Status   07/05/2020 >60 >60 mL/min/1.73 m^2 Final     Comment:     Calculation used to obtain the estimated glomerular filtration  rate (eGFR) is the CKD-EPI equation.        LFTs trendign down    A/P: s/p lap erick  Regular diet  Trend LFTs  No further surgical plans.  Ok to d/c when cleared medically.  Surgery to sign off can f/u in  Office in 2 weeks

## 2020-07-05 NOTE — PROGRESS NOTES
"[facility] Medicine  Progress Note    Patient Name: Nina Portillo  MRN: [id number]  Patient Class: IP- Inpatient   Admission Date: 7/2/2020  Length of Stay: 2 days  Attending Physician: Hannah Medina MD  Primary Care Provider: Rush Rosa MD        Subjective:     Principal Problem:Infectious encephalopathy        HPI:  Nina Portillo is a 79 y.o. female with a PMHx of hypertension, GERD, type 2 diabetes, renal cell carcinoma, Alzheimer's, and recurrent UTIs presents to the ED for evaluation altered mental status, fatigue, and emesis x2-3 days. Per the ED note the "the daughter reports increased confusion over the past few days with orange colored, foul smelling, urine today. She reports a prior hx of UTIs requiring hospital admission. No cough, fever, CP, SOB, gross hematuria, dysuria, or abdominal pain." The patient reports nausea and vomiting earlier this evening. The patient denies any abdominal pain, chest pain, shortness of breath, diarrhea, headache, dizziness, dysuria, or hematuria.  HPI and ROS limited secondary to patient's mental status.  Her ED workup is significant for tachycardia, leukocytosis, hyperbilirubinemia, elevated lipase, transaminitis, UTI, hyponatremia, and right upper quadrant ultrasound revealing cholelithiasis with contracted stone filled gallbladder. Patient received 1.5 L of normal saline and Rocephin while in the ED.  The patient will be placed in observation under Hospital Medicine for further workup and evaluation.    Overview/Hospital Course:  No notes on file    Interval History: Tolerated some jello without vomiting. Abdominal pain is the same. She is not ambulating.     Review of Systems   Constitutional: Positive for fatigue. Negative for fever.   HENT: Negative.    Eyes: Negative.    Respiratory: Negative.  Negative for cough and shortness of breath.    Cardiovascular: Negative.  Negative for chest pain and leg swelling. "   Gastrointestinal: Positive for abdominal pain. Negative for abdominal distention, nausea and vomiting.   Genitourinary: Negative.  Negative for difficulty urinating.   Musculoskeletal: Negative.  Negative for back pain.   Neurological: Negative.  Negative for tremors and weakness.   Psychiatric/Behavioral: Negative.  Negative for behavioral problems and confusion.   All other systems reviewed and are negative.    Objective:     Vital Signs (Most Recent):  Temp: 97.2 °F (36.2 °C) (07/05/20 1102)  Pulse: 68 (07/05/20 1102)  Resp: 18 (07/05/20 1102)  BP: 132/67 (07/05/20 1102)  SpO2: (!) 94 % (07/05/20 1102) Vital Signs (24h Range):  Temp:  [96.4 °F (35.8 °C)-98.2 °F (36.8 °C)] 97.2 °F (36.2 °C)  Pulse:  [68-91] 68  Resp:  [16-19] 18  SpO2:  [94 %-96 %] 94 %  BP: (128-181)/(62-86) 132/67     Weight: 57.3 kg (126 lb 6.4 oz)  Body mass index is 21.03 kg/m².    Intake/Output Summary (Last 24 hours) at 7/5/2020 1153  Last data filed at 7/5/2020 0500  Gross per 24 hour   Intake 820 ml   Output 400 ml   Net 420 ml      Physical Exam  Vitals signs reviewed.   Constitutional:       General: She is not in acute distress.     Appearance: Normal appearance.   HENT:      Head: Normocephalic and atraumatic.      Nose: No rhinorrhea.      Mouth/Throat:      Mouth: Mucous membranes are dry.   Eyes:      General: No scleral icterus.     Extraocular Movements: Extraocular movements intact.      Pupils: Pupils are equal, round, and reactive to light.   Neck:      Musculoskeletal: Normal range of motion and neck supple.   Cardiovascular:      Rate and Rhythm: Normal rate and regular rhythm.      Pulses: Normal pulses.      Heart sounds: Normal heart sounds.   Pulmonary:      Effort: Pulmonary effort is normal.      Breath sounds: Normal breath sounds. No wheezing.   Abdominal:      General: There is no distension.      Palpations: Abdomen is soft.      Tenderness: There is abdominal tenderness.   Musculoskeletal:         General: No  tenderness.      Right lower leg: No edema.      Left lower leg: No edema.   Skin:     General: Skin is warm and dry.      Coloration: Skin is not jaundiced.   Neurological:      General: No focal deficit present.      Mental Status: She is alert and oriented to person, place, and time.   Psychiatric:         Mood and Affect: Mood normal.         Behavior: Behavior normal.         Significant Labs:   BMP:   Recent Labs   Lab 07/04/20 0451 07/05/20 0432    129*   * 129*   K 4.3 4.0    98   CO2 17* 19*   BUN 8 12   CREATININE 0.7 0.7   CALCIUM 9.0 9.1   MG 2.3  --      CBC:   Recent Labs   Lab 07/05/20 0432   WBC 13.13*   HGB 12.3   HCT 37.3        CMP:   Recent Labs   Lab 07/04/20 0451 07/05/20 0432   * 129*   K 4.3 4.0    98   CO2 17* 19*    129*   BUN 8 12   CREATININE 0.7 0.7   CALCIUM 9.0 9.1   PROT 7.5 6.8   ALBUMIN 2.8* 2.4*   BILITOT 3.1* 2.1*   ALKPHOS 215* 184*   AST 68* 43*   * 70*   ANIONGAP 11 12   EGFRNONAA >60 >60       Significant Imaging:   Nothing new for review      Assessment/Plan:      * Infectious encephalopathy  Metabolic encephalopathy likely due to biliary pancreatitis. Resolving.    Calculus of gallbladder without cholecystitis  RUQ US reviewed: Cholelithiasis with contracted stone filled gallbladder.    Cholecystectomy on 7/3/2020    Acute biliary pancreatitis without infection or necrosis  Lipase was ~1000 initially  S/p cholecystectomy on 7/3/2020  Analgesics and anti-emetics  Needs to ambulate and use IS      Transaminitis  Gallstone pancreatitis s/p cholecystectomy. Stable today. Expect improvement.    AST   Date Value Ref Range Status   07/04/2020 68 (H) 10 - 40 U/L Final     ALT   Date Value Ref Range Status   07/04/2020 101 (H) 10 - 44 U/L Final       Urinary tract infection without hematuria  Growing Ecoli pending sensitivities.  Continue antibiotic.    Antibiotics (From admission, onward)    Start     Stop Route Frequency  Ordered    07/03/20 1800  cefTRIAXone (ROCEPHIN) 1 g/50 mL D5W IVPB      -- IV Every 24 hours (non-standard times) 07/02/20 2036          Hyponatremia  Acute drop today after fluid resuscitation. Continue to monitor.    Sodium   Date Value Ref Range Status   07/04/2020 128 (L) 136 - 145 mmol/L Final       Controlled type 2 diabetes mellitus with diabetic neuropathy, without long-term current use of insulin  Patient's FSGs are controlled on current hypoglycemics.   Last A1c reviewed-   Lab Results   Component Value Date    HGBA1C 7.2 (H) 07/02/2020     Most recent fingerstick glucose reviewed-   Recent Labs   Lab 07/02/20  2203 07/03/20  0540 07/03/20  1149 07/03/20  1710   POCTGLUCOSE 165* 129* 130* 136*     Current correctional scale  Low  Maintain anti-hyperglycemic dose as follows-   Antihyperglycemics (From admission, onward)    Start     Stop Route Frequency Ordered    07/02/20 2036  insulin aspart U-100 pen 0-5 Units      -- SubQ Before meals & nightly PRN 07/02/20 2036          Severe sepsis  This patient does have evidence of infective focus  My overall impression is severe sepsis.  Antibiotics given-   Antibiotics (From admission, onward)    Start     Stop Route Frequency Ordered    07/03/20 1800  cefTRIAXone (ROCEPHIN) 1 g/50 mL D5W IVPB      -- IV Every 24 hours (non-standard times) 07/02/20 2036          Latest lactate reviewed, they are-  Recent Labs   Lab 07/02/20  1735 07/03/20  0751   LACTATE 2.5* 1.9       Organ dysfunction indicated by Encephalopathy and Acute liver injury  Source- UTI  Source control Achieved by- Rocephin    History of renal cell carcinoma  Noted.    Alzheimer's disease  Dementia is controlled currently. Continue home dementia meds and non-pharmacologic interventions to prevent delirium (Activity during day, opening blinds, providing glasses/hearing aids, and up in chair during daytime). Use PRN anti-psychotics to prevent behavior of self harm during sundowning, and avoid  narcotics and benzos unless absolutely necessary. PRN anti-psychotics prescribed to avoid self harm behaviors.    GERD (gastroesophageal reflux disease)  Chronic, stable. Continue PPI.    Hypothyroid  Patient has chronic hypothyroidism. TFTs reviewed-   Lab Results   Component Value Date    TSH 1.147 07/03/2020   . Will continue chronic levothyroxine and adjust for and clinical changes.    Hypertension associated with diabetes  Chronic, controlled.  Latest blood pressure and vitals reviewed-   Temp:  [97.6 °F (36.4 °C)-99.6 °F (37.6 °C)]   Pulse:  [78-86]   Resp:  [14-18]   BP: (134-173)/(60-96)   SpO2:  [95 %-97 %] .     Hypertension Medications at home            amLODIPine (NORVASC) 10 MG tablet Take 1 tablet (10 mg total) by mouth once daily.      Hospital Medications             amLODIPine tablet 10 mg 10 mg, Oral, Daily            VTE Risk Mitigation (From admission, onward)         Ordered     IP VTE HIGH RISK PATIENT  Once      07/02/20 2036     Place sequential compression device  Until discontinued      07/02/20 2036                      Hannah Medina MD  Department of Hospital Medicine   [facility]

## 2020-07-05 NOTE — PLAN OF CARE
POC reviewed with pt, understanding verbalized. Blood glucose monitoring. PRN oxy given with full relief. Poor appetite noted- encouraged oral intake. Incontinence care. Safety maintained. Will monitor.

## 2020-07-06 PROBLEM — Z86.59 HISTORY OF DEPRESSION: Status: ACTIVE | Noted: 2020-07-06

## 2020-07-06 PROBLEM — G30.9 AD (ALZHEIMER'S DISEASE): Status: ACTIVE | Noted: 2020-07-06

## 2020-07-06 PROBLEM — G93.49 INFECTIOUS ENCEPHALOPATHY: Status: RESOLVED | Noted: 2020-07-02 | Resolved: 2020-07-06

## 2020-07-06 PROBLEM — F02.80 AD (ALZHEIMER'S DISEASE): Status: ACTIVE | Noted: 2020-07-06

## 2020-07-06 PROBLEM — B99.9 INFECTIOUS ENCEPHALOPATHY: Status: RESOLVED | Noted: 2020-07-02 | Resolved: 2020-07-06

## 2020-07-06 LAB
ALBUMIN SERPL BCP-MCNC: 2.4 G/DL (ref 3.5–5.2)
ALP SERPL-CCNC: 200 U/L (ref 55–135)
ALT SERPL W/O P-5'-P-CCNC: 58 U/L (ref 10–44)
ANION GAP SERPL CALC-SCNC: 10 MMOL/L (ref 8–16)
AST SERPL-CCNC: 43 U/L (ref 10–40)
BASOPHILS # BLD AUTO: 0.03 K/UL (ref 0–0.2)
BASOPHILS NFR BLD: 0.2 % (ref 0–1.9)
BILIRUB SERPL-MCNC: 1.7 MG/DL (ref 0.1–1)
BUN SERPL-MCNC: 13 MG/DL (ref 8–23)
CALCIUM SERPL-MCNC: 9.4 MG/DL (ref 8.7–10.5)
CHLORIDE SERPL-SCNC: 98 MMOL/L (ref 95–110)
CO2 SERPL-SCNC: 21 MMOL/L (ref 23–29)
CREAT SERPL-MCNC: 0.7 MG/DL (ref 0.5–1.4)
DIFFERENTIAL METHOD: ABNORMAL
EOSINOPHIL # BLD AUTO: 0.2 K/UL (ref 0–0.5)
EOSINOPHIL NFR BLD: 1.4 % (ref 0–8)
ERYTHROCYTE [DISTWIDTH] IN BLOOD BY AUTOMATED COUNT: 12.6 % (ref 11.5–14.5)
EST. GFR  (AFRICAN AMERICAN): >60 ML/MIN/1.73 M^2
EST. GFR  (NON AFRICAN AMERICAN): >60 ML/MIN/1.73 M^2
GLUCOSE SERPL-MCNC: 130 MG/DL (ref 70–110)
HCT VFR BLD AUTO: 37.3 % (ref 37–48.5)
HGB BLD-MCNC: 12.1 G/DL (ref 12–16)
IMM GRANULOCYTES # BLD AUTO: 0.06 K/UL (ref 0–0.04)
IMM GRANULOCYTES NFR BLD AUTO: 0.5 % (ref 0–0.5)
LYMPHOCYTES # BLD AUTO: 0.8 K/UL (ref 1–4.8)
LYMPHOCYTES NFR BLD: 6.7 % (ref 18–48)
MCH RBC QN AUTO: 31.1 PG (ref 27–31)
MCHC RBC AUTO-ENTMCNC: 32.4 G/DL (ref 32–36)
MCV RBC AUTO: 96 FL (ref 82–98)
MONOCYTES # BLD AUTO: 1.3 K/UL (ref 0.3–1)
MONOCYTES NFR BLD: 10.4 % (ref 4–15)
NEUTROPHILS # BLD AUTO: 9.8 K/UL (ref 1.8–7.7)
NEUTROPHILS NFR BLD: 80.8 % (ref 38–73)
NRBC BLD-RTO: 0 /100 WBC
PLATELET # BLD AUTO: 219 K/UL (ref 150–350)
PMV BLD AUTO: 9.1 FL (ref 9.2–12.9)
POCT GLUCOSE: 143 MG/DL (ref 70–110)
POCT GLUCOSE: 167 MG/DL (ref 70–110)
POTASSIUM SERPL-SCNC: 4 MMOL/L (ref 3.5–5.1)
PROT SERPL-MCNC: 6.7 G/DL (ref 6–8.4)
RBC # BLD AUTO: 3.89 M/UL (ref 4–5.4)
SODIUM SERPL-SCNC: 129 MMOL/L (ref 136–145)
WBC # BLD AUTO: 12.15 K/UL (ref 3.9–12.7)

## 2020-07-06 PROCEDURE — 63600175 PHARM REV CODE 636 W HCPCS: Performed by: SURGERY

## 2020-07-06 PROCEDURE — 12000002 HC ACUTE/MED SURGE SEMI-PRIVATE ROOM

## 2020-07-06 PROCEDURE — 25000003 PHARM REV CODE 250: Performed by: SURGERY

## 2020-07-06 PROCEDURE — 99900035 HC TECH TIME PER 15 MIN (STAT)

## 2020-07-06 PROCEDURE — 94761 N-INVAS EAR/PLS OXIMETRY MLT: CPT

## 2020-07-06 PROCEDURE — 36415 COLL VENOUS BLD VENIPUNCTURE: CPT

## 2020-07-06 PROCEDURE — 97162 PT EVAL MOD COMPLEX 30 MIN: CPT

## 2020-07-06 PROCEDURE — 80053 COMPREHEN METABOLIC PANEL: CPT

## 2020-07-06 PROCEDURE — 85025 COMPLETE CBC W/AUTO DIFF WBC: CPT

## 2020-07-06 PROCEDURE — 97530 THERAPEUTIC ACTIVITIES: CPT

## 2020-07-06 RX ADMIN — MIRTAZAPINE 15 MG: 15 TABLET, FILM COATED ORAL at 08:07

## 2020-07-06 RX ADMIN — OXYCODONE 5 MG: 5 TABLET ORAL at 10:07

## 2020-07-06 RX ADMIN — CEFTRIAXONE 1 G: 1 INJECTION, SOLUTION INTRAVENOUS at 07:07

## 2020-07-06 RX ADMIN — LEVOTHYROXINE SODIUM 100 MCG: 100 TABLET ORAL at 05:07

## 2020-07-06 RX ADMIN — OXYCODONE 5 MG: 5 TABLET ORAL at 02:07

## 2020-07-06 RX ADMIN — AMLODIPINE BESYLATE 10 MG: 5 TABLET ORAL at 10:07

## 2020-07-06 RX ADMIN — PANTOPRAZOLE SODIUM 40 MG: 40 TABLET, DELAYED RELEASE ORAL at 10:07

## 2020-07-06 RX ADMIN — OXYCODONE 5 MG: 5 TABLET ORAL at 08:07

## 2020-07-06 NOTE — ASSESSMENT & PLAN NOTE
Microbiology Results (last 7 days)     Procedure Component Value Units Date/Time    Blood Culture #1 [[id number]] Collected: 07/02/20 1735    Order Status: Completed Specimen: Blood from Peripheral, Left Wrist Updated: 07/05/20 2212     Blood Culture, Routine No Growth to date      No Growth to date      No Growth to date      No Growth to date    Urine culture [[id number]]  (Abnormal)  (Susceptibility) Collected: 07/02/20 1628    Order Status: Completed Specimen: Urine Updated: 07/05/20 0125     Urine Culture, Routine ESCHERICHIA COLI  >100,000 cfu/ml      Narrative:      Specimen Source->Urine      Pansensitive      Antibiotics (From admission, onward)    Start     Stop Route Frequency Ordered    07/03/20 1800  cefTRIAXone (ROCEPHIN) 1 g/50 mL D5W IVPB      -- IV Every 24 hours (non-standard times) 07/02/20 2036

## 2020-07-06 NOTE — PLAN OF CARE
POC reviewed with pt's daughter. IV antibiotics administered per order. Pt afebrile throughout shift. Moderate pain controlled with prn meds per order. Bed in lowest position, call light in reach. Will continue to monitor.

## 2020-07-06 NOTE — PLAN OF CARE
"I met with the pt while sitting in her chair next to her bed to discuss SNf options. She smiled and stated that ," I am discharging home." I asked if she has ever been to SNF before for therapy and she replied. " Yes and I am going home." When I asked about HH services she stated that she lives with her son and daughter in law and to ask them. At this time the pt is refusing SNF services. CM following. Linda Trejo, CEDRIC     07/06/20 2243   Post-Acute Status   Post-Acute Authorization Placement   Post-Acute Placement Status Patient List Provided     "

## 2020-07-06 NOTE — PLAN OF CARE
Pt is calm and cooperative, pt agrees with plan of care, pt remains hypoactive, pt denies any pain or sob, pt slept through shift, po intake tolerated well, pure wick in place with no signs of discomfort, iv access maintained with iv hydration continued, fall precautions maintained, weight shift assistance provided. Will continue to monitor.

## 2020-07-06 NOTE — ASSESSMENT & PLAN NOTE
Gallstone pancreatitis s/p cholecystectomy. Stable today. Expect improvement.    AST   Date Value Ref Range Status   07/06/2020 43 (H) 10 - 40 U/L Final     ALT   Date Value Ref Range Status   07/06/2020 58 (H) 10 - 44 U/L Final

## 2020-07-06 NOTE — ASSESSMENT & PLAN NOTE
This patient does have evidence of infective focus  My overall impression is sepsis.  Antibiotics given-   Antibiotics (From admission, onward)    Start     Stop Route Frequency Ordered    07/03/20 1800  cefTRIAXone (ROCEPHIN) 1 g/50 mL D5W IVPB      -- IV Every 24 hours (non-standard times) 07/02/20 2036          Latest lactate reviewed, they are-  No results for input(s): LACTATE in the last 72 hours.    Organ dysfunction indicated by Encephalopathy and Acute liver injury  Source- UTI  Source control Achieved by- Rocephin

## 2020-07-06 NOTE — SUBJECTIVE & OBJECTIVE
Interval History: Patient did not tolerate diet, had nausea vomiting when the diet was advanced to regular diet. Also patient is unsteady and weakness while ambulating    Review of Systems  Objective:     Vital Signs (Most Recent):  Temp: 98.7 °F (37.1 °C) (07/06/20 1101)  Pulse: 101 (07/06/20 1101)  Resp: 18 (07/06/20 1101)  BP: 130/78 (07/06/20 1101)  SpO2: 95 % (07/06/20 1101) Vital Signs (24h Range):  Temp:  [97.2 °F (36.2 °C)-98.7 °F (37.1 °C)] 98.7 °F (37.1 °C)  Pulse:  [] 101  Resp:  [16-18] 18  SpO2:  [94 %-96 %] 95 %  BP: (130-146)/(61-78) 130/78     Weight: 57.3 kg (126 lb 6.4 oz)  Body mass index is 21.03 kg/m².    Intake/Output Summary (Last 24 hours) at 7/6/2020 1418  Last data filed at 7/6/2020 0800  Gross per 24 hour   Intake 1255 ml   Output 300 ml   Net 955 ml      Physical Exam  Vitals signs and nursing note reviewed.   Constitutional:       General: She is not in acute distress.     Appearance: Normal appearance.   HENT:      Head: Normocephalic and atraumatic.      Nose: No rhinorrhea.      Mouth/Throat:      Mouth: Mucous membranes are dry.   Eyes:      General: No scleral icterus.     Extraocular Movements: Extraocular movements intact.      Pupils: Pupils are equal, round, and reactive to light.   Neck:      Musculoskeletal: Normal range of motion and neck supple.   Cardiovascular:      Rate and Rhythm: Normal rate and regular rhythm.      Pulses: Normal pulses.      Heart sounds: Normal heart sounds.   Pulmonary:      Effort: Pulmonary effort is normal.      Breath sounds: Normal breath sounds. No wheezing.   Abdominal:      General: There is no distension.      Palpations: Abdomen is soft.      Tenderness: There is abdominal tenderness.   Musculoskeletal:         General: No tenderness.      Right lower leg: No edema.      Left lower leg: No edema.   Skin:     General: Skin is warm and dry.      Coloration: Skin is not jaundiced.   Neurological:      General: No focal deficit present.       Mental Status: She is alert and oriented to person, place, and time.   Psychiatric:         Mood and Affect: Mood normal.         Behavior: Behavior normal.         Significant Labs:   BMP:   Recent Labs   Lab 07/06/20  0434   *   *   K 4.0   CL 98   CO2 21*   BUN 13   CREATININE 0.7   CALCIUM 9.4     CBC:   Recent Labs   Lab 07/05/20  0432 07/06/20  0434   WBC 13.13* 12.15   HGB 12.3 12.1   HCT 37.3 37.3    219       Significant Imaging: I have reviewed all pertinent imaging results/findings within the past 24 hours.

## 2020-07-06 NOTE — ASSESSMENT & PLAN NOTE
Patient's FSGs are controlled on current hypoglycemics.   Last A1c reviewed-   Lab Results   Component Value Date    HGBA1C 7.2 (H) 07/02/2020     Most recent fingerstick glucose reviewed-   Recent Labs   Lab 07/05/20  1710 07/05/20  2157 07/06/20  0511   POCTGLUCOSE 128* 154* 143*     Current correctional scale  Low  Maintain anti-hyperglycemic dose as follows-   Antihyperglycemics (From admission, onward)    Start     Stop Route Frequency Ordered    07/02/20 2036  insulin aspart U-100 pen 0-5 Units      -- SubQ Before meals & nightly PRN 07/02/20 2036

## 2020-07-06 NOTE — PLAN OF CARE
Problem: Physical Therapy Goal  Goal: Physical Therapy Goal  Description: Goals to be met by: 2020     Patient will increase functional independence with mobility by performin. Supine to sit with MInimal Assistance  2. Sit to stand transfer with Minimal Assistance  3. Gait  x 150 feet with Minimal Assistance using Rolling Walker.   4. Lower extremity exercise program x20 reps   Outcome: Ongoing, Progressing   PT eval and treat completed. Mod assist for mobility to EOB, standing with difficulty maintaining upright. Pt able to take small steps to chair. Appears deconditioned and weak. Pt to benefit from SNF

## 2020-07-06 NOTE — PLAN OF CARE
07/06/20 0802   Patient Assessment/Suction   Respiratory Effort Unlabored   Expansion/Accessory Muscles/Retractions expansion symmetric;no retractions   Rhythm/Pattern, Respiratory unlabored   PRE-TX-O2   O2 Device (Oxygen Therapy) room air   SpO2 (!) 94 %   Pulse Oximetry Type Intermittent   $ Pulse Oximetry - Multiple Charge Pulse Oximetry - Multiple   Incentive Spirometer   $ Incentive Spirometer Charges unable to perform   Incentive Spirometer Predicted Level (mL) 1900   Administration (IS) unable to perform   Number of Repetitions (IS) 5   Level Incentive Spirometer (mL) 500   Patient Tolerance (IS) poor

## 2020-07-06 NOTE — PT/OT/SLP EVAL
Physical Therapy Evaluation    Patient Name:  Nina Portillo   MRN:  [id number]    Recommendations:     Discharge Recommendations:  nursing facility, skilled   Discharge Equipment Recommendations: none(has RW)   Barriers to discharge: Decreased caregiver support    Assessment:     Nina Portillo is a 79 y.o. female admitted with a medical diagnosis of Infectious encephalopathy.  She presents with the following impairments/functional limitations:  impaired self care skills, impaired functional mobilty, impaired endurance, weakness, gait instability, impaired balance, impaired cognition, decreased lower extremity function, decreased safety awareness, pain . Pt sleepy, slow in following directions. Pt tolerated standing and few steps with mod assist. Pt with early fatigue with inability to stand upright. Pt able to take few steps to chair only. Pt to benefit from continued therapies at SNF.    Rehab Prognosis: Fair; patient would benefit from acute skilled PT services to address these deficits and reach maximum level of function.    Recent Surgery: Procedure(s) (LRB):  CHOLECYSTECTOMY, LAPAROSCOPIC (N/A) 2 Days Post-Op    Plan:     During this hospitalization, patient to be seen 6 x/week to address the identified rehab impairments via gait training, therapeutic activities, therapeutic exercises and progress toward the following goals:    · Plan of Care Expires:  07/20/20    Subjective   Nurse Winifred stated pt did not tolerate diet  Pt stated that her daughter lives with her-  Pt requiring cueing to stand upright and straighten knees  Chief Complaint: sleepy/weak  Patient/Family Comments/goals: none stated  Pain/Comfort:  · Pain Rating 1: (did not rate)  · Location 1: abdomen  · Pain Addressed 1: Reposition, Distraction    Patients cultural, spiritual, Taoist conflicts given the current situation:      Living Environment:  Home with daughter  Prior to admission, patients level of function was ambulatory.   Equipment used at home: none.  DME owned (not currently used): none.  Upon discharge, patient will have assistance from family.    Objective:     Communicated with nurse Vitale prior to session.  Patient found HOB elevated with telemetry, peripheral IV, PureWick  upon PT entry to room.    General Precautions: Standard, fall   Orthopedic Precautions:N/A   Braces: N/A     Exams:  · Postural Exam:  Patient presented with the following abnormalities:    · -       Rounded shoulders  · -       Forward head  · -       Kyphosis  · RLE ROM: WFL  · RLE Strength: Deficits: 3/5  · LLE ROM: WFL  · LLE Strength: Deficits: 3/5    Functional Mobility:  · Bed Mobility:     · Rolling Left:  minimum assistance  · Scooting: moderate assistance  · Supine to Sit: minimum assistance  · Transfers:     · Sit to Stand:  moderate assistance with rolling walker  · Bed to Chair: moderate assistance with  rolling walker  using  Stand Pivot  · Gait: few small steps with RW mod assist/kyphotic    Therapeutic Activities and Exercises:   thera ex with AP,LAQ, marches with cueing to attend to task  Extra time needed for EOB sittinga nd standing  Pt left up in chair with tray table in front    AM-PAC 6 CLICK MOBILITY  Total Score:13     Patient left up in chair with all lines intact, call button in reach, chair alarm on and nurse Viatle notified.    GOALS:   Multidisciplinary Problems     Physical Therapy Goals        Problem: Physical Therapy Goal    Goal Priority Disciplines Outcome Goal Variances Interventions   Physical Therapy Goal     PT, PT/OT Ongoing, Progressing     Description: Goals to be met by: 2020     Patient will increase functional independence with mobility by performin. Supine to sit with MInimal Assistance  2. Sit to stand transfer with Minimal Assistance  3. Gait  x 150 feet with Minimal Assistance using Rolling Walker.   4. Lower extremity exercise program x20 reps                    History:     Past Medical  History:   Diagnosis Date    Alzheimer disease 2012    Cancer     renal cell. right    Dementia     Diabetes mellitus     GERD (gastroesophageal reflux disease)     Hyperlipidemia     Hypothyroid     Memory loss     Movement disorder        Past Surgical History:   Procedure Laterality Date    HYSTERECTOMY      JOINT REPLACEMENT      right    KNEE ARTHROSCOPY W/ DEBRIDEMENT      left    PARTIAL NEPHRECTOMY      right.     TONSILLECTOMY         Time Tracking:       PT Received On: 07/06/20  PT Start Time: 1321     PT Stop Time: 1348  PT Total Time (min): 27 min     Billable Minutes: Evaluation 10 and Therapeutic Activity 17      Geni Flores, PT  07/06/2020

## 2020-07-06 NOTE — PROGRESS NOTES
"[facility] Medicine  Progress Note    Patient Name: Nnia Portillo  MRN: [id number]  Patient Class: IP- Inpatient   Admission Date: 7/2/2020  Length of Stay: 3 days  Attending Physician: Amanda Thompson MD  Primary Care Provider: Rush Rosa MD        Subjective:     Principal Problem:Infectious encephalopathy        HPI:  Nina Portillo is a 79 y.o. female with a PMHx of hypertension, GERD, type 2 diabetes, renal cell carcinoma, Alzheimer's, and recurrent UTIs presents to the ED for evaluation altered mental status, fatigue, and emesis x2-3 days. Per the ED note the "the daughter reports increased confusion over the past few days with orange colored, foul smelling, urine today. She reports a prior hx of UTIs requiring hospital admission. No cough, fever, CP, SOB, gross hematuria, dysuria, or abdominal pain." The patient reports nausea and vomiting earlier this evening. The patient denies any abdominal pain, chest pain, shortness of breath, diarrhea, headache, dizziness, dysuria, or hematuria.  HPI and ROS limited secondary to patient's mental status.  Her ED workup is significant for tachycardia, leukocytosis, hyperbilirubinemia, elevated lipase, transaminitis, UTI, hyponatremia, and right upper quadrant ultrasound revealing cholelithiasis with contracted stone filled gallbladder. Patient received 1.5 L of normal saline and Rocephin while in the ED.  The patient will be placed in observation under Hospital Medicine for further workup and evaluation.    Overview/Hospital Course:  Ms. Portillo was admitted with gallstone pancreatitis and had cholecystectomy. She was slow to ambulate and had a lot of post-operative pain. She was treated for UTI with rocephin. E coli grew out of the urine culture and it was pan-sensitive.     Interval History: Patient did not tolerate diet, had nausea vomiting when the diet was advanced to regular diet. Also patient is unsteady and weakness while " ambulating    Review of Systems  Objective:     Vital Signs (Most Recent):  Temp: 98.7 °F (37.1 °C) (07/06/20 1101)  Pulse: 101 (07/06/20 1101)  Resp: 18 (07/06/20 1101)  BP: 130/78 (07/06/20 1101)  SpO2: 95 % (07/06/20 1101) Vital Signs (24h Range):  Temp:  [97.2 °F (36.2 °C)-98.7 °F (37.1 °C)] 98.7 °F (37.1 °C)  Pulse:  [] 101  Resp:  [16-18] 18  SpO2:  [94 %-96 %] 95 %  BP: (130-146)/(61-78) 130/78     Weight: 57.3 kg (126 lb 6.4 oz)  Body mass index is 21.03 kg/m².    Intake/Output Summary (Last 24 hours) at 7/6/2020 1418  Last data filed at 7/6/2020 0800  Gross per 24 hour   Intake 1255 ml   Output 300 ml   Net 955 ml      Physical Exam  Vitals signs and nursing note reviewed.   Constitutional:       General: She is not in acute distress.     Appearance: Normal appearance.   HENT:      Head: Normocephalic and atraumatic.      Nose: No rhinorrhea.      Mouth/Throat:      Mouth: Mucous membranes are dry.   Eyes:      General: No scleral icterus.     Extraocular Movements: Extraocular movements intact.      Pupils: Pupils are equal, round, and reactive to light.   Neck:      Musculoskeletal: Normal range of motion and neck supple.   Cardiovascular:      Rate and Rhythm: Normal rate and regular rhythm.      Pulses: Normal pulses.      Heart sounds: Normal heart sounds.   Pulmonary:      Effort: Pulmonary effort is normal.      Breath sounds: Normal breath sounds. No wheezing.   Abdominal:      General: There is no distension.      Palpations: Abdomen is soft.      Tenderness: There is abdominal tenderness.   Musculoskeletal:         General: No tenderness.      Right lower leg: No edema.      Left lower leg: No edema.   Skin:     General: Skin is warm and dry.      Coloration: Skin is not jaundiced.   Neurological:      General: No focal deficit present.      Mental Status: She is alert and oriented to person, place, and time.   Psychiatric:         Mood and Affect: Mood normal.         Behavior: Behavior  normal.         Significant Labs:   BMP:   Recent Labs   Lab 07/06/20  0434   *   *   K 4.0   CL 98   CO2 21*   BUN 13   CREATININE 0.7   CALCIUM 9.4     CBC:   Recent Labs   Lab 07/05/20  0432 07/06/20  0434   WBC 13.13* 12.15   HGB 12.3 12.1   HCT 37.3 37.3    219       Significant Imaging: I have reviewed all pertinent imaging results/findings within the past 24 hours.      Assessment/Plan:      AD (Alzheimer's disease)  Hx noted      Calculus of gallbladder without cholecystitis  RUQ US reviewed: Cholelithiasis with contracted stone filled gallbladder.    Cholecystectomy on 7/3/2020    Acute biliary pancreatitis without infection or necrosis  Lipase was ~1000 initially  S/p cholecystectomy on 7/3/2020  Analgesics and anti-emetics  Needs to ambulate and use IS  Cleared by surgery for discharge      Transaminitis  Gallstone pancreatitis s/p cholecystectomy. Stable today. Expect improvement.    AST   Date Value Ref Range Status   07/06/2020 43 (H) 10 - 40 U/L Final     ALT   Date Value Ref Range Status   07/06/2020 58 (H) 10 - 44 U/L Final       Urinary tract infection without hematuria  Microbiology Results (last 7 days)     Procedure Component Value Units Date/Time    Blood Culture #1 [[id number]] Collected: 07/02/20 1735    Order Status: Completed Specimen: Blood from Peripheral, Left Wrist Updated: 07/05/20 2212     Blood Culture, Routine No Growth to date      No Growth to date      No Growth to date      No Growth to date    Urine culture [[id number]]  (Abnormal)  (Susceptibility) Collected: 07/02/20 1628    Order Status: Completed Specimen: Urine Updated: 07/05/20 0125     Urine Culture, Routine ESCHERICHIA COLI  >100,000 cfu/ml      Narrative:      Specimen Source->Urine      Pansensitive      Antibiotics (From admission, onward)    Start     Stop Route Frequency Ordered    07/03/20 1800  cefTRIAXone (ROCEPHIN) 1 g/50 mL D5W IVPB      -- IV Every 24 hours (non-standard times) 07/02/20 2036           Hyponatremia  Will follow up with urine lytes  Will stop IV fluids  Will start with water restriction and trend    Sodium   Date Value Ref Range Status   07/06/2020 129 (L) 136 - 145 mmol/L Final       Controlled type 2 diabetes mellitus with diabetic neuropathy, without long-term current use of insulin  Patient's FSGs are controlled on current hypoglycemics.   Last A1c reviewed-   Lab Results   Component Value Date    HGBA1C 7.2 (H) 07/02/2020     Most recent fingerstick glucose reviewed-   Recent Labs   Lab 07/05/20  1710 07/05/20  2157 07/06/20  0511   POCTGLUCOSE 128* 154* 143*     Current correctional scale  Low  Maintain anti-hyperglycemic dose as follows-   Antihyperglycemics (From admission, onward)    Start     Stop Route Frequency Ordered    07/02/20 2036  insulin aspart U-100 pen 0-5 Units      -- SubQ Before meals & nightly PRN 07/02/20 2036          Severe sepsis  This patient does have evidence of infective focus  My overall impression is sepsis.  Antibiotics given-   Antibiotics (From admission, onward)    Start     Stop Route Frequency Ordered    07/03/20 1800  cefTRIAXone (ROCEPHIN) 1 g/50 mL D5W IVPB      -- IV Every 24 hours (non-standard times) 07/02/20 2036          Latest lactate reviewed, they are-  No results for input(s): LACTATE in the last 72 hours.    Organ dysfunction indicated by Encephalopathy and Acute liver injury  Source- UTI  Source control Achieved by- Rocephin    History of renal cell carcinoma  Noted.    Alzheimer's disease  Dementia is controlled currently. Continue home dementia meds and non-pharmacologic interventions to prevent delirium (Activity during day, opening blinds, providing glasses/hearing aids, and up in chair during daytime). Use PRN anti-psychotics to prevent behavior of self harm during sundowning, and avoid narcotics and benzos unless absolutely necessary. PRN anti-psychotics prescribed to avoid self harm behaviors.    GERD (gastroesophageal reflux  disease)  Chronic, stable. Continue PPI.    Hypothyroid  Patient has chronic hypothyroidism. TFTs reviewed-   Lab Results   Component Value Date    TSH 1.147 07/03/2020   . Will continue chronic levothyroxine and adjust for and clinical changes.    Hypertension associated with diabetes  Chronic, controlled.  Latest blood pressure and vitals reviewed-   Temp:  [97.2 °F (36.2 °C)-98.7 °F (37.1 °C)]   Pulse:  []   Resp:  [16-18]   BP: (130-146)/(61-78)   SpO2:  [94 %-96 %] .     Hypertension Medications at home            amLODIPine (NORVASC) 10 MG tablet Take 1 tablet (10 mg total) by mouth once daily.      Hospital Medications             amLODIPine tablet 10 mg 10 mg, Oral, Daily            VTE Risk Mitigation (From admission, onward)         Ordered     IP VTE HIGH RISK PATIENT  Once      07/02/20 2036     Place sequential compression device  Until discontinued      07/02/20 2036                      Amanda Thompson MD  Department of Hospital Medicine   [facility]

## 2020-07-06 NOTE — ASSESSMENT & PLAN NOTE
Chronic, controlled.  Latest blood pressure and vitals reviewed-   Temp:  [97.2 °F (36.2 °C)-98.7 °F (37.1 °C)]   Pulse:  []   Resp:  [16-18]   BP: (130-146)/(61-78)   SpO2:  [94 %-96 %] .     Hypertension Medications at home            amLODIPine (NORVASC) 10 MG tablet Take 1 tablet (10 mg total) by mouth once daily.      Hospital Medications             amLODIPine tablet 10 mg 10 mg, Oral, Daily

## 2020-07-06 NOTE — ASSESSMENT & PLAN NOTE
Lipase was ~1000 initially  S/p cholecystectomy on 7/3/2020  Analgesics and anti-emetics  Needs to ambulate and use IS  Cleared by surgery for discharge

## 2020-07-06 NOTE — ASSESSMENT & PLAN NOTE
Will follow up with urine lytes  Will stop IV fluids  Will start with water restriction and trend    Sodium   Date Value Ref Range Status   07/06/2020 129 (L) 136 - 145 mmol/L Final

## 2020-07-07 VITALS
SYSTOLIC BLOOD PRESSURE: 113 MMHG | HEART RATE: 84 BPM | OXYGEN SATURATION: 94 % | BODY MASS INDEX: 21.05 KG/M2 | RESPIRATION RATE: 18 BRPM | HEIGHT: 65 IN | WEIGHT: 126.38 LBS | TEMPERATURE: 97 F | DIASTOLIC BLOOD PRESSURE: 63 MMHG

## 2020-07-07 PROBLEM — A41.9 SEVERE SEPSIS: Status: RESOLVED | Noted: 2019-02-15 | Resolved: 2020-07-07

## 2020-07-07 PROBLEM — R65.20 SEVERE SEPSIS: Status: RESOLVED | Noted: 2019-02-15 | Resolved: 2020-07-07

## 2020-07-07 LAB
ALBUMIN SERPL BCP-MCNC: 2.3 G/DL (ref 3.5–5.2)
ALP SERPL-CCNC: 225 U/L (ref 55–135)
ALT SERPL W/O P-5'-P-CCNC: 49 U/L (ref 10–44)
ANION GAP SERPL CALC-SCNC: 10 MMOL/L (ref 8–16)
AST SERPL-CCNC: 40 U/L (ref 10–40)
BACTERIA BLD CULT: NORMAL
BASOPHILS # BLD AUTO: 0.04 K/UL (ref 0–0.2)
BASOPHILS NFR BLD: 0.4 % (ref 0–1.9)
BILIRUB SERPL-MCNC: 1.3 MG/DL (ref 0.1–1)
BUN SERPL-MCNC: 8 MG/DL (ref 8–23)
CALCIUM SERPL-MCNC: 9.5 MG/DL (ref 8.7–10.5)
CHLORIDE SERPL-SCNC: 99 MMOL/L (ref 95–110)
CO2 SERPL-SCNC: 23 MMOL/L (ref 23–29)
CREAT SERPL-MCNC: 0.7 MG/DL (ref 0.5–1.4)
DIFFERENTIAL METHOD: ABNORMAL
EOSINOPHIL # BLD AUTO: 0.3 K/UL (ref 0–0.5)
EOSINOPHIL NFR BLD: 2.4 % (ref 0–8)
ERYTHROCYTE [DISTWIDTH] IN BLOOD BY AUTOMATED COUNT: 12.3 % (ref 11.5–14.5)
EST. GFR  (AFRICAN AMERICAN): >60 ML/MIN/1.73 M^2
EST. GFR  (NON AFRICAN AMERICAN): >60 ML/MIN/1.73 M^2
GGT SERPL-CCNC: 167 U/L (ref 8–55)
GLUCOSE SERPL-MCNC: 158 MG/DL (ref 70–110)
HCT VFR BLD AUTO: 37.8 % (ref 37–48.5)
HGB BLD-MCNC: 12.2 G/DL (ref 12–16)
IMM GRANULOCYTES # BLD AUTO: 0.07 K/UL (ref 0–0.04)
IMM GRANULOCYTES NFR BLD AUTO: 0.6 % (ref 0–0.5)
LIPASE SERPL-CCNC: 54 U/L (ref 4–60)
LYMPHOCYTES # BLD AUTO: 1.1 K/UL (ref 1–4.8)
LYMPHOCYTES NFR BLD: 9.7 % (ref 18–48)
MAGNESIUM SERPL-MCNC: 1.8 MG/DL (ref 1.6–2.6)
MCH RBC QN AUTO: 31.4 PG (ref 27–31)
MCHC RBC AUTO-ENTMCNC: 32.3 G/DL (ref 32–36)
MCV RBC AUTO: 97 FL (ref 82–98)
MONOCYTES # BLD AUTO: 1 K/UL (ref 0.3–1)
MONOCYTES NFR BLD: 8.9 % (ref 4–15)
NEUTROPHILS # BLD AUTO: 8.8 K/UL (ref 1.8–7.7)
NEUTROPHILS NFR BLD: 78 % (ref 38–73)
NRBC BLD-RTO: 0 /100 WBC
PHOSPHATE SERPL-MCNC: 1.5 MG/DL (ref 2.7–4.5)
PLATELET # BLD AUTO: 243 K/UL (ref 150–350)
PMV BLD AUTO: 9.7 FL (ref 9.2–12.9)
POCT GLUCOSE: 146 MG/DL (ref 70–110)
POCT GLUCOSE: 163 MG/DL (ref 70–110)
POTASSIUM SERPL-SCNC: 3.8 MMOL/L (ref 3.5–5.1)
PROT SERPL-MCNC: 6.8 G/DL (ref 6–8.4)
RBC # BLD AUTO: 3.88 M/UL (ref 4–5.4)
SODIUM SERPL-SCNC: 132 MMOL/L (ref 136–145)
WBC # BLD AUTO: 11.25 K/UL (ref 3.9–12.7)

## 2020-07-07 PROCEDURE — 83735 ASSAY OF MAGNESIUM: CPT

## 2020-07-07 PROCEDURE — 82977 ASSAY OF GGT: CPT

## 2020-07-07 PROCEDURE — 84100 ASSAY OF PHOSPHORUS: CPT

## 2020-07-07 PROCEDURE — 83690 ASSAY OF LIPASE: CPT

## 2020-07-07 PROCEDURE — 25000003 PHARM REV CODE 250: Performed by: SURGERY

## 2020-07-07 PROCEDURE — 85025 COMPLETE CBC W/AUTO DIFF WBC: CPT

## 2020-07-07 PROCEDURE — 99900035 HC TECH TIME PER 15 MIN (STAT)

## 2020-07-07 PROCEDURE — 94760 N-INVAS EAR/PLS OXIMETRY 1: CPT

## 2020-07-07 PROCEDURE — 97116 GAIT TRAINING THERAPY: CPT

## 2020-07-07 PROCEDURE — 80053 COMPREHEN METABOLIC PANEL: CPT

## 2020-07-07 PROCEDURE — 36415 COLL VENOUS BLD VENIPUNCTURE: CPT

## 2020-07-07 PROCEDURE — 25000003 PHARM REV CODE 250: Performed by: INTERNAL MEDICINE

## 2020-07-07 RX ORDER — AMOXICILLIN AND CLAVULANATE POTASSIUM 875; 125 MG/1; MG/1
1 TABLET, FILM COATED ORAL EVERY 12 HOURS
Status: DISCONTINUED | OUTPATIENT
Start: 2020-07-07 | End: 2020-07-07 | Stop reason: HOSPADM

## 2020-07-07 RX ORDER — LANOLIN ALCOHOL/MO/W.PET/CERES
400 CREAM (GRAM) TOPICAL DAILY
Refills: 0 | COMMUNITY
Start: 2020-07-07

## 2020-07-07 RX ORDER — SODIUM,POTASSIUM PHOSPHATES 280-250MG
2 POWDER IN PACKET (EA) ORAL
Refills: 0 | COMMUNITY
Start: 2020-07-07 | End: 2020-07-10

## 2020-07-07 RX ORDER — ONDANSETRON 4 MG/1
4 TABLET, ORALLY DISINTEGRATING ORAL EVERY 6 HOURS PRN
Qty: 15 TABLET | Refills: 1 | Status: SHIPPED | OUTPATIENT
Start: 2020-07-07

## 2020-07-07 RX ORDER — AMOXICILLIN AND CLAVULANATE POTASSIUM 875; 125 MG/1; MG/1
1 TABLET, FILM COATED ORAL EVERY 12 HOURS
Qty: 5 TABLET | Refills: 0 | Status: SHIPPED | OUTPATIENT
Start: 2020-07-07 | End: 2020-07-10

## 2020-07-07 RX ORDER — SODIUM,POTASSIUM PHOSPHATES 280-250MG
2 POWDER IN PACKET (EA) ORAL 3 TIMES DAILY
Status: DISCONTINUED | OUTPATIENT
Start: 2020-07-07 | End: 2020-07-07 | Stop reason: HOSPADM

## 2020-07-07 RX ADMIN — OXYCODONE 5 MG: 5 TABLET ORAL at 10:07

## 2020-07-07 RX ADMIN — PANTOPRAZOLE SODIUM 40 MG: 40 TABLET, DELAYED RELEASE ORAL at 10:07

## 2020-07-07 RX ADMIN — AMLODIPINE BESYLATE 10 MG: 5 TABLET ORAL at 10:07

## 2020-07-07 RX ADMIN — POTASSIUM & SODIUM PHOSPHATES POWDER PACK 280-160-250 MG 2 PACKET: 280-160-250 PACK at 10:07

## 2020-07-07 RX ADMIN — AMOXICILLIN AND CLAVULANATE POTASSIUM 1 TABLET: 875; 125 TABLET, FILM COATED ORAL at 10:07

## 2020-07-07 RX ADMIN — LEVOTHYROXINE SODIUM 100 MCG: 100 TABLET ORAL at 05:07

## 2020-07-07 NOTE — PLAN OF CARE
Pt is hypoactive, pt has low energy, pt slept through most of shift, pure wick in place with no signs of discomfort, pt able to turn self in bed with little assistance, fall precautions maintained, iv access maintained, po intake tolerated well, pain controled with prn medications, will continue to monitor.

## 2020-07-07 NOTE — RESPIRATORY THERAPY
07/07/20 0808   Patient Assessment/Suction   Level of Consciousness (AVPU) alert   PRE-TX-O2   O2 Device (Oxygen Therapy) room air   SpO2 97 %   Pulse Oximetry Type Intermittent   $ Pulse Oximetry - Single Charge Pulse Oximetry - Single   Incentive Spirometer   $ Incentive Spirometer Charges unable to perform

## 2020-07-07 NOTE — PLAN OF CARE
Hospital follow up scheduled with PCP. ARIEL updated.     Post op apt scheduled with Dr. Mane. ARIEL updated.

## 2020-07-07 NOTE — PLAN OF CARE
Per PHN- the pt is assigned to [facility]. I sent via [employer], updated the AVS and booked the discharge destination. Linda Trejo, CEDRIC     07/07/20 1304   Post-Acute Status   Post-Acute Authorization Home Health   Home Health Status Set-up Complete

## 2020-07-07 NOTE — PT/OT/SLP PROGRESS
Physical Therapy Treatment    Patient Name:  Nina Portillo   MRN:  [id number]    Recommendations:     Discharge Recommendations:  nursing facility, skilled, home health PT   Discharge Equipment Recommendations: walker, rolling   Barriers to discharge: Decreased caregiver support    Assessment:     Nina Portillo is a 79 y.o. female admitted with a medical diagnosis of Urinary tract infection without hematuria.  She presents with the following impairments/functional limitations:  gait instability, impaired functional mobilty, impaired self care skills, impaired endurance, weakness, impaired balance, impaired cognition, decreased lower extremity function, decreased safety awareness . ,pt appears passive and flat affect, sheets over head and requiring several cueing. Pt ambulated 10ft with RW mod assist with chair following. Pt refusing SNF- will need 24 hr supervision at discharge.    Rehab Prognosis: Fair; patient would benefit from acute skilled PT services to address these deficits and reach maximum level of function.    Recent Surgery: Procedure(s) (LRB):  CHOLECYSTECTOMY, LAPAROSCOPIC (N/A) 3 Days Post-Op    Plan:     During this hospitalization, patient to be seen 6 x/week to address the identified rehab impairments via gait training, therapeutic activities, therapeutic exercises and progress toward the following goals:    · Plan of Care Expires:  07/20/20    Subjective   Pt requiring multiple prompting and education re importance of activities and OOB  Little verbalization, good eye contact  Stated her daughter and son in law takes turns to work  Chief Complaint: weakness  Patient/Family Comments/goals: none stated  Pain/Comfort:  · Pain Rating 1: 0/10      Objective:     Communicated with nurse Maldonado prior to session.  Patient found HOB elevated sheet over head with SCD, telemetry upon PT entry to room.     General Precautions: Standard, fall   Orthopedic Precautions:N/A   Braces: N/A     Functional  Mobility:  · Bed Mobility:     · Rolling Left:  minimum assistance  · Scooting: minimum assistance  · Supine to Sit: minimum assistance  · Transfers:     · Sit to Stand:  minimum assistance with rolling walker  · Bed to Chair: minimum assistance with  rolling walker  using  Stand Pivot  · Gait: 10ft with RW min/mod assist      AM-PAC 6 CLICK MOBILITY          Therapeutic Activities and Exercises:   OOB to chair post PT  Educated on LE's thera ex and importance of OOB  Closes eyes when not stimulated    Patient left up in chair with all lines intact, chair alarm on and nurse Erica present..    GOALS:   Multidisciplinary Problems     Physical Therapy Goals        Problem: Physical Therapy Goal    Goal Priority Disciplines Outcome Goal Variances Interventions   Physical Therapy Goal     PT, PT/OT Ongoing, Progressing     Description: Goals to be met by: 2020     Patient will increase functional independence with mobility by performin. Supine to sit with MInimal Assistance  2. Sit to stand transfer with Minimal Assistance  3. Gait  x 150 feet with Minimal Assistance using Rolling Walker.   4. Lower extremity exercise program x20 reps                    Time Tracking:     PT Received On: 20  PT Start Time: 1026     PT Stop Time: 1043  PT Total Time (min): 17 min     Billable Minutes: Gait Training 17    Treatment Type: Treatment  PT/PTA: PT     PTA Visit Number: 0     Geni Flores, PT  2020

## 2020-07-07 NOTE — PLAN OF CARE
Pt clear for discharge from case management standpoint. Pt discharging to home with home health services assigned by PHN.          Final Note (most recent)        Final Note - 07/07/20 0837          Final Note    Assessment Type  Final Discharge Note  (Pended)      Anticipated Discharge Disposition  Home-Health  (Pended)      What phone number can be called within the next 1-3 days to see how you are doing after discharge?  --     Hospital Follow Up  Appt(s) scheduled?  Yes  (Pended)      Discharge plans and expectations educations in teach back method with documentation complete?  --     Disposition  --     Post Acute Recommendation  --     Referral Type  --     Facility Name  --     Street  --     [facility]  --        Post-Acute Status    Post-Acute Authorization  --     Post-Acute Placement Status  --     Part D Coverage  --     Patient choice form signed by patient/caregiver  --     Discharge Delays  --

## 2020-07-07 NOTE — PLAN OF CARE
"Pt daughter sarthak lockwood  "understanding," to d/c instructions, follow up visits and new medication administration, IV removed, pt tolerated well, staff packed personal belongings, denies abdominal pain/discomfort prior to d/c, dressing remains dry/intact, escorted to main lobby by staff, to home per private vehicle, safety maintain    "

## 2020-07-07 NOTE — PLAN OF CARE
I sent the pts HH orders and HH packet to [facility] to assign a HH company. CM following. Linda Trejo, CEDRIC     07/07/20 0823   Post-Acute Status   Post-Acute Authorization Home Health   Post-Acute Placement Status Referrals Sent

## 2020-07-07 NOTE — DISCHARGE SUMMARY
"[facility] Medicine  Discharge Summary      Patient Name: Nina Portillo  MRN: [id number]  Admission Date: 7/2/2020  Hospital Length of Stay: 4 days  Discharge Date and Time:  07/07/2020 9:21 AM  Attending Physician: Amanda Thompson MD   Discharging Provider: Amanda Thompson MD  Primary Care Provider: Rush Rosa MD      HPI:   Nina Portillo is a 79 y.o. female with a PMHx of hypertension, GERD, type 2 diabetes, renal cell carcinoma, Alzheimer's, and recurrent UTIs presents to the ED for evaluation altered mental status, fatigue, and emesis x2-3 days. Per the ED note the "the daughter reports increased confusion over the past few days with orange colored, foul smelling, urine today. She reports a prior hx of UTIs requiring hospital admission. No cough, fever, CP, SOB, gross hematuria, dysuria, or abdominal pain." The patient reports nausea and vomiting earlier this evening. The patient denies any abdominal pain, chest pain, shortness of breath, diarrhea, headache, dizziness, dysuria, or hematuria.  HPI and ROS limited secondary to patient's mental status.  Her ED workup is significant for tachycardia, leukocytosis, hyperbilirubinemia, elevated lipase, transaminitis, UTI, hyponatremia, and right upper quadrant ultrasound revealing cholelithiasis with contracted stone filled gallbladder. Patient received 1.5 L of normal saline and Rocephin while in the ED.  The patient will be placed in observation under Hospital Medicine for further workup and evaluation.    Procedure(s) (LRB):  CHOLECYSTECTOMY, LAPAROSCOPIC (N/A)      Hospital Course:   Ms. Portillo was admitted with gallstone pancreatitis and had cholecystectomy. She was slow to ambulate and had a lot of post-operative pain. She was treated for UTI with rocephin. E coli grew out of the urine culture and it was pan-sensitive.  Evaluated by GI and surgery cleared the patient for discharge and with outpatient follow-up  " Evaluated By physical therapy who recommended skilled nursing facility for the patient.  The family wanted take the patient back home with home health.  Patient medically cleared for discharge     Consults:   Consults (From admission, onward)        Status Ordering Provider     Inpatient consult to Gastroenterology  Once     Provider:  Shai Green MD    Completed TAMIKO CISSE     Inpatient consult to General Surgery  Once     Provider:  Raffi Mane MD    Completed LESLY RANGEL     Inpatient consult to Social Work/Case Management  Once     Provider:  (Not yet assigned)    ARMOND Gonzales          No new Assessment & Plan notes have been filed under this hospital service since the last note was generated.  Service: Hospital Medicine    Final Active Diagnoses:    Diagnosis Date Noted POA    PRINCIPAL PROBLEM:  Urinary tract infection without hematuria [N39.0] 07/03/2020 Yes    AD (Alzheimer's disease) [G30.9, F02.80] 07/06/2020 Yes    Hyponatremia [E87.1] 07/03/2020 Yes    Transaminitis [R74.0] 07/03/2020 Yes    Acute biliary pancreatitis without infection or necrosis [K85.10] 07/03/2020 Yes    Calculus of gallbladder without cholecystitis [K80.20] 07/03/2020 Yes    Controlled type 2 diabetes mellitus with diabetic neuropathy, without long-term current use of insulin [E11.40] 02/16/2019 Yes    History of renal cell carcinoma [Z85.528] 02/03/2017 Not Applicable    Alzheimer's disease [G30.9, F02.80] 03/24/2014 Yes    Hypothyroid [E03.9] 05/31/2012 Yes    Hypertension associated with diabetes [E11.59, I10] 05/31/2012 Yes    GERD (gastroesophageal reflux disease) [K21.9] 05/31/2012 Yes      Problems Resolved During this Admission:    Diagnosis Date Noted Date Resolved POA    Infectious encephalopathy [G93.49, B99.9] 07/02/2020 07/06/2020 Yes    Severe sepsis [A41.9, R65.20] 02/15/2019 07/07/2020 Yes       Discharged Condition: stable    Disposition: Home or Self  Care    Follow Up:  Follow-up Information     Rush Rosa MD In 2 weeks.    Specialties: Family Medicine, Internal Medicine  Contact information:  [address]  [place] [address]  [phone]             Raffi Mane MD In 2 weeks.    Specialties: General Surgery, Colon and Rectal Surgery, Surgery  Contact information:  [address]  [address]  [place] [address]  [phone]             Shai Green MD In 2 weeks.    Specialty: Gastroenterology  Contact information:  [address]  [place] [address]  [phone]                 Patient Instructions:      Referral to Home health   Referral Priority: Routine Referral Type: Home Health   Referral Reason: Specialty Services Required   Requested Specialty: Home Health Services   Number of Visits Requested: 1     Notify your health care provider if you experience any of the following:  temperature >100.4     Notify your health care provider if you experience any of the following:  severe uncontrolled pain     Activity as tolerated       Significant Diagnostic Studies: Labs:   BMP:   Recent Labs   Lab 07/06/20 0434 07/07/20 0628   * 158*   * 132*   K 4.0 3.8   CL 98 99   CO2 21* 23   BUN 13 8   CREATININE 0.7 0.7   CALCIUM 9.4 9.5   MG  --  1.8   , CMP   Recent Labs   Lab 07/06/20 0434 07/07/20 0628   * 132*   K 4.0 3.8   CL 98 99   CO2 21* 23   * 158*   BUN 13 8   CREATININE 0.7 0.7   CALCIUM 9.4 9.5   PROT 6.7 6.8   ALBUMIN 2.4* 2.3*   BILITOT 1.7* 1.3*   ALKPHOS 200* 225*   AST 43* 40   ALT 58* 49*   ANIONGAP 10 10   ESTGFRAFRICA >60 >60   EGFRNONAA >60 >60    and CBC   Recent Labs   Lab 07/06/20 0434 07/07/20 0628   WBC 12.15 11.25   HGB 12.1 12.2   HCT 37.3 37.8    243     Microbiology:   Blood Culture   Lab Results   Component Value Date    LABBLOO No Growth to date 07/02/2020    LABBLOO No Growth to date 07/02/2020    LABBLOO No Growth to date 07/02/2020    LABBLOO No Growth to date 07/02/2020    LABBLOO  No Growth to date 07/02/2020    and Urine Culture    Lab Results   Component Value Date    LABURIN ESCHERICHIA COLI  >100,000 cfu/ml   (A) 07/02/2020       Pending Diagnostic Studies:     Procedure Component Value Units Date/Time    Specimen to Pathology, Surgery General Surgery [[id number]] Collected: 07/04/20 1017    Order Status: Sent Lab Status: In process Updated: 07/06/20 0801         Medications:  Reconciled Home Medications:      Medication List      START taking these medications    amoxicillin-clavulanate 875-125mg 875-125 mg per tablet  Commonly known as: AUGMENTIN  Take 1 tablet by mouth every 12 (twelve) hours. for 3 days     magnesium oxide 400 mg (241.3 mg magnesium) tablet  Commonly known as: MAG-OX  Take 1 tablet (400 mg total) by mouth once daily.     ondansetron 4 MG Tbdl  Commonly known as: ZOFRAN-ODT  Take 1 tablet (4 mg total) by mouth every 6 (six) hours as needed.     potassium, sodium phosphates 280-160-250 mg Pwpk  Commonly known as: PHOS-NAK  Take 2 packets by mouth 4 (four) times daily before meals and nightly. for 3 days        CONTINUE taking these medications    amLODIPine 10 MG tablet  Commonly known as: NORVASC  Take 1 tablet (10 mg total) by mouth once daily.     aspirin 81 MG EC tablet  Commonly known as: ECOTRIN  Take 81 mg by mouth once daily.     blood sugar diagnostic Strp  1 each by Misc.(Non-Drug; Combo Route) route once daily.     blood-glucose meter Misc  1 kit by Misc.(Non-Drug; Combo Route) route once daily.     lancets 33 gauge Misc  1 lancet by Misc.(Non-Drug; Combo Route) route once daily.     levothyroxine 100 MCG tablet  Commonly known as: SYNTHROID  Take 1 tablet (100 mcg total) by mouth before breakfast.     metFORMIN 500 MG XR 24hr tablet  Commonly known as: GLUCOPHAGE-XR  Take 1 tablet (500 mg total) by mouth after dinner.     mirtazapine 15 MG tablet  Commonly known as: REMERON  Take 1 tablet (15 mg total) by mouth every evening.     pantoprazole 40 MG  tablet  Commonly known as: PROTONIX  Take 1 tablet (40 mg total) by mouth once daily.            Indwelling Lines/Drains at time of discharge:   Lines/Drains/Airways     Drain            Female External Urinary Catheter 07/04/20 2000 2 days                Time spent on the discharge of patient: 60 minutes  Patient was seen and examined on the date of discharge and determined to be suitable for discharge.         Amanda Thompson MD  Department of Hospital Medicine  [facility]

## 2020-07-07 NOTE — PLAN OF CARE
Problem: Physical Therapy Goal  Goal: Physical Therapy Goal  Description: Goals to be met by: 2020     Patient will increase functional independence with mobility by performin. Supine to sit with MInimal Assistance  2. Sit to stand transfer with Minimal Assistance  3. Gait  x 150 feet with Minimal Assistance using Rolling Walker.   4. Lower extremity exercise program x20 reps   Outcome: Ongoing, Progressing   Pt flat affect/passive- ambulated 10ft with RW min/mod assist with chair following. Pt refusing SNF- will need HHPT

## 2020-07-08 LAB
FINAL PATHOLOGIC DIAGNOSIS: NORMAL
GROSS: NORMAL

## 2020-07-08 PROCEDURE — G0180 MD CERTIFICATION HHA PATIENT: HCPCS | Mod: ,,, | Performed by: INTERNAL MEDICINE

## 2020-07-08 PROCEDURE — G0180 PR HOME HEALTH MD CERTIFICATION: ICD-10-PCS | Mod: ,,, | Performed by: INTERNAL MEDICINE

## 2020-07-09 ENCOUNTER — TELEPHONE (OUTPATIENT)
Dept: MEDSURG UNIT | Facility: HOSPITAL | Age: 80
End: 2020-07-09

## 2020-07-10 NOTE — PHYSICIAN QUERY
PT Name: Nina Portillo  MR #: [id number]     Documentation Clarification      CDS: Yanet Pelaez RN, CCDS  Contact information: [email or web address]    This form is a permanent document in the medical record.     Query Date: July 10, 2020    By submitting this query, we are merely seeking further clarification of documentation. Please utilize your independent clinical judgment when addressing the question(s) below.    The Medical Record reflects the following:    Supporting Clinical Findings Location in Medical Record   Hypertension associated with diabetes  Chronic, controlled.  Latest blood pressure and vitals reviewed-   Temp:  [98.2 °F (36.8 °C)-99.3 °F (37.4 °C)]   Pulse:  []   Resp:  [16-18]   BP: (132-169)/(57-81)   SpO2:  [96 %-98 %] .     Home meds for hypertension were reviewed and noted below. Hospital anti-hypertensive changes were made as shown below.  Hypertension Medications   amLODIPine (NORVASC) 10 MG tablet Take 1 tablet (10 mg total) by mouth once daily.      Hospital Medications   amLODIPine tablet 10 mg 10 mg, Oral, Daily     Controlled type 2 diabetes mellitus with diabetic neuropathy, without long-term current use of insulin  Patient's FSGs are controlled on current hypoglycemics.   Last A1c reviewed-   Lab Results  Component Value Date    HGBA1C 7.6 (H) 01/10/2020 H&P 7/3   Hypertension   This is a chronic problem. The current episode started more than 1 year ago. The problem has been gradually worsening since onset. The problem is uncontrolled.     Identifiable causes of hypertension include a thyroid problem.   Office visit note 5/20/2020  CONRADO Sandoval  Family Practice     Doctor, please confirm the meaning of hypertension associated with DM.    [  x ] HTN is a manifestation of DM (Secondary HTN)   [   ] HTN is not a manifestation of DM (Essential HTN)   [   ] Other (please specify): ____________   [  ] Clinically undetermined

## 2020-07-13 ENCOUNTER — TELEPHONE (OUTPATIENT)
Dept: FAMILY MEDICINE | Facility: CLINIC | Age: 80
End: 2020-07-13

## 2020-07-13 DIAGNOSIS — G30.9 ALZHEIMER'S DEMENTIA WITHOUT BEHAVIORAL DISTURBANCE, UNSPECIFIED TIMING OF DEMENTIA ONSET: Primary | ICD-10-CM

## 2020-07-13 DIAGNOSIS — R63.4 WEIGHT LOSS: ICD-10-CM

## 2020-07-13 DIAGNOSIS — F02.80 ALZHEIMER'S DEMENTIA WITHOUT BEHAVIORAL DISTURBANCE, UNSPECIFIED TIMING OF DEMENTIA ONSET: Primary | ICD-10-CM

## 2020-11-17 ENCOUNTER — DOCUMENT SCAN (OUTPATIENT)
Dept: HOME HEALTH SERVICES | Facility: HOSPITAL | Age: 80
End: 2020-11-17
Payer: MEDICARE

## 2020-11-17 ENCOUNTER — TELEPHONE (OUTPATIENT)
Dept: FAMILY MEDICINE | Facility: CLINIC | Age: 80
End: 2020-11-17

## 2020-11-17 NOTE — TELEPHONE ENCOUNTER
Spoke with patient's daughter. Soledad, who states her mother has been slowly declining since we spoke last, but has taken a turn for the worst recently. Hospice nurse has told her that pt's weight is <100 pounds now. Patient is no longer able to sit up in WC, too weak. Has indwelling urinary catheter now and is hardly eating. Soledad has had to take off work to care full time for patient who appears to be entering the final phase of dementia. Soledad needs [facility] paperwork completed.

## 2020-11-19 ENCOUNTER — EXTERNAL HOME HEALTH (OUTPATIENT)
Dept: HOME HEALTH SERVICES | Facility: HOSPITAL | Age: 80
End: 2020-11-19
Payer: MEDICARE

## 2023-02-08 NOTE — PLAN OF CARE
Problem: Physical Therapy Goal  Goal: Physical Therapy Goal  Description  Goals to be met by: 2020    Patient will increase functional independence with mobility by performin. Supine to sit with Modified [place]  2. Sit to supine with Modified [place]  3. Sit to stand transfer with Modified [place]  4. Bed to chair transfer with Supervision using Rolling Walker  5. Gait  x 150 feet with Supervision using Single-point Cane .      Outcome: Ongoing, Progressing      Ear Star Wedge Flap Text: The defect edges were debeveled with a #15 blade scalpel.  Given the location of the defect and the proximity to free margins (helical rim) an ear star wedge flap was deemed most appropriate.  Using a sterile surgical marker, the appropriate flap was drawn incorporating the defect and placing the expected incisions between the helical rim and antihelix where possible.  The area thus outlined was incised through and through with a #15 scalpel blade.

## (undated) DEVICE — CANNULA ENDOPATH XCEL 5X100MM

## (undated) DEVICE — BLADE SURG CARBON STEEL SZ11

## (undated) DEVICE — GLOVE SURG ULTRA TOUCH 7.5

## (undated) DEVICE — KIT ANTIFOG

## (undated) DEVICE — LINER SUCTION 3000CC

## (undated) DEVICE — DISSECTOR CURVED 5DCD

## (undated) DEVICE — SEE MEDLINE ITEM 157117

## (undated) DEVICE — APPLIER CLIP EPIX UNIV 5X34

## (undated) DEVICE — SUT 0 VICRYL / UR6 (J603)

## (undated) DEVICE — SLEEVE SCD EXPRESS CALF MEDIUM

## (undated) DEVICE — PACK CUSTOM ENDO CHOLO SLI

## (undated) DEVICE — SYR 10CC LUER LOCK

## (undated) DEVICE — SOL WATER STRL IRR 1000ML

## (undated) DEVICE — NDL SAFETY 21G X 1 1/2 ECLPSE

## (undated) DEVICE — APPLICATOR CHLORAPREP ORN 26ML

## (undated) DEVICE — SUT MONOCRYL 4-0 PS-2

## (undated) DEVICE — IRRIGATOR SUCTION W/TIP

## (undated) DEVICE — TROCAR KII FIOS 5MM X 100MM

## (undated) DEVICE — BAG TISS RETRV MONARCH 10MM

## (undated) DEVICE — CLOSURE SKIN STERI STRIP 1/2X4

## (undated) DEVICE — TROCAR ENDOPATH XCEL 11MM 10CM

## (undated) DEVICE — SET TUBE PNEUMOCLEAR SE HI FLO

## (undated) DEVICE — NDL INSUF ULTRA VERESS 120MM

## (undated) DEVICE — SCISSOR 5MMX35CM DIRECT DRIVE

## (undated) DEVICE — ELECTRODE REM PLYHSV RETURN 9

## (undated) DEVICE — SEE MEDLINE ITEM 152622

## (undated) DEVICE — STRAP OR TABLE 5IN X 72IN

## (undated) DEVICE — SOL IRR NACL .9% 3000ML

## (undated) DEVICE — SUT VCRL ENDOLOOP 0 18 VIOL

## (undated) DEVICE — TROCAR ENDOPATH XCEL 5X100MM